# Patient Record
Sex: MALE | Race: BLACK OR AFRICAN AMERICAN | NOT HISPANIC OR LATINO | Employment: OTHER | URBAN - METROPOLITAN AREA
[De-identification: names, ages, dates, MRNs, and addresses within clinical notes are randomized per-mention and may not be internally consistent; named-entity substitution may affect disease eponyms.]

---

## 2021-04-12 ENCOUNTER — OFFICE VISIT (OUTPATIENT)
Dept: URGENT CARE | Facility: CLINIC | Age: 86
End: 2021-04-12
Payer: MEDICARE

## 2021-04-12 VITALS
SYSTOLIC BLOOD PRESSURE: 170 MMHG | DIASTOLIC BLOOD PRESSURE: 84 MMHG | HEART RATE: 71 BPM | OXYGEN SATURATION: 95 % | TEMPERATURE: 98 F | RESPIRATION RATE: 18 BRPM

## 2021-04-12 DIAGNOSIS — G56.03 BILATERAL CARPAL TUNNEL SYNDROME: Primary | ICD-10-CM

## 2021-04-12 DIAGNOSIS — R20.0 BILATERAL HAND NUMBNESS: ICD-10-CM

## 2021-04-12 DIAGNOSIS — R03.0 ELEVATED BLOOD PRESSURE READING: ICD-10-CM

## 2021-04-12 DIAGNOSIS — Z76.89 ENCOUNTER TO ESTABLISH CARE: ICD-10-CM

## 2021-04-12 PROCEDURE — 99203 OFFICE O/P NEW LOW 30 MIN: CPT | Mod: S$GLB,,, | Performed by: NURSE PRACTITIONER

## 2021-04-12 PROCEDURE — 99203 PR OFFICE/OUTPT VISIT, NEW, LEVL III, 30-44 MIN: ICD-10-PCS | Mod: S$GLB,,, | Performed by: NURSE PRACTITIONER

## 2021-04-12 RX ORDER — DICLOFENAC SODIUM 10 MG/G
2 GEL TOPICAL DAILY
Qty: 20 G | Refills: 0 | Status: SHIPPED | OUTPATIENT
Start: 2021-04-12 | End: 2022-11-07 | Stop reason: SDUPTHER

## 2021-04-13 ENCOUNTER — TELEPHONE (OUTPATIENT)
Dept: URGENT CARE | Facility: CLINIC | Age: 86
End: 2021-04-13

## 2021-09-28 ENCOUNTER — OFFICE VISIT (OUTPATIENT)
Dept: URGENT CARE | Facility: CLINIC | Age: 86
End: 2021-09-28
Payer: COMMERCIAL

## 2021-09-28 VITALS
TEMPERATURE: 98 F | HEART RATE: 57 BPM | SYSTOLIC BLOOD PRESSURE: 165 MMHG | OXYGEN SATURATION: 95 % | DIASTOLIC BLOOD PRESSURE: 87 MMHG | RESPIRATION RATE: 18 BRPM

## 2021-09-28 DIAGNOSIS — R20.2 PARESTHESIAS: ICD-10-CM

## 2021-09-28 DIAGNOSIS — S22.060A COMPRESSION FRACTURE OF T7 VERTEBRA, INITIAL ENCOUNTER: Primary | ICD-10-CM

## 2021-09-28 DIAGNOSIS — J30.9 ALLERGIC RHINITIS, UNSPECIFIED SEASONALITY, UNSPECIFIED TRIGGER: ICD-10-CM

## 2021-09-28 DIAGNOSIS — S39.92XA INJURY OF BACK, INITIAL ENCOUNTER: ICD-10-CM

## 2021-09-28 PROCEDURE — 1159F PR MEDICATION LIST DOCUMENTED IN MEDICAL RECORD: ICD-10-PCS | Mod: CPTII,S$GLB,, | Performed by: PHYSICIAN ASSISTANT

## 2021-09-28 PROCEDURE — 1125F PR PAIN SEVERITY QUANTIFIED, PAIN PRESENT: ICD-10-PCS | Mod: CPTII,S$GLB,, | Performed by: PHYSICIAN ASSISTANT

## 2021-09-28 PROCEDURE — 1159F MED LIST DOCD IN RCRD: CPT | Mod: CPTII,S$GLB,, | Performed by: PHYSICIAN ASSISTANT

## 2021-09-28 PROCEDURE — 72070 X-RAY EXAM THORAC SPINE 2VWS: CPT | Mod: S$GLB,,, | Performed by: RADIOLOGY

## 2021-09-28 PROCEDURE — 1160F PR REVIEW ALL MEDS BY PRESCRIBER/CLIN PHARMACIST DOCUMENTED: ICD-10-PCS | Mod: CPTII,S$GLB,, | Performed by: PHYSICIAN ASSISTANT

## 2021-09-28 PROCEDURE — 99214 OFFICE O/P EST MOD 30 MIN: CPT | Mod: S$GLB,,, | Performed by: PHYSICIAN ASSISTANT

## 2021-09-28 PROCEDURE — 1160F RVW MEDS BY RX/DR IN RCRD: CPT | Mod: CPTII,S$GLB,, | Performed by: PHYSICIAN ASSISTANT

## 2021-09-28 PROCEDURE — 99214 PR OFFICE/OUTPT VISIT, EST, LEVL IV, 30-39 MIN: ICD-10-PCS | Mod: S$GLB,,, | Performed by: PHYSICIAN ASSISTANT

## 2021-09-28 PROCEDURE — 72070 XR THORACIC SPINE AP LATERAL: ICD-10-PCS | Mod: S$GLB,,, | Performed by: RADIOLOGY

## 2021-09-28 PROCEDURE — 1125F AMNT PAIN NOTED PAIN PRSNT: CPT | Mod: CPTII,S$GLB,, | Performed by: PHYSICIAN ASSISTANT

## 2021-09-28 RX ORDER — METHOCARBAMOL 500 MG/1
1000 TABLET, FILM COATED ORAL 3 TIMES DAILY
Qty: 30 TABLET | Refills: 0 | Status: SHIPPED | OUTPATIENT
Start: 2021-09-28 | End: 2021-10-03

## 2022-04-26 ENCOUNTER — OFFICE VISIT (OUTPATIENT)
Dept: INTERNAL MEDICINE | Facility: CLINIC | Age: 87
End: 2022-04-26
Payer: COMMERCIAL

## 2022-04-26 ENCOUNTER — HOSPITAL ENCOUNTER (OUTPATIENT)
Dept: RADIOLOGY | Facility: HOSPITAL | Age: 87
Discharge: HOME | End: 2022-04-26
Attending: STUDENT IN AN ORGANIZED HEALTH CARE EDUCATION/TRAINING PROGRAM
Payer: MEDICARE

## 2022-04-26 VITALS
HEIGHT: 67 IN | WEIGHT: 130.31 LBS | BODY MASS INDEX: 20.45 KG/M2 | DIASTOLIC BLOOD PRESSURE: 68 MMHG | OXYGEN SATURATION: 98 % | HEART RATE: 89 BPM | SYSTOLIC BLOOD PRESSURE: 130 MMHG

## 2022-04-26 DIAGNOSIS — G56.00 CARPAL TUNNEL SYNDROME, UNSPECIFIED LATERALITY: ICD-10-CM

## 2022-04-26 DIAGNOSIS — H53.9 VISION CHANGES: ICD-10-CM

## 2022-04-26 DIAGNOSIS — G56.00 CARPAL TUNNEL SYNDROME, UNSPECIFIED LATERALITY: Primary | ICD-10-CM

## 2022-04-26 PROCEDURE — 99999 PR PBB SHADOW E&M-EST. PATIENT-LVL III: ICD-10-PCS | Mod: PBBFAC,GC,, | Performed by: STUDENT IN AN ORGANIZED HEALTH CARE EDUCATION/TRAINING PROGRAM

## 2022-04-26 PROCEDURE — 99213 PR OFFICE/OUTPT VISIT, EST, LEVL III, 20-29 MIN: ICD-10-PCS | Mod: HCNC,S$GLB,, | Performed by: STUDENT IN AN ORGANIZED HEALTH CARE EDUCATION/TRAINING PROGRAM

## 2022-04-26 PROCEDURE — 72040 X-RAY EXAM NECK SPINE 2-3 VW: CPT | Mod: 26,,, | Performed by: RADIOLOGY

## 2022-04-26 PROCEDURE — 99999 PR PBB SHADOW E&M-EST. PATIENT-LVL III: CPT | Mod: PBBFAC,GC,, | Performed by: STUDENT IN AN ORGANIZED HEALTH CARE EDUCATION/TRAINING PROGRAM

## 2022-04-26 PROCEDURE — 72040 XR CERVICAL SPINE AP LATERAL: ICD-10-PCS | Mod: 26,,, | Performed by: RADIOLOGY

## 2022-04-26 PROCEDURE — 72040 X-RAY EXAM NECK SPINE 2-3 VW: CPT | Mod: TC

## 2022-04-26 PROCEDURE — 99213 OFFICE O/P EST LOW 20 MIN: CPT | Mod: HCNC,S$GLB,, | Performed by: STUDENT IN AN ORGANIZED HEALTH CARE EDUCATION/TRAINING PROGRAM

## 2022-04-26 NOTE — PATIENT INSTRUCTIONS
Plan:  - EMG ordered  - xray of neck ordered  - optometry referral placed  - start wearing wrist splints at night  - follow up in 6 weeks

## 2022-04-26 NOTE — PROGRESS NOTES
"RESIDENT CLINIC PROGRESS NOTE    Name: Moses Peng  : 1935  Date of Service: 2022   PCP: Primary Doctor No    Reason for visit:   Chief Complaint   Patient presents with    Numbness     Pt states that he has been having numbness in both hands for a few years. Pt states that he has had 4 falls and hit his head and hurt his ribs about 2 weeks ago and pt states that he is still in pain       HPI: Moses Peng is a 86 y.o. male who presents to clinic for bilateral hand pain. Patient describes several year of numbness and painful intermittent tingling of both his hands. He states that his pain is worse at night. Patient works as a  and notes that pain can be bad when he is working. He notes that he has reduced  strength. Additionally, patient states that he has had multiple falls over the last several weeks. Most notably, 1 week ago he fell after "losing balance" and hit the back of his head. He states that he did not lose consciousness at this time and was not down for a long period. He lives alone. He denies any light headedness.     Medications:   Current Outpatient Medications:     diclofenac sodium (VOLTAREN) 1 % Gel, Apply 2 g topically once daily. As needed for hand pain or numbness., Disp: 20 g, Rfl: 0     Review of Systems:   Review of Systems   Constitutional: Positive for weight loss. Negative for chills, diaphoresis and fever.   HENT: Negative for congestion, hearing loss, sinus pain, sore throat and tinnitus.    Eyes: Negative for blurred vision, pain, discharge and redness.   Respiratory: Negative for cough, hemoptysis, sputum production, shortness of breath and wheezing.    Cardiovascular: Negative for chest pain, palpitations, orthopnea, claudication and leg swelling.   Gastrointestinal: Negative for abdominal pain, blood in stool, constipation, diarrhea, heartburn, melena, nausea and vomiting.   Genitourinary: Negative for dysuria, frequency and urgency.   Musculoskeletal: " "Positive for falls and neck pain. Negative for back pain, joint pain and myalgias.   Skin: Negative for itching and rash.   Neurological: Positive for tingling. Negative for dizziness, tremors, speech change, focal weakness, seizures, weakness and headaches.   Endo/Heme/Allergies: Negative for environmental allergies and polydipsia. Does not bruise/bleed easily.       Vitals:   Vitals:    04/26/22 1431   BP: 130/68   BP Location: Right arm   Patient Position: Sitting   BP Method: Medium (Manual)   Pulse: 89   SpO2: 98%   Weight: 59.1 kg (130 lb 4.7 oz)   Height: 5' 7" (1.702 m)     Body mass index is 20.41 kg/m².    Physical Exam:   Physical Exam  Constitutional:       General: He is not in acute distress.     Appearance: Normal appearance. He is normal weight. He is not ill-appearing, toxic-appearing or diaphoretic.   Eyes:      General: No scleral icterus.        Right eye: No discharge.         Left eye: No discharge.   Cardiovascular:      Rate and Rhythm: Normal rate and regular rhythm.      Pulses: Normal pulses.      Heart sounds: Normal heart sounds. No murmur heard.    No friction rub. No gallop.   Pulmonary:      Effort: Pulmonary effort is normal. No respiratory distress.      Breath sounds: Normal breath sounds. No stridor. No wheezing, rhonchi or rales.   Chest:      Chest wall: No tenderness.   Abdominal:      General: Abdomen is flat. Bowel sounds are normal. There is no distension.      Palpations: Abdomen is soft. There is no mass.      Tenderness: There is no abdominal tenderness. There is no guarding.   Musculoskeletal:      Comments: Reduced ROM of neck to left, limited by pain    Positive bilateral phalen, could not elicit tinel. Reduced  strength bilaterally   Skin:     General: Skin is warm and dry.      Coloration: Skin is not jaundiced.   Neurological:      General: No focal deficit present.      Mental Status: He is alert and oriented to person, place, and time.      Coordination: " Coordination normal.      Gait: Gait normal.      Deep Tendon Reflexes: Reflexes normal.         Labs: Previous labs reviewed.    Imaging: Previous imaging reviewed.     Assessment/Plan:   Carpal tunnel syndrome, unspecified laterality  Symptoms consistent with carpal tunnel syndrome. Patient states he has not yet trialed wrist splints. Will plan to start using them at night and avoid repetitive activities if possible. EMG ordered. Will also ordered neck film to rule out cervical pathology, particularly given recent fall.  -     EMG W/ ULTRASOUND AND NERVE CONDUCTION TEST 2 Extremities; Future  -     X-Ray Cervical Spine AP And Lateral; Future; Expected date: 04/26/2022    Vision changes  Glasses recently broke  -     Ambulatory referral/consult to Optometry; Future; Expected date: 05/03/2022    Follow up in 6 weeks.    Discussed with Dr. Oliveira    I have discussed A/P with DR Florentino and agree with plan of action.  Toribio Centeno.

## 2022-04-28 ENCOUNTER — TELEPHONE (OUTPATIENT)
Dept: INTERNAL MEDICINE | Facility: CLINIC | Age: 87
End: 2022-04-28
Payer: MEDICARE

## 2022-04-28 NOTE — TELEPHONE ENCOUNTER
----- Message from Mary Graham sent at 4/28/2022  9:52 AM CDT -----  Contact: Shaan (daughter) [phone]  GENERAL CALL BACK    Patient's daughter Shaan called indicating she was under the impression that patient was supposed to have some medications called in for his hand numbness and wrist pain? Their pharmacy did not have any prescription information. She'd like them called into:     [id number] - [place] - [address], [phone]    Shaan would like a call back once done at [phone]

## 2022-06-17 ENCOUNTER — TELEPHONE (OUTPATIENT)
Dept: INTERNAL MEDICINE | Facility: CLINIC | Age: 87
End: 2022-06-17
Payer: MEDICARE

## 2022-06-17 NOTE — TELEPHONE ENCOUNTER
----- Message from Adeoladilcia Fatima sent at 2022  2:59 PM CDT -----  Contact: or [phone]  Cc: Dariel Florentino MD  What orders is pt asking for?: Carpal tunnel syndrome, unspecified laterality    Is there a future appointment with the provider?:no     When?:    Comments?: patient order  and would like a new order to be place. Thank you

## 2022-11-07 ENCOUNTER — HOSPITAL ENCOUNTER (EMERGENCY)
Facility: HOSPITAL | Age: 87
Discharge: HOME | End: 2022-11-07
Attending: EMERGENCY MEDICINE
Payer: MEDICARE

## 2022-11-07 VITALS
WEIGHT: 135 LBS | TEMPERATURE: 98 F | OXYGEN SATURATION: 100 % | DIASTOLIC BLOOD PRESSURE: 91 MMHG | HEART RATE: 63 BPM | RESPIRATION RATE: 18 BRPM | SYSTOLIC BLOOD PRESSURE: 204 MMHG | BODY MASS INDEX: 21.14 KG/M2

## 2022-11-07 DIAGNOSIS — M71.22 SYNOVIAL CYST OF LEFT POPLITEAL SPACE: Primary | ICD-10-CM

## 2022-11-07 DIAGNOSIS — R60.9 EDEMA: ICD-10-CM

## 2022-11-07 DIAGNOSIS — M71.21 BAKER CYST, RIGHT: ICD-10-CM

## 2022-11-07 DIAGNOSIS — R20.0 BILATERAL HAND NUMBNESS: ICD-10-CM

## 2022-11-07 DIAGNOSIS — R06.02 SHORTNESS OF BREATH: ICD-10-CM

## 2022-11-07 DIAGNOSIS — R60.0 PERIPHERAL EDEMA: ICD-10-CM

## 2022-11-07 DIAGNOSIS — G56.03 CARPAL TUNNEL SYNDROME ON BOTH SIDES: ICD-10-CM

## 2022-11-07 LAB
ALBUMIN SERPL-MCNC: 3 G/DL (ref 3.3–5.5)
ALP SERPL-CCNC: 81 U/L (ref 42–141)
BILIRUB SERPL-MCNC: 0.5 MG/DL (ref 0.2–1.6)
BUN SERPL-MCNC: 17 MG/DL (ref 7–22)
CALCIUM SERPL-MCNC: 9 MG/DL (ref 8–10.3)
CHLORIDE SERPL-SCNC: 106 MMOL/L (ref 98–108)
CREAT SERPL-MCNC: 1.2 MG/DL (ref 0.6–1.2)
GLUCOSE SERPL-MCNC: 90 MG/DL (ref 73–118)
POC ALT (SGPT): 22 U/L (ref 10–47)
POC AST (SGOT): 36 U/L (ref 11–38)
POC B-TYPE NATRIURETIC PEPTIDE: 262 PG/ML (ref 0–100)
POC CARDIAC TROPONIN I: 0.01 NG/ML
POC TCO2: 28 MMOL/L (ref 18–33)
POTASSIUM BLD-SCNC: 4.4 MMOL/L (ref 3.6–5.1)
PROTEIN, POC: 6.8 G/DL (ref 6.4–8.1)
SAMPLE: NORMAL
SODIUM BLD-SCNC: 141 MMOL/L (ref 128–145)

## 2022-11-07 PROCEDURE — 83880 ASSAY OF NATRIURETIC PEPTIDE: CPT | Mod: ER

## 2022-11-07 PROCEDURE — 84484 ASSAY OF TROPONIN QUANT: CPT | Mod: ER

## 2022-11-07 PROCEDURE — 93010 ELECTROCARDIOGRAM REPORT: CPT | Mod: ,,, | Performed by: INTERNAL MEDICINE

## 2022-11-07 PROCEDURE — 36000 PLACE NEEDLE IN VEIN: CPT | Mod: ER

## 2022-11-07 PROCEDURE — 93005 ELECTROCARDIOGRAM TRACING: CPT | Mod: ER

## 2022-11-07 PROCEDURE — 99285 EMERGENCY DEPT VISIT HI MDM: CPT | Mod: 25,ER

## 2022-11-07 PROCEDURE — 80053 COMPREHEN METABOLIC PANEL: CPT | Mod: ER

## 2022-11-07 PROCEDURE — 93010 EKG 12-LEAD: ICD-10-PCS | Mod: ,,, | Performed by: INTERNAL MEDICINE

## 2022-11-07 PROCEDURE — 85025 COMPLETE CBC W/AUTO DIFF WBC: CPT | Mod: ER

## 2022-11-07 RX ORDER — DICLOFENAC SODIUM 10 MG/G
2 GEL TOPICAL DAILY
Qty: 20 G | Refills: 0 | Status: SHIPPED | OUTPATIENT
Start: 2022-11-07

## 2022-11-07 RX ORDER — HYDROCHLOROTHIAZIDE 12.5 MG/1
12.5 TABLET ORAL DAILY
Qty: 30 TABLET | Refills: 11 | OUTPATIENT
Start: 2022-11-07 | End: 2023-12-28

## 2022-11-07 NOTE — ED PROVIDER NOTES
"Encounter Date: 11/7/2022    SCRIBE #1 NOTE: I, Natty Barba, am scribing for, and in the presence of,  Mary Strange MD. I have scribed the following portions of the note - Other sections scribed: HPI, ROS, PE.     History     Chief Complaint   Patient presents with    Leg Pain    hand numbness     Pt states," My right leg has been swollen for a month and my hands are numb foir the last year."     87 y.o. male with Hx of Asthma and Carpal tunnel syndrome who presents to the ED with multiple complaints including bilateral leg swelling for 2-4 weeks, frequent urination (every 30 minutes) for 2-4 weeks, and numbness to the bilateral hands for 1 year. Patient works outside cutting grass. Per chart review, patient has previously been seen for hand numbness and evaluated for carpal tunnel syndrome. He says the medications prescribed are not helping his symptoms. Denies history of DM or neuropathy. Denies shortness of breath. Patient smokes cigars occasionally.     The history is provided by the patient. No  was used.   Review of patient's allergies indicates:  No Known Allergies  No past medical history on file.  No past surgical history on file.  No family history on file.  Social History     Tobacco Use    Smoking status: Every Day    Smokeless tobacco: Never     Review of Systems   Constitutional:  Negative for fatigue and fever.   HENT:  Negative for facial swelling.    Eyes:  Negative for pain.   Respiratory:  Negative for shortness of breath.    Cardiovascular:  Positive for leg swelling. Negative for chest pain.   Gastrointestinal:  Negative for abdominal pain, nausea and vomiting.   Genitourinary:  Positive for frequency. Negative for dysuria.   Musculoskeletal:  Negative for back pain.   Skin:  Negative for color change.   Neurological:  Positive for numbness. Negative for headaches.   Hematological:  Does not bruise/bleed easily.   Psychiatric/Behavioral:  Negative for behavioral problems.  "   All other systems reviewed and are negative.    Physical Exam     Initial Vitals [22 1042]   BP Pulse Resp Temp SpO2   (!) 177/94 81 16 98 °F (36.7 °C) 99 %      MAP       --         Physical Exam    Nursing note and vitals reviewed.  Constitutional: He appears well-developed and well-nourished.   HENT:   Head: Normocephalic and atraumatic.   Eyes: EOM are normal. Pupils are equal, round, and reactive to light.   Neck: Neck supple.   Normal range of motion.  Cardiovascular:  Normal rate and regular rhythm.           Pulmonary/Chest: He has wheezes.   Scattered wheezes.   Abdominal: Abdomen is soft. There is no rebound and no guarding.   Musculoskeletal:         General: Normal range of motion.      Cervical back: Normal range of motion and neck supple.      Right lower le+ Edema present.      Left lower leg: Edema (trace) present.     Neurological: He is alert and oriented to person, place, and time. No cranial nerve deficit.   Skin: Skin is warm and dry.   Psychiatric: He has a normal mood and affect.       ED Course   Procedures  Labs Reviewed   POCT B-TYPE NATRIURETIC PEPTIDE (BNP) - Abnormal; Notable for the following components:       Result Value    POC B-Type Natriuretic Peptide 262 (*)     All other components within normal limits   TROPONIN ISTAT   POCT CBC   POCT CMP   POCT B-TYPE NATRIURETIC PEPTIDE (BNP)   POCT TROPONIN   POCT CMP       EKG Readings: (Independently Interpreted)   Initial Reading: No STEMI. Rhythm: Normal Sinus Rhythm. Heart Rate: 64. Ectopy: No Ectopy.     Imaging Results              X-Ray Chest PA And Lateral (Final result)  Result time 22 13:42:46      Final result by Jose Alejandro Tan MD (22 13:42:46)                   Impression:      No radiographic evidence of pneumonia or other source of shortness of breath, noting that early/mild viral pneumonia or nonspecific bronchitis may be radiographically occult.    Slight interval progression of a chronic appearing  moderate to severe anterior wedge compression deformity of a midthoracic vertebral body.      Electronically signed by: Jose Alejandro Tan MD  Date:    11/07/2022  Time:    13:42               Narrative:    EXAMINATION:  XR CHEST PA AND LATERAL    CLINICAL HISTORY:  Shortness of breath    TECHNIQUE:  PA and lateral views of the chest were performed.    COMPARISON:  Thoracic spine series 09/28/2021    FINDINGS:  Monitoring leads overlie the chest.  Relatively symmetric nipple shadows project over both lung bases on the frontal view.    Cardiomediastinal silhouette is midline and within normal limits for age noting calcification of the arch.  Pulmonary vasculature and hilar contours are within normal limits.    Few scattered linear opacities throughout the lungs consistent with minimal platelike scarring versus atelectasis.  The lungs are otherwise symmetrically well expanded without consolidation, pleural effusion or pneumothorax.    Moderate to severe anterior wedge compression deformity of a midthoracic vertebral body, slightly progressed from prior, but no definite radiolucency and likely chronic.  Remaining osseous structures appear stable without acute process seen.                                       US Lower Extremity Veins Bilateral (Final result)  Result time 11/07/22 13:46:05      Final result by Jose Alejandro Tan MD (11/07/22 13:46:05)                   Impression:      No evidence of deep venous thrombosis in either lower extremity.    Suspected bilateral Baker's cysts, right larger than left.      Electronically signed by: Jose Alejandro Tan MD  Date:    11/07/2022  Time:    13:46               Narrative:    EXAMINATION:  US LOWER EXTREMITY VEINS BILATERAL    CLINICAL HISTORY:  Edema, unspecified    TECHNIQUE:  Duplex and color flow Doppler and dynamic compression was performed of the bilateral lower extremity veins was performed.    COMPARISON:  None    FINDINGS:  Right thigh veins: The common femoral, femoral,  popliteal, upper greater saphenous, and deep femoral veins are patent and free of thrombus. The veins are normally compressible and have normal phasic flow and augmentation response.    Right calf veins: The visualized calf veins are patent.    Left thigh veins: The common femoral, femoral, popliteal, upper greater saphenous, and deep femoral veins are patent and free of thrombus. The veins are normally compressible and have normal phasic flow and augmentation response.    Left calf veins: The visualized calf veins are patent.    Miscellaneous: At the right popliteal fossa there is a 4 x 1.8 x 3.7 cm nonvascular hypoechoic collection.  At the left popliteal fossa there is a 2 x 0.6 x 1 cm nonvascular hypoechoic collection.  Diffuse mild nonspecific subcutaneous edema bilaterally.                                       Medications - No data to display  Medical Decision Making:   History:   Old Medical Records: I decided to obtain old medical records.  Initial Assessment:   This is an emergent evaluation of a 87 yr with leg swelling and hand paresthesias.   Differential Diagnosis:   DVT, chf, peripheral edema, carpal tunnel, amongst others.   Independently Interpreted Test(s):   I have ordered and independently interpreted EKG Reading(s) - see prior notes  Clinical Tests:   Lab Tests: Ordered and Reviewed  ED Management:  On exam, no acute distress, scattered wheezes on lung exam. Heart sounds wnl. +mild edema to bilateral lower extremities. Will obtain ultrasound and labs.   strength normal bilaterally.  Review of patient's chart reveals he has a history of carpal tunnel syndrome for which he has been evaluated by his primary care physician as well as a reported outpatient orthopedist who discussed potential surgery with him.  With regards to his hand complaint, I will refill his Voltaren gel and refer him back to his primary care physician.  Dispo is pending.     Mary Strange MD  1:34 PM  11/7/2022    Labs and  imaging returned.  He has bilateral Baker cysts and a mild elevation in his BNP at 262 but otherwise no acute findings.  Chest x-ray without significant pulmonary vascular congestion per Radiology interpretation.  Will discharge this patient home with instructions to follow with Dr. Gauman. Return precautions given.     Mary Strange MD  2:59 PM  11/7/2022              Scribe Attestation:   Scribe #1: I performed the above scribed service and the documentation accurately describes the services I performed. I attest to the accuracy of the note.      ED Course as of 11/07/22 0913   Mon Nov 07, 2022   1346 EKG: Normal sinus rhythm, 64 beats per minute, , no STEMI. [EM]      ED Course User Index  [EM] Mary Strange MD               I, Mary Strange MD, personally performed the services described in the documentation.  All medical records entries made by the scribe were made at my direction and in my presence.  I have reviewed the chart and agree that the record reflects my personal performance and is accurate and complete.   Clinical Impression:   Final diagnoses:  [R60.9] Edema  [R06.02] Shortness of breath  [M71.22] Synovial cyst of left popliteal space (Primary)  [M71.21] Baker cyst, right  [R60.9] Peripheral edema  [G56.03] Carpal tunnel syndrome on both sides      ED Disposition Condition    Discharge Stable          ED Prescriptions       Medication Sig Dispense Start Date End Date Auth. Provider    diclofenac sodium (VOLTAREN) 1 % Gel Apply 2 g topically once daily. As needed for hand pain or numbness. 20 g 11/7/2022 -- Mary Strange MD          Follow-up Information       Follow up With Specialties Details Why Contact Info    Milan Guaman MD Internal Medicine, Oncology, Hematology and Oncology   [address]  [address]  [place] [address]  [phone]               Mary Strange MD  11/07/22 6596

## 2022-11-17 ENCOUNTER — PES CALL (OUTPATIENT)
Dept: ADMINISTRATIVE | Facility: CLINIC | Age: 87
End: 2022-11-17
Payer: MEDICARE

## 2023-12-28 ENCOUNTER — HOSPITAL ENCOUNTER (EMERGENCY)
Facility: HOSPITAL | Age: 88
Discharge: HOME | End: 2023-12-28
Attending: STUDENT IN AN ORGANIZED HEALTH CARE EDUCATION/TRAINING PROGRAM
Payer: MEDICARE

## 2023-12-28 VITALS
HEART RATE: 80 BPM | TEMPERATURE: 98 F | WEIGHT: 135 LBS | RESPIRATION RATE: 16 BRPM | OXYGEN SATURATION: 98 % | BODY MASS INDEX: 21.14 KG/M2 | SYSTOLIC BLOOD PRESSURE: 217 MMHG | DIASTOLIC BLOOD PRESSURE: 104 MMHG

## 2023-12-28 DIAGNOSIS — I10 HYPERTENSION: ICD-10-CM

## 2023-12-28 DIAGNOSIS — R07.89 CHEST DISCOMFORT: ICD-10-CM

## 2023-12-28 DIAGNOSIS — R07.81 RIB PAIN: ICD-10-CM

## 2023-12-28 LAB
ALBUMIN SERPL BCP-MCNC: 3.3 G/DL (ref 3.5–5.2)
ALP SERPL-CCNC: 99 U/L (ref 55–135)
ALT SERPL W/O P-5'-P-CCNC: 26 U/L (ref 10–44)
ANION GAP SERPL CALC-SCNC: 8 MMOL/L (ref 8–16)
AST SERPL-CCNC: 52 U/L (ref 10–40)
BASOPHILS # BLD AUTO: 0.02 K/UL (ref 0–0.2)
BASOPHILS NFR BLD: 0.4 % (ref 0–1.9)
BILIRUB SERPL-MCNC: 0.5 MG/DL (ref 0.1–1)
BUN SERPL-MCNC: 18 MG/DL (ref 8–23)
CALCIUM SERPL-MCNC: 8.3 MG/DL (ref 8.7–10.5)
CHLORIDE SERPL-SCNC: 110 MMOL/L (ref 95–110)
CO2 SERPL-SCNC: 23 MMOL/L (ref 23–29)
CREAT SERPL-MCNC: 1.2 MG/DL (ref 0.5–1.4)
DIFFERENTIAL METHOD BLD: ABNORMAL
EOSINOPHIL # BLD AUTO: 0 K/UL (ref 0–0.5)
EOSINOPHIL NFR BLD: 0.7 % (ref 0–8)
ERYTHROCYTE [DISTWIDTH] IN BLOOD BY AUTOMATED COUNT: 14 % (ref 11.5–14.5)
EST. GFR  (NO RACE VARIABLE): 58.2 ML/MIN/1.73 M^2
GLUCOSE SERPL-MCNC: 81 MG/DL (ref 70–110)
HCT VFR BLD AUTO: 34.5 % (ref 40–54)
HGB BLD-MCNC: 12 G/DL (ref 14–18)
IMM GRANULOCYTES # BLD AUTO: 0.01 K/UL (ref 0–0.04)
IMM GRANULOCYTES NFR BLD AUTO: 0.2 % (ref 0–0.5)
LYMPHOCYTES # BLD AUTO: 1.2 K/UL (ref 1–4.8)
LYMPHOCYTES NFR BLD: 21.8 % (ref 18–48)
MCH RBC QN AUTO: 30.5 PG (ref 27–31)
MCHC RBC AUTO-ENTMCNC: 34.8 G/DL (ref 32–36)
MCV RBC AUTO: 88 FL (ref 82–98)
MONOCYTES # BLD AUTO: 0.3 K/UL (ref 0.3–1)
MONOCYTES NFR BLD: 5 % (ref 4–15)
NEUTROPHILS # BLD AUTO: 3.9 K/UL (ref 1.8–7.7)
NEUTROPHILS NFR BLD: 71.9 % (ref 38–73)
NRBC BLD-RTO: 0 /100 WBC
PLATELET # BLD AUTO: 227 K/UL (ref 150–450)
PMV BLD AUTO: 9.6 FL (ref 9.2–12.9)
POTASSIUM SERPL-SCNC: 3.9 MMOL/L (ref 3.5–5.1)
PROT SERPL-MCNC: 6.9 G/DL (ref 6–8.4)
RBC # BLD AUTO: 3.94 M/UL (ref 4.6–6.2)
SODIUM SERPL-SCNC: 141 MMOL/L (ref 136–145)
WBC # BLD AUTO: 5.45 K/UL (ref 3.9–12.7)

## 2023-12-28 PROCEDURE — 93005 ELECTROCARDIOGRAM TRACING: CPT

## 2023-12-28 PROCEDURE — 99285 EMERGENCY DEPT VISIT HI MDM: CPT | Mod: 25

## 2023-12-28 PROCEDURE — 80053 COMPREHEN METABOLIC PANEL: CPT | Performed by: PHYSICIAN ASSISTANT

## 2023-12-28 PROCEDURE — 85025 COMPLETE CBC W/AUTO DIFF WBC: CPT | Performed by: PHYSICIAN ASSISTANT

## 2023-12-28 PROCEDURE — 93010 ELECTROCARDIOGRAM REPORT: CPT | Mod: ,,, | Performed by: INTERNAL MEDICINE

## 2023-12-28 PROCEDURE — 25000003 PHARM REV CODE 250

## 2023-12-28 PROCEDURE — 25000003 PHARM REV CODE 250: Performed by: STUDENT IN AN ORGANIZED HEALTH CARE EDUCATION/TRAINING PROGRAM

## 2023-12-28 RX ORDER — LIDOCAINE 50 MG/G
1 PATCH TOPICAL
Status: DISCONTINUED | OUTPATIENT
Start: 2023-12-28 | End: 2023-12-28 | Stop reason: HOSPADM

## 2023-12-28 RX ORDER — ACETAMINOPHEN 500 MG
1000 TABLET ORAL
Status: COMPLETED | OUTPATIENT
Start: 2023-12-28 | End: 2023-12-28

## 2023-12-28 RX ORDER — MORPHINE SULFATE 4 MG/ML
4 INJECTION, SOLUTION INTRAMUSCULAR; INTRAVENOUS
Status: DISCONTINUED | OUTPATIENT
Start: 2023-12-28 | End: 2023-12-28 | Stop reason: HOSPADM

## 2023-12-28 RX ORDER — HYDROCHLOROTHIAZIDE 12.5 MG/1
12.5 TABLET ORAL ONCE
Status: COMPLETED | OUTPATIENT
Start: 2023-12-28 | End: 2023-12-28

## 2023-12-28 RX ORDER — HYDROCHLOROTHIAZIDE 12.5 MG/1
12.5 TABLET ORAL DAILY
Qty: 30 TABLET | Refills: 11 | Status: SHIPPED | OUTPATIENT
Start: 2023-12-28 | End: 2024-12-27

## 2023-12-28 RX ADMIN — ACETAMINOPHEN 1000 MG: 500 TABLET ORAL at 01:12

## 2023-12-28 RX ADMIN — LIDOCAINE 1 PATCH: 700 PATCH TOPICAL at 01:12

## 2023-12-28 RX ADMIN — HYDROCHLOROTHIAZIDE 12.5 MG: 12.5 TABLET ORAL at 01:12

## 2023-12-28 NOTE — ED TRIAGE NOTES
89 y/o M presents to ER with c/c R chest wall pain secondary fall on Sunday. Pain rated 9/10, endorses mechanical fall but denies SOB, N/V/D, fevers and chills.

## 2023-12-28 NOTE — ED NOTES
Patient identifiers for Moses Peng 88 y.o. male checked and correct.  Chief Complaint   Patient presents with    Fall     Fell Sunday night on right side; pain to right side of chest wall; worse with movement; denies head injury or blood thinners     No past medical history on file.  Allergies reported: Review of patient's allergies indicates:  No Known Allergies      LOC: Patient is awake, alert, and aware of environment with an appropriate affect. Patient is oriented x 4 and speaking appropriately.  APPEARANCE: Patient resting comfortably and in no acute distress. Patient is clean and well groomed, patient's clothing is properly fastened.  HEENT: - JVD, + midline trach  SKIN: The skin is warm and dry. Patient has normal skin turgor and moist mucus membranes.   MUSKULOSKELETAL: Patient is moving all extremities well, no obvious deformities noted. Pulses intact. PMS x 4.  RESPIRATORY: Airway is open and patent. Respirations are spontaneous and non-labored with normal effort and rate. = CBBS  CARDIAC: Patient has a normal rate and rhythm. 75 on cardiac monitor. No peripheral edema noted. Endorses R midclavicular c/p rated 9/10.  ABDOMEN: No distention noted. Soft and non-tender upon palpation.  NEUROLOGICAL: pupils 4 mm, PERRL. Facial expression is symmetrical. Hand grasps are equal bilaterally. Normal sensation in all extremities when touched with finger.        
Pt states he would like to go home, Dr. Pena notified.  
special scrub at home x3 days starting 10/3, 10/4 and 10/5 per MD order

## 2023-12-28 NOTE — FIRST PROVIDER EVALUATION
Emergency Department TeleTriage Encounter Note      CHIEF COMPLAINT    Chief Complaint   Patient presents with    Fall     Fell Sunday night on right side; pain to right side of chest wall; worse with movement; denies head injury or blood thinners       VITAL SIGNS   Initial Vitals [12/28/23 1127]   BP Pulse Resp Temp SpO2   (!) 226/102 97 16 98.3 °F (36.8 °C) 100 %      MAP       --            ALLERGIES    Review of patient's allergies indicates:  No Known Allergies    PROVIDER TRIAGE NOTE  Patient presents with complaint of right rib pain after a fall.  Also reports hitting the back of his head on the right side.  Denies loss of consciousness.  Denies nausea or vomiting.  Denies visual changes.  Denies history of hypertension.  States he does not check his blood pressure at home.      Phy:   Constitutional: well nourished, well developed, appearing stated age, NAD        Initial orders will be placed and care will be transferred to an alternate provider when patient is roomed for a full evaluation. Any additional orders and the final disposition will be determined by that provider.        ORDERS  Labs Reviewed - No data to display    ED Orders (720h ago, onward)      Start Ordered     Status Ordering Provider    12/28/23 1134 12/28/23 1133  EKG 12-lead  Once         Completed by DOMENICO HUNT on 12/28/2023 at 11:39 AM FELIBERTO TIMMONS              Virtual Visit Note: The provider triage portion of this emergency department evaluation and documentation was performed via [employer], a HIPAA-compliant telemedicine application, in concert with a tele-presenter in the room. A face to face patient evaluation with one of my colleagues will occur once the patient is placed in an emergency department room.      DISCLAIMER: This note was prepared with [employer] voice recognition transcription software. Garbled syntax, mangled pronouns, and other bizarre constructions may be attributed to that software system.

## 2023-12-28 NOTE — ED PROVIDER NOTES
Encounter Date: 12/28/2023       History     Chief Complaint   Patient presents with    Fall     Fell Sunday night on right side; pain to right side of chest wall; worse with movement; denies head injury or blood thinners     Patient is an 88 y.o. male with a PMH of HTN presenting to [facility] for evaluation of right sided anterior rib pain s/p ground level fall on Sunday night. He reports he was in his kitchen and lost his balance, falling onto the right side of his ribs and hitting the right side of his head. Denies LOC, vision changes, N/V, weakness, sensory changes, ataxia, dyspnea, palpitations, neck pain, back pain, headache, abdominal pain, or chest pain.     The history is provided by the patient and medical records. No  was used.     Review of patient's allergies indicates:  No Known Allergies  History reviewed. No pertinent past medical history.  History reviewed. No pertinent surgical history.  History reviewed. No pertinent family history.  Social History     Tobacco Use    Smoking status: Every Day    Smokeless tobacco: Never     Review of Systems    Physical Exam     Initial Vitals [12/28/23 1127]   BP Pulse Resp Temp SpO2   (!) 226/102 97 16 98.3 °F (36.8 °C) 100 %      MAP       --         Physical Exam    Nursing note and vitals reviewed.  Constitutional: He appears well-developed and well-nourished. No distress.   HENT:   Head: Normocephalic and atraumatic.   Right Ear: External ear normal.   Left Ear: External ear normal.   Eyes: Conjunctivae are normal. Pupils are equal, round, and reactive to light. No scleral icterus.   Neck: Neck supple.   Cardiovascular:  Normal rate and intact distal pulses.           Pulmonary/Chest: Breath sounds normal. No stridor. No respiratory distress. He has no wheezes. He has no rhonchi. He has no rales. He exhibits tenderness (mildly tender to palpation over lower anterior ribs in the mid clavicular line, no subcostal tenderness).    Abdominal: Abdomen is soft. He exhibits no distension. There is no abdominal tenderness. There is no rebound and no guarding.   Musculoskeletal:         General: No edema.      Cervical back: Neck supple.      Comments: No C/T/L spine tenderness. No tenderness over clavicles, upper extremities, or lower extremities.      Neurological: He is alert and oriented to person, place, and time. GCS score is 15. GCS eye subscore is 4. GCS verbal subscore is 5. GCS motor subscore is 6.   Skin: Skin is warm and dry. Capillary refill takes less than 2 seconds. No rash noted.   Psychiatric: He has a normal mood and affect. His behavior is normal.         ED Course   Procedures  Labs Reviewed   CBC W/ AUTO DIFFERENTIAL - Abnormal; Notable for the following components:       Result Value    RBC 3.94 (*)     Hemoglobin 12.0 (*)     Hematocrit 34.5 (*)     All other components within normal limits   COMPREHENSIVE METABOLIC PANEL - Abnormal; Notable for the following components:    Calcium 8.3 (*)     Albumin 3.3 (*)     AST 52 (*)     eGFR 58.2 (*)     All other components within normal limits        ECG Results              EKG 12-lead (Final result)  Result time 12/28/23 15:48:30      Final result by Interface, Lab In [facility] (12/28/23 15:48:30)                   Narrative:    Test Reason : R07.89,    Vent. Rate : 085 BPM     Atrial Rate : 085 BPM     P-R Int : 128 ms          QRS Dur : 088 ms      QT Int : 370 ms       P-R-T Axes : 077 054 070 degrees     QTc Int : 440 ms    Sinus rhythm with Premature atrial complexes  Voltage criteria for left ventricular hypertrophy  Nonspecific ST and/or T wave abnormalities  Abnormal ECG  When compared with ECG of 07-NOV-2022 12:51,  Premature atrial complexes are now Present  Confirmed by Agus Hicks MD ([id number]) on 12/28/2023 3:48:18 PM    Referred By: [email or web address]   SELF           Confirmed By:Agus Hicks MD                                  Imaging Results              CT Head Without  Contrast (Final result)  Result time 12/28/23 14:21:31      Final result by Anthony Concepcion MD (12/28/23 14:21:31)                   Impression:      No acute intracranial hemorrhage/injury.      Electronically signed by: Anthony Concepcion  Date:    12/28/2023  Time:    14:21               Narrative:    EXAMINATION:  CT HEAD WITHOUT CONTRAST    CLINICAL HISTORY:  Head trauma, minor (Age >= 65y);    TECHNIQUE:  Low dose axial images were obtained through the head.  Coronal and sagittal reformations were also performed. Contrast was not administered.    COMPARISON:  None.    FINDINGS:  No evidence of hydrocephalus, mass effect, intracranial hemorrhage or acute territorial infarct.    Decreased attenuation within the periventricular white matter is nonspecific but may reflect mild chronic small vessel ischemic change.    The calvarium is intact. The visualized sinuses and mastoid air cells are clear.  Bilateral orbital scleral bands.                                       CT Cervical Spine Without Contrast (Final result)  Result time 12/28/23 14:39:17      Final result by Pritesh Rothman MD (12/28/23 14:39:17)                   Impression:      Anterior wedging of T1, likely chronic.  Correlate with trauma history and for focal pain in this region.  No other fractures.    Multilevel degenerative changes, including a disc extrusion at C5-6 resulting in moderate canal stenosis.    Multinodular goiter.  Nonemergent thyroid ultrasound is recommended.    Other findings as described.      Electronically signed by: Pritesh Rothman  Date:    12/28/2023  Time:    14:39               Narrative:    EXAMINATION:  CT CERVICAL SPINE WITHOUT CONTRAST    CLINICAL HISTORY:  Neck trauma (Age >= 65y);    TECHNIQUE:  Low dose axial images, sagittal and coronal reformations were performed though the cervical spine.  Contrast was not administered.    COMPARISON:  Radiographs 04/26/2022 and 09/28/2021    FINDINGS:  ALIGNMENT: Straightening of  the cervical spine.  Grade 1 retrolisthesis at C4-5 and C5-6.    BONE: Diffuse osteopenia.  Anterior wedging of T1.  It is difficult to tell if this was present on previous radiographs.  No other fractures are identified.  No focal osseous lesions.    JOINT: Multilevel disc, uncovertebral, and facet joint degeneration.  Partial ankylosis of the C2-3 and C3-4 disc spaces and posterior elements, likely secondary to chronic inflammation.  Degenerative changes also involve the atlantodental and sternoclavicular joints.    SPINAL CANAL: See below.    POSTERIOR FOSSA: Refer to same day CT head.    PARASPINAL SOFT TISSUES: Enlarged thyroid gland containing multiple partially calcified nodules measuring up to 2.7 cm.  Mild atherosclerosis.  There is an air-fluid level in the upper esophagus, which could be secondary to dysmotility or reflux.  Paraseptal and centrilobular emphysema.    SIGNIFICANT FINDINGS BY LEVEL: Large central disc extrusion at C5-6 resulting in moderate canal stenosis with flattening of the cervical cord.  There is moderate-severe foraminal stenosis at multiple levels bilaterally.                                       X-Ray Ribs 2 View Right (Final result)  Result time 12/28/23 13:40:54      Final result by Stefano Blair MD (12/28/23 13:40:54)                   Impression:      No evidence of a displaced right-sided rib fracture.      Electronically signed by: Stefano Blair MD  Date:    12/28/2023  Time:    13:40               Narrative:    EXAMINATION:  XR RIBS 2 VIEW RIGHT    CLINICAL HISTORY:  Pleurodynia    TECHNIQUE:  Two views of the right ribs were performed.    COMPARISON:  09/28/2021.    FINDINGS:  There is demineralization of the osseous structures.  There is no cortical step-off.  There is no evidence of periostitis.    Monitoring EKG leads are present.  There is a partially visualized compression fracture of a midthoracic vertebral body.    There is no evidence of a displaced right-sided rib  fracture.                                       X-Ray Chest AP Portable (Final result)  Result time 12/28/23 13:42:28      Final result by Stefano Blair MD (12/28/23 13:42:28)                   Impression:      No acute process.      Electronically signed by: Stefano Blair MD  Date:    12/28/2023  Time:    13:42               Narrative:    EXAMINATION:  XR CHEST AP PORTABLE    CLINICAL HISTORY:  Pleurodynia    TECHNIQUE:  Single frontal view of the chest was performed.    COMPARISON:  11/07/2022.    FINDINGS:  Monitoring EKG leads are present.  The trachea is unremarkable.  There are calcifications of the aortic knob.  The cardiomediastinal silhouette is within normal limits.  There is no evidence of free air beneath the hemidiaphragms.  There are no pleural effusions.  There is no evidence of a pneumothorax.  There is no evidence of pneumomediastinum.  No airspace opacity is present.  The osseous structures demonstrate degenerative changes.                                       Medications   hydroCHLOROthiazide tablet 12.5 mg (12.5 mg Oral Given 12/28/23 1309)   acetaminophen tablet 1,000 mg (1,000 mg Oral Given 12/28/23 1309)     Medical Decision Making  Obtained imaging of head, c spine, and ribs to evaluate for injuries after fall 4 days ago.     CT c-spine demonstrated anterior wedging of T1, that was felt to likely be chronic. Re-evaluated patient who confirmed he has had no back pain and has no tenderness on exam. Full ROM without pain. Additionally discussed importance of PCP follow up for ultrasound of multinodular goiter. Referral placed to internal medicine clinic for patient to establish care.     Patient did not want to stay for blood pressure control and states he understands the risks. Discussed importance of adherence to antihypertensive medication to help prevent stroke or heart attack, and importance of follow up with PCP for blood pressure monitoring and potentially adding medications to better  manage his pressures. Discussed plan for discharge home, referral to IM clinic to establish care with a PCP, prescription for HCTZ sent to patient's pharmacy, and strict return precautions and patient expressed understanding and agreement.     Amount and/or Complexity of Data Reviewed  Radiology: ordered. Decision-making details documented in ED Course.    Risk  OTC drugs.  Prescription drug management.               ED Course as of 12/29/23 1712   Thu Dec 28, 2023   1252 EKG with sinus rhythm, rate 85, no STEMI. [NN]   1423 XR negative for rib fracture [NN]   1437 CT Head Without Contrast  Impression:     No acute intracranial hemorrhage/injury.   [OW]   1506 Impression:     Anterior wedging of T1, likely chronic.  Correlate with trauma history and for focal pain in this region.  No other fractures.     Multilevel degenerative changes, including a disc extrusion at C5-6 resulting in moderate canal stenosis.     Multinodular goiter.  Nonemergent thyroid ultrasound is recommended.     Other findings as described.   [NN]   1506 Patient has no pain at T1 or in the midline of his C/T/L spine [NN]   1508 HCT negative [NN]   1532 Patient was requesting to leave.  On re-evaluation prior to discharge, he was still hyper extensive.  He has no chest pain, shortness of breath or headache.  He was advised to stay for treatment of his blood pressure however he declined.  We discussed risks benefits and alternatives.  Patient has decision-making capacity.  He declined to stay and states that he wants to leave now.  He understands that he could have a stroke or other cardiovascular complications.  Patient has not been taking HCTZ but we will represcribed this for him to start taking again at discharge. [NN]      ED Course User Index  [NN] Savita Winchester MD  [OW] Debby Pena MD                           Clinical Impression:  Final diagnoses:  [R07.89] Chest discomfort  [I10] Hypertension  [R07.81] Rib pain          ED  Disposition Condition    Discharge Stable          ED Prescriptions       Medication Sig Dispense Start Date End Date Auth. Provider    hydroCHLOROthiazide (HYDRODIURIL) 12.5 MG Tab Take 1 tablet (12.5 mg total) by mouth once daily. 30 tablet 12/28/2023 12/27/2024 Debby Pena MD          Follow-up Information       Follow up With Specialties Details Why Contact Info Additional Information    [facility]   [address]  [facility] [address] [phone] [facility] Please park in surface lot and check in at central registration desk    [facility] Emergency Medicine  If symptoms worsen [address]  [facility] [address] [phone]              Debby Pena MD  Resident  12/29/23 [id number]

## 2023-12-28 NOTE — DISCHARGE INSTRUCTIONS
Tests today showed:   Labs Reviewed   CBC W/ AUTO DIFFERENTIAL - Abnormal; Notable for the following components:       Result Value    RBC 3.94 (*)     Hemoglobin 12.0 (*)     Hematocrit 34.5 (*)     All other components within normal limits   COMPREHENSIVE METABOLIC PANEL - Abnormal; Notable for the following components:    Calcium 8.3 (*)     Albumin 3.3 (*)     AST 52 (*)     eGFR 58.2 (*)     All other components within normal limits   HIV 1 / 2 ANTIBODY   HEPATITIS C ANTIBODY     CT Head Without Contrast   Final Result      No acute intracranial hemorrhage/injury.         Electronically signed by: Anthony Concepcion   Date:    12/28/2023   Time:    14:21      CT Cervical Spine Without Contrast   Final Result      Anterior wedging of T1, likely chronic.  Correlate with trauma history and for focal pain in this region.  No other fractures.      Multilevel degenerative changes, including a disc extrusion at C5-6 resulting in moderate canal stenosis.      Multinodular goiter.  Nonemergent thyroid ultrasound is recommended.      Other findings as described.         Electronically signed by: Pritesh Rothman   Date:    12/28/2023   Time:    14:39      X-Ray Ribs 2 View Right   Final Result      No evidence of a displaced right-sided rib fracture.         Electronically signed by: Stefano Blair MD   Date:    12/28/2023   Time:    13:40      X-Ray Chest AP Portable   Final Result      No acute process.         Electronically signed by: Stefano Blair MD   Date:    12/28/2023   Time:    13:42          Treatments you had today:   Medications   LIDOcaine 5 % patch 1 patch (1 patch Transdermal Patch Applied 12/28/23 1309)   morphine injection 4 mg (has no administration in time range)   hydroCHLOROthiazide tablet 12.5 mg (12.5 mg Oral Given 12/28/23 1309)   acetaminophen tablet 1,000 mg (1,000 mg Oral Given 12/28/23 1309)       Follow-Up Plan:  - Follow-up with primary care doctor within 3 - 5 days  - Additional testing and/or  evaluation as directed by your primary doctor    Return to the Emergency Department for symptoms including but not limited to: worsening symptoms, shortness of breath or chest pain, vomiting with inability to hold down fluids, fevers greater than 100.4°F, passing out/fainting/unconsciousness, or other concerning symptoms.

## 2024-05-17 ENCOUNTER — ANESTHESIA (OUTPATIENT)
Dept: SURGERY | Facility: HOSPITAL | Age: 89
DRG: 020 | End: 2024-05-17
Payer: MEDICARE

## 2024-05-17 ENCOUNTER — ANESTHESIA EVENT (OUTPATIENT)
Dept: SURGERY | Facility: HOSPITAL | Age: 89
DRG: 020 | End: 2024-05-17
Payer: MEDICARE

## 2024-05-17 ENCOUNTER — HOSPITAL ENCOUNTER (INPATIENT)
Facility: HOSPITAL | Age: 89
LOS: 9 days | Discharge: HOME HEALTH | DRG: 020 | End: 2024-05-26
Attending: STUDENT IN AN ORGANIZED HEALTH CARE EDUCATION/TRAINING PROGRAM | Admitting: PSYCHIATRY & NEUROLOGY
Payer: MEDICARE

## 2024-05-17 DIAGNOSIS — I10 ESSENTIAL HYPERTENSION: ICD-10-CM

## 2024-05-17 DIAGNOSIS — E87.1 HYPONATREMIA: ICD-10-CM

## 2024-05-17 DIAGNOSIS — D64.9 ANEMIA, UNSPECIFIED TYPE: ICD-10-CM

## 2024-05-17 DIAGNOSIS — G93.5 BRAIN COMPRESSION: ICD-10-CM

## 2024-05-17 DIAGNOSIS — G93.40 ENCEPHALOPATHY: ICD-10-CM

## 2024-05-17 DIAGNOSIS — S06.5XAA SUBDURAL HEMATOMA: Primary | ICD-10-CM

## 2024-05-17 DIAGNOSIS — F10.930 ALCOHOL WITHDRAWAL SYNDROME WITHOUT COMPLICATION: ICD-10-CM

## 2024-05-17 DIAGNOSIS — Z91.89 AT RISK FOR LONG QT SYNDROME: ICD-10-CM

## 2024-05-17 DIAGNOSIS — G93.89 MIDLINE SHIFT OF BRAIN DUE TO HEMATOMA: ICD-10-CM

## 2024-05-17 DIAGNOSIS — G93.40 ACUTE ENCEPHALOPATHY: ICD-10-CM

## 2024-05-17 DIAGNOSIS — S06.5XAA SDH (SUBDURAL HEMATOMA): ICD-10-CM

## 2024-05-17 DIAGNOSIS — Z98.890 HISTORY OF BURR HOLE SURGERY: ICD-10-CM

## 2024-05-17 DIAGNOSIS — S06.2X0S MIDLINE SHIFT OF BRAIN DUE TO HEMATOMA: ICD-10-CM

## 2024-05-17 DIAGNOSIS — R45.851 SUICIDAL IDEATION: ICD-10-CM

## 2024-05-17 LAB
ABO + RH BLD: NORMAL
ALBUMIN SERPL BCP-MCNC: 3.2 G/DL (ref 3.5–5.2)
ALP SERPL-CCNC: 91 U/L (ref 55–135)
ALT SERPL W/O P-5'-P-CCNC: 19 U/L (ref 10–44)
ANION GAP SERPL CALC-SCNC: 10 MMOL/L (ref 8–16)
APAP SERPL-MCNC: <3 UG/ML (ref 10–20)
APTT PPP: 27.4 SEC (ref 21–32)
AST SERPL-CCNC: 33 U/L (ref 10–40)
BASOPHILS # BLD AUTO: 0.04 K/UL (ref 0–0.2)
BASOPHILS NFR BLD: 1 % (ref 0–1.9)
BILIRUB SERPL-MCNC: 0.2 MG/DL (ref 0.1–1)
BLD GP AB SCN CELLS X3 SERPL QL: NORMAL
BUN SERPL-MCNC: 18 MG/DL (ref 8–23)
CALCIUM SERPL-MCNC: 9 MG/DL (ref 8.7–10.5)
CHLORIDE SERPL-SCNC: 105 MMOL/L (ref 95–110)
CO2 SERPL-SCNC: 21 MMOL/L (ref 23–29)
CREAT SERPL-MCNC: 1.2 MG/DL (ref 0.5–1.4)
DIFFERENTIAL METHOD BLD: ABNORMAL
EOSINOPHIL # BLD AUTO: 0.2 K/UL (ref 0–0.5)
EOSINOPHIL NFR BLD: 3.9 % (ref 0–8)
ERYTHROCYTE [DISTWIDTH] IN BLOOD BY AUTOMATED COUNT: 13.5 % (ref 11.5–14.5)
EST. GFR  (NO RACE VARIABLE): 58.2 ML/MIN/1.73 M^2
ESTIMATED AVG GLUCOSE: 100 MG/DL (ref 68–131)
ETHANOL SERPL-MCNC: 31 MG/DL
GLUCOSE SERPL-MCNC: 70 MG/DL (ref 70–110)
HBA1C MFR BLD: 5.1 % (ref 4–5.6)
HCT VFR BLD AUTO: 35.1 % (ref 40–54)
HCV AB SERPL QL IA: NORMAL
HGB BLD-MCNC: 11.3 G/DL (ref 14–18)
HIV 1+2 AB+HIV1 P24 AG SERPL QL IA: NORMAL
IMM GRANULOCYTES # BLD AUTO: 0.01 K/UL (ref 0–0.04)
IMM GRANULOCYTES NFR BLD AUTO: 0.2 % (ref 0–0.5)
INR PPP: 1 (ref 0.8–1.2)
LYMPHOCYTES # BLD AUTO: 1.4 K/UL (ref 1–4.8)
LYMPHOCYTES NFR BLD: 35.3 % (ref 18–48)
MCH RBC QN AUTO: 32 PG (ref 27–31)
MCHC RBC AUTO-ENTMCNC: 32.2 G/DL (ref 32–36)
MCV RBC AUTO: 99 FL (ref 82–98)
MONOCYTES # BLD AUTO: 0.2 K/UL (ref 0.3–1)
MONOCYTES NFR BLD: 5.6 % (ref 4–15)
NEUTROPHILS # BLD AUTO: 2.2 K/UL (ref 1.8–7.7)
NEUTROPHILS NFR BLD: 54 % (ref 38–73)
NRBC BLD-RTO: 0 /100 WBC
PHOSPHATE SERPL-MCNC: 3.3 MG/DL (ref 2.7–4.5)
PLATELET # BLD AUTO: 221 K/UL (ref 150–450)
PMV BLD AUTO: 9.7 FL (ref 9.2–12.9)
POTASSIUM SERPL-SCNC: 4.1 MMOL/L (ref 3.5–5.1)
PROT SERPL-MCNC: 6.9 G/DL (ref 6–8.4)
PROTHROMBIN TIME: 11.2 SEC (ref 9–12.5)
RBC # BLD AUTO: 3.53 M/UL (ref 4.6–6.2)
SALICYLATES SERPL-MCNC: <5 MG/DL (ref 15–30)
SODIUM SERPL-SCNC: 136 MMOL/L (ref 136–145)
SPECIMEN OUTDATE: NORMAL
T4 FREE SERPL-MCNC: 0.9 NG/DL (ref 0.71–1.51)
TSH SERPL DL<=0.005 MIU/L-ACNC: 0.29 UIU/ML (ref 0.4–4)
WBC # BLD AUTO: 4.08 K/UL (ref 3.9–12.7)

## 2024-05-17 PROCEDURE — 99291 CRITICAL CARE FIRST HOUR: CPT | Mod: FS,,, | Performed by: PSYCHIATRY & NEUROLOGY

## 2024-05-17 PROCEDURE — 25000003 PHARM REV CODE 250: Performed by: STUDENT IN AN ORGANIZED HEALTH CARE EDUCATION/TRAINING PROGRAM

## 2024-05-17 PROCEDURE — 80053 COMPREHEN METABOLIC PANEL: CPT | Performed by: STUDENT IN AN ORGANIZED HEALTH CARE EDUCATION/TRAINING PROGRAM

## 2024-05-17 PROCEDURE — 82077 ASSAY SPEC XCP UR&BREATH IA: CPT | Performed by: STUDENT IN AN ORGANIZED HEALTH CARE EDUCATION/TRAINING PROGRAM

## 2024-05-17 PROCEDURE — 37000008 HC ANESTHESIA 1ST 15 MINUTES: Performed by: NEUROLOGICAL SURGERY

## 2024-05-17 PROCEDURE — 94761 N-INVAS EAR/PLS OXIMETRY MLT: CPT

## 2024-05-17 PROCEDURE — 96374 THER/PROPH/DIAG INJ IV PUSH: CPT | Mod: 59

## 2024-05-17 PROCEDURE — 00C43ZZ EXTIRPATION OF MATTER FROM INTRACRANIAL SUBDURAL SPACE, PERCUTANEOUS APPROACH: ICD-10-PCS | Performed by: NEUROLOGICAL SURGERY

## 2024-05-17 PROCEDURE — 63600175 PHARM REV CODE 636 W HCPCS: Performed by: STUDENT IN AN ORGANIZED HEALTH CARE EDUCATION/TRAINING PROGRAM

## 2024-05-17 PROCEDURE — 61154 BURR HOLE W/EVAC&/DRG HMTMA: CPT | Mod: RT,,, | Performed by: NEUROLOGICAL SURGERY

## 2024-05-17 PROCEDURE — 63600175 PHARM REV CODE 636 W HCPCS: Performed by: NURSE ANESTHETIST, CERTIFIED REGISTERED

## 2024-05-17 PROCEDURE — 20000000 HC ICU ROOM

## 2024-05-17 PROCEDURE — 85610 PROTHROMBIN TIME: CPT | Performed by: PHYSICIAN ASSISTANT

## 2024-05-17 PROCEDURE — 99291 CRITICAL CARE FIRST HOUR: CPT

## 2024-05-17 PROCEDURE — 84100 ASSAY OF PHOSPHORUS: CPT | Performed by: NURSE PRACTITIONER

## 2024-05-17 PROCEDURE — 25000003 PHARM REV CODE 250: Performed by: NURSE ANESTHETIST, CERTIFIED REGISTERED

## 2024-05-17 PROCEDURE — 85730 THROMBOPLASTIN TIME PARTIAL: CPT | Performed by: PHYSICIAN ASSISTANT

## 2024-05-17 PROCEDURE — 36620 INSERTION CATHETER ARTERY: CPT | Mod: 59,,, | Performed by: ANESTHESIOLOGY

## 2024-05-17 PROCEDURE — 80179 DRUG ASSAY SALICYLATE: CPT | Performed by: STUDENT IN AN ORGANIZED HEALTH CARE EDUCATION/TRAINING PROGRAM

## 2024-05-17 PROCEDURE — D9220A PRA ANESTHESIA: Mod: ANES,,, | Performed by: ANESTHESIOLOGY

## 2024-05-17 PROCEDURE — 84443 ASSAY THYROID STIM HORMONE: CPT | Performed by: STUDENT IN AN ORGANIZED HEALTH CARE EDUCATION/TRAINING PROGRAM

## 2024-05-17 PROCEDURE — 96365 THER/PROPH/DIAG IV INF INIT: CPT

## 2024-05-17 PROCEDURE — 84439 ASSAY OF FREE THYROXINE: CPT | Performed by: STUDENT IN AN ORGANIZED HEALTH CARE EDUCATION/TRAINING PROGRAM

## 2024-05-17 PROCEDURE — 99499 UNLISTED E&M SERVICE: CPT | Mod: ,,, | Performed by: NURSE PRACTITIONER

## 2024-05-17 PROCEDURE — 37000009 HC ANESTHESIA EA ADD 15 MINS: Performed by: NEUROLOGICAL SURGERY

## 2024-05-17 PROCEDURE — 85025 COMPLETE CBC W/AUTO DIFF WBC: CPT | Performed by: STUDENT IN AN ORGANIZED HEALTH CARE EDUCATION/TRAINING PROGRAM

## 2024-05-17 PROCEDURE — C1729 CATH, DRAINAGE: HCPCS | Performed by: NEUROLOGICAL SURGERY

## 2024-05-17 PROCEDURE — 86803 HEPATITIS C AB TEST: CPT | Performed by: PHYSICIAN ASSISTANT

## 2024-05-17 PROCEDURE — D9220A PRA ANESTHESIA: Mod: CRNA,,, | Performed by: NURSE ANESTHETIST, CERTIFIED REGISTERED

## 2024-05-17 PROCEDURE — 27201423 OPTIME MED/SURG SUP & DEVICES STERILE SUPPLY: Performed by: NEUROLOGICAL SURGERY

## 2024-05-17 PROCEDURE — 83036 HEMOGLOBIN GLYCOSYLATED A1C: CPT | Performed by: NURSE PRACTITIONER

## 2024-05-17 PROCEDURE — 36000710: Performed by: NEUROLOGICAL SURGERY

## 2024-05-17 PROCEDURE — 36000711: Performed by: NEUROLOGICAL SURGERY

## 2024-05-17 PROCEDURE — 87389 HIV-1 AG W/HIV-1&-2 AB AG IA: CPT | Performed by: PHYSICIAN ASSISTANT

## 2024-05-17 PROCEDURE — 80143 DRUG ASSAY ACETAMINOPHEN: CPT | Performed by: STUDENT IN AN ORGANIZED HEALTH CARE EDUCATION/TRAINING PROGRAM

## 2024-05-17 PROCEDURE — 99284 EMERGENCY DEPT VISIT MOD MDM: CPT | Mod: 57,ICN,, | Performed by: PHYSICIAN ASSISTANT

## 2024-05-17 PROCEDURE — 86901 BLOOD TYPING SEROLOGIC RH(D): CPT | Performed by: PHYSICIAN ASSISTANT

## 2024-05-17 PROCEDURE — C9254 INJECTION, LACOSAMIDE: HCPCS | Performed by: STUDENT IN AN ORGANIZED HEALTH CARE EDUCATION/TRAINING PROGRAM

## 2024-05-17 PROCEDURE — C1751 CATH, INF, PER/CENT/MIDLINE: HCPCS | Performed by: ANESTHESIOLOGY

## 2024-05-17 PROCEDURE — 25000003 PHARM REV CODE 250: Performed by: NEUROLOGICAL SURGERY

## 2024-05-17 RX ORDER — FAMOTIDINE 20 MG/1
20 TABLET, FILM COATED ORAL 2 TIMES DAILY
Status: DISCONTINUED | OUTPATIENT
Start: 2024-05-17 | End: 2024-05-18

## 2024-05-17 RX ORDER — ONDANSETRON HYDROCHLORIDE 2 MG/ML
INJECTION, SOLUTION INTRAVENOUS
Status: DISCONTINUED | OUTPATIENT
Start: 2024-05-17 | End: 2024-05-17

## 2024-05-17 RX ORDER — HYDROCHLOROTHIAZIDE 12.5 MG/1
12.5 TABLET ORAL DAILY
Status: DISCONTINUED | OUTPATIENT
Start: 2024-05-18 | End: 2024-05-20

## 2024-05-17 RX ORDER — OXYCODONE HYDROCHLORIDE 5 MG/1
5 TABLET ORAL EVERY 4 HOURS PRN
Status: DISCONTINUED | OUTPATIENT
Start: 2024-05-17 | End: 2024-05-26

## 2024-05-17 RX ORDER — HEPARIN SODIUM 5000 [USP'U]/ML
5000 INJECTION, SOLUTION INTRAVENOUS; SUBCUTANEOUS EVERY 8 HOURS
Status: DISCONTINUED | OUTPATIENT
Start: 2024-05-18 | End: 2024-05-25

## 2024-05-17 RX ORDER — ROCURONIUM BROMIDE 10 MG/ML
INJECTION, SOLUTION INTRAVENOUS
Status: DISCONTINUED | OUTPATIENT
Start: 2024-05-17 | End: 2024-05-17

## 2024-05-17 RX ORDER — DEXMEDETOMIDINE HYDROCHLORIDE 100 UG/ML
INJECTION, SOLUTION INTRAVENOUS
Status: DISCONTINUED | OUTPATIENT
Start: 2024-05-17 | End: 2024-05-17

## 2024-05-17 RX ORDER — NICARDIPINE HYDROCHLORIDE 0.2 MG/ML
0-15 INJECTION INTRAVENOUS CONTINUOUS
Status: DISCONTINUED | OUTPATIENT
Start: 2024-05-17 | End: 2024-05-17

## 2024-05-17 RX ORDER — ONDANSETRON HYDROCHLORIDE 2 MG/ML
8 INJECTION, SOLUTION INTRAVENOUS EVERY 6 HOURS PRN
Status: DISCONTINUED | OUTPATIENT
Start: 2024-05-17 | End: 2024-05-26 | Stop reason: HOSPADM

## 2024-05-17 RX ORDER — CEFAZOLIN SODIUM 1 G/3ML
INJECTION, POWDER, FOR SOLUTION INTRAMUSCULAR; INTRAVENOUS
Status: DISCONTINUED | OUTPATIENT
Start: 2024-05-17 | End: 2024-05-17

## 2024-05-17 RX ORDER — ACETAMINOPHEN 500 MG
1000 TABLET ORAL EVERY 6 HOURS
Status: DISCONTINUED | OUTPATIENT
Start: 2024-05-18 | End: 2024-05-21

## 2024-05-17 RX ORDER — MUPIROCIN 20 MG/G
OINTMENT TOPICAL 2 TIMES DAILY
Status: COMPLETED | OUTPATIENT
Start: 2024-05-17 | End: 2024-05-19

## 2024-05-17 RX ORDER — FENTANYL CITRATE 50 UG/ML
INJECTION, SOLUTION INTRAMUSCULAR; INTRAVENOUS
Status: DISCONTINUED | OUTPATIENT
Start: 2024-05-17 | End: 2024-05-17

## 2024-05-17 RX ORDER — PROPOFOL 10 MG/ML
VIAL (ML) INTRAVENOUS
Status: DISCONTINUED | OUTPATIENT
Start: 2024-05-17 | End: 2024-05-17

## 2024-05-17 RX ORDER — LIDOCAINE HYDROCHLORIDE AND EPINEPHRINE 10; 10 MG/ML; UG/ML
INJECTION, SOLUTION INFILTRATION; PERINEURAL
Status: DISCONTINUED | OUTPATIENT
Start: 2024-05-17 | End: 2024-05-17 | Stop reason: HOSPADM

## 2024-05-17 RX ORDER — DEXAMETHASONE SODIUM PHOSPHATE 4 MG/ML
INJECTION, SOLUTION INTRA-ARTICULAR; INTRALESIONAL; INTRAMUSCULAR; INTRAVENOUS; SOFT TISSUE
Status: DISCONTINUED | OUTPATIENT
Start: 2024-05-17 | End: 2024-05-17

## 2024-05-17 RX ORDER — AMOXICILLIN 250 MG
1 CAPSULE ORAL DAILY
Status: DISCONTINUED | OUTPATIENT
Start: 2024-05-18 | End: 2024-05-26 | Stop reason: HOSPADM

## 2024-05-17 RX ORDER — PHENYLEPHRINE HYDROCHLORIDE 10 MG/ML
INJECTION INTRAVENOUS
Status: DISCONTINUED | OUTPATIENT
Start: 2024-05-17 | End: 2024-05-17

## 2024-05-17 RX ORDER — NICARDIPINE HYDROCHLORIDE 0.2 MG/ML
0-15 INJECTION INTRAVENOUS CONTINUOUS
Status: DISCONTINUED | OUTPATIENT
Start: 2024-05-17 | End: 2024-05-18

## 2024-05-17 RX ORDER — LEVETIRACETAM 500 MG/5ML
INJECTION, SOLUTION, CONCENTRATE INTRAVENOUS
Status: DISCONTINUED | OUTPATIENT
Start: 2024-05-17 | End: 2024-05-17

## 2024-05-17 RX ORDER — LEVETIRACETAM 500 MG/1
500 TABLET ORAL EVERY 12 HOURS
Status: DISCONTINUED | OUTPATIENT
Start: 2024-05-17 | End: 2024-05-19

## 2024-05-17 RX ORDER — LIDOCAINE HYDROCHLORIDE 20 MG/ML
INJECTION INTRAVENOUS
Status: DISCONTINUED | OUTPATIENT
Start: 2024-05-17 | End: 2024-05-17

## 2024-05-17 RX ORDER — BACITRACIN ZINC 500 UNIT/G
OINTMENT (GRAM) TOPICAL
Status: DISCONTINUED | OUTPATIENT
Start: 2024-05-17 | End: 2024-05-17 | Stop reason: HOSPADM

## 2024-05-17 RX ORDER — SODIUM CHLORIDE 9 MG/ML
INJECTION, SOLUTION INTRAVENOUS CONTINUOUS
Status: DISCONTINUED | OUTPATIENT
Start: 2024-05-17 | End: 2024-05-20

## 2024-05-17 RX ADMIN — CEFAZOLIN 2 G: 2 INJECTION, POWDER, FOR SOLUTION INTRAMUSCULAR; INTRAVENOUS at 07:05

## 2024-05-17 RX ADMIN — NICARDIPINE HYDROCHLORIDE 5 MG/HR: 0.2 INJECTION, SOLUTION INTRAVENOUS at 03:05

## 2024-05-17 RX ADMIN — FENTANYL CITRATE 50 MCG: 50 INJECTION, SOLUTION INTRAMUSCULAR; INTRAVENOUS at 06:05

## 2024-05-17 RX ADMIN — CEFAZOLIN 2 G: 330 INJECTION, POWDER, FOR SOLUTION INTRAMUSCULAR; INTRAVENOUS at 05:05

## 2024-05-17 RX ADMIN — FENTANYL CITRATE 50 MCG: 50 INJECTION, SOLUTION INTRAMUSCULAR; INTRAVENOUS at 05:05

## 2024-05-17 RX ADMIN — PHENYLEPHRINE HYDROCHLORIDE 100 MCG: 10 INJECTION INTRAVENOUS at 05:05

## 2024-05-17 RX ADMIN — FAMOTIDINE 20 MG: 20 TABLET ORAL at 09:05

## 2024-05-17 RX ADMIN — DEXAMETHASONE SODIUM PHOSPHATE 4 MG: 4 INJECTION, SOLUTION INTRAMUSCULAR; INTRAVENOUS at 06:05

## 2024-05-17 RX ADMIN — SODIUM CHLORIDE: 9 INJECTION, SOLUTION INTRAVENOUS at 07:05

## 2024-05-17 RX ADMIN — SUGAMMADEX 200 MG: 100 INJECTION, SOLUTION INTRAVENOUS at 06:05

## 2024-05-17 RX ADMIN — MUPIROCIN: 20 OINTMENT TOPICAL at 09:05

## 2024-05-17 RX ADMIN — LIDOCAINE HYDROCHLORIDE 80 MG: 20 INJECTION INTRAVENOUS at 05:05

## 2024-05-17 RX ADMIN — ONDANSETRON 4 MG: 2 INJECTION INTRAMUSCULAR; INTRAVENOUS at 06:05

## 2024-05-17 RX ADMIN — LEVETIRACETAM 1000 MG: 100 INJECTION, SOLUTION INTRAVENOUS at 05:05

## 2024-05-17 RX ADMIN — DEXMEDETOMIDINE 8 MCG: 100 INJECTION, SOLUTION, CONCENTRATE INTRAVENOUS at 06:05

## 2024-05-17 RX ADMIN — ROCURONIUM BROMIDE 70 MG: 10 INJECTION, SOLUTION INTRAVENOUS at 05:05

## 2024-05-17 RX ADMIN — LEVETIRACETAM 500 MG: 500 TABLET, FILM COATED ORAL at 09:05

## 2024-05-17 RX ADMIN — LACOSAMIDE 100 MG: 10 INJECTION INTRAVENOUS at 04:05

## 2024-05-17 RX ADMIN — PROPOFOL 130 MG: 10 INJECTION, EMULSION INTRAVENOUS at 05:05

## 2024-05-17 RX ADMIN — SODIUM CHLORIDE, SODIUM ACETATE ANHYDROUS, SODIUM GLUCONATE, POTASSIUM CHLORIDE, AND MAGNESIUM CHLORIDE: 526; 222; 502; 37; 30 INJECTION, SOLUTION INTRAVENOUS at 05:05

## 2024-05-17 NOTE — SUBJECTIVE & OBJECTIVE
History reviewed. No pertinent past medical history.  History reviewed. No pertinent surgical history.   No current facility-administered medications on file prior to encounter.     Current Outpatient Medications on File Prior to Encounter   Medication Sig Dispense Refill    diclofenac sodium (VOLTAREN) 1 % Gel Apply 2 g topically once daily. As needed for hand pain or numbness. 20 g 0    hydroCHLOROthiazide (HYDRODIURIL) 12.5 MG Tab Take 1 tablet (12.5 mg total) by mouth once daily. 30 tablet 11      Allergies: Patient has no known allergies.  No family history on file.  Social History     Tobacco Use    Smoking status: Every Day    Smokeless tobacco: Never     Review of Systems  Constitutional: Denies fevers, weight loss, chills, or weakness.  Eyes: Denies changes in vision.  ENT: Denies dysphagia, nasal discharge, ear pain or discharge.  Cardiovascular: Denies chest pain, palpitations, orthopnea, or claudication.  Respiratory: Denies shortness of breath, cough, hemoptysis, or wheezing.  GI: Denies nausea/vomitting, hematochezia, melena, abd pain, or changes in appetite.  : Denies dysuria, incontinence, or hematuria.  Musculoskeletal: Denies joint pain or myalgias.  Skin/breast: Denies rashes, lumps, lesions, or discharge.  Neurologic: Denies headache, dizziness, vertigo. Complains of numbness of R hand and R leg that started over a year ago.   Psychiatric: Denies changes in mood or hallucinations.  Endocrine: Denies polyuria, polydipsia, heat/cold intolerance.  Hematologic/Lymph: Denies lymphadenopathy, easy bruising or easy bleeding.  Allergic/Immunologic: Denies rash, rhinitis.   Objective:     Vitals:    Temp: 98 °F (36.7 °C)  Pulse: (!) 56  BP: (!) 154/72  Resp: 18  SpO2: 100 %    Temp  Min: 97.7 °F (36.5 °C)  Max: 98.2 °F (36.8 °C)  Pulse  Min: 56  Max: 82  BP  Min: 154/72  Max: 190/87  Resp  Min: 16  Max: 18  SpO2  Min: 99 %  Max: 100 %    No intake/output data recorded.            Physical Exam  GA:  Alert, comfortable, no acute distress.   HEENT: No scleral icterus or JVD.   Pulmonary: Clear to auscultation A/L.   Cardiac: RRR S1 & S2 w/o rubs/murmurs/gallops.   Abdominal: Bowel sounds present x 4. No appreciable hepatosplenomegaly.  Skin: No jaundice, rashes, or visible lesions.  Neuro:  --GCS: E4 V5 M6  --Mental Status:  awake, oriented X4, follows commands.   --CN II-XII grossly intact.   --Pupils 3mm, PERRL.   --Corneal reflex, gag, cough intact.  --LUE strength: 5/5  --RUE strength: 5/5  --LLE strength: 5/5  --RLE strength: 5/5       Today I personally reviewed pertinent medications, lines/drains/airways, imaging, cardiology results, laboratory results, microbiology results,

## 2024-05-17 NOTE — Clinical Note
Diagnosis: Subdural hematoma [[id number]]   Future Attending Provider: LINO MACEDO [[id number]]   Reason for IP Medical Treatment  (Clinical interventions that can only be accomplished in the IP setting? ) :: Subdural hematoma   I certify that Inpatient services for greater than or equal to 2 midnights are medically necessary:: Yes   Plans for Post-Acute care--if anticipated (pick the single best option):: A. No post acute care anticipated at this time

## 2024-05-17 NOTE — ASSESSMENT & PLAN NOTE
89 yo male w/Bilateral SDH R>L large right SDH with 7 mm MLS     -- Denies falls, not on AC/APT  -- NSGY following  -- MRI brain revealed-large subdural hematoma over the right cerebral hemisphere. Collection -- measures up to 2.1 cm in thickness   -- To OR w/ NSGY today  -- SBP <140  -- neuro checks q 1 hr  -- PT/OT/SLP

## 2024-05-17 NOTE — ANESTHESIA PREPROCEDURE EVALUATION
[facility]  Anesthesia Pre-Operative Evaluation   05/17/2024        Moses Peng, 1935  [id number]  Procedure(s) (LRB):  CRANIOTOMY, FOR SUBDURAL HEMATOMA EVACUATION (Right)    Subjective    Moses Peng is a 88 y.o. male w/ no known significant PMHx admitted with R hand and foot swelling and numbness with AMS. Stat MRI Brain with bilateral subdural hematomas with significant intracranial mass effect with leftward midline shift.     Patient now presents for above procedure(s).     Level of Care: ED  Hemodynamics: No vasopressor or inotropic support.  Respiratory Status: Room air  NPO: Unknown    ECHO:  No results found for this or any previous visit.      Prev Airway: None documented.    LDA: None documented.       Peripheral IV - Single Lumen 05/17/24 1418 18 G Anterior;Right Forearm (Active)   Number of days: 0            Peripheral IV - Single Lumen 05/17/24 1549 20 G Anterior;Left Forearm (Active)   Number of days: 0       Drips:    nicardipine  0-15 mg/hr Intravenous Continuous 25 mL/hr at 05/17/24 1546 5 mg/hr at 05/17/24 1546       There is no problem list on file for this patient.      Review of patient's allergies indicates:  No Known Allergies    Current Inpatient Medications:    lacosamide (VIMPAT) IVPB  100 mg Intravenous Once       No current facility-administered medications on file prior to encounter.     Current Outpatient Medications on File Prior to Encounter   Medication Sig Dispense Refill    diclofenac sodium (VOLTAREN) 1 % Gel Apply 2 g topically once daily. As needed for hand pain or numbness. 20 g 0    hydroCHLOROthiazide (HYDRODIURIL) 12.5 MG Tab Take 1 tablet (12.5 mg total) by mouth once daily. 30 tablet 11       History reviewed. No pertinent surgical history.    Social History:  Tobacco Use: High Risk (5/17/2024)    Patient History     Smoking Tobacco Use: Every Day     Smokeless Tobacco Use: Never     Passive Exposure: Not on file       Alcohol Use: Not on file        Objective    Vital Signs Range:  BMI Readings from Last 1 Encounters:   05/17/24 21.14 kg/m²       Temp:  [36.5 °C (97.7 °F)-36.7 °C (98.1 °F)]   Pulse:  [56-73]   Resp:  [18]   BP: (154-190)/(72-87)   SpO2:  [99 %-100 %]        Significant Labs:        Component Value Date/Time    WBC 4.08 05/17/2024 1202    HGB 11.3 (L) 05/17/2024 1202    HCT 35.1 (L) 05/17/2024 1202    HCT 36 05/16/2024 2134     05/17/2024 1202     05/17/2024 1202    K 4.1 05/17/2024 1202     05/17/2024 1202    CO2 21 (L) 05/17/2024 1202    GLU 70 05/17/2024 1202    BUN 18 05/17/2024 1202    CREATININE 1.2 05/17/2024 1202    CALCIUM 9.0 05/17/2024 1202    ALBUMIN 3.2 (L) 05/17/2024 1202    PROT 6.9 05/17/2024 1202    ALKPHOS 91 05/17/2024 1202    BILITOT 0.2 05/17/2024 1202    AST 33 05/17/2024 1202    ALT 19 05/17/2024 1202    INR 1.0 05/17/2024 1417        Please see Results Review for additional labs.     Diagnostic Studies: All relevant studies, reviewed.      EKG:   Results for orders placed or performed during the hospital encounter of 12/28/23   EKG 12-lead    Collection Time: 12/28/23 11:31 AM    Narrative    Test Reason : R07.89,    Vent. Rate : 085 BPM     Atrial Rate : 085 BPM     P-R Int : 128 ms          QRS Dur : 088 ms      QT Int : 370 ms       P-R-T Axes : 077 054 070 degrees     QTc Int : 440 ms    Sinus rhythm with Premature atrial complexes  Voltage criteria for left ventricular hypertrophy  Nonspecific ST and/or T wave abnormalities  Abnormal ECG  When compared with ECG of 07-NOV-2022 12:51,  Premature atrial complexes are now Present  Confirmed by Agus Hicks MD ([id number]) on 12/28/2023 3:48:18 PM    Referred By: ALEM   SELF           Confirmed By:Agus Hicks MD                                                                                                                 05/17/2024  Moses Peng is a 88 y.o., male.      Pre-op Assessment    I have reviewed the Patient Summary Reports.     I have  reviewed the Nursing Notes. I have reviewed the NPO Status.   I have reviewed the Medications.     Review of Systems  Anesthesia Hx:  No problems with previous Anesthesia   Neg history of prior surgery.          Denies Family Hx of Anesthesia complications.    Denies Personal Hx of Anesthesia complications.                    Cardiovascular:  Exercise tolerance: good    Denies Hypertension.    Denies CAD.    Denies CABG/stent.     Denies CHF.    no hyperlipidemia                             Pulmonary:    Denies COPD.  Denies Asthma.     Denies Sleep Apnea.                Renal/:   Denies Chronic Renal Disease.                Hepatic/GI:      Denies GERD.             Neurological:    Denies CVA.    Denies Headaches. Denies Seizures.                                Endocrine:  Denies Diabetes.         Denies Obesity / BMI > 30  Psych:  Denies Psychiatric History.                  Physical Exam  General: Confusion    Airway:  Mallampati: unable to assess   Mouth Opening: Normal  TM Distance: Normal  Tongue: Normal  Neck ROM: Normal ROM    Dental:  Intact        Anesthesia Plan  Type of Anesthesia, risks & benefits discussed:    Anesthesia Type: Gen ETT  Intra-op Monitoring Plan: Standard ASA Monitors and Art Line  Post Op Pain Control Plan: multimodal analgesia and IV/PO Opioids PRN  Induction:  IV  Airway Plan: Direct and Video, Post-Induction  Informed Consent: Informed consent signed with the Patient and all parties understand the risks and agree with anesthesia plan.  All questions answered. Patient consented to blood products? Yes  ASA Score: 4 Emergent  Day of Surgery Review of History & Physical: H&P Update referred to the surgeon/provider.    Ready For Surgery From Anesthesia Perspective.     .

## 2024-05-17 NOTE — HPI
88 M unknown PMH presents to the ED by EMS stating he wants to kill himself. Came yesterday with c/o R hand paresthesias and was discharged. Currently under PEC per psych evaluation. He states he may have hit the left side of his head on the door earlier this week. Denies falls. Denies headache, seizures, weakness, b/b dysfunction. MRI with large right SDH with midline shift. Denies use of AC/APT. NSGY consulted for evaluation.
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
## 2024-05-17 NOTE — ASSESSMENT & PLAN NOTE
"Per chart review- patient reported "if I had a gun I would kill myself"-   psychiatry following   PEC'ed      "

## 2024-05-17 NOTE — TRANSFER OF CARE
"Anesthesia Transfer of Care Note    Patient: Moses Peng    Procedure(s) Performed: Procedure(s) (LRB):  CRANIOTOMY, FOR SUBDURAL HEMATOMA EVACUATION (Right)    Patient location: ICU    Anesthesia Type: general    Transport from OR: Transported from OR on 6-10 L/min O2 by face mask with adequate spontaneous ventilation. Continuous ECG monitoring in transport. Continuous SpO2 monitoring in transport. Continuos invasive BP monitoring in transport    Post pain: adequate analgesia    Post assessment: no apparent anesthetic complications    Post vital signs: stable    Level of consciousness: awake and confused (oriented to self)    Nausea/Vomiting: no nausea/vomiting    Complications: none    Transfer of care protocol was followed      Last vitals: Visit Vitals  BP (!) 113/49   Pulse (!) 68   Temp 36.7 °C (98 °F) (Oral)   Resp 23   Ht 5' 7" (1.702 m)   Wt 61.2 kg (135 lb)   SpO2 100%   BMI 21.14 kg/m²     "

## 2024-05-17 NOTE — H&P
[facility] - Emergency Dept  Neurocritical Care  History & Physical    Admit Date: 5/17/2024  Service Date: 05/17/2024  Length of Stay: 0    Subjective:     Chief Complaint: Subdural hematoma    History of Present Illness: Mr Peng is an 89 y/o M unknown PMHx admitted to [facility] w/ bilateral SDH R>L and MLS. Per chart review, he presented to the ED by EMS stating he wants to kill himself. Came yesterday with c/o R hand paresthesias and was discharged. Currently [facility]ed per psych evaluation.  Denies falls. MRI with large right SDH with midline shift. Denies use of AC/APT. NSGY consulted for evaluation. NPO currently for OR  for SDH evac w/NSGY. Patient admitted to [facility] for close monitoring and higher level of care.         History reviewed. No pertinent past medical history.  History reviewed. No pertinent surgical history.   No current facility-administered medications on file prior to encounter.     Current Outpatient Medications on File Prior to Encounter   Medication Sig Dispense Refill    diclofenac sodium (VOLTAREN) 1 % Gel Apply 2 g topically once daily. As needed for hand pain or numbness. 20 g 0    hydroCHLOROthiazide (HYDRODIURIL) 12.5 MG Tab Take 1 tablet (12.5 mg total) by mouth once daily. 30 tablet 11      Allergies: Patient has no known allergies.  No family history on file.  Social History     Tobacco Use    Smoking status: Every Day    Smokeless tobacco: Never     Review of Systems  Constitutional: Denies fevers, weight loss, chills, or weakness.  Eyes: Denies changes in vision.  ENT: Denies dysphagia, nasal discharge, ear pain or discharge.  Cardiovascular: Denies chest pain, palpitations, orthopnea, or claudication.  Respiratory: Denies shortness of breath, cough, hemoptysis, or wheezing.  GI: Denies nausea/vomitting, hematochezia, melena, abd pain, or changes in appetite.  : Denies dysuria, incontinence, or hematuria.  Musculoskeletal: Denies joint pain or myalgias.  Skin/breast: Denies rashes, lumps,  "lesions, or discharge.  Neurologic: Denies headache, dizziness, vertigo. Complains of numbness of R hand and R leg that started over a year ago.   Psychiatric: Denies changes in mood or hallucinations.  Endocrine: Denies polyuria, polydipsia, heat/cold intolerance.  Hematologic/Lymph: Denies lymphadenopathy, easy bruising or easy bleeding.  Allergic/Immunologic: Denies rash, rhinitis.   Objective:     Vitals:    Temp: 98 °F (36.7 °C)  Pulse: (!) 56  BP: (!) 154/72  Resp: 18  SpO2: 100 %    Temp  Min: 97.7 °F (36.5 °C)  Max: 98.2 °F (36.8 °C)  Pulse  Min: 56  Max: 82  BP  Min: 154/72  Max: 190/87  Resp  Min: 16  Max: 18  SpO2  Min: 99 %  Max: 100 %    No intake/output data recorded.            Physical Exam  GA: Alert, comfortable, no acute distress.   HEENT: No scleral icterus or JVD.   Pulmonary: Clear to auscultation A/L.   Cardiac: RRR S1 & S2 w/o rubs/murmurs/gallops.   Abdominal: Bowel sounds present x 4. No appreciable hepatosplenomegaly.  Skin: No jaundice, rashes, or visible lesions.  Neuro:  --GCS: E4 V5 M6  --Mental Status:  awake, oriented X4, follows commands.   --CN II-XII grossly intact.   --Pupils 3mm, PERRL.   --Corneal reflex, gag, cough intact.  --LUE strength: 5/5  --RUE strength: 5/5  --LLE strength: 5/5  --RLE strength: 5/5       Today I personally reviewed pertinent medications, lines/drains/airways, imaging, cardiology results, laboratory results, microbiology results,     Assessment/Plan:     Neuro  * Subdural hematoma  89 yo male w/Bilateral SDH R>L large right SDH with 7 mm MLS     -- Denies falls, not on AC/APT  -- NSGY following  -- MRI brain revealed-large subdural hematoma over the right cerebral hemisphere. Collection -- measures up to 2.1 cm in thickness   -- To OR w/ NSGY today  -- SBP <140  -- neuro checks q 1 hr  -- PT/OT/SLP      Brain compression  Seen on brain imaging  Neuro exam q 1 hr    Psychiatric  Suicidal ideation  Per chart review- patient reported "if I had a gun I would " "kill myself"-   psychiatry following   PEC'ed        Cardiac/Vascular  Essential hypertension  SBP <140  Wean off cardene   PRN hydralazine and labetalol  Continuous cardiac monitoring   TTE and EKG          The patient is being Prophylaxed for:  Venous Thromboembolism with: Mechanical  Stress Ulcer with: None  Ventilator Pneumonia with: not applicable    Activity Orders            Diet NPO: NPO starting at 05/17 1601          No Order    Deanna Khanna NP  Neurocritical Care    Critical condition in that Patient has a condition that poses threat to life and bodily function: bilateral SDH R>L     35 minutes of Critical care time was spent personally by me on the following activities: development of treatment plan with patient or surrogate and bedside caregivers, discussions with consultants, evaluation of patient's response to treatment, examination of patient, ordering and performing treatments and interventions, ordering and review of laboratory studies, ordering and review of radiographic studies, pulse oximetry, antibiotic titration if applicable, vasopressor titration if applicable, re-evaluation of patient's condition. This critical care time did not overlap with that of any other provider or involve time for any procedures. There is high probability for acute neurological change leading to clinical and possibly life-threatening deterioration requiring highest level of physician preparedness for urgent intervention.    "

## 2024-05-17 NOTE — ED TRIAGE NOTES
"87 y/o M presents to ER with c/c RUE and RLE tingling x 1 year. Pt came to hospital with similar complaint yesterday. Pt said to police he was so tired of this that "I would just kill myself". Pt owns a gun but has not plans for how to harm himself. Denies SI at this time.  "

## 2024-05-17 NOTE — SUBJECTIVE & OBJECTIVE
(Not in a hospital admission)      Review of patient's allergies indicates:  No Known Allergies    History reviewed. No pertinent past medical history.  History reviewed. No pertinent surgical history.  Family History    None       Tobacco Use    Smoking status: Every Day    Smokeless tobacco: Never   Substance and Sexual Activity    Alcohol use: Not on file    Drug use: Not on file    Sexual activity: Not on file     Review of Systems  Objective:     Weight: 61.2 kg (135 lb)  Body mass index is 21.14 kg/m².  Vital Signs (Most Recent):  Temp: 98 °F (36.7 °C) (05/17/24 1602)  Pulse: (!) 56 (05/17/24 1344)  Resp: 18 (05/17/24 1344)  BP: (!) 154/72 (05/17/24 1554)  SpO2: 100 % (05/17/24 1344) Vital Signs (24h Range):  Temp:  [97.7 °F (36.5 °C)-98.2 °F (36.8 °C)] 98 °F (36.7 °C)  Pulse:  [56-82] 56  Resp:  [16-18] 18  SpO2:  [99 %-100 %] 100 %  BP: (154-190)/(72-95) 154/72                                 Physical Exam     Neurosurgery Physical Exam        General: well developed, well nourished, no distress.   Head: normocephalic, atraumatic  Neurologic: Alert and oriented. Thought content appropriate.  GCS: Motor: 6/Verbal: 5/Eyes: 4 GCS Total: 15  Mental Status: Awake, Alert, Oriented x 4  Language: No aphasia  Speech: No dysarthria  Cranial nerves: face symmetric, tongue midline, CN II-XII grossly intact.   Eyes: pupils equal, round, reactive to light with accomodation, EOMI.   Pulmonary: normal respirations, no signs of respiratory distress  Sensory: intact to light touch throughout  Motor Strength: Moves all extremities spontaneously with good tone.  Full strength upper and lower extremities. No abnormal movements seen.     Strength  Deltoids Triceps Biceps Wrist Extension Wrist Flexion Hand    Upper: R 5/5 5/5 5/5 5/5 5/5 5/5    L 5/5 5/5 5/5 5/5 5/5 5/5     Iliopsoas Quadriceps Knee  Flexion Tibialis  anterior Gastro- cnemius EHL   Lower: R 5/5 5/5 5/5 5/5 5/5 5/5    L 5/5 5/5 5/5 5/5 5/5 5/5     Pronator  Drift: no drift noted  Finger-to-nose: Intact bilaterally  Skin: Skin is warm, dry and intact.  No midline TTP, full ROM neck      Significant Labs:  Recent Labs   Lab 05/17/24  1202   GLU 70      K 4.1      CO2 21*   BUN 18   CREATININE 1.2   CALCIUM 9.0     Recent Labs   Lab 05/16/24 2134 05/17/24  1202   WBC  --  4.08   HGB  --  11.3*   HCT 36 35.1*   PLT  --  221     Recent Labs   Lab 05/17/24  1417   INR 1.0   APTT 27.4     Microbiology Results (last 7 days)       ** No results found for the last 168 hours. **          Recent Lab Results         05/17/24  1417   05/17/24  1202   05/16/24 2134        Acetaminophen Level   <3.0  Comment: Toxic Levels:  Adults (4 hr post-ingestion).........>150 ug/mL  Adults (12 hr post-ingestion)........>40 ug/mL  Peds (2 hr post-ingestion, liquid)...>225 ug/mL           Albumin   3.2         Alcohol, Serum   31         ALP   91         ALT   19         Anion Gap   10         PTT 27.4  Comment: Refer to local heparin nomogram for intensity/dose specific   therapeutic   range.             AST   33         Baso #   0.04         Basophil %   1.0         BILIRUBIN TOTAL   0.2  Comment: For infants and newborns, interpretation of results should be based  on gestational age, weight and in agreement with clinical  observations.    Premature Infant recommended reference ranges:  Up to 24 hours.............<8.0 mg/dL  Up to 48 hours............<12.0 mg/dL  3-5 days..................<15.0 mg/dL  6-29 days.................<15.0 mg/dL           BUN   18         Calcium   9.0         Chloride   105         CO2   21         Creatinine   1.2         Differential Method   Automated         eGFR   58.2         Eos #   0.2         Eos %   3.9         Free T4   0.90         Glucose   70         Gran # (ANC)   2.2         Gran %   54.0         Group & Rh O POS           Hematocrit   35.1         Hemoglobin   11.3         Hepatitis C Ab   Non-reactive         HIV 1/2 Ag/Ab    Non-reactive         Immature Grans (Abs)   0.01  Comment: Mild elevation in immature granulocytes is non specific and   can be seen in a variety of conditions including stress response,   acute inflammation, trauma and pregnancy. Correlation with other   laboratory and clinical findings is essential.           Immature Granulocytes   0.2         INDIRECT FELIPE NEG           INR 1.0  Comment: Coumadin Therapy:  2.0 - 3.0 for INR for all indicators except mechanical heart valves  and antiphospholipid syndromes which should use 2.5 - 3.5.             Lymph #   1.4         Lymph %   35.3         MCH   32.0         MCHC   32.2         MCV   99         Mono #   0.2         Mono %   5.6         MPV   9.7         nRBC   0         Platelet Count   221         POC BUN     18       POC Chloride     106       POC Creatinine     1.5       POC Glucose     83       POC Hematocrit     36       POC Ionized Calcium     1.15       Potassium, Blood Gas     4.5       Sodium, Blood Gas     140       POC TCO2 (MEASURED)     25       Potassium   4.1         PROTEIN TOTAL   6.9         PT 11.2           RBC   3.53         RDW   13.5         Salicylate Level   <5.0  Comment: Toxic:  30.0 - 70.0 mg/dl  Lethal: >70.0 mg/dl           Sample     DEBO       Sodium   136         Specimen Outdate 05/20/2024 23:59           TSH   0.289         WBC   4.08               All pertinent labs from the last 24 hours have been reviewed.    Significant Diagnostics:  I have reviewed all pertinent imaging results/findings within the past 24 hours.

## 2024-05-17 NOTE — ASSESSMENT & PLAN NOTE
89 yo male with large right SDH with 7 mm MLS, GCS 15.    - Recommended craniotomy for SDH evacuation. Patient does not have decision making and currently PED'd. Patient now agreeable to surgery. Emergency consents signed by 2 MD.  - Admit to ICU post-op  - Discussed with Dr. Price

## 2024-05-17 NOTE — BRIEF OP NOTE
[facility] - [facility] Dept  Brief Operative Note    SUMMARY     Surgery Date: 5/17/2024     Surgeons and Role:     * Herb Price MD - Primary    Assisting Surgeon: Wenceslao Barnett MD    Pre-op Diagnosis:  Subdural hematoma [S06.5XAA]    Post-op Diagnosis:  Post-Op Diagnosis Codes:     * Subdural hematoma [S06.5XAA]    Procedure(s) (LRB):  CRANIOTOMY, FOR SUBDURAL HEMATOMA EVACUATION (Right)    Anesthesia: General    Implants:  * No implants in log *    Operative Findings: R sided burrholes for SD hygroma    Estimated Blood Loss: * No values recorded between 5/17/2024  5:44 PM and 5/17/2024  6:27 PM *    Estimated Blood Loss has been documented.         Specimens:   Specimen (24h ago, onward)      None            [id number]

## 2024-05-17 NOTE — CARE UPDATE
Patient stated he did not want family contacted to notify of admission, however he is under PEC hold. Discussed recommendations for surgical intervention with patient's daughter Shaan Rees who is the only listed contact in the system. She passed the phone to the patient's ex-wife Rosaline Peng who states she is the patient's medical POA. They have both stated they would like life-saving intervention such as craniotomy for SDH evacuation performed. Surgery discussed with patient again and he agrees to the recommended procedure. Emergency consent obtained given the patient is under PEC hold. Psychiatry to re-evaluate PEC requirements daily, will re-assess tomorrow.     Lurdes Pollard PA-C

## 2024-05-17 NOTE — ED PROVIDER NOTES
"Encounter Date: 5/17/2024       History     Chief Complaint   Patient presents with    Suicidal     States "if I had a gun I would kill myself"      HPI      89 y/o M PMH HTN who presents to the ED for evaluation of suicidal statements.  Patient arrives by police and EMS. Reportedly he was making statements such as "if I had a gun I would kill myself" and "I am disgusted with myself." Patient admits to making these statements. He states it was out of frustration with his hand and foot numbness that has been present for over a year.  He was seen in our ED yesterday for the numbness.  Currently patient denies any pain.  No N/V, CP, dyspnea, dysuria, headache, or medication ingestion.    Review of patient's allergies indicates:  No Known Allergies  History reviewed. No pertinent past medical history.  History reviewed. No pertinent surgical history.  No family history on file.  Social History     Tobacco Use    Smoking status: Every Day    Smokeless tobacco: Never     Review of Systems   Respiratory:  Negative for cough.    Cardiovascular:  Negative for chest pain.   Gastrointestinal:  Negative for nausea.   Neurological:  Positive for numbness.   Psychiatric/Behavioral:  Positive for suicidal ideas.        Physical Exam     Initial Vitals [05/17/24 1136]   BP Pulse Resp Temp SpO2   (!) 160/80 73 18 98.1 °F (36.7 °C) 99 %      MAP       --         Physical Exam    Nursing note and vitals reviewed.  Constitutional: He appears well-nourished.   HENT:   No signs of severe head or neck trauma   Eyes: EOM are normal.   Neck: Neck supple.   Cardiovascular:  Normal rate and regular rhythm.           Pulmonary/Chest: Breath sounds normal. No respiratory distress.   Abdominal: Abdomen is soft. There is no abdominal tenderness.   Musculoskeletal:         General: No edema. Normal range of motion.      Cervical back: Neck supple.     Neurological: He is alert and oriented to person, place, and time.   Subjective numbness of right " hand and foot; 5/5 strength all UE and LE, symmetric smile, tongue midline, no aphasia, no dysarthria   Skin: Skin is warm and dry.   Psychiatric: He has a normal mood and affect. Thought content normal.         ED Course   Procedures  Labs Reviewed   CBC W/ AUTO DIFFERENTIAL - Abnormal; Notable for the following components:       Result Value    RBC 3.53 (*)     Hemoglobin 11.3 (*)     Hematocrit 35.1 (*)     MCV 99 (*)     MCH 32.0 (*)     Mono # 0.2 (*)     All other components within normal limits    Narrative:     Release to patient->Immediate   COMPREHENSIVE METABOLIC PANEL - Abnormal; Notable for the following components:    CO2 21 (*)     Albumin 3.2 (*)     eGFR 58.2 (*)     All other components within normal limits    Narrative:     Release to patient->Immediate   TSH - Abnormal; Notable for the following components:    TSH 0.289 (*)     All other components within normal limits    Narrative:     Release to patient->Immediate   ALCOHOL,MEDICAL (ETHANOL) - Abnormal; Notable for the following components:    Alcohol, Serum 31 (*)     All other components within normal limits    Narrative:     Release to patient->Immediate   ACETAMINOPHEN LEVEL - Abnormal; Notable for the following components:    Acetaminophen (Tylenol), Serum <3.0 (*)     All other components within normal limits    Narrative:     Release to patient->Immediate   SALICYLATE LEVEL - Abnormal; Notable for the following components:    Salicylate Lvl <5.0 (*)     All other components within normal limits    Narrative:     Release to patient->Immediate   HIV 1 / 2 ANTIBODY    Narrative:     Release to patient->Immediate   HEPATITIS C ANTIBODY    Narrative:     Release to patient->Immediate   T4, FREE    Narrative:     Release to patient->Immediate   APTT   PROTIME-INR   URINALYSIS, REFLEX TO URINE CULTURE   DRUG SCREEN PANEL, URINE EMERGENCY   TYPE & SCREEN   PREPARE RBC SOFT          Imaging Results               MRI Brain Without Contrast (Final  result)  Result time 05/17/24 13:45:52      Final result by Cheng Fajardo MD (05/17/24 13:45:52)                   Impression:      Bilateral subdural hematomas, larger on the right, as further discussed above.  There is significant intracranial mass effect with leftward midline shift.    Underlying chronic small vessel ischemic change and cerebral volume loss.    Neuro surgical consultation is advised.    This report was flagged in Epic as abnormal.      Electronically signed by: Cheng Fajardo MD  Date:    05/17/2024  Time:    13:45               Narrative:    EXAMINATION:  MRI BRAIN WITHOUT CONTRAST    CLINICAL HISTORY:  Transient ischemic attack (TIA);    TECHNIQUE:  Multiplanar multisequence MR imaging of the brain was performed without intravenous contrast.    Examination mildly degraded by patient motion artifact.    COMPARISON:  CT head 12/28/2023    FINDINGS:  Intracranial Compartment:    There is a large subdural hematoma over the right cerebral hemisphere.  Collection measures up to 2.1 cm in thickness.  Collection is predominantly hyperintense on T1 and T2-weighted images, with some interspersed areas of more heterogeneous signal anteriorly and posteriorly.  Minimal septation evident.  Additional subdural hemorrhage over the left cerebral convexity with similar imaging characteristics.  This collection measuring just 0.6 cm in maximal thickness.  No extra-axial blood or fluid collections elsewhere.  There is significant intracranial mass effect.  Diffuse right-sided sulcal and ventricular crowding, with approximately 0.7 cm of leftward midline shift measured at the septum pellucidum.    Mild chronic small vessel ischemic change throughout the supratentorial white matter.  No evidence of recent or remote major vascular distribution infarct.  No evidence of recent or remote parenchymal hemorrhage.    Moderate cerebral volume loss.  No ventricular entrapment or obstructive hydrocephalus.    Normal  vascular flow voids are preserved.    Skull/Extracranial Contents (limited evaluation):    Bone marrow signal intensity is unremarkable.    Postsurgical change both globes.    Findings were relayed to the ordering provider (Drake) via the epic secure chat system at approximately 13:40.                                       Medications   niCARdipine 40 mg/200 mL (0.2 mg/mL) infusion (5 mg/hr Intravenous New Bag 5/17/24 1546)   lacosamide (Vimpat) 100 mg in sodium chloride 0.9% 100 mL IVPB (ready to mix) (100 mg Intravenous Given 5/17/24 1626)     Medical Decision Making  Amount and/or Complexity of Data Reviewed  Labs: ordered. Decision-making details documented in ED Course.  Radiology: ordered and independent interpretation performed. Decision-making details documented in ED Course.    Risk  Prescription drug management.  Decision regarding hospitalization.                                  88-year-old male here with suicidal ideations.  VSS in ED, moving all extremities, normal speech; subjective numbness of RUE and RLE. He admits to the statements that he made.  PEC was placed.  Normal WBC, ASA/APAP negative, ETOH +31.  Consulted psychiatry because of his statements.  In the meantime, MRI was completed, showing acute large subdural hematomas with shift. Patient is a rather poor historian, states he may have bumped his head at some point, but is not sure.   Immediately consulted NSGY, who saw the patient. Plan is for OR.  Psychiatry also saw the patient, they recommend continuing PEC at this time.  Discussed with Neuro ICU, will start cardene for BP goal <160, and vimpat IV.  Patient not on any known blood thinners.    Admitted to Neuro ICU, plan for OR today per NSGY.    Critical care time of 45 minutes.              Clinical Impression:  Final diagnoses:  [S06.5XAA] Subdural hematoma (Primary)  [R45.851] Suicidal ideation          ED Disposition Condition    Admit                 Luis Juan MD  05/17/24  [id number]

## 2024-05-17 NOTE — ANESTHESIA PROCEDURE NOTES
Intubation    Date/Time: 5/17/2024 5:22 PM    Performed by: Lombardi, Nicole A., CRNA  Authorized by: Nikki Yeh MD    Intubation:     Induction:  Rapid sequence induction    Intubated:  Postinduction    Mask Ventilation:  Not attempted    Attempts:  1    Attempted By:  CRNA    Method of Intubation:  Video laryngoscopy    Blade:  Alvarez 3    Laryngeal View Grade: Grade I - full view of cords      Difficult Airway Encountered?: No      Complications:  None    Airway Device:  Oral endotracheal tube    Airway Device Size:  7.0    Style/Cuff Inflation:  Cuffed (inflated to minimal occlusive pressure)    Inflation Amount (mL):  6    Tube secured:  22    Secured at:  The lips    Placement Verified By:  Capnometry    Complicating Factors:  None    Findings Post-Intubation:  BS equal bilateral and atraumatic/condition of teeth unchanged

## 2024-05-17 NOTE — ED NOTES
"Patient identifiers for Moses Peng 88 y.o. male checked and correct.  Chief Complaint   Patient presents with    Suicidal     States "if I had a gun I would kill myself"      No past medical history on file.  Allergies reported: Review of patient's allergies indicates:  No Known Allergies      LOC: Patient is awake, alert, and aware of environment with an appropriate affect. Patient is oriented x 4 and speaking appropriately.  APPEARANCE: Patient resting comfortably and in no acute distress. Patient is clean and well groomed, patient's clothing is properly fastened.  HEENT: - JVD, + midline trach  SKIN: The skin is warm and dry. Patient has normal skin turgor and moist mucus membranes.   MUSKULOSKELETAL: Patient is moving all extremities well, no obvious deformities noted. Pulses intact.   RESPIRATORY: Airway is open and patent. Respirations are spontaneous and non-labored with normal effort and rate.  CARDIAC: No peripheral edema noted.   ABDOMEN: No distention noted. Soft and non-tender upon palpation.  NEUROLOGICAL: pupils 4 mm, PERRL. Facial expression is symmetrical. Hand grasps are equal bilaterally. Normal sensation in all extremities when touched with finger. Endorses RUE and RLE tingling with intermittent pain.        "

## 2024-05-17 NOTE — CONSULTS
[facility]  Neurosurgery  Consult Note    Inpatient consult to Neurosurgery  Consult performed by: Lurdes Pollard PA-C  Consult ordered by: Luis Juan MD        Subjective:     Chief Complaint/Reason for Admission: SDH    History of Present Illness: 88 M unknown PMH presents to the ED by EMS stating he wants to kill himself. Came yesterday with c/o R hand paresthesias and was discharged. Currently under PEC per psych evaluation. He states he may have hit the left side of his head on the door earlier this week. Denies falls. Denies headache, seizures, weakness, b/b dysfunction. MRI with large right SDH with midline shift. Denies use of AC/APT. NSGY consulted for evaluation.     (Not in a hospital admission)      Review of patient's allergies indicates:  No Known Allergies    History reviewed. No pertinent past medical history.  History reviewed. No pertinent surgical history.  Family History    None       Tobacco Use    Smoking status: Every Day    Smokeless tobacco: Never   Substance and Sexual Activity    Alcohol use: Not on file    Drug use: Not on file    Sexual activity: Not on file     Review of Systems  Objective:     Weight: 61.2 kg (135 lb)  Body mass index is 21.14 kg/m².  Vital Signs (Most Recent):  Temp: 98 °F (36.7 °C) (05/17/24 1602)  Pulse: (!) 56 (05/17/24 1344)  Resp: 18 (05/17/24 1344)  BP: (!) 154/72 (05/17/24 1554)  SpO2: 100 % (05/17/24 1344) Vital Signs (24h Range):  Temp:  [97.7 °F (36.5 °C)-98.2 °F (36.8 °C)] 98 °F (36.7 °C)  Pulse:  [56-82] 56  Resp:  [16-18] 18  SpO2:  [99 %-100 %] 100 %  BP: (154-190)/(72-95) 154/72                                 Physical Exam     Neurosurgery Physical Exam        General: well developed, well nourished, no distress.   Head: normocephalic, atraumatic  Neurologic: Alert and oriented. Thought content appropriate.  GCS: Motor: 6/Verbal: 5/Eyes: 4 GCS Total: 15  Mental Status: Awake, Alert, Oriented x 4  Language: No aphasia  Speech: No  dysarthria  Cranial nerves: face symmetric, tongue midline, CN II-XII grossly intact.   Eyes: pupils equal, round, reactive to light with accomodation, EOMI.   Pulmonary: normal respirations, no signs of respiratory distress  Sensory: intact to light touch throughout  Motor Strength: Moves all extremities spontaneously with good tone.  Full strength upper and lower extremities. No abnormal movements seen.     Strength  Deltoids Triceps Biceps Wrist Extension Wrist Flexion Hand    Upper: R 5/5 5/5 5/5 5/5 5/5 5/5    L 5/5 5/5 5/5 5/5 5/5 5/5     Iliopsoas Quadriceps Knee  Flexion Tibialis  anterior Gastro- cnemius EHL   Lower: R 5/5 5/5 5/5 5/5 5/5 5/5    L 5/5 5/5 5/5 5/5 5/5 5/5     Pronator Drift: no drift noted  Finger-to-nose: Intact bilaterally  Skin: Skin is warm, dry and intact.  No midline TTP, full ROM neck      Significant Labs:  Recent Labs   Lab 05/17/24  1202   GLU 70      K 4.1      CO2 21*   BUN 18   CREATININE 1.2   CALCIUM 9.0     Recent Labs   Lab 05/16/24  2134 05/17/24  1202   WBC  --  4.08   HGB  --  11.3*   HCT 36 35.1*   PLT  --  221     Recent Labs   Lab 05/17/24  1417   INR 1.0   APTT 27.4     Microbiology Results (last 7 days)       ** No results found for the last 168 hours. **          Recent Lab Results         05/17/24  1417   05/17/24  1202   05/16/24  2134        Acetaminophen Level   <3.0  Comment: Toxic Levels:  Adults (4 hr post-ingestion).........>150 ug/mL  Adults (12 hr post-ingestion)........>40 ug/mL  Peds (2 hr post-ingestion, liquid)...>225 ug/mL           Albumin   3.2         Alcohol, Serum   31         ALP   91         ALT   19         Anion Gap   10         PTT 27.4  Comment: Refer to local heparin nomogram for intensity/dose specific   therapeutic   range.             AST   33         Baso #   0.04         Basophil %   1.0         BILIRUBIN TOTAL   0.2  Comment: For infants and newborns, interpretation of results should be based  on gestational age,  weight and in agreement with clinical  observations.    Premature Infant recommended reference ranges:  Up to 24 hours.............<8.0 mg/dL  Up to 48 hours............<12.0 mg/dL  3-5 days..................<15.0 mg/dL  6-29 days.................<15.0 mg/dL           BUN   18         Calcium   9.0         Chloride   105         CO2   21         Creatinine   1.2         Differential Method   Automated         eGFR   58.2         Eos #   0.2         Eos %   3.9         Free T4   0.90         Glucose   70         Gran # (ANC)   2.2         Gran %   54.0         Group & Rh O POS           Hematocrit   35.1         Hemoglobin   11.3         Hepatitis C Ab   Non-reactive         HIV 1/2 Ag/Ab   Non-reactive         Immature Grans (Abs)   0.01  Comment: Mild elevation in immature granulocytes is non specific and   can be seen in a variety of conditions including stress response,   acute inflammation, trauma and pregnancy. Correlation with other   laboratory and clinical findings is essential.           Immature Granulocytes   0.2         INDIRECT FELIPE NEG           INR 1.0  Comment: Coumadin Therapy:  2.0 - 3.0 for INR for all indicators except mechanical heart valves  and antiphospholipid syndromes which should use 2.5 - 3.5.             Lymph #   1.4         Lymph %   35.3         MCH   32.0         MCHC   32.2         MCV   99         Mono #   0.2         Mono %   5.6         MPV   9.7         nRBC   0         Platelet Count   221         POC BUN     18       POC Chloride     106       POC Creatinine     1.5       POC Glucose     83       POC Hematocrit     36       POC Ionized Calcium     1.15       Potassium, Blood Gas     4.5       Sodium, Blood Gas     140       POC TCO2 (MEASURED)     25       Potassium   4.1         PROTEIN TOTAL   6.9         PT 11.2           RBC   3.53         RDW   13.5         Salicylate Level   <5.0  Comment: Toxic:  30.0 - 70.0 mg/dl  Lethal: >70.0 mg/dl           Sample     DEBO        Sodium   136         Specimen Outdate 05/20/2024 23:59           TSH   0.289         WBC   4.08               All pertinent labs from the last 24 hours have been reviewed.    Significant Diagnostics:  I have reviewed all pertinent imaging results/findings within the past 24 hours.  Assessment/Plan:     * Subdural hematoma  87 yo male with large right SDH with 7 mm MLS, GCS 15.    - Recommended craniotomy for SDH evacuation. Patient does not have decision making and currently PED'd. Patient now agreeable to surgery. Emergency consents signed by 2 MD.  - Admit to ICU post-op  - Discussed with Dr. Price         Thank you for your consult. I will follow-up with patient. Please contact us if you have any additional questions.    Lurdes Pollard PA-C  Neurosurgery  [facility]

## 2024-05-17 NOTE — ASSESSMENT & PLAN NOTE
SBP <140  Wean off cardene   PRN hydralazine and labetalol  Continuous cardiac monitoring   TTE and EKG

## 2024-05-17 NOTE — HPI
Mr Peng is an 89 y/o M unknown PMHx admitted to [facility] w/ bilateral SDH R>L and MLS. Per chart review, he presented to the ED by EMS stating he wants to kill himself. Came yesterday with c/o R hand paresthesias and was discharged. Currently PEC'ed per psych evaluation.  Denies falls. MRI with large right SDH with midline shift. Denies use of AC/APT. NSGY consulted for evaluation. NPO currently for OR  for SDH evac w/NSGY. Patient admitted to [facility] for close monitoring and higher level of care.

## 2024-05-18 PROBLEM — S06.A0XA MIDLINE SHIFT OF BRAIN DUE TO HEMATOMA: Status: ACTIVE | Noted: 2024-05-18

## 2024-05-18 PROBLEM — G93.40 ENCEPHALOPATHY: Status: ACTIVE | Noted: 2024-05-18

## 2024-05-18 PROBLEM — G93.89 MIDLINE SHIFT OF BRAIN DUE TO HEMATOMA: Status: ACTIVE | Noted: 2024-05-18

## 2024-05-18 PROBLEM — S06.2XAA MIDLINE SHIFT OF BRAIN DUE TO HEMATOMA: Status: ACTIVE | Noted: 2024-05-18

## 2024-05-18 PROBLEM — Z98.890 HISTORY OF BURR HOLE SURGERY: Status: ACTIVE | Noted: 2024-05-18

## 2024-05-18 PROBLEM — G93.40 ACUTE ENCEPHALOPATHY: Status: ACTIVE | Noted: 2024-05-18

## 2024-05-18 PROBLEM — S06.2X0S MIDLINE SHIFT OF BRAIN DUE TO HEMATOMA: Status: ACTIVE | Noted: 2024-05-18

## 2024-05-18 LAB
ALBUMIN SERPL BCP-MCNC: 2.9 G/DL (ref 3.5–5.2)
ALP SERPL-CCNC: 90 U/L (ref 55–135)
ALT SERPL W/O P-5'-P-CCNC: 15 U/L (ref 10–44)
AMPHET+METHAMPHET UR QL: NEGATIVE
ANION GAP SERPL CALC-SCNC: 8 MMOL/L (ref 8–16)
ANION GAP SERPL CALC-SCNC: 8 MMOL/L (ref 8–16)
ASCENDING AORTA: 3.25 CM
AST SERPL-CCNC: 25 U/L (ref 10–40)
AV INDEX (PROSTH): 0.43
AV MEAN GRADIENT: 17 MMHG
AV PEAK GRADIENT: 32 MMHG
AV VALVE AREA BY VELOCITY RATIO: 1.23 CM²
AV VALVE AREA: 1.34 CM²
AV VELOCITY RATIO: 0.39
BARBITURATES UR QL SCN>200 NG/ML: NEGATIVE
BASOPHILS # BLD AUTO: 0.01 K/UL (ref 0–0.2)
BASOPHILS # BLD AUTO: 0.01 K/UL (ref 0–0.2)
BASOPHILS NFR BLD: 0.3 % (ref 0–1.9)
BASOPHILS NFR BLD: 0.3 % (ref 0–1.9)
BENZODIAZ UR QL SCN>200 NG/ML: NEGATIVE
BILIRUB SERPL-MCNC: 0.4 MG/DL (ref 0.1–1)
BILIRUB UR QL STRIP: NEGATIVE
BSA FOR ECHO PROCEDURE: 1.7 M2
BUN SERPL-MCNC: 14 MG/DL (ref 8–23)
BUN SERPL-MCNC: 14 MG/DL (ref 8–23)
BZE UR QL SCN: NEGATIVE
CALCIUM SERPL-MCNC: 8.4 MG/DL (ref 8.7–10.5)
CALCIUM SERPL-MCNC: 8.4 MG/DL (ref 8.7–10.5)
CANNABINOIDS UR QL SCN: NEGATIVE
CHLORIDE SERPL-SCNC: 107 MMOL/L (ref 95–110)
CHLORIDE SERPL-SCNC: 107 MMOL/L (ref 95–110)
CLARITY UR REFRACT.AUTO: CLEAR
CO2 SERPL-SCNC: 21 MMOL/L (ref 23–29)
CO2 SERPL-SCNC: 21 MMOL/L (ref 23–29)
COLOR UR AUTO: YELLOW
CREAT SERPL-MCNC: 1.2 MG/DL (ref 0.5–1.4)
CREAT SERPL-MCNC: 1.2 MG/DL (ref 0.5–1.4)
CREAT UR-MCNC: 81 MG/DL (ref 23–375)
CV ECHO LV RWT: 0.71 CM
DIFFERENTIAL METHOD BLD: ABNORMAL
DIFFERENTIAL METHOD BLD: ABNORMAL
DOP CALC AO PEAK VEL: 2.81 M/S
DOP CALC AO VTI: 52.28 CM
DOP CALC LVOT AREA: 3.1 CM2
DOP CALC LVOT DIAMETER: 2 CM
DOP CALC LVOT PEAK VEL: 1.1 M/S
DOP CALC LVOT STROKE VOLUME: 69.96 CM3
DOP CALCLVOT PEAK VEL VTI: 22.28 CM
E WAVE DECELERATION TIME: 216.96 MSEC
E/A RATIO: 0.7
E/E' RATIO: 10.13 M/S
ECHO LV POSTERIOR WALL: 1.14 CM (ref 0.6–1.1)
EJECTION FRACTION: 68 %
EOSINOPHIL # BLD AUTO: 0 K/UL (ref 0–0.5)
EOSINOPHIL # BLD AUTO: 0 K/UL (ref 0–0.5)
EOSINOPHIL NFR BLD: 0 % (ref 0–8)
EOSINOPHIL NFR BLD: 0 % (ref 0–8)
ERYTHROCYTE [DISTWIDTH] IN BLOOD BY AUTOMATED COUNT: 13.2 % (ref 11.5–14.5)
ERYTHROCYTE [DISTWIDTH] IN BLOOD BY AUTOMATED COUNT: 13.2 % (ref 11.5–14.5)
EST. GFR  (NO RACE VARIABLE): 58.2 ML/MIN/1.73 M^2
EST. GFR  (NO RACE VARIABLE): 58.2 ML/MIN/1.73 M^2
FRACTIONAL SHORTENING: 27 % (ref 28–44)
GLUCOSE SERPL-MCNC: 135 MG/DL (ref 70–110)
GLUCOSE SERPL-MCNC: 135 MG/DL (ref 70–110)
GLUCOSE UR QL STRIP: NEGATIVE
HCT VFR BLD AUTO: 34.2 % (ref 40–54)
HCT VFR BLD AUTO: 34.2 % (ref 40–54)
HGB BLD-MCNC: 11.5 G/DL (ref 14–18)
HGB BLD-MCNC: 11.5 G/DL (ref 14–18)
HGB UR QL STRIP: NEGATIVE
IMM GRANULOCYTES # BLD AUTO: 0.01 K/UL (ref 0–0.04)
IMM GRANULOCYTES # BLD AUTO: 0.01 K/UL (ref 0–0.04)
IMM GRANULOCYTES NFR BLD AUTO: 0.3 % (ref 0–0.5)
IMM GRANULOCYTES NFR BLD AUTO: 0.3 % (ref 0–0.5)
INTERVENTRICULAR SEPTUM: 1.16 CM (ref 0.6–1.1)
IVRT: 102.76 MSEC
KETONES UR QL STRIP: NEGATIVE
LA MAJOR: 5.54 CM
LA MINOR: 5.52 CM
LA WIDTH: 3.77 CM
LEFT ATRIUM SIZE: 2.99 CM
LEFT ATRIUM VOLUME INDEX MOD: 31.5 ML/M2
LEFT ATRIUM VOLUME INDEX: 31 ML/M2
LEFT ATRIUM VOLUME MOD: 53.82 CM3
LEFT ATRIUM VOLUME: 52.99 CM3
LEFT INTERNAL DIMENSION IN SYSTOLE: 2.34 CM (ref 2.1–4)
LEFT VENTRICLE DIASTOLIC VOLUME INDEX: 23.75 ML/M2
LEFT VENTRICLE DIASTOLIC VOLUME: 40.61 ML
LEFT VENTRICLE MASS INDEX: 65 G/M2
LEFT VENTRICLE SYSTOLIC VOLUME INDEX: 11 ML/M2
LEFT VENTRICLE SYSTOLIC VOLUME: 18.82 ML
LEFT VENTRICULAR INTERNAL DIMENSION IN DIASTOLE: 3.19 CM (ref 3.5–6)
LEFT VENTRICULAR MASS: 111.26 G
LEUKOCYTE ESTERASE UR QL STRIP: NEGATIVE
LV LATERAL E/E' RATIO: 9 M/S
LV SEPTAL E/E' RATIO: 11.57 M/S
LYMPHOCYTES # BLD AUTO: 0.5 K/UL (ref 1–4.8)
LYMPHOCYTES # BLD AUTO: 0.5 K/UL (ref 1–4.8)
LYMPHOCYTES NFR BLD: 12.7 % (ref 18–48)
LYMPHOCYTES NFR BLD: 12.7 % (ref 18–48)
MAGNESIUM SERPL-MCNC: 1.8 MG/DL (ref 1.6–2.6)
MCH RBC QN AUTO: 32.9 PG (ref 27–31)
MCH RBC QN AUTO: 32.9 PG (ref 27–31)
MCHC RBC AUTO-ENTMCNC: 33.6 G/DL (ref 32–36)
MCHC RBC AUTO-ENTMCNC: 33.6 G/DL (ref 32–36)
MCV RBC AUTO: 98 FL (ref 82–98)
MCV RBC AUTO: 98 FL (ref 82–98)
METHADONE UR QL SCN>300 NG/ML: NEGATIVE
MONOCYTES # BLD AUTO: 0 K/UL (ref 0.3–1)
MONOCYTES # BLD AUTO: 0 K/UL (ref 0.3–1)
MONOCYTES NFR BLD: 1.1 % (ref 4–15)
MONOCYTES NFR BLD: 1.1 % (ref 4–15)
MV PEAK A VEL: 1.16 M/S
MV PEAK E VEL: 0.81 M/S
MV STENOSIS PRESSURE HALF TIME: 62.92 MS
MV VALVE AREA P 1/2 METHOD: 3.5 CM2
NEUTROPHILS # BLD AUTO: 3.2 K/UL (ref 1.8–7.7)
NEUTROPHILS # BLD AUTO: 3.2 K/UL (ref 1.8–7.7)
NEUTROPHILS NFR BLD: 85.6 % (ref 38–73)
NEUTROPHILS NFR BLD: 85.6 % (ref 38–73)
NITRITE UR QL STRIP: NEGATIVE
NRBC BLD-RTO: 0 /100 WBC
NRBC BLD-RTO: 0 /100 WBC
OHS CV RV/LV RATIO: 1.01 CM
OPIATES UR QL SCN: NEGATIVE
PCP UR QL SCN>25 NG/ML: NEGATIVE
PH UR STRIP: 6 [PH] (ref 5–8)
PISA TR MAX VEL: 2.89 M/S
PLATELET # BLD AUTO: 213 K/UL (ref 150–450)
PLATELET # BLD AUTO: 213 K/UL (ref 150–450)
PMV BLD AUTO: 9.5 FL (ref 9.2–12.9)
PMV BLD AUTO: 9.5 FL (ref 9.2–12.9)
POTASSIUM SERPL-SCNC: 4.3 MMOL/L (ref 3.5–5.1)
POTASSIUM SERPL-SCNC: 4.3 MMOL/L (ref 3.5–5.1)
PROT SERPL-MCNC: 6.4 G/DL (ref 6–8.4)
PROT UR QL STRIP: NEGATIVE
RA MAJOR: 4.73 CM
RA PRESSURE ESTIMATED: 3 MMHG
RA WIDTH: 4.27 CM
RBC # BLD AUTO: 3.5 M/UL (ref 4.6–6.2)
RBC # BLD AUTO: 3.5 M/UL (ref 4.6–6.2)
RIGHT VENTRICULAR END-DIASTOLIC DIMENSION: 3.22 CM
RV TB RVSP: 6 MMHG
SINUS: 3.12 CM
SODIUM SERPL-SCNC: 136 MMOL/L (ref 136–145)
SODIUM SERPL-SCNC: 136 MMOL/L (ref 136–145)
SP GR UR STRIP: 1.02 (ref 1–1.03)
STJ: 2.4 CM
TDI LATERAL: 0.09 M/S
TDI SEPTAL: 0.07 M/S
TDI: 0.08 M/S
TOXICOLOGY INFORMATION: NORMAL
TR MAX PG: 33 MMHG
TRICUSPID ANNULAR PLANE SYSTOLIC EXCURSION: 2.09 CM
TV REST PULMONARY ARTERY PRESSURE: 36 MMHG
URN SPEC COLLECT METH UR: NORMAL
WBC # BLD AUTO: 3.7 K/UL (ref 3.9–12.7)
WBC # BLD AUTO: 3.7 K/UL (ref 3.9–12.7)
Z-SCORE OF LEFT VENTRICULAR DIMENSION IN END DIASTOLE: -3.98
Z-SCORE OF LEFT VENTRICULAR DIMENSION IN END SYSTOLE: -1.81

## 2024-05-18 PROCEDURE — 99232 SBSQ HOSP IP/OBS MODERATE 35: CPT | Mod: GC,,, | Performed by: INTERNAL MEDICINE

## 2024-05-18 PROCEDURE — 63600175 PHARM REV CODE 636 W HCPCS: Performed by: PHYSICIAN ASSISTANT

## 2024-05-18 PROCEDURE — 63600175 PHARM REV CODE 636 W HCPCS: Performed by: NURSE PRACTITIONER

## 2024-05-18 PROCEDURE — 25000003 PHARM REV CODE 250: Performed by: NURSE PRACTITIONER

## 2024-05-18 PROCEDURE — 80307 DRUG TEST PRSMV CHEM ANLYZR: CPT | Performed by: STUDENT IN AN ORGANIZED HEALTH CARE EDUCATION/TRAINING PROGRAM

## 2024-05-18 PROCEDURE — 94761 N-INVAS EAR/PLS OXIMETRY MLT: CPT

## 2024-05-18 PROCEDURE — 81003 URINALYSIS AUTO W/O SCOPE: CPT | Performed by: STUDENT IN AN ORGANIZED HEALTH CARE EDUCATION/TRAINING PROGRAM

## 2024-05-18 PROCEDURE — 25000003 PHARM REV CODE 250: Performed by: STUDENT IN AN ORGANIZED HEALTH CARE EDUCATION/TRAINING PROGRAM

## 2024-05-18 PROCEDURE — 80053 COMPREHEN METABOLIC PANEL: CPT | Performed by: NURSE PRACTITIONER

## 2024-05-18 PROCEDURE — 99291 CRITICAL CARE FIRST HOUR: CPT | Mod: FS,,, | Performed by: PSYCHIATRY & NEUROLOGY

## 2024-05-18 PROCEDURE — 83735 ASSAY OF MAGNESIUM: CPT | Performed by: NURSE PRACTITIONER

## 2024-05-18 PROCEDURE — 99499 UNLISTED E&M SERVICE: CPT | Mod: ,,, | Performed by: NURSE PRACTITIONER

## 2024-05-18 PROCEDURE — 63600175 PHARM REV CODE 636 W HCPCS: Performed by: STUDENT IN AN ORGANIZED HEALTH CARE EDUCATION/TRAINING PROGRAM

## 2024-05-18 PROCEDURE — 20000000 HC ICU ROOM

## 2024-05-18 PROCEDURE — 85025 COMPLETE CBC W/AUTO DIFF WBC: CPT | Performed by: NURSE PRACTITIONER

## 2024-05-18 RX ORDER — LANOLIN ALCOHOL/MO/W.PET/CERES
800 CREAM (GRAM) TOPICAL
Status: DISCONTINUED | OUTPATIENT
Start: 2024-05-18 | End: 2024-05-22

## 2024-05-18 RX ORDER — SODIUM,POTASSIUM PHOSPHATES 280-250MG
2 POWDER IN PACKET (EA) ORAL
Status: DISCONTINUED | OUTPATIENT
Start: 2024-05-18 | End: 2024-05-22

## 2024-05-18 RX ORDER — FAMOTIDINE 20 MG/1
20 TABLET, FILM COATED ORAL DAILY
Status: DISCONTINUED | OUTPATIENT
Start: 2024-05-19 | End: 2024-05-19

## 2024-05-18 RX ORDER — FENTANYL CITRATE 50 UG/ML
12.5 INJECTION, SOLUTION INTRAMUSCULAR; INTRAVENOUS ONCE
Status: COMPLETED | OUTPATIENT
Start: 2024-05-18 | End: 2024-05-18

## 2024-05-18 RX ORDER — HYDRALAZINE HYDROCHLORIDE 20 MG/ML
10 INJECTION INTRAMUSCULAR; INTRAVENOUS EVERY 4 HOURS PRN
Status: DISCONTINUED | OUTPATIENT
Start: 2024-05-18 | End: 2024-05-26 | Stop reason: HOSPADM

## 2024-05-18 RX ORDER — POLYETHYLENE GLYCOL 3350 17 G/17G
17 POWDER, FOR SOLUTION ORAL DAILY
Status: DISCONTINUED | OUTPATIENT
Start: 2024-05-18 | End: 2024-05-26 | Stop reason: HOSPADM

## 2024-05-18 RX ADMIN — ACETAMINOPHEN 1000 MG: 500 TABLET ORAL at 05:05

## 2024-05-18 RX ADMIN — SENNOSIDES AND DOCUSATE SODIUM 1 TABLET: 50; 8.6 TABLET ORAL at 08:05

## 2024-05-18 RX ADMIN — CEFAZOLIN 2 G: 2 INJECTION, POWDER, FOR SOLUTION INTRAMUSCULAR; INTRAVENOUS at 03:05

## 2024-05-18 RX ADMIN — HEPARIN SODIUM 5000 UNITS: 5000 INJECTION INTRAVENOUS; SUBCUTANEOUS at 02:05

## 2024-05-18 RX ADMIN — OXYCODONE 5 MG: 5 TABLET ORAL at 11:05

## 2024-05-18 RX ADMIN — OXYCODONE 5 MG: 5 TABLET ORAL at 04:05

## 2024-05-18 RX ADMIN — HYDRALAZINE HYDROCHLORIDE 10 MG: 20 INJECTION, SOLUTION INTRAMUSCULAR; INTRAVENOUS at 10:05

## 2024-05-18 RX ADMIN — OXYCODONE 5 MG: 5 TABLET ORAL at 08:05

## 2024-05-18 RX ADMIN — MUPIROCIN: 20 OINTMENT TOPICAL at 08:05

## 2024-05-18 RX ADMIN — OXYCODONE 5 MG: 5 TABLET ORAL at 03:05

## 2024-05-18 RX ADMIN — ACETAMINOPHEN 1000 MG: 500 TABLET ORAL at 10:05

## 2024-05-18 RX ADMIN — LEVETIRACETAM 500 MG: 500 TABLET, FILM COATED ORAL at 08:05

## 2024-05-18 RX ADMIN — SODIUM CHLORIDE: 9 INJECTION, SOLUTION INTRAVENOUS at 05:05

## 2024-05-18 RX ADMIN — HEPARIN SODIUM 5000 UNITS: 5000 INJECTION INTRAVENOUS; SUBCUTANEOUS at 05:05

## 2024-05-18 RX ADMIN — HYDRALAZINE HYDROCHLORIDE 10 MG: 20 INJECTION, SOLUTION INTRAMUSCULAR; INTRAVENOUS at 06:05

## 2024-05-18 RX ADMIN — FENTANYL CITRATE 12.5 MCG: 50 INJECTION INTRAMUSCULAR; INTRAVENOUS at 10:05

## 2024-05-18 RX ADMIN — SODIUM CHLORIDE: 9 INJECTION, SOLUTION INTRAVENOUS at 04:05

## 2024-05-18 RX ADMIN — HYDROCHLOROTHIAZIDE 12.5 MG: 12.5 TABLET ORAL at 08:05

## 2024-05-18 RX ADMIN — POLYETHYLENE GLYCOL 3350 17 G: 17 POWDER, FOR SOLUTION ORAL at 02:05

## 2024-05-18 RX ADMIN — ACETAMINOPHEN 1000 MG: 500 TABLET ORAL at 12:05

## 2024-05-18 RX ADMIN — CEFAZOLIN 2 G: 2 INJECTION, POWDER, FOR SOLUTION INTRAMUSCULAR; INTRAVENOUS at 11:05

## 2024-05-18 RX ADMIN — ACETAMINOPHEN 1000 MG: 500 TABLET ORAL at 11:05

## 2024-05-18 RX ADMIN — FAMOTIDINE 20 MG: 20 TABLET ORAL at 08:05

## 2024-05-18 RX ADMIN — CEFAZOLIN 2 G: 2 INJECTION, POWDER, FOR SOLUTION INTRAMUSCULAR; INTRAVENOUS at 08:05

## 2024-05-18 RX ADMIN — LEVETIRACETAM 500 MG: 500 TABLET, FILM COATED ORAL at 09:05

## 2024-05-18 RX ADMIN — MUPIROCIN: 20 OINTMENT TOPICAL at 09:05

## 2024-05-18 RX ADMIN — HEPARIN SODIUM 5000 UNITS: 5000 INJECTION INTRAVENOUS; SUBCUTANEOUS at 10:05

## 2024-05-18 NOTE — SUBJECTIVE & OBJECTIVE
Interval History: NAEON. Remains agitated with sitter bedside. Impulsive and will sit up in bed, disregards drain. Subdural drain with 65cc, bloody output. CTH post operatively with expected pneumocephalus, post op changes.     Medications:  Continuous Infusions:   sodium chloride 0.9%   Intravenous Continuous 100 mL/hr at 05/18/24 0548 New Bag at 05/18/24 0548    nicardipine  0-15 mg/hr Intravenous Continuous 12.5 mL/hr at 05/18/24 0424 2.5 mg/hr at 05/18/24 0424     Scheduled Meds:   acetaminophen  1,000 mg Oral Q6H    ceFAZolin (Ancef) IV (PEDS and ADULTS)  2 g Intravenous Q8H    famotidine  20 mg Oral BID    heparin (porcine)  5,000 Units Subcutaneous Q8H    hydroCHLOROthiazide  12.5 mg Oral Daily    levETIRAcetam  500 mg Oral Q12H    mupirocin   Nasal BID    senna-docusate 8.6-50 mg  1 tablet Oral Daily     PRN Meds:  Current Facility-Administered Medications:     ondansetron, 8 mg, Intravenous, Q6H PRN    oxyCODONE, 5 mg, Oral, Q4H PRN     Review of Systems  Objective:     Weight: 61.2 kg (135 lb)  Body mass index is 21.14 kg/m².  Vital Signs (Most Recent):  Temp: 97.9 °F (36.6 °C) (05/18/24 0400)  Pulse: (!) 59 (05/18/24 0400)  Resp: 20 (05/18/24 0400)  BP: (!) 143/66 (05/18/24 0400)  SpO2: 99 % (05/18/24 0400) Vital Signs (24h Range):  Temp:  [97.7 °F (36.5 °C)-98.2 °F (36.8 °C)] 97.9 °F (36.6 °C)  Pulse:  [56-73] 59  Resp:  [12-42] 20  SpO2:  [96 %-100 %] 99 %  BP: (109-190)/(56-87) 143/66  Arterial Line BP: (104-162)/(43-65) 145/60                              Closed/Suction Drain 05/17/24 1805 Left Scalp Bulb 7 Fr. (Active)   Drainage Serosanguineous 05/18/24 0400   Status Other (Comment) 05/18/24 0400   Output (mL) 15 mL 05/18/24 0400          Physical Exam         Neurosurgery Physical Exam  General: well developed, well nourished, mild distress.   Head: normocephalic, atraumatic  Neurologic: Alert and oriented. Thought content appropriate.  GCS: Motor: 6/Verbal: 5/Eyes: 4 GCS Total: 15  Mental Status:  Awake, Alert, Oriented x 4  Language: No aphasia  Speech: No dysarthria  Cranial nerves: face symmetric, tongue midline, CN II-XII grossly intact.   Eyes: pupils equal, round, reactive to light with accomodation, EOMI.   Pulmonary: normal respirations, no signs of respiratory distress  Sensory: intact to light touch throughout  Motor Strength: Moves all extremities spontaneously with good tone.  Full strength upper and lower extremities. No abnormal movements seen.      Pronator Drift: no drift noted  Skin: Incision c/d/i  JOANNE with bloody output    Significant Labs:  Recent Labs   Lab 05/17/24  1202 05/18/24  0125   GLU 70 135*  135*    136  136   K 4.1 4.3  4.3    107  107   CO2 21* 21*  21*   BUN 18 14  14   CREATININE 1.2 1.2  1.2   CALCIUM 9.0 8.4*  8.4*   MG  --  1.8     Recent Labs   Lab 05/16/24  2134 05/17/24  1202 05/18/24  0125   WBC  --  4.08 3.70*  3.70*   HGB  --  11.3* 11.5*  11.5*   HCT 36 35.1* 34.2*  34.2*   PLT  --  221 213  213     Recent Labs   Lab 05/17/24  1417   INR 1.0   APTT 27.4     Microbiology Results (last 7 days)       ** No results found for the last 168 hours. **          All pertinent labs from the last 24 hours have been reviewed.    Significant Diagnostics:  I have reviewed all pertinent imaging results/findings within the past 24 hours.    X-Ray Chest 1 View    Result Date: 5/17/2024  No acute intrathoracic process.  No evidence of a pneumonia. Electronically signed by: Stefano Blair MD Date:    05/17/2024 Time:    22:02    CT Head Without Contrast    Result Date: 5/17/2024  Interval postsurgical changes of right craniotomy for subdural hematoma evacuation with drainage catheter placement.  Decreased volume of the extra-axial collection with improved mass effect and midline shift.  Expected pneumocephalus overlies the right frontal cerebral convexity. Stable subdural hematoma overlying the left cerebral convexity predominantly hyperattenuating.  No  significant mass effect or midline shift.  Continued attention on follow-up recommended. Chronic microvascular ischemic changes and generalized cerebral volume loss. Additional findings as above. Electronically signed by resident: Evon Talbert Date:    05/17/2024 Time:    19:53 Electronically signed by: Stefano Blair MD Date:    05/17/2024 Time:    20:30    MRI Brain Without Contrast    Result Date: 5/17/2024  Bilateral subdural hematomas, larger on the right, as further discussed above.  There is significant intracranial mass effect with leftward midline shift. Underlying chronic small vessel ischemic change and cerebral volume loss. Neuro surgical consultation is advised. This report was flagged in Epic as abnormal. Electronically signed by: Cheng Fajardo MD Date:    05/17/2024 Time:    13:45

## 2024-05-18 NOTE — PROGRESS NOTES
[facility]  Neurocritical Care  Progress Note    Admit Date: 5/17/2024  Service Date: 05/18/2024  Length of Stay: 1    Subjective:     Chief Complaint: Subdural hematoma    History of Present Illness: Mr Peng is an 87 y/o M unknown PMHx admitted to [facility] w/ bilateral SDH R>L and MLS. Per chart review, he presented to the ED by EMS stating he wants to kill himself. Came yesterday with c/o R hand paresthesias and was discharged. Currently [facility]ed per psych evaluation.  Denies falls. MRI with large right SDH with midline shift. Denies use of AC/APT. NSGY consulted for evaluation. NPO currently for OR  for SDH evac w/NSGY. Patient admitted to [facility] for close monitoring and higher level of care.       Hospital Course: 5/18/2024 Started miralax, electrolytes, and hydralazine prn, dced cardene, Drain pulled, CT in am      History reviewed. No pertinent past medical history.  History reviewed. No pertinent surgical history.   No current facility-administered medications on file prior to encounter.     Current Outpatient Medications on File Prior to Encounter   Medication Sig Dispense Refill    hydroCHLOROthiazide (HYDRODIURIL) 12.5 MG Tab Take 1 tablet (12.5 mg total) by mouth once daily. 30 tablet 11    [DISCONTINUED] diclofenac sodium (VOLTAREN) 1 % Gel Apply 2 g topically once daily. As needed for hand pain or numbness. 20 g 0      Allergies: Patient has no known allergies.  No family history on file.  Social History     Tobacco Use    Smoking status: Every Day    Smokeless tobacco: Never     Review of Systems  Constitutional: Denies fevers, weight loss, chills, or weakness.  Eyes: Denies changes in vision.  ENT: Denies dysphagia, nasal discharge, ear pain or discharge.  Cardiovascular: Denies chest pain, palpitations, orthopnea, or claudication.  Respiratory: Denies shortness of breath, cough, hemoptysis, or wheezing.  GI: Denies nausea/vomitting, hematochezia, melena, abd pain, or changes in  appetite.  : Denies dysuria, incontinence, or hematuria.  Musculoskeletal: Denies joint pain or myalgias.  Skin/breast: Denies rashes, lumps, lesions, or discharge.  Neurologic: Denies headache, dizziness, vertigo. Complains of numbness of R hand and R leg that started over a year ago.   Psychiatric: Denies changes in mood or hallucinations.  Endocrine: Denies polyuria, polydipsia, heat/cold intolerance.  Hematologic/Lymph: Denies lymphadenopathy, easy bruising or easy bleeding.  Allergic/Immunologic: Denies rash, rhinitis.   Objective:     Vitals:    Temp: 98.2 °F (36.8 °C)  Pulse: 75  Rhythm: normal sinus rhythm  BP: (!) 145/67  MAP (mmHg): 97  Resp: (!) 25  SpO2: (!) 94 %    Temp  Min: 97.7 °F (36.5 °C)  Max: 98.2 °F (36.8 °C)  Pulse  Min: 58  Max: 75  BP  Min: 105/60  Max: 162/64  MAP (mmHg)  Min: 74  Max: 106  Resp  Min: 11  Max: 42  SpO2  Min: 93 %  Max: 100 %    05/17 0701 - 05/18 0700  In: 2254.4 [I.V.:2155.7]  Out: 815 [Urine:750; Drains:65]   Unmeasured Output  Urine Occurrence: 1 (Unmeasured d/t going in bedpan w/pad)        Physical Exam  GA: Alert, comfortable, no acute distress.   HEENT: No scleral icterus or JVD.   Pulmonary: Clear to auscultation A/L.   Cardiac: RRR S1 & S2 w/o rubs/murmurs/gallops.   Abdominal: Bowel sounds present x 4. No appreciable hepatosplenomegaly.  Skin: No jaundice, rashes, or visible lesions.  Neuro:  --GCS: E4 V5 M6  --Mental Status:  awake, oriented X4, follows commands.   --CN II-XII grossly intact.   --Pupils 3mm, PERRL.   --Corneal reflex, gag, cough intact.  --LUE strength: 5/5  --RUE strength: 5/5  --LLE strength: 5/5  --RLE strength: 5/5       Today I personally reviewed pertinent medications, lines/drains/airways, imaging, cardiology results, laboratory results, microbiology results,     Assessment/Plan:     Neuro  * Subdural hematoma  89 yo male w/Bilateral SDH R>L large right SDH with 7 mm MLS   -- Denies falls, not on AC/APT  -- NSGY following  -- MRI brain  "revealed-large subdural hematoma over the right cerebral hemisphere. Collection -- measures up to 2.1 cm in thickness   -- To OR w/ NSGY today  -- SBP <140  -- neuro checks q 1 hr  -- PT/OT/SLP  5/18/2024 Started miralax, electrolytes, and hydralazine prn, dced cardene, Drain pulled, CT in am      Brain compression  Seen on brain imaging  Neuro exam q 1 hr    Psychiatric  Suicidal ideation  Per chart review- patient reported "if I had a gun I would kill myself"-   psychiatry following   PEC'ed      Cardiac/Vascular  Essential hypertension  SBP <160  Weaned off cardene   PRN hydralazine  Continuous cardiac monitoring   TTE and EKG            Activity Orders            Diet Adult Regular (IDDSI Level 7): Regular starting at 05/17 1825    Bed rest starting at 05/17 1824          Full Code    Jonas Apodaca NP  Neurocritical Care  [facility]      "

## 2024-05-18 NOTE — ANESTHESIA PROCEDURE NOTES
Arterial    Diagnosis: SDH    Patient location during procedure: done in OR  Timeout: 5/17/2024 5:20 PM  Procedure end time: 5/17/2024 5:23 PM    Staffing  Authorizing Provider: Nikki Yeh MD  Performing Provider: Phil Pang MD    Staffing  Performed by: Phil Pang MD  Authorized by: Nikki Yeh MD    Anesthesiologist was present at the time of the procedure.    Preanesthetic Checklist  Completed: patient identified, IV checked, site marked, risks and benefits discussed, surgical consent, monitors and equipment checked, pre-op evaluation, timeout performed and anesthesia consent givenArterial  Skin Prep: chlorhexidine gluconate and isopropyl alcohol  Local Infiltration: none  Orientation: left  Location: radial    Catheter Size: 20 G  Catheter placement by Ultrasound guidance. Heme positive aspiration all ports.   Vessel Caliber: small, patent, compressibility normal  Vascular Doppler:  not done  Needle advanced into vessel with real time Ultrasound guidance.Insertion Attempts: 1  Assessment  Dressing: secured with tape and tegaderm  Patient: Tolerated well

## 2024-05-18 NOTE — PT/OT/SLP PROGRESS
Physical Therapy      Patient Name:  Moses Peng   MRN:  [id number]    Patient not seen today secondary to Other (Comment) (HOB flat orders due to subdural drain). Will follow-up as appropriate pending removal of drain and liberation of HOB flat orders.

## 2024-05-18 NOTE — PLAN OF CARE
[facility]  Initial Discharge Assessment       Primary Care Provider: No, Primary Doctor    Admission Diagnosis: Suicidal ideation [R45.851]  Subdural hematoma [S06.5XAA]  SDH (subdural hematoma) [S06.5XAA]    Admission Date: 5/17/2024  Expected Discharge Date:     Transition of Care Barriers: No family/friends to help    Payor: HU[employer] MANAGED MEDICARE / Plan: HU[employer]NA MEDICARE [facility] / Product Type: Medicare Advantage /     Extended Emergency Contact Information  Primary Emergency Contact: Shaan Rees  Mobile Phone: [phone]  Relation: Other  Preferred language: English   needed? No    Discharge Plan A: Home  Discharge Plan B: Home with family      [id number] - [place] - [address]  [address]  [place] [id number]  Phone: [phone] Fax: [phone]      Initial Assessment (most recent)       Adult Discharge Assessment - 05/18/24 1522          Discharge Assessment    Assessment Type Discharge Planning Assessment     Confirmed/corrected address, phone number and insurance Yes     Confirmed Demographics Correct on Facesheet     Source of Information patient     Does patient/caregiver understand observation status Yes     Communicated MIKE with patient/caregiver Yes     Reason For Admission Subdural hematoma     People in Home alone     Do you expect to return to your current living situation? Yes     Do you have help at home or someone to help you manage your care at home? No     Prior to hospitilization cognitive status: Unable to Assess     Current cognitive status: Alert/Oriented     Equipment Currently Used at Home walker, rolling     Readmission within 30 days? No     Patient currently being followed by outpatient case management? No     Do you currently have service(s) that help you manage your care at home? No     Do you take prescription medications? Yes     Do you have prescription coverage? Yes     Coverage Payor: HUMANA MANAGED MEDICARE - [employer] MEDICARE  [facility] [place]     Is the patient taking medications as prescribed? yes     How do you get to doctors appointments? family or friend will provide;car, drives self     Are you on dialysis? No     Do you take coumadin? No     Discharge Plan A Home     Discharge Plan B Home with family     DME Needed Upon Discharge  none     Discharge Plan discussed with: Patient     Transition of Care Barriers No family/friends to help        Physical Activity    On average, how many days per week do you engage in moderate to strenuous exercise (like a brisk walk)? 0 days     On average, how many minutes do you engage in exercise at this level? 0 min        Financial Resource Strain    How hard is it for you to pay for the very basics like food, housing, medical care, and heating? Not very hard        Housing Stability    In the last 12 months, was there a time when you were not able to pay the mortgage or rent on time? No     At any time in the past 12 months, were you homeless or living in a shelter (including now)? No        Transportation Needs    Has the lack of transportation kept you from medical appointments, meetings, work or from getting things needed for daily living? No        Food Insecurity    Within the past 12 months, you worried that your food would run out before you got the money to buy more. Never true     Within the past 12 months, the food you bought just didn't last and you didn't have money to get more. Never true        Stress    Do you feel stress - tense, restless, nervous, or anxious, or unable to sleep at night because your mind is troubled all the time - these days? Not at all        Social Isolation    How often do you feel lonely or isolated from those around you?  Never        Alcohol Use    Q1: How often do you have a drink containing alcohol? 4 or more times a week     Q2: How many drinks containing alcohol do you have on a typical day when you are drinking? 3 or 4     Q3: How often do you have six or more  drinks on one occasion? Weekly                   Spoke to pt. Pt lives at home alone. Post hospital  stay friend will be pt support person and pt. has transportation at d/c with friend but may need a ride home. There have been no hospitalizations within the last 30 days per pt.  Verified pt PCP and preferred pharmacy. Pt stated not on Coumadin and is not receiving dialysis. All questions answered regarding case management/ discharge planning , pt verbalized understanding. Discharge booklet with SW contact information given to pt.     Discharge Plan A and Plan B have been determined by review of patient's clinical status, future medical and therapeutic needs, and coverage/benefits for post-acute care in coordination with multidisciplinary team members.        SILAS Garvin  [facility] [place]  [phone]

## 2024-05-18 NOTE — PROGRESS NOTES
[facility]  Neurosurgery  Progress Note    Subjective:     History of Present Illness: 88 M unknown PMH presents to the ED by EMS stating he wants to kill himself. Came yesterday with c/o R hand paresthesias and was discharged. Currently under PEC per psych evaluation. He states he may have hit the left side of his head on the door earlier this week. Denies falls. Denies headache, seizures, weakness, b/b dysfunction. MRI with large right SDH with midline shift. Denies use of AC/APT. NSGY consulted for evaluation.     Post-Op Info:  Procedure(s) (LRB):  CRANIOTOMY, FOR SUBDURAL HEMATOMA EVACUATION (Right)   1 Day Post-Op   Interval History: NAEON. Remains agitated with sitter bedside. Impulsive and will sit up in bed, disregards drain. Subdural drain with 65cc, bloody output. CTH post operatively with expected pneumocephalus, post op changes.     Medications:  Continuous Infusions:   sodium chloride 0.9%   Intravenous Continuous 100 mL/hr at 05/18/24 0548 New Bag at 05/18/24 0548    nicardipine  0-15 mg/hr Intravenous Continuous 12.5 mL/hr at 05/18/24 0424 2.5 mg/hr at 05/18/24 0424     Scheduled Meds:   acetaminophen  1,000 mg Oral Q6H    ceFAZolin (Ancef) IV (PEDS and ADULTS)  2 g Intravenous Q8H    famotidine  20 mg Oral BID    heparin (porcine)  5,000 Units Subcutaneous Q8H    hydroCHLOROthiazide  12.5 mg Oral Daily    levETIRAcetam  500 mg Oral Q12H    mupirocin   Nasal BID    senna-docusate 8.6-50 mg  1 tablet Oral Daily     PRN Meds:  Current Facility-Administered Medications:     ondansetron, 8 mg, Intravenous, Q6H PRN    oxyCODONE, 5 mg, Oral, Q4H PRN     Review of Systems  Objective:     Weight: 61.2 kg (135 lb)  Body mass index is 21.14 kg/m².  Vital Signs (Most Recent):  Temp: 97.9 °F (36.6 °C) (05/18/24 0400)  Pulse: (!) 59 (05/18/24 0400)  Resp: 20 (05/18/24 0400)  BP: (!) 143/66 (05/18/24 0400)  SpO2: 99 % (05/18/24 0400) Vital Signs (24h Range):  Temp:  [97.7 °F (36.5 °C)-98.2 °F  (36.8 °C)] 97.9 °F (36.6 °C)  Pulse:  [56-73] 59  Resp:  [12-42] 20  SpO2:  [96 %-100 %] 99 %  BP: (109-190)/(56-87) 143/66  Arterial Line BP: (104-162)/(43-65) 145/60                              Closed/Suction Drain 05/17/24 1805 Left Scalp Bulb 7 Fr. (Active)   Drainage Serosanguineous 05/18/24 0400   Status Other (Comment) 05/18/24 0400   Output (mL) 15 mL 05/18/24 0400          Physical Exam         Neurosurgery Physical Exam  General: well developed, well nourished, mild distress.   Head: normocephalic, atraumatic  Neurologic: Alert and oriented. Thought content appropriate.  GCS: Motor: 6/Verbal: 5/Eyes: 4 GCS Total: 15  Mental Status: Awake, Alert, Oriented x 4  Language: No aphasia  Speech: No dysarthria  Cranial nerves: face symmetric, tongue midline, CN II-XII grossly intact.   Eyes: pupils equal, round, reactive to light with accomodation, EOMI.   Pulmonary: normal respirations, no signs of respiratory distress  Sensory: intact to light touch throughout  Motor Strength: Moves all extremities spontaneously with good tone.  Full strength upper and lower extremities. No abnormal movements seen.      Pronator Drift: no drift noted  Skin: Incision c/d/i  JOANNE with bloody output    Significant Labs:  Recent Labs   Lab 05/17/24  1202 05/18/24  0125   GLU 70 135*  135*    136  136   K 4.1 4.3  4.3    107  107   CO2 21* 21*  21*   BUN 18 14  14   CREATININE 1.2 1.2  1.2   CALCIUM 9.0 8.4*  8.4*   MG  --  1.8     Recent Labs   Lab 05/16/24  2134 05/17/24  1202 05/18/24  0125   WBC  --  4.08 3.70*  3.70*   HGB  --  11.3* 11.5*  11.5*   HCT 36 35.1* 34.2*  34.2*   PLT  --  221 213  213     Recent Labs   Lab 05/17/24  1417   INR 1.0   APTT 27.4     Microbiology Results (last 7 days)       ** No results found for the last 168 hours. **          All pertinent labs from the last 24 hours have been reviewed.    Significant Diagnostics:  I have reviewed all pertinent imaging results/findings within  the past 24 hours.    X-Ray Chest 1 View    Result Date: 5/17/2024  No acute intrathoracic process.  No evidence of a pneumonia. Electronically signed by: Stefano Blair MD Date:    05/17/2024 Time:    22:02    CT Head Without Contrast    Result Date: 5/17/2024  Interval postsurgical changes of right craniotomy for subdural hematoma evacuation with drainage catheter placement.  Decreased volume of the extra-axial collection with improved mass effect and midline shift.  Expected pneumocephalus overlies the right frontal cerebral convexity. Stable subdural hematoma overlying the left cerebral convexity predominantly hyperattenuating.  No significant mass effect or midline shift.  Continued attention on follow-up recommended. Chronic microvascular ischemic changes and generalized cerebral volume loss. Additional findings as above. Electronically signed by resident: Evon Talbert Date:    05/17/2024 Time:    19:53 Electronically signed by: Stefano Blair MD Date:    05/17/2024 Time:    20:30    MRI Brain Without Contrast    Result Date: 5/17/2024  Bilateral subdural hematomas, larger on the right, as further discussed above.  There is significant intracranial mass effect with leftward midline shift. Underlying chronic small vessel ischemic change and cerebral volume loss. Neuro surgical consultation is advised. This report was flagged in Epic as abnormal. Electronically signed by: Cheng Fajardo MD Date:    05/17/2024 Time:    13:45     Assessment/Plan:     * Subdural hematoma  87 yo male with large right SDH with 7 mm MLS, GCS 15 now s/p burrhole craniotomy for SDH evacuation.     --Patient admitted to [facility] on telemetry; sitter bedside; currently PEC'd      -q1h neurochecks in ICU,  --Post op CTH satisfactory  --SBP <140  --Na >135  --Keppra 500 BID x5d  --HOB <15  --Subdural drain to thumbprint suction, abx while in place  --PT/OT once subdural drain removed; bedrest while drain in place  --PRN pain control with aggressive  bowel regimen while on narcotics  --Continue to monitor clinically, notify NSGY immediately with any changes in neuro status    Dispo: ongoing    Closed with Monocryl    D/w Dr. Price    Please contact the on call neurosurgery provider with questions or concerns. Can be reached via on-call schedule and/or .              Mary Jara MD  Neurosurgery  [facility]

## 2024-05-18 NOTE — ASSESSMENT & PLAN NOTE
89 yo male w/Bilateral SDH R>L large right SDH with 7 mm MLS   -- Denies falls, not on AC/APT  -- NSGY following  -- MRI brain revealed-large subdural hematoma over the right cerebral hemisphere. Collection -- measures up to 2.1 cm in thickness   -- To OR w/ NSGY today  -- SBP <140  -- neuro checks q 1 hr  -- PT/OT/SLP  5/18/2024 Started miralax, electrolytes, and hydralazine prn, dced cardene, Drain pulled, CT in am

## 2024-05-18 NOTE — PROGRESS NOTES
"CONSULTATION LIAISON PSYCHIATRY PROGRESS NOTE    Patient Name: Moses Peng  MRN: [id number]  Patient Class: IP- Inpatient  Admission Date: 5/17/2024  Attending Physician: Jj Castillo DO      SUBJECTIVE:   Moses Peng is a 88 y.o. male with a no past psychiatric history, currently presenting with chronic RUE and RLE numbness and tingling. Emergency Psychiatry was originally consulted to address the patient's symptoms of suicidal ideation. Patient is admitted to the hospital for bilateral Subdural hematomas.    Psychiatry consulted for suicidal ideation    Patient is s/p craniotomy overnight with subdural drain now in place. Per NSGY documentation patient "Remains agitated with sitter bedside. Impulsive and will sit up in bed, disregards drain." Patient receiving scheduled Tylenol and PRN oxycodone 5 mg for pain management. Patient received PRN Oxycodone 5 mg @ 0357. Patient is receiving Keppra 500 mg q12hrs. Patient did not receive any psychotropic medications overnight.    Today, patient seen at bedside asleep with subdural drain in place under bandage. Patient wakes easily upon introduction. He reports he is doing "good" with the exception of pain in the head and cites surgery. He reports surgery was on Thursday and reports it is currently the 19th or 20th of May 2024. Patient reports he does not recall why he was admitted to the hospital. Patient re-oriented and provided education regarding admission. Patient reports his mood as "good" and that he feels "100% alive" and is happy to be alive. He denies passive or active SI and reports he did not mean to say that he wanted to end his life yesterday. He reports he likes being alive rather than dead and has five grandchildren to live for. He reports there are things he is looking forward to but that he cannot tell the interviewer what those things are because they are secret. Patient initially denies having access to any guns or firearms in his house then reports he " "does have one gun that he keeps locked in a box and has never used. Towards the endo of the interview he is making jokes and smiling. He denies any additional complaints at this time. Denies HI, AVH, or paranoia.       OBJECTIVE:    Mental Status Exam:  General Appearance: appears stated age, well developed and nourished, adequately groomed and appropriately dressed, in no acute distress, lying in bed, bandage over R side of head intact and drain in place with bloody output.  Behavior: normal; cooperative; reasonably friendly, pleasant, and polite; appropriate eye-contact; under good behavioral control  Involuntary Movements and Motor Activity: no abnormal involuntary movements noted; no tics, no tremors, no akathisia, no dystonia, no evidence of tardive dyskinesia; no psychomotor agitation or retardation  Gait and Station: unable to assess - patient lying down or seated  Speech and Language: intact; normal rate, rhythm, volume, tone, and pitch; conversational, spontaneous, and coherent; speaks and understands English proficiently and fluently; repeats words and phrases, no word finding difficulties are noted  Mood: "good"  Affect: normal, euthymic, reactive, full-range, mood-congruent, appropriate to situation and context  Thought Process and Associations: concrete (proverbs & similarities), evidence of disorganized thinking on CAM-ICU assessment  reported that 1 pound weighs more than 2  Thought Content and Perceptions:: no suicidal or homicidal ideation, no auditory or visual hallucinations, no paranoid ideation, no ideas of reference, no evidence of delusions or psychosis  Sensorium and Orientation: oriented to person and place, oriented partially to time, disoriented to situation  Recent and Remote Memory: mild impairments noted  Attention and Concentration: unable to spell WORLD forwards, unable to complete serial 7's, poor effort given during testing1 error on SAVE A HAART  Fund of Knowledge: correctly " identifies the , unable to identify the previous president  Insight: impaired, poor awareness of illness  Judgment: impaired, noncompliant with health provider's recommendations and instructions    CAM ICU positive? No      ASSESSMENT & RECOMMENDATIONS   Encephalopathy 2/2 Subdural Hematoma, improving    PSYCH MEDICATIONS  None needed    Delirium Behavior Management  Minimize use of physical restraints if able   Keep window shades open and room lit during day and room dim at night in order to promote normal sleep-wake cycles  Encourage family at bedside. [place] patient often to situation, location, date.  Continue to Limit or Discontinue use of Narcotics, Benzos and Anticholinergic medications as they may worsen delirium.  Continue medical workup for causative etiology of Delirium.       RISK ASSESSMENT  NO NEED FOR PEC    FOLLOW UP  Will sign off. Patient can follow up with his outpatient mental health provider. Resources provided in patient's discharge instructions.    DISPOSITION - once medically cleared:   Defer to medical team    Please contact ON CALL psychiatry service (24/7) for any acute issues that may arise.    Dr. Priscilla Cabezas   Psychiatry  [facility]  5/18/2024 9:08 AM        --------------------------------------------------------------------------------------------------------------------------------------------------------------------------------------------------------------------------------------    CONTINUED OBJECTIVE clinical data & findings reviewed and noted for above decision making    Current Medications:   Scheduled Meds:    acetaminophen  1,000 mg Oral Q6H    ceFAZolin (Ancef) IV (PEDS and ADULTS)  2 g Intravenous Q8H    famotidine  20 mg Oral BID    heparin (porcine)  5,000 Units Subcutaneous Q8H    hydroCHLOROthiazide  12.5 mg Oral Daily    levETIRAcetam  500 mg Oral Q12H    mupirocin   Nasal BID    senna-docusate 8.6-50 mg  1 tablet  Oral Daily     PRN Meds:   Current Facility-Administered Medications:     ondansetron, 8 mg, Intravenous, Q6H PRN    oxyCODONE, 5 mg, Oral, Q4H PRN    Allergies:   Review of patient's allergies indicates:  No Known Allergies    Vitals  Vitals:    05/18/24 0849   BP: (!) 128/53   Pulse:    Resp:    Temp:        Labs/Imaging/Studies:  Recent Results (from the past 24 hour(s))   HIV 1/2 Ag/Ab (4th Gen)    Collection Time: 05/17/24 12:02 PM   Result Value Ref Range    HIV 1/2 Ag/Ab Non-reactive Non-reactive   Hepatitis C Antibody    Collection Time: 05/17/24 12:02 PM   Result Value Ref Range    Hepatitis C Ab Non-reactive Non-reactive   CBC auto differential    Collection Time: 05/17/24 12:02 PM   Result Value Ref Range    WBC 4.08 3.90 - 12.70 K/uL    RBC 3.53 (L) 4.60 - 6.20 M/uL    Hemoglobin 11.3 (L) 14.0 - 18.0 g/dL    Hematocrit 35.1 (L) 40.0 - 54.0 %    MCV 99 (H) 82 - 98 fL    MCH 32.0 (H) 27.0 - 31.0 pg    MCHC 32.2 32.0 - 36.0 g/dL    RDW 13.5 11.5 - 14.5 %    Platelets 221 150 - 450 K/uL    MPV 9.7 9.2 - 12.9 fL    Immature Granulocytes 0.2 0.0 - 0.5 %    Gran # (ANC) 2.2 1.8 - 7.7 K/uL    Immature Grans (Abs) 0.01 0.00 - 0.04 K/uL    Lymph # 1.4 1.0 - 4.8 K/uL    Mono # 0.2 (L) 0.3 - 1.0 K/uL    Eos # 0.2 0.0 - 0.5 K/uL    Baso # 0.04 0.00 - 0.20 K/uL    nRBC 0 0 /100 WBC    Gran % 54.0 38.0 - 73.0 %    Lymph % 35.3 18.0 - 48.0 %    Mono % 5.6 4.0 - 15.0 %    Eosinophil % 3.9 0.0 - 8.0 %    Basophil % 1.0 0.0 - 1.9 %    Differential Method Automated    Comprehensive metabolic panel    Collection Time: 05/17/24 12:02 PM   Result Value Ref Range    Sodium 136 136 - 145 mmol/L    Potassium 4.1 3.5 - 5.1 mmol/L    Chloride 105 95 - 110 mmol/L    CO2 21 (L) 23 - 29 mmol/L    Glucose 70 70 - 110 mg/dL    BUN 18 8 - 23 mg/dL    Creatinine 1.2 0.5 - 1.4 mg/dL    Calcium 9.0 8.7 - 10.5 mg/dL    Total Protein 6.9 6.0 - 8.4 g/dL    Albumin 3.2 (L) 3.5 - 5.2 g/dL    Total Bilirubin 0.2 0.1 - 1.0 mg/dL    Alkaline  Phosphatase 91 55 - 135 U/L    AST 33 10 - 40 U/L    ALT 19 10 - 44 U/L    eGFR 58.2 (A) >60 mL/min/1.73 m^2    Anion Gap 10 8 - 16 mmol/L   TSH    Collection Time: 05/17/24 12:02 PM   Result Value Ref Range    TSH 0.289 (L) 0.400 - 4.000 uIU/mL   Ethanol    Collection Time: 05/17/24 12:02 PM   Result Value Ref Range    Alcohol, Serum 31 (H) <10 mg/dL   Acetaminophen level    Collection Time: 05/17/24 12:02 PM   Result Value Ref Range    Acetaminophen (Tylenol), Serum <3.0 (L) 10.0 - 20.0 ug/mL   Salicylate level    Collection Time: 05/17/24 12:02 PM   Result Value Ref Range    Salicylate Lvl <5.0 (L) 15.0 - 30.0 mg/dL   T4, Free    Collection Time: 05/17/24 12:02 PM   Result Value Ref Range    Free T4 0.90 0.71 - 1.51 ng/dL   APTT    Collection Time: 05/17/24  2:17 PM   Result Value Ref Range    aPTT 27.4 21.0 - 32.0 sec   Protime-INR    Collection Time: 05/17/24  2:17 PM   Result Value Ref Range    Prothrombin Time 11.2 9.0 - 12.5 sec    INR 1.0 0.8 - 1.2   Type & Screen    Collection Time: 05/17/24  2:17 PM   Result Value Ref Range    Group & Rh O POS     Indirect [place] NEG     Specimen Outdate 05/20/2024 23:59    Hemoglobin A1c    Collection Time: 05/17/24  7:33 PM   Result Value Ref Range    Hemoglobin A1C 5.1 4.0 - 5.6 %    Estimated Avg Glucose 100 68 - 131 mg/dL   Phosphorus    Collection Time: 05/17/24  7:33 PM   Result Value Ref Range    Phosphorus 3.3 2.7 - 4.5 mg/dL   CBC Auto Differential    Collection Time: 05/18/24  1:25 AM   Result Value Ref Range    WBC 3.70 (L) 3.90 - 12.70 K/uL    RBC 3.50 (L) 4.60 - 6.20 M/uL    Hemoglobin 11.5 (L) 14.0 - 18.0 g/dL    Hematocrit 34.2 (L) 40.0 - 54.0 %    MCV 98 82 - 98 fL    MCH 32.9 (H) 27.0 - 31.0 pg    MCHC 33.6 32.0 - 36.0 g/dL    RDW 13.2 11.5 - 14.5 %    Platelets 213 150 - 450 K/uL    MPV 9.5 9.2 - 12.9 fL    Immature Granulocytes 0.3 0.0 - 0.5 %    Gran # (ANC) 3.2 1.8 - 7.7 K/uL    Immature Grans (Abs) 0.01 0.00 - 0.04 K/uL    Lymph # 0.5 (L) 1.0 - 4.8  K/uL    Mono # 0.0 (L) 0.3 - 1.0 K/uL    Eos # 0.0 0.0 - 0.5 K/uL    Baso # 0.01 0.00 - 0.20 K/uL    nRBC 0 0 /100 WBC    Gran % 85.6 (H) 38.0 - 73.0 %    Lymph % 12.7 (L) 18.0 - 48.0 %    Mono % 1.1 (L) 4.0 - 15.0 %    Eosinophil % 0.0 0.0 - 8.0 %    Basophil % 0.3 0.0 - 1.9 %    Differential Method Automated    Comprehensive Metabolic Panel    Collection Time: 05/18/24  1:25 AM   Result Value Ref Range    Sodium 136 136 - 145 mmol/L    Potassium 4.3 3.5 - 5.1 mmol/L    Chloride 107 95 - 110 mmol/L    CO2 21 (L) 23 - 29 mmol/L    Glucose 135 (H) 70 - 110 mg/dL    BUN 14 8 - 23 mg/dL    Creatinine 1.2 0.5 - 1.4 mg/dL    Calcium 8.4 (L) 8.7 - 10.5 mg/dL    Total Protein 6.4 6.0 - 8.4 g/dL    Albumin 2.9 (L) 3.5 - 5.2 g/dL    Total Bilirubin 0.4 0.1 - 1.0 mg/dL    Alkaline Phosphatase 90 55 - 135 U/L    AST 25 10 - 40 U/L    ALT 15 10 - 44 U/L    eGFR 58.2 (A) >60 mL/min/1.73 m^2    Anion Gap 8 8 - 16 mmol/L   Magnesium    Collection Time: 05/18/24  1:25 AM   Result Value Ref Range    Magnesium 1.8 1.6 - 2.6 mg/dL   CBC auto differential    Collection Time: 05/18/24  1:25 AM   Result Value Ref Range    WBC 3.70 (L) 3.90 - 12.70 K/uL    RBC 3.50 (L) 4.60 - 6.20 M/uL    Hemoglobin 11.5 (L) 14.0 - 18.0 g/dL    Hematocrit 34.2 (L) 40.0 - 54.0 %    MCV 98 82 - 98 fL    MCH 32.9 (H) 27.0 - 31.0 pg    MCHC 33.6 32.0 - 36.0 g/dL    RDW 13.2 11.5 - 14.5 %    Platelets 213 150 - 450 K/uL    MPV 9.5 9.2 - 12.9 fL    Immature Granulocytes 0.3 0.0 - 0.5 %    Gran # (ANC) 3.2 1.8 - 7.7 K/uL    Immature Grans (Abs) 0.01 0.00 - 0.04 K/uL    Lymph # 0.5 (L) 1.0 - 4.8 K/uL    Mono # 0.0 (L) 0.3 - 1.0 K/uL    Eos # 0.0 0.0 - 0.5 K/uL    Baso # 0.01 0.00 - 0.20 K/uL    nRBC 0 0 /100 WBC    Gran % 85.6 (H) 38.0 - 73.0 %    Lymph % 12.7 (L) 18.0 - 48.0 %    Mono % 1.1 (L) 4.0 - 15.0 %    Eosinophil % 0.0 0.0 - 8.0 %    Basophil % 0.3 0.0 - 1.9 %    Differential Method Automated    Basic metabolic panel    Collection Time: 05/18/24  1:25 AM    Result Value Ref Range    Sodium 136 136 - 145 mmol/L    Potassium 4.3 3.5 - 5.1 mmol/L    Chloride 107 95 - 110 mmol/L    CO2 21 (L) 23 - 29 mmol/L    Glucose 135 (H) 70 - 110 mg/dL    BUN 14 8 - 23 mg/dL    Creatinine 1.2 0.5 - 1.4 mg/dL    Calcium 8.4 (L) 8.7 - 10.5 mg/dL    Anion Gap 8 8 - 16 mmol/L    eGFR 58.2 (A) >60 mL/min/1.73 m^2   Urinalysis, Reflex to Urine Culture Urine, Clean Catch    Collection Time: 05/18/24  4:18 AM    Specimen: Urine   Result Value Ref Range    Specimen UA Urine, Clean Catch     Color, UA Yellow Yellow, Straw, Zita    Appearance, UA Clear Clear    pH, UA 6.0 5.0 - 8.0    Specific Gravity, UA 1.020 1.005 - 1.030    Protein, UA Negative Negative    Glucose, UA Negative Negative    Ketones, UA Negative Negative    Bilirubin (UA) Negative Negative    Occult Blood UA Negative Negative    Nitrite, UA Negative Negative    Leukocytes, UA Negative Negative   Drug screen panel, emergency    Collection Time: 05/18/24  4:18 AM   Result Value Ref Range    Benzodiazepines Negative Negative    Methadone metabolites Negative Negative    Cocaine (Metab.) Negative Negative    Opiate Scrn, Ur Negative Negative    Barbiturate Screen, Ur Negative Negative    Amphetamine Screen, Ur Negative Negative    THC Negative Negative    Phencyclidine Negative Negative    Creatinine, Urine 81.0 23.0 - 375.0 mg/dL    Toxicology Information SEE COMMENT      Imaging Results              X-Ray Chest 1 View (Final result)  Result time 05/17/24 22:02:05      Final result by Stefano Blair MD (05/17/24 22:02:05)                   Impression:      No acute intrathoracic process.  No evidence of a pneumonia.      Electronically signed by: Stefano Blair MD  Date:    05/17/2024  Time:    22:02               Narrative:    EXAMINATION:  XR CHEST 1 VIEW    CLINICAL HISTORY:  eval PNA;    TECHNIQUE:  Single frontal view of the chest was performed.    COMPARISON:  12/28/2023    FINDINGS:  Monitoring EKG leads are present.  The  trachea is unremarkable.  The cardiomediastinal silhouette is stable.  There is no evidence of free air beneath the hemidiaphragms.  There are no pleural effusions.  There is no evidence of a pneumothorax.  There is no evidence of pneumomediastinum.  No airspace opacity is present.  There are degenerative changes in the osseous structures.                                       CT Head Without Contrast (Final result)  Result time 05/17/24 20:30:06      Final result by Stefano Blair MD (05/17/24 20:30:06)                   Impression:      Interval postsurgical changes of right craniotomy for subdural hematoma evacuation with drainage catheter placement.  Decreased volume of the extra-axial collection with improved mass effect and midline shift.  Expected pneumocephalus overlies the right frontal cerebral convexity.    Stable subdural hematoma overlying the left cerebral convexity predominantly hyperattenuating.  No significant mass effect or midline shift.  Continued attention on follow-up recommended.    Chronic microvascular ischemic changes and generalized cerebral volume loss.    Additional findings as above.    Electronically signed by resident: Evon Talbert  Date:    05/17/2024  Time:    19:53    Electronically signed by: Stefano Blair MD  Date:    05/17/2024  Time:    20:30               Narrative:    EXAMINATION:  CT HEAD WITHOUT CONTRAST    CLINICAL HISTORY:  post-op SDH jacob holes;    TECHNIQUE:  Low dose axial CT images obtained throughout the head without the use of intravenous contrast.  Axial, sagittal and coronal reconstructions were performed.    COMPARISON:  MRI 05/17/2024; CT 12/28/2023    FINDINGS:  Postsurgical changes of right craniotomy for subdural hematoma evacuation with decreased volume of predominantly low-density extra-axial collection.  Draining catheter terminates within the collection.  Postsurgical pneumocephalus overlies the right frontal convexity.  The collection measures up to 1.5  cm in maximum thickness, previously 2.1 cm.  Stable mass effect upon the underlying parenchyma and right lateral ventricle with improved leftward midline shift, measuring 4 mm at the septum pellucidum, pre measurement of 7 mm.    Additional hyperattenuating extra-axial collection overlies the left frontal and parietal cerebral convexity measuring 5 mm in maximal thickness, similar when compared to same day MRI.  No significant mass effect upon the underlying parenchyma.    Generalized cerebral volume loss.  Minimal re-expansion of the right lateral ventricle.  No evidence of hydrocephalus.    Mild patchy hypoattenuation in the supratentorial white matter, nonspecific but most likely reflecting chronic small vessel ischemic changes.  No abnormal parenchymal abnormality to suggest acute intraparenchymal hemorrhage or major vascular distribution infarct.    No displaced calvarial fracture.  Mild soft tissue stranding and air overlies the operative site.  Postsurgical changes of bilateral globes.    Left maxillary mucous retention cyst.  Peripheral mucosal thickening of the right maxillary sinus.  Remaining paranasal sinuses and mastoid air cells are clear.                                        MRI Brain Without Contrast (Final result)  Result time 05/17/24 13:45:52      Final result by Cheng Fajardo MD (05/17/24 13:45:52)                   Impression:      Bilateral subdural hematomas, larger on the right, as further discussed above.  There is significant intracranial mass effect with leftward midline shift.    Underlying chronic small vessel ischemic change and cerebral volume loss.    Neuro surgical consultation is advised.    This report was flagged in Epic as abnormal.      Electronically signed by: Cheng Fajardo MD  Date:    05/17/2024  Time:    13:45               Narrative:    EXAMINATION:  MRI BRAIN WITHOUT CONTRAST    CLINICAL HISTORY:  Transient ischemic attack (TIA);    TECHNIQUE:  Multiplanar  multisequence MR imaging of the brain was performed without intravenous contrast.    Examination mildly degraded by patient motion artifact.    COMPARISON:  CT head 12/28/2023    FINDINGS:  Intracranial Compartment:    There is a large subdural hematoma over the right cerebral hemisphere.  Collection measures up to 2.1 cm in thickness.  Collection is predominantly hyperintense on T1 and T2-weighted images, with some interspersed areas of more heterogeneous signal anteriorly and posteriorly.  Minimal septation evident.  Additional subdural hemorrhage over the left cerebral convexity with similar imaging characteristics.  This collection measuring just 0.6 cm in maximal thickness.  No extra-axial blood or fluid collections elsewhere.  There is significant intracranial mass effect.  Diffuse right-sided sulcal and ventricular crowding, with approximately 0.7 cm of leftward midline shift measured at the septum pellucidum.    Mild chronic small vessel ischemic change throughout the supratentorial white matter.  No evidence of recent or remote major vascular distribution infarct.  No evidence of recent or remote parenchymal hemorrhage.    Moderate cerebral volume loss.  No ventricular entrapment or obstructive hydrocephalus.    Normal vascular flow voids are preserved.    Skull/Extracranial Contents (limited evaluation):    Bone marrow signal intensity is unremarkable.    Postsurgical change both globes.    Findings were relayed to the ordering provider (Drake) via the epic secure chat system at approximately 13:40.

## 2024-05-18 NOTE — HOSPITAL COURSE
05/18/2024 Started miralax, electrolytes, and hydralazine prn, dced cardene, CT in am  05/19/2024 NAEON. CTH overnight with decreased right SDH and MLS with stable mixed amount of left SDH. Patient acutely agitated this AM, threatening to leave and sitting upright in bed. Security called to bedside. Agitation settled after receiving 10mg haldol IM, 2mg versed, and 25mg benadryl. Patient started on precedex gtt for sedation. Keppra changed to briviact in the hopes of reducing agitation. Seroquel 25mg added qhs. Drain in place, NSGY to pull drain tomorrow.  05/20/2024 Persistent agitation overnight, on precedex gtt -> less agitated this AM, appears tired, likely from multiple medications over last 24 hrs. Weaning precedex gtt, plan for zyprexa 5 mg q8hrs PRN per psych recs. Subdural drain d/c'd by NSGY, HOB liberalized.   05/21/2024 Post-subdural drain removal CT w/ slight increase in pneumocephalus, no re-accumulation of SDH, midline shift/mass effect unchanged from prior. Mental status markedly improved this AM, does remain mildly altered (perseverating on poorly defined pain and numbness in hands), but no longer requiring 4 pt restraints, precedex d/c'd.   05/22/2024 MMA embolization postponed due to anesthesia scheduling. Continues to complain of b/l hand numbness/pain, no focal findings on exam; gabapentin increased. Started on losartan for elevated BP overnight. Stable for transfer to floor

## 2024-05-18 NOTE — SUBJECTIVE & OBJECTIVE
History reviewed. No pertinent past medical history.  History reviewed. No pertinent surgical history.   No current facility-administered medications on file prior to encounter.     Current Outpatient Medications on File Prior to Encounter   Medication Sig Dispense Refill    hydroCHLOROthiazide (HYDRODIURIL) 12.5 MG Tab Take 1 tablet (12.5 mg total) by mouth once daily. 30 tablet 11    [DISCONTINUED] diclofenac sodium (VOLTAREN) 1 % Gel Apply 2 g topically once daily. As needed for hand pain or numbness. 20 g 0      Allergies: Patient has no known allergies.  No family history on file.  Social History     Tobacco Use    Smoking status: Every Day    Smokeless tobacco: Never     Review of Systems  Constitutional: Denies fevers, weight loss, chills, or weakness.  Eyes: Denies changes in vision.  ENT: Denies dysphagia, nasal discharge, ear pain or discharge.  Cardiovascular: Denies chest pain, palpitations, orthopnea, or claudication.  Respiratory: Denies shortness of breath, cough, hemoptysis, or wheezing.  GI: Denies nausea/vomitting, hematochezia, melena, abd pain, or changes in appetite.  : Denies dysuria, incontinence, or hematuria.  Musculoskeletal: Denies joint pain or myalgias.  Skin/breast: Denies rashes, lumps, lesions, or discharge.  Neurologic: Denies headache, dizziness, vertigo. Complains of numbness of R hand and R leg that started over a year ago.   Psychiatric: Denies changes in mood or hallucinations.  Endocrine: Denies polyuria, polydipsia, heat/cold intolerance.  Hematologic/Lymph: Denies lymphadenopathy, easy bruising or easy bleeding.  Allergic/Immunologic: Denies rash, rhinitis.   Objective:     Vitals:    Temp: 98.2 °F (36.8 °C)  Pulse: 75  Rhythm: normal sinus rhythm  BP: (!) 145/67  MAP (mmHg): 97  Resp: (!) 25  SpO2: (!) 94 %    Temp  Min: 97.7 °F (36.5 °C)  Max: 98.2 °F (36.8 °C)  Pulse  Min: 58  Max: 75  BP  Min: 105/60  Max: 162/64  MAP (mmHg)  Min: 74  Max: 106  Resp  Min: 11  Max:  42  SpO2  Min: 93 %  Max: 100 %    05/17 0701 - 05/18 0700  In: 2254.4 [I.V.:2155.7]  Out: 815 [Urine:750; Drains:65]   Unmeasured Output  Urine Occurrence: 1 (Unmeasured d/t going in bedpan w/pad)        Physical Exam  GA: Alert, comfortable, no acute distress.   HEENT: No scleral icterus or JVD.   Pulmonary: Clear to auscultation A/L.   Cardiac: RRR S1 & S2 w/o rubs/murmurs/gallops.   Abdominal: Bowel sounds present x 4. No appreciable hepatosplenomegaly.  Skin: No jaundice, rashes, or visible lesions.  Neuro:  --GCS: E4 V5 M6  --Mental Status:  awake, oriented X4, follows commands.   --CN II-XII grossly intact.   --Pupils 3mm, PERRL.   --Corneal reflex, gag, cough intact.  --LUE strength: 5/5  --RUE strength: 5/5  --LLE strength: 5/5  --RLE strength: 5/5       Today I personally reviewed pertinent medications, lines/drains/airways, imaging, cardiology results, laboratory results, microbiology results,

## 2024-05-18 NOTE — PT/OT/SLP PROGRESS
Occupational Therapy      Patient Name:  Moses Peng   MRN:  [id number]    Patient not seen today secondary to Pt with subdural drain and HOB flat orders. Will follow-up pending drain removal/restrictions lifted.    5/18/2024

## 2024-05-18 NOTE — ASSESSMENT & PLAN NOTE
87 yo male with large right SDH with 7 mm MLS, GCS 15 now s/p burrhole craniotomy for SDH evacuation.     --Patient admitted to [facility] on telemetry; sitter bedside; currently PEC'd      -q1h neurochecks in ICU,  --Post op CTH satisfactory  --SBP <140  --Na >135  --Keppra 500 BID x5d  --HOB <15  --Subdural drain to thumbprint suction, abx while in place  --PT/OT once subdural drain removed; bedrest while drain in place  --PRN pain control with aggressive bowel regimen while on narcotics  --Continue to monitor clinically, notify NSGY immediately with any changes in neuro status    Dispo: ongoing    Closed with Anjumryl    D/w Dr. Price    Please contact the on call neurosurgery provider with questions or concerns. Can be reached via on-call schedule and/or .

## 2024-05-18 NOTE — HOSPITAL COURSE
5/18: NAEON. Remains agitated with sitter bedside. Impulsive and will sit up in bed, disregards drain. Subdural drain with 65cc, bloody output. CTH post operatively with expected pneumocephalus, post op changes.   5/19: NAEON. Sitter bedside. Subdural drain with ~ 80cc for past 24h, 30cc overnight.   5/20: NAEON. JOANNE 20cc for 24h, more serous this AM. Continues on Precedex s/p phenobarb taper. Remains in BUE restraints with sitter bedside.  5/21: NAEON. Subdural drain removed. Pending post pull CT this AM.   5/22: CTH post drain pull stable. Exam stable, off pcdx

## 2024-05-19 LAB
ALBUMIN SERPL BCP-MCNC: 2.7 G/DL (ref 3.5–5.2)
ALP SERPL-CCNC: 79 U/L (ref 55–135)
ALT SERPL W/O P-5'-P-CCNC: 8 U/L (ref 10–44)
ANION GAP SERPL CALC-SCNC: 6 MMOL/L (ref 8–16)
AST SERPL-CCNC: 19 U/L (ref 10–40)
BASOPHILS # BLD AUTO: 0.02 K/UL (ref 0–0.2)
BASOPHILS NFR BLD: 0.3 % (ref 0–1.9)
BILIRUB SERPL-MCNC: 0.3 MG/DL (ref 0.1–1)
BILIRUB UR QL STRIP: NEGATIVE
BUN SERPL-MCNC: 13 MG/DL (ref 8–23)
CALCIUM SERPL-MCNC: 7.9 MG/DL (ref 8.7–10.5)
CHLORIDE SERPL-SCNC: 106 MMOL/L (ref 95–110)
CLARITY UR REFRACT.AUTO: CLEAR
CO2 SERPL-SCNC: 20 MMOL/L (ref 23–29)
COLOR UR AUTO: COLORLESS
CREAT SERPL-MCNC: 1.1 MG/DL (ref 0.5–1.4)
DIFFERENTIAL METHOD BLD: ABNORMAL
EOSINOPHIL # BLD AUTO: 0.1 K/UL (ref 0–0.5)
EOSINOPHIL NFR BLD: 0.7 % (ref 0–8)
ERYTHROCYTE [DISTWIDTH] IN BLOOD BY AUTOMATED COUNT: 13.2 % (ref 11.5–14.5)
EST. GFR  (NO RACE VARIABLE): >60 ML/MIN/1.73 M^2
GLUCOSE SERPL-MCNC: 93 MG/DL (ref 70–110)
GLUCOSE UR QL STRIP: NEGATIVE
HCT VFR BLD AUTO: 33.2 % (ref 40–54)
HGB BLD-MCNC: 11.1 G/DL (ref 14–18)
HGB UR QL STRIP: NEGATIVE
IMM GRANULOCYTES # BLD AUTO: 0.02 K/UL (ref 0–0.04)
IMM GRANULOCYTES NFR BLD AUTO: 0.3 % (ref 0–0.5)
KETONES UR QL STRIP: NEGATIVE
LEUKOCYTE ESTERASE UR QL STRIP: NEGATIVE
LYMPHOCYTES # BLD AUTO: 1.7 K/UL (ref 1–4.8)
LYMPHOCYTES NFR BLD: 25.1 % (ref 18–48)
MAGNESIUM SERPL-MCNC: 1.8 MG/DL (ref 1.6–2.6)
MCH RBC QN AUTO: 32.4 PG (ref 27–31)
MCHC RBC AUTO-ENTMCNC: 33.4 G/DL (ref 32–36)
MCV RBC AUTO: 97 FL (ref 82–98)
MONOCYTES # BLD AUTO: 0.4 K/UL (ref 0.3–1)
MONOCYTES NFR BLD: 6.3 % (ref 4–15)
NEUTROPHILS # BLD AUTO: 4.6 K/UL (ref 1.8–7.7)
NEUTROPHILS NFR BLD: 67.3 % (ref 38–73)
NITRITE UR QL STRIP: NEGATIVE
NRBC BLD-RTO: 0 /100 WBC
OSMOLALITY SERPL: 287 MOSM/KG (ref 280–300)
OSMOLALITY UR: 283 MOSM/KG (ref 50–1200)
PH UR STRIP: 6 [PH] (ref 5–8)
PHOSPHATE SERPL-MCNC: 2.4 MG/DL (ref 2.7–4.5)
PLATELET # BLD AUTO: 193 K/UL (ref 150–450)
PMV BLD AUTO: 10.6 FL (ref 9.2–12.9)
POTASSIUM SERPL-SCNC: 3.7 MMOL/L (ref 3.5–5.1)
PROT SERPL-MCNC: 5.8 G/DL (ref 6–8.4)
PROT UR QL STRIP: NEGATIVE
RBC # BLD AUTO: 3.43 M/UL (ref 4.6–6.2)
SODIUM SERPL-SCNC: 132 MMOL/L (ref 136–145)
SODIUM UR-SCNC: 56 MMOL/L (ref 20–250)
SP GR UR STRIP: 1.01 (ref 1–1.03)
URN SPEC COLLECT METH UR: ABNORMAL
WBC # BLD AUTO: 6.82 K/UL (ref 3.9–12.7)

## 2024-05-19 PROCEDURE — 81003 URINALYSIS AUTO W/O SCOPE: CPT

## 2024-05-19 PROCEDURE — 63600175 PHARM REV CODE 636 W HCPCS: Performed by: STUDENT IN AN ORGANIZED HEALTH CARE EDUCATION/TRAINING PROGRAM

## 2024-05-19 PROCEDURE — 83735 ASSAY OF MAGNESIUM: CPT | Performed by: PSYCHIATRY & NEUROLOGY

## 2024-05-19 PROCEDURE — 83930 ASSAY OF BLOOD OSMOLALITY: CPT

## 2024-05-19 PROCEDURE — 63600175 PHARM REV CODE 636 W HCPCS

## 2024-05-19 PROCEDURE — 25000003 PHARM REV CODE 250

## 2024-05-19 PROCEDURE — 63600175 PHARM REV CODE 636 W HCPCS: Performed by: NURSE PRACTITIONER

## 2024-05-19 PROCEDURE — C9399 UNCLASSIFIED DRUGS OR BIOLOG: HCPCS

## 2024-05-19 PROCEDURE — 99233 SBSQ HOSP IP/OBS HIGH 50: CPT | Mod: ,,,

## 2024-05-19 PROCEDURE — 25000003 PHARM REV CODE 250: Performed by: STUDENT IN AN ORGANIZED HEALTH CARE EDUCATION/TRAINING PROGRAM

## 2024-05-19 PROCEDURE — 85025 COMPLETE CBC W/AUTO DIFF WBC: CPT | Performed by: PSYCHIATRY & NEUROLOGY

## 2024-05-19 PROCEDURE — 80053 COMPREHEN METABOLIC PANEL: CPT | Performed by: PSYCHIATRY & NEUROLOGY

## 2024-05-19 PROCEDURE — 84300 ASSAY OF URINE SODIUM: CPT

## 2024-05-19 PROCEDURE — 94761 N-INVAS EAR/PLS OXIMETRY MLT: CPT

## 2024-05-19 PROCEDURE — 83935 ASSAY OF URINE OSMOLALITY: CPT

## 2024-05-19 PROCEDURE — 25000003 PHARM REV CODE 250: Performed by: NURSE PRACTITIONER

## 2024-05-19 PROCEDURE — 20000000 HC ICU ROOM

## 2024-05-19 PROCEDURE — 84100 ASSAY OF PHOSPHORUS: CPT | Performed by: PSYCHIATRY & NEUROLOGY

## 2024-05-19 PROCEDURE — 93005 ELECTROCARDIOGRAM TRACING: CPT

## 2024-05-19 PROCEDURE — 93010 ELECTROCARDIOGRAM REPORT: CPT | Mod: ,,, | Performed by: INTERNAL MEDICINE

## 2024-05-19 RX ORDER — LEVETIRACETAM 500 MG/1
500 TABLET ORAL EVERY 12 HOURS
Status: DISCONTINUED | OUTPATIENT
Start: 2024-05-19 | End: 2024-05-19

## 2024-05-19 RX ORDER — ZIPRASIDONE MESYLATE 20 MG/ML
10 INJECTION, POWDER, LYOPHILIZED, FOR SOLUTION INTRAMUSCULAR ONCE
Status: COMPLETED | OUTPATIENT
Start: 2024-05-19 | End: 2024-05-19

## 2024-05-19 RX ORDER — HALOPERIDOL 5 MG/ML
INJECTION INTRAMUSCULAR
Status: COMPLETED
Start: 2024-05-19 | End: 2024-05-19

## 2024-05-19 RX ORDER — QUETIAPINE FUMARATE 25 MG/1
25 TABLET, FILM COATED ORAL NIGHTLY
Status: DISCONTINUED | OUTPATIENT
Start: 2024-05-19 | End: 2024-05-26 | Stop reason: HOSPADM

## 2024-05-19 RX ORDER — DIPHENHYDRAMINE HYDROCHLORIDE 50 MG/ML
INJECTION INTRAMUSCULAR; INTRAVENOUS
Status: COMPLETED
Start: 2024-05-19 | End: 2024-05-19

## 2024-05-19 RX ORDER — HALOPERIDOL 5 MG/ML
10 INJECTION INTRAMUSCULAR ONCE
Status: COMPLETED | OUTPATIENT
Start: 2024-05-19 | End: 2024-05-19

## 2024-05-19 RX ORDER — DIPHENHYDRAMINE HYDROCHLORIDE 50 MG/ML
25 INJECTION INTRAMUSCULAR; INTRAVENOUS ONCE
Status: COMPLETED | OUTPATIENT
Start: 2024-05-19 | End: 2024-05-19

## 2024-05-19 RX ORDER — DEXMEDETOMIDINE HYDROCHLORIDE 4 UG/ML
INJECTION, SOLUTION INTRAVENOUS
Status: COMPLETED
Start: 2024-05-19 | End: 2024-05-19

## 2024-05-19 RX ORDER — BACITRACIN ZINC 500 [USP'U]/G
OINTMENT TOPICAL 2 TIMES DAILY
Status: DISCONTINUED | OUTPATIENT
Start: 2024-05-19 | End: 2024-05-26 | Stop reason: HOSPADM

## 2024-05-19 RX ORDER — PHENOBARBITAL SODIUM 65 MG/ML
130 INJECTION, SOLUTION INTRAMUSCULAR; INTRAVENOUS ONCE
Status: COMPLETED | OUTPATIENT
Start: 2024-05-20 | End: 2024-05-20

## 2024-05-19 RX ORDER — PHENOBARBITAL SODIUM 65 MG/ML
160 INJECTION, SOLUTION INTRAMUSCULAR; INTRAVENOUS ONCE
Status: COMPLETED | OUTPATIENT
Start: 2024-05-19 | End: 2024-05-19

## 2024-05-19 RX ORDER — LEVETIRACETAM 500 MG/5ML
500 INJECTION, SOLUTION, CONCENTRATE INTRAVENOUS ONCE
Status: COMPLETED | OUTPATIENT
Start: 2024-05-19 | End: 2024-05-19

## 2024-05-19 RX ORDER — DEXMEDETOMIDINE HYDROCHLORIDE 4 UG/ML
0-.6 INJECTION, SOLUTION INTRAVENOUS CONTINUOUS
Status: DISCONTINUED | OUTPATIENT
Start: 2024-05-19 | End: 2024-05-21

## 2024-05-19 RX ORDER — MIDAZOLAM HYDROCHLORIDE 1 MG/ML
2 INJECTION, SOLUTION INTRAMUSCULAR; INTRAVENOUS ONCE AS NEEDED
Status: COMPLETED | OUTPATIENT
Start: 2024-05-19 | End: 2024-05-19

## 2024-05-19 RX ORDER — MIDAZOLAM HYDROCHLORIDE 1 MG/ML
INJECTION, SOLUTION INTRAMUSCULAR; INTRAVENOUS
Status: COMPLETED
Start: 2024-05-19 | End: 2024-05-19

## 2024-05-19 RX ADMIN — BRIVARACETAM 50 MG: 10 SOLUTION ORAL at 08:05

## 2024-05-19 RX ADMIN — POTASSIUM & SODIUM PHOSPHATES POWDER PACK 280-160-250 MG 2 PACKET: 280-160-250 PACK at 06:05

## 2024-05-19 RX ADMIN — HALOPERIDOL LACTATE 10 MG: 5 INJECTION, SOLUTION INTRAMUSCULAR at 07:05

## 2024-05-19 RX ADMIN — HEPARIN SODIUM 5000 UNITS: 5000 INJECTION INTRAVENOUS; SUBCUTANEOUS at 09:05

## 2024-05-19 RX ADMIN — BACITRACIN 1 EACH: 500 OINTMENT TOPICAL at 02:05

## 2024-05-19 RX ADMIN — MUPIROCIN: 20 OINTMENT TOPICAL at 09:05

## 2024-05-19 RX ADMIN — POTASSIUM BICARBONATE 50 MEQ: 978 TABLET, EFFERVESCENT ORAL at 06:05

## 2024-05-19 RX ADMIN — BACITRACIN 1 EACH: 500 OINTMENT TOPICAL at 09:05

## 2024-05-19 RX ADMIN — HEPARIN SODIUM 5000 UNITS: 5000 INJECTION INTRAVENOUS; SUBCUTANEOUS at 02:05

## 2024-05-19 RX ADMIN — DEXMEDETOMIDINE HYDROCHLORIDE 0.6 MCG/KG/HR: 4 INJECTION INTRAVENOUS at 02:05

## 2024-05-19 RX ADMIN — DIPHENHYDRAMINE HYDROCHLORIDE 25 MG: 50 INJECTION, SOLUTION INTRAMUSCULAR; INTRAVENOUS at 07:05

## 2024-05-19 RX ADMIN — ACETAMINOPHEN 1000 MG: 500 TABLET ORAL at 06:05

## 2024-05-19 RX ADMIN — HYDRALAZINE HYDROCHLORIDE 10 MG: 20 INJECTION, SOLUTION INTRAMUSCULAR; INTRAVENOUS at 03:05

## 2024-05-19 RX ADMIN — PHENOBARBITAL SODIUM 159.9 MG: 65 INJECTION INTRAMUSCULAR at 10:05

## 2024-05-19 RX ADMIN — OXYCODONE 5 MG: 5 TABLET ORAL at 08:05

## 2024-05-19 RX ADMIN — MIDAZOLAM 2 MG: 1 INJECTION INTRAMUSCULAR; INTRAVENOUS at 07:05

## 2024-05-19 RX ADMIN — LEVETIRACETAM 500 MG: 100 INJECTION INTRAVENOUS at 09:05

## 2024-05-19 RX ADMIN — DEXMEDETOMIDINE HYDROCHLORIDE 0.2 MCG/KG/HR: 4 INJECTION INTRAVENOUS at 07:05

## 2024-05-19 RX ADMIN — QUETIAPINE FUMARATE 25 MG: 25 TABLET ORAL at 08:05

## 2024-05-19 RX ADMIN — HALOPERIDOL 10 MG: 5 INJECTION INTRAMUSCULAR at 07:05

## 2024-05-19 RX ADMIN — DIPHENHYDRAMINE HYDROCHLORIDE 25 MG: 50 INJECTION INTRAMUSCULAR; INTRAVENOUS at 07:05

## 2024-05-19 RX ADMIN — ZIPRASIDONE MESYLATE 10 MG: 20 INJECTION, POWDER, LYOPHILIZED, FOR SOLUTION INTRAMUSCULAR at 06:05

## 2024-05-19 RX ADMIN — Medication 800 MG: at 06:05

## 2024-05-19 RX ADMIN — CEFAZOLIN 2 G: 2 INJECTION, POWDER, FOR SOLUTION INTRAMUSCULAR; INTRAVENOUS at 05:05

## 2024-05-19 RX ADMIN — HYDRALAZINE HYDROCHLORIDE 10 MG: 20 INJECTION, SOLUTION INTRAMUSCULAR; INTRAVENOUS at 04:05

## 2024-05-19 RX ADMIN — HEPARIN SODIUM 5000 UNITS: 5000 INJECTION INTRAVENOUS; SUBCUTANEOUS at 06:05

## 2024-05-19 NOTE — SUBJECTIVE & OBJECTIVE
Interval History: NAEON. Remains agitated with sitter bedside. Impulsive and will sit up in bed, disregards drain. Subdural drain with 65cc, bloody output. CTH post operatively with expected pneumocephalus, post op changes.     Medications:  Continuous Infusions:   sodium chloride 0.9%   Intravenous Continuous 100 mL/hr at 05/19/24 0501 Rate Verify at 05/19/24 0501     Scheduled Meds:   acetaminophen  1,000 mg Oral Q6H    famotidine  20 mg Oral Daily    heparin (porcine)  5,000 Units Subcutaneous Q8H    hydroCHLOROthiazide  12.5 mg Oral Daily    levETIRAcetam  500 mg Oral Q12H    mupirocin   Nasal BID    polyethylene glycol  17 g Oral Daily    senna-docusate 8.6-50 mg  1 tablet Oral Daily     PRN Meds:  Current Facility-Administered Medications:     hydrALAZINE, 10 mg, Intravenous, Q4H PRN    magnesium oxide, 800 mg, Oral, PRN    magnesium oxide, 800 mg, Oral, PRN    ondansetron, 8 mg, Intravenous, Q6H PRN    oxyCODONE, 5 mg, Oral, Q4H PRN    potassium bicarbonate, 35 mEq, Oral, PRN    potassium bicarbonate, 50 mEq, Oral, PRN    potassium bicarbonate, 60 mEq, Oral, PRN    potassium, sodium phosphates, 2 packet, Oral, PRN    potassium, sodium phosphates, 2 packet, Oral, PRN    potassium, sodium phosphates, 2 packet, Oral, PRN     Review of Systems  Objective:     Weight: 61.2 kg (135 lb)  Body mass index is 21.14 kg/m².  Vital Signs (Most Recent):  Temp: 98.4 °F (36.9 °C) (05/19/24 0301)  Pulse: 66 (05/19/24 0521)  Resp: (!) 45 (05/19/24 0501)  BP: 119/66 (05/19/24 0501)  SpO2: 97 % (05/19/24 0501) Vital Signs (24h Range):  Temp:  [97.7 °F (36.5 °C)-98.4 °F (36.9 °C)] 98.4 °F (36.9 °C)  Pulse:  [58-87] 66  Resp:  [11-54] 45  SpO2:  [92 %-100 %] 97 %  BP: (105-170)/(51-73) 119/66  Arterial Line BP: (112-162)/(49-76) 139/58                              Closed/Suction Drain 05/17/24 1805 Left Scalp Bulb 7 Fr. (Active)   Dressing Type Other (Comment) 05/19/24 0301   Drainage Sanguineous 05/18/24 1505   Status To bulb  suction 05/19/24 0301   Output (mL) 30 mL 05/19/24 0501          Physical Exam         Neurosurgery Physical Exam  General: well developed, well nourished, mild distress.   Head: normocephalic, atraumatic  Neurologic: Alert and oriented. Thought content appropriate.  GCS: Motor: 6/Verbal: 5/Eyes: 4 GCS Total: 15  Mental Status: Awake, Alert, Oriented x 4  Language: No aphasia  Speech: No dysarthria  Cranial nerves: face symmetric, tongue midline, CN II-XII grossly intact.   Eyes: pupils equal, round, reactive to light with accomodation, EOMI.   Pulmonary: normal respirations, no signs of respiratory distress  Sensory: intact to light touch throughout  Motor Strength: Moves all extremities spontaneously with good tone.  Full strength upper and lower extremities. No abnormal movements seen.      Pronator Drift: no drift noted  Skin: Incision c/d/i  JOANNE with serosang output    Significant Labs:  Recent Labs   Lab 05/17/24  1202 05/18/24  0125 05/19/24  0220   GLU 70 135*  135* 93    136  136 132*   K 4.1 4.3  4.3 3.7    107  107 106   CO2 21* 21*  21* 20*   BUN 18 14  14 13   CREATININE 1.2 1.2  1.2 1.1   CALCIUM 9.0 8.4*  8.4* 7.9*   MG  --  1.8 1.8     Recent Labs   Lab 05/17/24  1202 05/18/24  0125 05/19/24  0220   WBC 4.08 3.70*  3.70* 6.82   HGB 11.3* 11.5*  11.5* 11.1*   HCT 35.1* 34.2*  34.2* 33.2*    213  213 193     Recent Labs   Lab 05/17/24  1417   INR 1.0   APTT 27.4     Microbiology Results (last 7 days)       ** No results found for the last 168 hours. **          All pertinent labs from the last 24 hours have been reviewed.    Significant Diagnostics:  I have reviewed all pertinent imaging results/findings within the past 24 hours.

## 2024-05-19 NOTE — PT/OT/SLP PROGRESS
Occupational Therapy      Patient Name:  Moses Peng   MRN:  [id number]    Patient not seen today secondary to pt with subdural drain, requires HOB flat. OT to hold today. Will follow-up as appropriate.  Sonia Epperson, OT  5/19/2024

## 2024-05-19 NOTE — PROGRESS NOTES
Pt. Transported to CT with cardiac monitor and Ambu Bag via bed. No acute events during transfer. O2 device used room air. Pt had nurse assist and security present. Pt returned to [facility] rm [id number]  and room monitor reapplied. VSS. RN WCTM

## 2024-05-19 NOTE — PLAN OF CARE
"[facility] Care Plan    POC reviewed with Moses Peng at 0300. Pt verbalized understanding. Questions and concerns addressed. No acute events overnight. Pt progressing toward goals. Will continue to monitor. See below and flowsheets for full assessment and VS info.     - CTH obtained.   - Pending JOANNE drain removal.   - JOANNE drain to thumbprint bulb suction.  - PRN hydralazine given x 2 for SBP greater than 160.   - PRN oxycodone x 1 and one time dose of fentanyl 12.5 mcg given for headache.   - Linens changed.  - Mag, potasssium, and phos replaced.       Is this a stroke patient? no    Neuro:  [place] Coma Scale  Best Eye Response: 4-->(E4) spontaneous  Best Motor Response: 6-->(M6) obeys commands  Best Verbal Response: 4-->(V4) confused  [place] Coma Scale Score: 14  Assessment Qualifiers: patient not sedated/intubated  Pupil PERRLA: yes     24hr Temp:  [97.7 °F (36.5 °C)-98.4 °F (36.9 °C)]     CV:   Rhythm: normal sinus rhythm  BP goals:   SBP < 160  MAP > 65    Resp:           Plan: N/A    GI/:     Diet/Nutrition Received: regular  Last Bowel Movement: 05/16/24  Voiding Characteristics: voids spontaneously without difficulty    Intake/Output Summary (Last 24 hours) at 5/19/2024 0533  Last data filed at 5/19/2024 0501  Gross per 24 hour   Intake 2967.83 ml   Output 783 ml   Net 2184.83 ml     Unmeasured Output  Urine Occurrence: 1    Labs/Accuchecks:  Recent Labs   Lab 05/19/24  0220   WBC 6.82   RBC 3.43*   HGB 11.1*   HCT 33.2*         Recent Labs   Lab 05/19/24  0220   *   K 3.7   CO2 20*      BUN 13   CREATININE 1.1   ALKPHOS 79   ALT 8*   AST 19   BILITOT 0.3      Recent Labs   Lab 05/17/24  1417   INR 1.0   APTT 27.4    No results for input(s): "CPK", "CPKMB", "TROPONINI", "MB" in the last 168 hours.    Electrolytes: Electrolytes replaced  Accuchecks: none    Gtts:   sodium chloride 0.9%   Intravenous Continuous 100 mL/hr at 05/19/24 0501 Rate Verify at 05/19/24 0501       LDA/Wounds:    Nurses " Note -- 4 Eyes    Is there altered skin present? no       Prevention Measures Documented    Second RN/Staff Member:  Florecita    Restraints:        WCTM   Problem: Adult Inpatient Plan of Care  Goal: Absence of Hospital-Acquired Illness or Injury  Intervention: Prevent and Manage VTE (Venous Thromboembolism) Risk  Flowsheets (Taken 5/19/2024 0523)  VTE Prevention/Management: remove, assess skin, and reapply sequential compression device     Problem: Infection  Goal: Absence of Infection Signs and Symptoms  Intervention: Prevent or Manage Infection  Flowsheets (Taken 5/19/2024 0523)  Infection Management: aseptic technique maintained  Isolation Precautions:   precautions maintained   protective     Problem: Wound  Goal: Optimal Pain Control and Function  Intervention: Prevent or Manage Pain  Flowsheets (Taken 5/19/2024 0523)  Sleep/Rest Enhancement:   awakenings minimized   therapeutic touch utilized  Goal: Optimal Wound Healing  Intervention: Promote Wound Healing  Flowsheets (Taken 5/19/2024 0523)  Sleep/Rest Enhancement:   awakenings minimized   therapeutic touch utilized     Problem: Fall Injury Risk  Goal: Absence of Fall and Fall-Related Injury  Intervention: Promote Injury-Free Environment  Flowsheets (Taken 5/19/2024 0523)  Safety Promotion/Fall Prevention:   assistive device/personal item within reach   bed alarm set   commode/urinal/bedpan at bedside   side rails raised x 3   pulse ox     Problem: Skin Injury Risk Increased  Goal: Skin Health and Integrity  Intervention: Optimize Skin Protection  Flowsheets (Taken 5/19/2024 0523)  Pressure Reduction Techniques:   frequent weight shift encouraged   positioned off wounds   pressure points protected  Pressure Reduction Devices: positioning supports utilized  Activity Management:   Arm raise - L1   Leg kicks - L2  Head of Bed (HOB) Positioning: HOB at 15 degrees

## 2024-05-19 NOTE — PLAN OF CARE
"[facility] Care Plan    POC reviewed with Moses Peng and family at 1500. Pts verbalized understanding. Questions and concerns addressed with patients family at bedside. Pt progressing toward goals. See below and flowsheets for full assessment and VS info.     - See previous note for events during shift  - Linen changed  - Precedex gtt continued and titrated to maintain a RASS goal of -1  - PRN hydrazine given x1 for SBP >160  - NS @ 75  - SD drian remains in place, to bulb suction. Output 10ML  - HOB restrictions still in place        Is this a stroke patient? no    Neuro:  [place] Coma Scale  Best Eye Response: 4-->(E4) spontaneous  Best Motor Response: 6-->(M6) obeys commands  Best Verbal Response: 5-->(V5) oriented  [place] Coma Scale Score: 15  Assessment Qualifiers: patient not sedated/intubated  Pupil PERRLA: yes     24 hr Temp:  [96.7 °F (35.9 °C)-98.4 °F (36.9 °C)]     CV:   Rhythm: normal sinus rhythm  BP goals:   SBP < 160  MAP > 65    Resp:           Plan: N/A    GI/:     Diet/Nutrition Received: regular  Last Bowel Movement: 05/16/24  Voiding Characteristics: voids spontaneously without difficulty    Intake/Output Summary (Last 24 hours) at 5/19/2024 1719  Last data filed at 5/19/2024 1602  Gross per 24 hour   Intake 1514.68 ml   Output 580 ml   Net 934.68 ml     Unmeasured Output  Urine Occurrence: 1    Labs/Accuchecks:  Recent Labs   Lab 05/19/24  0220   WBC 6.82   RBC 3.43*   HGB 11.1*   HCT 33.2*         Recent Labs   Lab 05/19/24  0220   *   K 3.7   CO2 20*      BUN 13   CREATININE 1.1   ALKPHOS 79   ALT 8*   AST 19   BILITOT 0.3      Recent Labs   Lab 05/17/24  1417   INR 1.0   APTT 27.4    No results for input(s): "CPK", "CPKMB", "TROPONINI", "MB" in the last 168 hours.    Electrolytes: Contraindicated  Accuchecks: none    Gtts:   sodium chloride 0.9%   Intravenous Continuous 100 mL/hr at 05/19/24 1602 Rate Verify at 05/19/24 1602    dexmedeTOMIDine (Precedex) infusion (titrating)  " 0-1.4 mcg/kg/hr Intravenous Continuous 6.12 mL/hr at 24 1602 0.4 mcg/kg/hr at 24 1602       LDA/Wounds:      Nurses Note -- 4 Eyes      Is there altered skin present? no       Prevention Measures Documented    Second RN/Staff Member: DIANNA Chance       Restraints: L and R soft wrist restraints. L and R ankle soft restraints, Roll belt   Restraint Order  Length of Order: Order good for next 24 hours or when removed.  Date that the current order will : 24  Time that the current order will : 802  Order Upon Application: Yes

## 2024-05-19 NOTE — ASSESSMENT & PLAN NOTE
87 yo male with large right SDH with 7 mm MLS, GCS 15 now s/p burrhole craniotomy for SDH evacuation.     --Patient admitted to [facility] on telemetry; sitter bedside; currently PEC'd      -q1h neurochecks in ICU,  --Post op CTH satisfactory; repeat CT this AM with improving pneumocephalus  --SBP <140  --Na >135  --Keppra 500 BID x5d  --HOB <15  --Subdural drain to thumbprint suction, abx while in place  --PT/OT once subdural drain removed; bedrest while drain in place  --PRN pain control with aggressive bowel regimen while on narcotics  --Continue to monitor clinically, notify NSGY immediately with any changes in neuro status    Dispo: ongoing, sitter bedside    Closed with Monocryl    D/w Dr. Price    Please contact the on call neurosurgery provider with questions or concerns. Can be reached via on-call schedule and/or .

## 2024-05-19 NOTE — PROGRESS NOTES
[facility] - Neuro Critical Care  Neurosurgery  Progress Note    Subjective:     History of Present Illness: 88 M unknown PMH presents to the ED by EMS stating he wants to kill himself. Came yesterday with c/o R hand paresthesias and was discharged. Currently under PEC per psych evaluation. He states he may have hit the left side of his head on the door earlier this week. Denies falls. Denies headache, seizures, weakness, b/b dysfunction. MRI with large right SDH with midline shift. Denies use of AC/APT. NSGY consulted for evaluation.     Post-Op Info:  Procedure(s) (LRB):  CRANIOTOMY, FOR SUBDURAL HEMATOMA EVACUATION (Right)   2 Days Post-Op   Interval History: NAEON. Remains agitated with sitter bedside. Impulsive and will sit up in bed, disregards drain. Subdural drain with 65cc, bloody output. CTH post operatively with expected pneumocephalus, post op changes.     Medications:  Continuous Infusions:   sodium chloride 0.9%   Intravenous Continuous 100 mL/hr at 05/19/24 0501 Rate Verify at 05/19/24 0501     Scheduled Meds:   acetaminophen  1,000 mg Oral Q6H    famotidine  20 mg Oral Daily    heparin (porcine)  5,000 Units Subcutaneous Q8H    hydroCHLOROthiazide  12.5 mg Oral Daily    levETIRAcetam  500 mg Oral Q12H    mupirocin   Nasal BID    polyethylene glycol  17 g Oral Daily    senna-docusate 8.6-50 mg  1 tablet Oral Daily     PRN Meds:  Current Facility-Administered Medications:     hydrALAZINE, 10 mg, Intravenous, Q4H PRN    magnesium oxide, 800 mg, Oral, PRN    magnesium oxide, 800 mg, Oral, PRN    ondansetron, 8 mg, Intravenous, Q6H PRN    oxyCODONE, 5 mg, Oral, Q4H PRN    potassium bicarbonate, 35 mEq, Oral, PRN    potassium bicarbonate, 50 mEq, Oral, PRN    potassium bicarbonate, 60 mEq, Oral, PRN    potassium, sodium phosphates, 2 packet, Oral, PRN    potassium, sodium phosphates, 2 packet, Oral, PRN    potassium, sodium phosphates, 2 packet, Oral, PRN     Review of Systems  Objective:     Weight: 61.2 kg  (135 lb)  Body mass index is 21.14 kg/m².  Vital Signs (Most Recent):  Temp: 98.4 °F (36.9 °C) (05/19/24 0301)  Pulse: 66 (05/19/24 0521)  Resp: (!) 45 (05/19/24 0501)  BP: 119/66 (05/19/24 0501)  SpO2: 97 % (05/19/24 0501) Vital Signs (24h Range):  Temp:  [97.7 °F (36.5 °C)-98.4 °F (36.9 °C)] 98.4 °F (36.9 °C)  Pulse:  [58-87] 66  Resp:  [11-54] 45  SpO2:  [92 %-100 %] 97 %  BP: (105-170)/(51-73) 119/66  Arterial Line BP: (112-162)/(49-76) 139/58                              Closed/Suction Drain 05/17/24 1805 Left Scalp Bulb 7 Fr. (Active)   Dressing Type Other (Comment) 05/19/24 0301   Drainage Sanguineous 05/18/24 1505   Status To bulb suction 05/19/24 0301   Output (mL) 30 mL 05/19/24 0501          Physical Exam         Neurosurgery Physical Exam  General: well developed, well nourished, mild distress.   Head: normocephalic, atraumatic  Neurologic: Alert and oriented. Thought content appropriate.  GCS: Motor: 6/Verbal: 5/Eyes: 4 GCS Total: 15  Mental Status: Awake, Alert, Oriented x 4  Language: No aphasia  Speech: No dysarthria  Cranial nerves: face symmetric, tongue midline, CN II-XII grossly intact.   Eyes: pupils equal, round, reactive to light with accomodation, EOMI.   Pulmonary: normal respirations, no signs of respiratory distress  Sensory: intact to light touch throughout  Motor Strength: Moves all extremities spontaneously with good tone.  Full strength upper and lower extremities. No abnormal movements seen.      Pronator Drift: no drift noted  Skin: Incision c/d/i  JOANNE with serosang output    Significant Labs:  Recent Labs   Lab 05/17/24  1202 05/18/24  0125 05/19/24  0220   GLU 70 135*  135* 93    136  136 132*   K 4.1 4.3  4.3 3.7    107  107 106   CO2 21* 21*  21* 20*   BUN 18 14  14 13   CREATININE 1.2 1.2  1.2 1.1   CALCIUM 9.0 8.4*  8.4* 7.9*   MG  --  1.8 1.8     Recent Labs   Lab 05/17/24  1202 05/18/24  0125 05/19/24  0220   WBC 4.08 3.70*  3.70* 6.82   HGB 11.3* 11.5*   11.5* 11.1*   HCT 35.1* 34.2*  34.2* 33.2*    213  213 193     Recent Labs   Lab 05/17/24  1417   INR 1.0   APTT 27.4     Microbiology Results (last 7 days)       ** No results found for the last 168 hours. **          All pertinent labs from the last 24 hours have been reviewed.    Significant Diagnostics:  I have reviewed all pertinent imaging results/findings within the past 24 hours.  Assessment/Plan:     * Subdural hematoma  89 yo male with large right SDH with 7 mm MLS, GCS 15 now s/p burrhole craniotomy for SDH evacuation.     --Patient admitted to [facility] on telemetry; sitter bedside; currently PEC'd      -q1h neurochecks in ICU,  --Post op CTH satisfactory; repeat CT this AM with improving pneumocephalus  --SBP <140  --Na >135  --Keppra 500 BID x5d  --HOB <15  --Subdural drain to thumbprint suction, abx while in place  --PT/OT once subdural drain removed; bedrest while drain in place  --PRN pain control with aggressive bowel regimen while on narcotics  --Continue to monitor clinically, notify NSGY immediately with any changes in neuro status    Dispo: ongoing, sitter bedside    Closed with Monocryl    D/w Dr. Price    Please contact the on call neurosurgery provider with questions or concerns. Can be reached via on-call schedule and/or .              Mary Jara MD  Neurosurgery  [facility] - Neuro Critical Care

## 2024-05-19 NOTE — PT/OT/SLP PROGRESS
Physical Therapy  Not Seen  Patient Name:  Moses Peng   MRN:  [id number]  Admitting Diagnosis:  Subdural hematoma   Recent Surgery: Procedure(s) (LRB):  CRANIOTOMY, FOR SUBDURAL HEMATOMA EVACUATION (Right) 2 Days Post-Op  Admit Date: 5/17/2024  Length of Stay: 2 days    Patient not seen on this date for PT evaluation - patient still has subdural drain and requires HOB flat. PT will check back tomorrow to complete evaluation as patient is able to participate.    Monie Vega, PT, DPT  5/19/2024

## 2024-05-20 PROBLEM — E87.1 HYPONATREMIA: Status: ACTIVE | Noted: 2024-05-20

## 2024-05-20 PROBLEM — G93.40 ENCEPHALOPATHY: Status: RESOLVED | Noted: 2024-05-18 | Resolved: 2024-05-20

## 2024-05-20 LAB
ALBUMIN SERPL BCP-MCNC: 2.3 G/DL (ref 3.5–5.2)
ALP SERPL-CCNC: 77 U/L (ref 55–135)
ALT SERPL W/O P-5'-P-CCNC: 6 U/L (ref 10–44)
ANION GAP SERPL CALC-SCNC: 10 MMOL/L (ref 8–16)
AST SERPL-CCNC: 18 U/L (ref 10–40)
BASOPHILS # BLD AUTO: 0.02 K/UL (ref 0–0.2)
BASOPHILS NFR BLD: 0.5 % (ref 0–1.9)
BILIRUB SERPL-MCNC: 0.3 MG/DL (ref 0.1–1)
BUN SERPL-MCNC: 14 MG/DL (ref 8–23)
CALCIUM SERPL-MCNC: 8.4 MG/DL (ref 8.7–10.5)
CHLORIDE SERPL-SCNC: 108 MMOL/L (ref 95–110)
CO2 SERPL-SCNC: 16 MMOL/L (ref 23–29)
CREAT SERPL-MCNC: 1 MG/DL (ref 0.5–1.4)
DIFFERENTIAL METHOD BLD: ABNORMAL
EOSINOPHIL # BLD AUTO: 0 K/UL (ref 0–0.5)
EOSINOPHIL NFR BLD: 0.7 % (ref 0–8)
ERYTHROCYTE [DISTWIDTH] IN BLOOD BY AUTOMATED COUNT: 13 % (ref 11.5–14.5)
EST. GFR  (NO RACE VARIABLE): >60 ML/MIN/1.73 M^2
GLUCOSE SERPL-MCNC: 105 MG/DL (ref 70–110)
HCT VFR BLD AUTO: 34.9 % (ref 40–54)
HGB BLD-MCNC: 11.7 G/DL (ref 14–18)
IMM GRANULOCYTES # BLD AUTO: 0 K/UL (ref 0–0.04)
IMM GRANULOCYTES NFR BLD AUTO: 0 % (ref 0–0.5)
LYMPHOCYTES # BLD AUTO: 0.9 K/UL (ref 1–4.8)
LYMPHOCYTES NFR BLD: 22.4 % (ref 18–48)
MAGNESIUM SERPL-MCNC: 1.9 MG/DL (ref 1.6–2.6)
MCH RBC QN AUTO: 32.1 PG (ref 27–31)
MCHC RBC AUTO-ENTMCNC: 33.5 G/DL (ref 32–36)
MCV RBC AUTO: 96 FL (ref 82–98)
MONOCYTES # BLD AUTO: 0.2 K/UL (ref 0.3–1)
MONOCYTES NFR BLD: 5.5 % (ref 4–15)
NEUTROPHILS # BLD AUTO: 3 K/UL (ref 1.8–7.7)
NEUTROPHILS NFR BLD: 70.9 % (ref 38–73)
NRBC BLD-RTO: 0 /100 WBC
OHS QRS DURATION: 90 MS
OHS QRS DURATION: 94 MS
OHS QTC CALCULATION: 449 MS
OHS QTC CALCULATION: 457 MS
PHOSPHATE SERPL-MCNC: 3 MG/DL (ref 2.7–4.5)
PLATELET # BLD AUTO: 181 K/UL (ref 150–450)
PMV BLD AUTO: 10.1 FL (ref 9.2–12.9)
POTASSIUM SERPL-SCNC: 3.9 MMOL/L (ref 3.5–5.1)
PROT SERPL-MCNC: 5.5 G/DL (ref 6–8.4)
RBC # BLD AUTO: 3.64 M/UL (ref 4.6–6.2)
SODIUM SERPL-SCNC: 134 MMOL/L (ref 136–145)
WBC # BLD AUTO: 4.19 K/UL (ref 3.9–12.7)

## 2024-05-20 PROCEDURE — 94761 N-INVAS EAR/PLS OXIMETRY MLT: CPT

## 2024-05-20 PROCEDURE — 99291 CRITICAL CARE FIRST HOUR: CPT | Mod: GC,,, | Performed by: PSYCHIATRY & NEUROLOGY

## 2024-05-20 PROCEDURE — 63600175 PHARM REV CODE 636 W HCPCS: Performed by: STUDENT IN AN ORGANIZED HEALTH CARE EDUCATION/TRAINING PROGRAM

## 2024-05-20 PROCEDURE — 25000003 PHARM REV CODE 250

## 2024-05-20 PROCEDURE — 99221 1ST HOSP IP/OBS SF/LOW 40: CPT | Mod: GC,,, | Performed by: PSYCHIATRY & NEUROLOGY

## 2024-05-20 PROCEDURE — 25000003 PHARM REV CODE 250: Performed by: STUDENT IN AN ORGANIZED HEALTH CARE EDUCATION/TRAINING PROGRAM

## 2024-05-20 PROCEDURE — 63600175 PHARM REV CODE 636 W HCPCS

## 2024-05-20 PROCEDURE — 84100 ASSAY OF PHOSPHORUS: CPT | Performed by: STUDENT IN AN ORGANIZED HEALTH CARE EDUCATION/TRAINING PROGRAM

## 2024-05-20 PROCEDURE — 85025 COMPLETE CBC W/AUTO DIFF WBC: CPT | Performed by: STUDENT IN AN ORGANIZED HEALTH CARE EDUCATION/TRAINING PROGRAM

## 2024-05-20 PROCEDURE — 83735 ASSAY OF MAGNESIUM: CPT | Performed by: STUDENT IN AN ORGANIZED HEALTH CARE EDUCATION/TRAINING PROGRAM

## 2024-05-20 PROCEDURE — 20000000 HC ICU ROOM

## 2024-05-20 PROCEDURE — C9399 UNCLASSIFIED DRUGS OR BIOLOG: HCPCS

## 2024-05-20 PROCEDURE — 80053 COMPREHEN METABOLIC PANEL: CPT | Performed by: STUDENT IN AN ORGANIZED HEALTH CARE EDUCATION/TRAINING PROGRAM

## 2024-05-20 RX ORDER — DIPHENHYDRAMINE HYDROCHLORIDE 50 MG/ML
25 INJECTION INTRAMUSCULAR; INTRAVENOUS EVERY 6 HOURS PRN
Status: DISCONTINUED | OUTPATIENT
Start: 2024-05-20 | End: 2024-05-21

## 2024-05-20 RX ORDER — OLANZAPINE 10 MG/2ML
5 INJECTION, POWDER, FOR SOLUTION INTRAMUSCULAR EVERY 8 HOURS PRN
Status: DISCONTINUED | OUTPATIENT
Start: 2024-05-20 | End: 2024-05-26 | Stop reason: HOSPADM

## 2024-05-20 RX ADMIN — BACITRACIN 1 EACH: 500 OINTMENT TOPICAL at 09:05

## 2024-05-20 RX ADMIN — PHENOBARBITAL SODIUM 130 MG: 65 INJECTION INTRAMUSCULAR at 04:05

## 2024-05-20 RX ADMIN — HEPARIN SODIUM 5000 UNITS: 5000 INJECTION INTRAVENOUS; SUBCUTANEOUS at 09:05

## 2024-05-20 RX ADMIN — QUETIAPINE FUMARATE 25 MG: 25 TABLET ORAL at 09:05

## 2024-05-20 RX ADMIN — HEPARIN SODIUM 5000 UNITS: 5000 INJECTION INTRAVENOUS; SUBCUTANEOUS at 02:05

## 2024-05-20 RX ADMIN — HEPARIN SODIUM 5000 UNITS: 5000 INJECTION INTRAVENOUS; SUBCUTANEOUS at 05:05

## 2024-05-20 RX ADMIN — BRIVARACETAM 50 MG: 10 SOLUTION ORAL at 09:05

## 2024-05-20 RX ADMIN — SODIUM CHLORIDE: 9 INJECTION, SOLUTION INTRAVENOUS at 05:05

## 2024-05-20 RX ADMIN — PHENOBARBITAL SODIUM 130 MG: 65 INJECTION INTRAMUSCULAR at 01:05

## 2024-05-20 RX ADMIN — DEXMEDETOMIDINE HYDROCHLORIDE 0.6 MCG/KG/HR: 4 INJECTION INTRAVENOUS at 02:05

## 2024-05-20 RX ADMIN — HYDROCHLOROTHIAZIDE 12.5 MG: 12.5 TABLET ORAL at 08:05

## 2024-05-20 NOTE — PROGRESS NOTES
[facility] - Neuro Critical Care  Neurocritical Care  Progress Note    Admit Date: 5/17/2024  Service Date: 05/19/2024  Length of Stay: 2    Subjective:     Chief Complaint: Subdural hematoma    History of Present Illness: Mr Peng is an 87 y/o M unknown PMHx admitted to [facility] w/ bilateral SDH R>L and MLS. Per chart review, he presented to the ED by EMS stating he wants to kill himself. Came yesterday with c/o R hand paresthesias and was discharged. Currently [facility]ed per psych evaluation.  Denies falls. MRI with large right SDH with midline shift. Denies use of AC/APT. NSGY consulted for evaluation. NPO currently for OR  for SDH evac w/NSGY. Patient admitted to [facility] for close monitoring and higher level of care.       Hospital Course: 05/18/2024 Started miralax, electrolytes, and hydralazine prn, dced cardene, CT in am  05/19/2024 NAEON. CTH overnight with decreased right SDH and MLS with stable mixed amount of left SDH. Patient acutely agitated this AM, threatening to leave and sitting upright in bed. Security called to bedside. Agitation settled after receiving 10mg haldol IM, 2mg versed, and 25mg benadryl. Patient started on precedex gtt for sedation. Keppra changed to briviact in the hopes of reducing agitation. Seroquel 25mg added qhs. Drain in place, NSGY to pull drain tomorrow.      Objective:     Vitals:  Temp: 97.1 °F (36.2 °C)  Pulse: 71  Rhythm: normal sinus rhythm  BP: (!) 178/81  MAP (mmHg): 117  Resp: (!) 31  SpO2: 100 %    Temp  Min: 96.7 °F (35.9 °C)  Max: 98.4 °F (36.9 °C)  Pulse  Min: 45  Max: 106  BP  Min: 97/53  Max: 178/81  MAP (mmHg)  Min: 72  Max: 117  Resp  Min: 10  Max: 45  SpO2  Min: 91 %  Max: 100 %    05/18 0701 - 05/19 0700  In: 2955 [P.O.:600; I.V.:2191.5]  Out: 433 [Urine:350; Drains:83]   Unmeasured Output  Urine Occurrence: 1        Physical Exam  Vitals and nursing note reviewed.   Constitutional:       General: He is not in acute distress.     Appearance: Normal appearance.       Comments: Elderly male. Well developed. Well nourished. Resting comfortably in bed. On precedex gtt for sedation.    HENT:      Head: Normocephalic.      Comments: SDD in place with serosanguinous output. Surgical site C/D/I.      Right Ear: External ear normal.      Left Ear: External ear normal.      Nose: Nose normal.      Mouth/Throat:      Mouth: Mucous membranes are moist.      Pharynx: Oropharynx is clear.   Eyes:      General: No scleral icterus.     Extraocular Movements: Extraocular movements intact.      Pupils: Pupils are equal, round, and reactive to light.   Cardiovascular:      Rate and Rhythm: Normal rate and regular rhythm.   Pulmonary:      Effort: Pulmonary effort is normal. No respiratory distress.   Abdominal:      General: Abdomen is flat. There is no distension.      Palpations: Abdomen is soft.      Tenderness: There is no abdominal tenderness.   Musculoskeletal:      Right lower leg: No edema.      Left lower leg: No edema.   Skin:     General: Skin is warm and dry.   Neurological:      Mental Status: He is alert.      Comments: [id number]  Sedated on precedex. AA&Ox3. Disoriented to situation. Speech fluent. Answers questions appropriately. Follows commands briskly. Intermittently agitated & combative.  PERRL. EOMI. CN II-XII grossly intact.  VANCE & AG. SILT in all 4 extremities.           Medications:  Continuoussodium chloride 0.9%, Last Rate: Stopped (05/19/24 2033)  dexmedeTOMIDine (Precedex) infusion (titrating), Last Rate: 0.6 mcg/kg/hr (05/19/24 2100)    Scheduledacetaminophen, 1,000 mg, Q6H  bacitracin zinc, , BID  brivaracetam, 50 mg, BID  heparin (porcine), 5,000 Units, Q8H  hydroCHLOROthiazide, 12.5 mg, Daily  PHENObarbital, 159.9 mg, Once   Followed by  [START ON 5/20/2024] PHENObarbital, 130 mg, Once   Followed by  [START ON 5/20/2024] PHENObarbital, 130 mg, Once  polyethylene glycol, 17 g, Daily  QUEtiapine, 25 mg, QHS  senna-docusate 8.6-50 mg, 1 tablet, Daily    PRNhydrALAZINE,  "10 mg, Q4H PRN  magnesium oxide, 800 mg, PRN  magnesium oxide, 800 mg, PRN  ondansetron, 8 mg, Q6H PRN  oxyCODONE, 5 mg, Q4H PRN  potassium bicarbonate, 35 mEq, PRN  potassium bicarbonate, 50 mEq, PRN  potassium bicarbonate, 60 mEq, PRN  potassium, sodium phosphates, 2 packet, PRN  potassium, sodium phosphates, 2 packet, PRN  potassium, sodium phosphates, 2 packet, PRN      Today I personally reviewed pertinent medications, lines/drains/airways, imaging, cardiology results, laboratory results, microbiology results,     Diet  Diet Adult Regular (IDDSI Level 7)  Diet Adult Regular (IDDSI Level 7)        Assessment/Plan:     Neuro  * Subdural hematoma  87 yo male w/Bilateral SDH R>L large right SDH with 7 mm MLS. POD 2 s/p evac.   [facility]  - NSGY consulted, following  - SDD in place, management per NSGY   - q1h neuro checks, vitals, I/Os  - Echo, EKG, CXR  - MRI brain revealed-large subdural hematoma over the right cerebral hemisphere. Collection -- measures up to 2.1 cm in thickness   - CTH (5/19) with improvement in right SDH collection and MLS as well as stable left SDH collection    - No AC/AP, denies trauma  - Daily CBC, CMP, mag, phos  - SBP < 160   - PRN labetalol, hydralazine  - Keppra dc'd given agitation, start briviact for a total of 7 days for seizure prophylaxis   - s/p 10mg haldol IM, 2mg versed IV, and 25mg benadryl for agitation and combativeness this AM  - Precedex gtt for agitation  - Start seroquel 25mg qhs  - Seizure precautions  - HOB 30°   - Diet Regular  - PT/OT/SLP as appropriate  - VTE Prophylaxis: mechanical, hold chemical in setting of acute bleed     Acute encephalopathy  See Subdural hematoma    Midline shift of brain due to hematoma  See Subdural hematoma    History of jacob hole surgery  See Subdural hematoma    Encephalopathy  See primary problem    Brain compression  See Subdural hematoma    Psychiatric  Suicidal ideation  Per chart review- patient reported "if I had a gun I " "would kill myself"-   psychiatry following   [facility]'ed    Cardiac/Vascular  Essential hypertension  History of,  - EKG, Echo  - SBP < 160  - PRN labetalol, hydralazine  - Continue home meds          The patient is being Prophylaxed for:  Venous Thromboembolism with: Mechanical  Stress Ulcer with: None  Ventilator Pneumonia with: not applicable    Activity Orders            Diet Adult Regular (IDDSI Level 7): Regular starting at 05/17 1825          Full Code    Critical condition in that Patient has a condition that poses threat to life and bodily function: see associated documentation     35 minutes of Critical care time was spent personally by me on the following activities: development of treatment plan with patient or surrogate and bedside caregivers, discussions with consultants, evaluation of patient's response to treatment, examination of patient, ordering and performing treatments and interventions, ordering and review of laboratory studies, ordering and review of radiographic studies, pulse oximetry, antibiotic titration if applicable, vasopressor titration if applicable, re-evaluation of patient's condition. This critical care time did not overlap with that of any other provider or involve time for any procedures. There is high probability for acute neurological change leading to clinical and possibly life-threatening deterioration requiring highest level of physician preparedness for urgent intervention.      Jj Castillo, DO  Neurocritical Care  [facility] - Neuro Critical Care      "

## 2024-05-20 NOTE — MEDICAL/APP STUDENT
"CONSULTATION LIAISON PSYCHIATRY PROGRESS NOTE    Patient Name: Moses Peng  MRN: [id number]  Patient Class: IP- Inpatient  Admission Date: 5/17/2024  Attending Physician: Nilda Thompson MD      SUBJECTIVE:   Moses Peng is a 88 y.o. male with no known past psychiatric history & no known past medical history presents to the ED/admitted to the hospital for Subdural hematoma    Psychiatry consulted for "suicidal ideation."    Today, he is sedated on Precedex. He is arousable to voice but does not produce intelligible sounds or exhibit purposeful movements. Drain still in place. No significant event reported by nursing staff.    During rounds, he is off Precedex and is easily arousable. He is oriented to person, place, time (able to state May, 2024), not to date or situation. He mumbles heavily. He states that he does not have SI, HI, AH, or VH. He does not remember the episode of SI during this admission in ED, and he does not have any SI at the moment. No previous suicidal attempts.    He states he feels safe in the hospital; however, he does not have family/friends to support him outside the hospital.       OBJECTIVE:    Mental Status Exam:  General Appearance: dressed in hospital garb, lying in bed, in no acute distress, in restraints, disheveled  Behavior: cooperative, appropriate eye-contact  Involuntary Movements and Motor Activity: no abnormal involuntary movements noted; no tics, no tremors, no akathisia, no dystonia, no evidence of tardive dyskinesia; no psychomotor agitation or retardation  Gait and Station: unable to assess - patient lying down or seated  Speech and Language: soft, monotone, mumbling  Mood: "KAILYN"  Affect: constricted  Thought Process and Associations: linear, logical  Thought Content and Perceptions:: no suicidal or homicidal ideation, no auditory or visual hallucinations, no paranoid ideation, no ideas of reference, no evidence of delusions or psychosis  Sensorium and Orientation: oriented " to person and place, oriented partially to time  Recent and Remote Memory: Unable to  Formally Assess  Attention and Concentration: Unable to Formally Assess  Fund of Knowledge: Unable to Formally Assess  Insight: impaired  Judgment: impaired    CAM ICU positive? KAILYN      ASSESSMENT & RECOMMENDATIONS     Please contact ON CALL psychiatry service (24/7) for any acute issues that may arise.    Nicolas Conte, MS-3  [employer]     Psychiatry  [facility]  5/20/2024 11:44 AM        --------------------------------------------------------------------------------------------------------------------------------------------------------------------------------------------------------------------------------------    CONTINUED OBJECTIVE clinical data & findings reviewed and noted for above decision making    Current Medications:   Scheduled Meds:    acetaminophen  1,000 mg Oral Q6H    bacitracin zinc   Topical (Top) BID    brivaracetam  50 mg Oral BID    heparin (porcine)  5,000 Units Subcutaneous Q8H    hydroCHLOROthiazide  12.5 mg Oral Daily    polyethylene glycol  17 g Oral Daily    QUEtiapine  25 mg Oral QHS    senna-docusate 8.6-50 mg  1 tablet Oral Daily     PRN Meds:   Current Facility-Administered Medications:     diphenhydrAMINE, 25 mg, Intravenous, Q6H PRN    hydrALAZINE, 10 mg, Intravenous, Q4H PRN    magnesium oxide, 800 mg, Oral, PRN    magnesium oxide, 800 mg, Oral, PRN    ondansetron, 8 mg, Intravenous, Q6H PRN    oxyCODONE, 5 mg, Oral, Q4H PRN    potassium bicarbonate, 35 mEq, Oral, PRN    potassium bicarbonate, 50 mEq, Oral, PRN    potassium bicarbonate, 60 mEq, Oral, PRN    potassium, sodium phosphates, 2 packet, Oral, PRN    potassium, sodium phosphates, 2 packet, Oral, PRN    potassium, sodium phosphates, 2 packet, Oral, PRN    Allergies:   Review of patient's allergies indicates:  No Known Allergies    Vitals  Vitals:    05/20/24 0900   BP: 133/65   Pulse: 61   Resp: 12    Temp: 98.6 °F (37 °C)       Labs/Imaging/Studies:  Recent Results (from the past 24 hour(s))   EKG 12-lead    Collection Time: 05/19/24 12:27 PM   Result Value Ref Range    QRS Duration 94 ms    [facility] QTC Calculation 457 ms   EKG 12-lead    Collection Time: 05/19/24 12:29 PM   Result Value Ref Range    QRS Duration 90 ms    [facility] QTC Calculation 449 ms   Sodium, urine, random    Collection Time: 05/19/24  3:52 PM   Result Value Ref Range    Sodium, Urine 56 20 - 250 mmol/L   Urinalysis    Collection Time: 05/19/24  3:52 PM   Result Value Ref Range    Specimen UA Urine, Clean Catch     Color, UA Colorless (A) Yellow, Straw, Zita    Appearance, UA Clear Clear    pH, UA 6.0 5.0 - 8.0    Specific Gravity, UA 1.010 1.005 - 1.030    Protein, UA Negative Negative    Glucose, UA Negative Negative    Ketones, UA Negative Negative    Bilirubin (UA) Negative Negative    Occult Blood UA Negative Negative    Nitrite, UA Negative Negative    Leukocytes, UA Negative Negative   Osmolality, urine    Collection Time: 05/19/24  3:52 PM   Result Value Ref Range    Osmolality, Urine 283 50 - 1200 mOsm/kg   Osmolality, Serum    Collection Time: 05/19/24  4:12 PM   Result Value Ref Range    Osmolality 287 280 - 300 mOsm/kg   CBC auto differential    Collection Time: 05/20/24  1:52 AM   Result Value Ref Range    WBC 4.19 3.90 - 12.70 K/uL    RBC 3.64 (L) 4.60 - 6.20 M/uL    Hemoglobin 11.7 (L) 14.0 - 18.0 g/dL    Hematocrit 34.9 (L) 40.0 - 54.0 %    MCV 96 82 - 98 fL    MCH 32.1 (H) 27.0 - 31.0 pg    MCHC 33.5 32.0 - 36.0 g/dL    RDW 13.0 11.5 - 14.5 %    Platelets 181 150 - 450 K/uL    MPV 10.1 9.2 - 12.9 fL    Immature Granulocytes 0.0 0.0 - 0.5 %    Gran # (ANC) 3.0 1.8 - 7.7 K/uL    Immature Grans (Abs) 0.00 0.00 - 0.04 K/uL    Lymph # 0.9 (L) 1.0 - 4.8 K/uL    Mono # 0.2 (L) 0.3 - 1.0 K/uL    Eos # 0.0 0.0 - 0.5 K/uL    Baso # 0.02 0.00 - 0.20 K/uL    nRBC 0 0 /100 WBC    Gran % 70.9 38.0 - 73.0 %    Lymph % 22.4 18.0 - 48.0 %    Mono  % 5.5 4.0 - 15.0 %    Eosinophil % 0.7 0.0 - 8.0 %    Basophil % 0.5 0.0 - 1.9 %    Differential Method Automated    Comprehensive metabolic panel    Collection Time: 05/20/24  1:52 AM   Result Value Ref Range    Sodium 134 (L) 136 - 145 mmol/L    Potassium 3.9 3.5 - 5.1 mmol/L    Chloride 108 95 - 110 mmol/L    CO2 16 (L) 23 - 29 mmol/L    Glucose 105 70 - 110 mg/dL    BUN 14 8 - 23 mg/dL    Creatinine 1.0 0.5 - 1.4 mg/dL    Calcium 8.4 (L) 8.7 - 10.5 mg/dL    Total Protein 5.5 (L) 6.0 - 8.4 g/dL    Albumin 2.3 (L) 3.5 - 5.2 g/dL    Total Bilirubin 0.3 0.1 - 1.0 mg/dL    Alkaline Phosphatase 77 55 - 135 U/L    AST 18 10 - 40 U/L    ALT 6 (L) 10 - 44 U/L    eGFR >60.0 >60 mL/min/1.73 m^2    Anion Gap 10 8 - 16 mmol/L   Phosphorus    Collection Time: 05/20/24  1:52 AM   Result Value Ref Range    Phosphorus 3.0 2.7 - 4.5 mg/dL   Magnesium    Collection Time: 05/20/24  1:52 AM   Result Value Ref Range    Magnesium 1.9 1.6 - 2.6 mg/dL     Imaging Results              X-Ray Chest 1 View (Final result)  Result time 05/17/24 22:02:05      Final result by Stefano Blair MD (05/17/24 22:02:05)                   Impression:      No acute intrathoracic process.  No evidence of a pneumonia.      Electronically signed by: Stefano Blair MD  Date:    05/17/2024  Time:    22:02               Narrative:    EXAMINATION:  XR CHEST 1 VIEW    CLINICAL HISTORY:  eval PNA;    TECHNIQUE:  Single frontal view of the chest was performed.    COMPARISON:  12/28/2023    FINDINGS:  Monitoring EKG leads are present.  The trachea is unremarkable.  The cardiomediastinal silhouette is stable.  There is no evidence of free air beneath the hemidiaphragms.  There are no pleural effusions.  There is no evidence of a pneumothorax.  There is no evidence of pneumomediastinum.  No airspace opacity is present.  There are degenerative changes in the osseous structures.                                       CT Head Without Contrast (Final result)  Result time  05/17/24 20:30:06      Final result by Stefano Blair MD (05/17/24 20:30:06)                   Impression:      Interval postsurgical changes of right craniotomy for subdural hematoma evacuation with drainage catheter placement.  Decreased volume of the extra-axial collection with improved mass effect and midline shift.  Expected pneumocephalus overlies the right frontal cerebral convexity.    Stable subdural hematoma overlying the left cerebral convexity predominantly hyperattenuating.  No significant mass effect or midline shift.  Continued attention on follow-up recommended.    Chronic microvascular ischemic changes and generalized cerebral volume loss.    Additional findings as above.    Electronically signed by resident: Evon Talbert  Date:    05/17/2024  Time:    19:53    Electronically signed by: Stefano Blair MD  Date:    05/17/2024  Time:    20:30               Narrative:    EXAMINATION:  CT HEAD WITHOUT CONTRAST    CLINICAL HISTORY:  post-op SDH jacob holes;    TECHNIQUE:  Low dose axial CT images obtained throughout the head without the use of intravenous contrast.  Axial, sagittal and coronal reconstructions were performed.    COMPARISON:  MRI 05/17/2024; CT 12/28/2023    FINDINGS:  Postsurgical changes of right craniotomy for subdural hematoma evacuation with decreased volume of predominantly low-density extra-axial collection.  Draining catheter terminates within the collection.  Postsurgical pneumocephalus overlies the right frontal convexity.  The collection measures up to 1.5 cm in maximum thickness, previously 2.1 cm.  Stable mass effect upon the underlying parenchyma and right lateral ventricle with improved leftward midline shift, measuring 4 mm at the septum pellucidum, pre measurement of 7 mm.    Additional hyperattenuating extra-axial collection overlies the left frontal and parietal cerebral convexity measuring 5 mm in maximal thickness, similar when compared to same day MRI.  No significant  mass effect upon the underlying parenchyma.    Generalized cerebral volume loss.  Minimal re-expansion of the right lateral ventricle.  No evidence of hydrocephalus.    Mild patchy hypoattenuation in the supratentorial white matter, nonspecific but most likely reflecting chronic small vessel ischemic changes.  No abnormal parenchymal abnormality to suggest acute intraparenchymal hemorrhage or major vascular distribution infarct.    No displaced calvarial fracture.  Mild soft tissue stranding and air overlies the operative site.  Postsurgical changes of bilateral globes.    Left maxillary mucous retention cyst.  Peripheral mucosal thickening of the right maxillary sinus.  Remaining paranasal sinuses and mastoid air cells are clear.                                        MRI Brain Without Contrast (Final result)  Result time 05/17/24 13:45:52      Final result by Cheng Fajardo MD (05/17/24 13:45:52)                   Impression:      Bilateral subdural hematomas, larger on the right, as further discussed above.  There is significant intracranial mass effect with leftward midline shift.    Underlying chronic small vessel ischemic change and cerebral volume loss.    Neuro surgical consultation is advised.    This report was flagged in Epic as abnormal.      Electronically signed by: Cheng Fajardo MD  Date:    05/17/2024  Time:    13:45               Narrative:    EXAMINATION:  MRI BRAIN WITHOUT CONTRAST    CLINICAL HISTORY:  Transient ischemic attack (TIA);    TECHNIQUE:  Multiplanar multisequence MR imaging of the brain was performed without intravenous contrast.    Examination mildly degraded by patient motion artifact.    COMPARISON:  CT head 12/28/2023    FINDINGS:  Intracranial Compartment:    There is a large subdural hematoma over the right cerebral hemisphere.  Collection measures up to 2.1 cm in thickness.  Collection is predominantly hyperintense on T1 and T2-weighted images, with some interspersed areas  of more heterogeneous signal anteriorly and posteriorly.  Minimal septation evident.  Additional subdural hemorrhage over the left cerebral convexity with similar imaging characteristics.  This collection measuring just 0.6 cm in maximal thickness.  No extra-axial blood or fluid collections elsewhere.  There is significant intracranial mass effect.  Diffuse right-sided sulcal and ventricular crowding, with approximately 0.7 cm of leftward midline shift measured at the septum pellucidum.    Mild chronic small vessel ischemic change throughout the supratentorial white matter.  No evidence of recent or remote major vascular distribution infarct.  No evidence of recent or remote parenchymal hemorrhage.    Moderate cerebral volume loss.  No ventricular entrapment or obstructive hydrocephalus.    Normal vascular flow voids are preserved.    Skull/Extracranial Contents (limited evaluation):    Bone marrow signal intensity is unremarkable.    Postsurgical change both globes.    Findings were relayed to the ordering provider (Drake) via the epic secure chat system at approximately 13:40.

## 2024-05-20 NOTE — PT/OT/SLP PROGRESS
Physical Therapy      Patient Name:  Moses Peng   MRN:  [id number]    Patient not seen today secondary to Other (Comment) (at time of attempt, SD drain in place with HOB flat orders). Will follow-up as appropriate.

## 2024-05-20 NOTE — PROGRESS NOTES
[facility] - Neuro Critical Care  Neurosurgery  Progress Note    Subjective:     History of Present Illness: 88 M unknown PMH presents to the ED by EMS stating he wants to kill himself. Came yesterday with c/o R hand paresthesias and was discharged. Currently under PEC per psych evaluation. He states he may have hit the left side of his head on the door earlier this week. Denies falls. Denies headache, seizures, weakness, b/b dysfunction. MRI with large right SDH with midline shift. Denies use of AC/APT. NSGY consulted for evaluation.     Post-Op Info:  Procedure(s) (LRB):  CRANIOTOMY, FOR SUBDURAL HEMATOMA EVACUATION (Right)   3 Days Post-Op   Interval History: NAEON. JOANNE 20cc for 24h, more serous this AM. Continues on Precedex s/p phenobarb taper. Remains in BUE restraints with sitter bedside.    Medications:  Continuous Infusions:   sodium chloride 0.9%   Intravenous Continuous 100 mL/hr at 05/20/24 0600 Rate Verify at 05/20/24 0600    dexmedeTOMIDine (Precedex) infusion (titrating)  0-1.4 mcg/kg/hr Intravenous Continuous 7.65 mL/hr at 05/20/24 0600 0.5 mcg/kg/hr at 05/20/24 0600     Scheduled Meds:   acetaminophen  1,000 mg Oral Q6H    bacitracin zinc   Topical (Top) BID    brivaracetam  50 mg Oral BID    heparin (porcine)  5,000 Units Subcutaneous Q8H    hydroCHLOROthiazide  12.5 mg Oral Daily    polyethylene glycol  17 g Oral Daily    QUEtiapine  25 mg Oral QHS    senna-docusate 8.6-50 mg  1 tablet Oral Daily     PRN Meds:  Current Facility-Administered Medications:     hydrALAZINE, 10 mg, Intravenous, Q4H PRN    magnesium oxide, 800 mg, Oral, PRN    magnesium oxide, 800 mg, Oral, PRN    ondansetron, 8 mg, Intravenous, Q6H PRN    oxyCODONE, 5 mg, Oral, Q4H PRN    potassium bicarbonate, 35 mEq, Oral, PRN    potassium bicarbonate, 50 mEq, Oral, PRN    potassium bicarbonate, 60 mEq, Oral, PRN    potassium, sodium phosphates, 2 packet, Oral, PRN    potassium, sodium phosphates, 2 packet, Oral, PRN    potassium,  sodium phosphates, 2 packet, Oral, PRN     Review of Systems  Objective:     Weight: 61.2 kg (135 lb)  Body mass index is 21.14 kg/m².  Vital Signs (Most Recent):  Temp: 98.4 °F (36.9 °C) (05/20/24 0600)  Pulse: 63 (05/20/24 0600)  Resp: 15 (05/20/24 0600)  BP: (!) 105/58 (05/20/24 0500)  SpO2: 100 % (05/20/24 0600) Vital Signs (24h Range):  Temp:  [93.2 °F (34 °C)-98.4 °F (36.9 °C)] 98.4 °F (36.9 °C)  Pulse:  [] 63  Resp:  [10-39] 15  SpO2:  [91 %-100 %] 100 %  BP: ()/(53-81) 105/58  Arterial Line BP: (139)/(69) 139/69                              Closed/Suction Drain 05/17/24 1805 Left Scalp Bulb 7 Fr. (Active)   Dressing Type Other (Comment) 05/19/24 0301   Drainage Serosanguineous 05/19/24 1702   Status To bulb suction 05/19/24 1702   Output (mL) 7.5 mL 05/20/24 0517       Male External Urinary Catheter 05/19/24 1502 (Active)   Output (mL) 200 mL 05/19/24 1502          Physical Exam         Neurosurgery Physical Exam  General: well developed, well nourished, sedated  Head: normocephalic, atraumatic  Neurologic: Alert and oriented. Precedex paused  GCS: Motor: 6/Verbal: 5/Eyes: 4 GCS Total: 15  Mental Status: Awake, Alert, Oriented x 4  Language: No aphasia  Speech: No dysarthria  Cranial nerves: face symmetric, tongue midline, CN II-XII grossly intact.   Eyes: pupils equal, round, reactive to light with accomodation, EOMI.   Pulmonary: normal respirations, no signs of respiratory distress  Sensory: intact to light touch throughout  Motor Strength: Moves all extremities spontaneously with good tone.  Full strength upper and lower extremities. No abnormal movements seen.      Skin: Incision c/d/i  JOANNE with serosang output    Significant Labs:  Recent Labs   Lab 05/19/24  0220 05/20/24 0152   GLU 93 105   * 134*   K 3.7 3.9    108   CO2 20* 16*   BUN 13 14   CREATININE 1.1 1.0   CALCIUM 7.9* 8.4*   MG 1.8 1.9     Recent Labs   Lab 05/19/24 0220 05/20/24 0152   WBC 6.82 4.19   HGB 11.1*  "11.7*   HCT 33.2* 34.9*    181     No results for input(s): "LABPT", "INR", "APTT" in the last 48 hours.  Microbiology Results (last 7 days)       ** No results found for the last 168 hours. **          All pertinent labs from the last 24 hours have been reviewed.    Significant Diagnostics:  I have reviewed all pertinent imaging results/findings within the past 24 hours.      Assessment/Plan:     * Subdural hematoma  89 yo male with large right SDH with 7 mm MLS, GCS 15 now s/p burrhole craniotomy for SDH evacuation.     --Patient admitted to [facility] on telemetry; sitter bedside; currently PEC'd      -q1h neurochecks in ICU,  --Post op CTH satisfactory; repeat CT 5/19 with improving pneumocephalus  --SBP <140  --Na >135  --Keppra 500 BID x5d  --HOB <15 while subdural drain in place  --Subdural drain to thumbprint suction, abx while in place  --Rec Neuro IR consultation for MMA embolization during this admission  --PT/OT once subdural drain removed; bedrest while drain in place  --PRN pain control with aggressive bowel regimen while on narcotics  --Continue to monitor clinically, notify NSGY immediately with any changes in neuro status    Dispo: ongoing, sitter bedside; possible removal of subdural drain today    Closed with Monocryl    D/w Dr. Price    Please contact the on call neurosurgery provider with questions or concerns. Can be reached via on-call schedule and/or .              Mary Jara MD  Neurosurgery  [facility] - Neuro Critical Care  "

## 2024-05-20 NOTE — SUBJECTIVE & OBJECTIVE
Interval History:  See hospital course above    Review of Systems   Unable to perform ROS: Other (patient agitated, uncooperative with exam)      Objective:     Vitals:  Temp: 96.7 °F (35.9 °C)  Pulse: (!) 55  Rhythm: normal sinus rhythm  BP: (!) 157/78  MAP (mmHg): 87  Resp: 18  SpO2: 100 %    Temp  Min: 96.7 °F (35.9 °C)  Max: 98.4 °F (36.9 °C)  Pulse  Min: 45  Max: 106  BP  Min: 97/53  Max: 171/80  MAP (mmHg)  Min: 72  Max: 115  Resp  Min: 10  Max: 45  SpO2  Min: 91 %  Max: 100 %    05/18 0701 - 05/19 0700  In: 2955 [P.O.:600; I.V.:2191.5]  Out: 433 [Urine:350; Drains:83]   Unmeasured Output  Urine Occurrence: 1        Physical Exam  Vitals and nursing note reviewed.   Constitutional:       General: He is not in acute distress.     Appearance: Normal appearance.      Comments: Elderly male. Well developed. Well nourished. Resting comfortably in bed. On precedex gtt for sedation.    HENT:      Head: Normocephalic.      Comments: SDD in place with serosanguinous output. Surgical site C/D/I.      Right Ear: External ear normal.      Left Ear: External ear normal.      Nose: Nose normal.      Mouth/Throat:      Mouth: Mucous membranes are moist.      Pharynx: Oropharynx is clear.   Eyes:      General: No scleral icterus.     Extraocular Movements: Extraocular movements intact.      Pupils: Pupils are equal, round, and reactive to light.   Cardiovascular:      Rate and Rhythm: Normal rate and regular rhythm.   Pulmonary:      Effort: Pulmonary effort is normal. No respiratory distress.   Abdominal:      General: Abdomen is flat. There is no distension.      Palpations: Abdomen is soft.      Tenderness: There is no abdominal tenderness.   Musculoskeletal:      Right lower leg: No edema.      Left lower leg: No edema.   Skin:     General: Skin is warm and dry.   Neurological:      Mental Status: He is alert.      Comments: [id number]  Sedated on precedex. AA&Ox3. Disoriented to situation. Speech fluent. Answers questions  appropriately. Follows commands briskly. Intermittently agitated & combative.  PERRL. EOMI. CN II-XII grossly intact.  VANCE & AG. SILT in all 4 extremities.       Gait & coordination exams deferred.        Medications:  Continuoussodium chloride 0.9%, Last Rate: 100 mL/hr at 05/19/24 1802  dexmedeTOMIDine (Precedex) infusion (titrating), Last Rate: 0.4 mcg/kg/hr (05/19/24 1802)    Scheduledacetaminophen, 1,000 mg, Q6H  bacitracin zinc, , BID  brivaracetam, 50 mg, BID  heparin (porcine), 5,000 Units, Q8H  hydroCHLOROthiazide, 12.5 mg, Daily  mupirocin, , BID  polyethylene glycol, 17 g, Daily  QUEtiapine, 25 mg, QHS  senna-docusate 8.6-50 mg, 1 tablet, Daily    PRNhydrALAZINE, 10 mg, Q4H PRN  magnesium oxide, 800 mg, PRN  magnesium oxide, 800 mg, PRN  ondansetron, 8 mg, Q6H PRN  oxyCODONE, 5 mg, Q4H PRN  potassium bicarbonate, 35 mEq, PRN  potassium bicarbonate, 50 mEq, PRN  potassium bicarbonate, 60 mEq, PRN  potassium, sodium phosphates, 2 packet, PRN  potassium, sodium phosphates, 2 packet, PRN  potassium, sodium phosphates, 2 packet, PRN      Today I personally reviewed pertinent medications, lines/drains/airways, imaging, cardiology results, laboratory results, notably:      Laboratory:  CBC:  Recent Labs   Lab 05/19/24  0220   WBC 6.82   RBC 3.43*   HGB 11.1*   HCT 33.2*      MCV 97   MCH 32.4*   MCHC 33.4       CMP:  Recent Labs   Lab 05/19/24  0220   CALCIUM 7.9*   ALBUMIN 2.7*   PROT 5.8*   *   K 3.7   CO2 20*      BUN 13   CREATININE 1.1   ALKPHOS 79   ALT 8*   AST 19   BILITOT 0.3     Recent Labs   Lab 05/19/24  0220   MG 1.8   PHOS 2.4*       Coagulation:  Recent Labs   Lab 05/17/24  1417   LABPROT 11.2   INR 1.0   APTT 27.4     Endocrine:  Recent Labs     05/17/24  1202 05/17/24  1933   HGBA1C  --  5.1   TSH 0.289*  --        Urinalysis:  Recent Labs   Lab 05/19/24  1552   COLORU Colorless*   SPECGRAV 1.010   PHUR 6.0   OCCULTUA Negative   GLUCUA Negative   KETONESU Negative   PROTEINUA  Negative   BILIRUBINUA Negative              Imaging:  CT Head Without Contrast:  Impression:  Evolving postsurgical changes.  Decrease in volume of low-density collection along the right hemisphere.  Decreased midline shift to the left.  Stable small mixed attenuation subdural hematoma on the left.  No evidence of new hemorrhage.  Electronically signed by:Anthony Concepcion  Date:                                            05/19/2024  Time:                                           08:09    Diet  Diet Adult Regular (IDDSI Level 7)  Diet Adult Regular (IDDSI Level 7)

## 2024-05-20 NOTE — ASSESSMENT & PLAN NOTE
Pt stated he having the pain mostly at nigh, frequent urinating(every 30 mins - 1 hr), no fever, no chills, & no nausea/vomiting. See Subdural hematoma

## 2024-05-20 NOTE — ASSESSMENT & PLAN NOTE
87 yo male w/Bilateral SDH R>L large right SDH with 7 mm MLS. POD 2 s/p evac.   - [facility]  - NSGY consulted, following  - SDD in place, management per NSGY   - q1h neuro checks, vitals, I/Os  - Echo, EKG, CXR  - MRI brain revealed-large subdural hematoma over the right cerebral hemisphere. Collection -- measures up to 2.1 cm in thickness   - CTH (5/19) with improvement in right SDH collection and MLS as well as stable left SDH collection    - No AC/AP, denies trauma  - Daily CBC, CMP, mag, phos  - SBP < 160   - PRN labetalol, hydralazine  - Keppra dc'd given agitation, start briviact for a total of 7 days for seizure prophylaxis   - s/p 10mg haldol IM, 2mg versed IV, and 25mg benadryl for agitation and combativeness this AM  - Precedex gtt for agitation  - Start seroquel 25mg qhs  - Seizure precautions  - HOB 30°   - Diet Regular  - PT/OT/SLP as appropriate  - VTE Prophylaxis: mechanical, hold chemical in setting of acute bleed

## 2024-05-20 NOTE — PT/OT/SLP PROGRESS
Occupational Therapy      Patient Name:  Moses Peng   MRN:  [id number]    Patient not seen today secondary to pt with subdural drain, requires HOB flat. OT to hold today. Will follow-up as appropriate.  Sonia Epperson, OT    5/20/2024

## 2024-05-20 NOTE — OP NOTE
DATE OF PROCEDURE: 5/17/2024     PREOPERATIVE DIAGNOSES:   Subdural hematoma [S06.5XAA]    POSTOPERATIVE DIAGNOSES:   Subdural hematoma [S06.5XAA]    PROCEDURES PERFORMED:   Jacob hole craniotomies for evacuation of right subdural hematoma and placement of subdural drain    Surgeons and Role:     * Herb Price MD - Primary    ANESTHESIA: General    ESTIMATED BLOOD LOSS: 200mL    INDICATION FOR PROCEDURE: Moses Peng is a 88 y.o. year old male with encephalopathy and large chronic right SDH. Since there was subfalcine herniation I recommended the above procedure.      OPERATIVE PROCEDURE:  After obtaining informed consent, the patient was brought   into the Operating Room. he was intubated and anesthetized by Anesthesia.     The patient was placed supine on the operating room table with his head in a horseshoe and turned to   the left to expose the right side of the head. A shoulder roll was placed under his right shoulder. The head was then prepped and draped the head in the standard sterile fashion. The skin was injected with 1% lidocaine with epinephrine. We made two small incisions over the right frontoparietal skull (one anterior and one posterior) using a #15 blade. This was carried all the way down to the periosteum using Bovie electrocautery.  Weitlaner retractors were placed in both incisions. Using the  drill bit a jacob hole was made at both incisions. The dura was cauterized at both jacob holes and opened with the 11 blade. Copious chronic subdural blood emerged from both jacob holes. We evacuated the remaining SDH with suction aspiration and irrigation. We increased the end tidal CO2 so that the brain surface rebounded to the inner table of the skull. We placed a JOANNE subdural drain in the anterior jacob hole and directed posteriorly.     We placed gelfoam sheets over the dura at both jaocb holes. We closed the galea with 2-0 Vicryls and closed the skin with staples. The patient was extubated by  Anesthesia and brought to the Recovery Room in stable condition. All counts were correct at the end of the case. I was present for all critical portions

## 2024-05-20 NOTE — CONSULTS
Interventional Neuroradiology Pre-procedure Note    Procedure: Diagnostic cerebral angiogram and bilateral MMA embolization    History of Present Illness:  Moses Peng is a 88 y.o. male with large right SDH with 7 mm MLS and smaller left sided SDH,  now s/p burrhole craniotomy for SDH evacuation in admitted in the [facility]. LORETTA is consulted for b/l MMA embolization.       ROS:   Hematological: no known coagulopathies  Respiratory: no shortness of breath  Cardiovascular: no chest pain  Gastrointestinal: no abdominal pain  Genito-Urinary: no dysuria  Musculoskeletal: negative  Neurological: confused    Scheduled Meds:    acetaminophen  1,000 mg Oral Q6H    bacitracin zinc   Topical (Top) BID    brivaracetam  50 mg Oral BID    heparin (porcine)  5,000 Units Subcutaneous Q8H    hydroCHLOROthiazide  12.5 mg Oral Daily    polyethylene glycol  17 g Oral Daily    QUEtiapine  25 mg Oral QHS    senna-docusate 8.6-50 mg  1 tablet Oral Daily     Current Meds:   Current Facility-Administered Medications:     0.9%  NaCl infusion, , Intravenous, Continuous, Wenceslao Barnett MD, Last Rate: 100 mL/hr at 05/20/24 0600, Rate Verify at 05/20/24 0600    acetaminophen tablet 1,000 mg, 1,000 mg, Oral, Q6H, Wenceslao Barnett MD, 1,000 mg at 05/19/24 0603    bacitracin zinc ointment, , Topical (Top), BID, Wenceslao Barnett MD, 1 each at 05/20/24 0900    brivaracetam oral solution 50 mg, 50 mg, Oral, BID, Soledad Mascorro PA-C, 50 mg at 05/20/24 0927    dexmedetomidine (PRECEDEX) 400mcg/100mL 0.9% NaCL infusion, 0-1.4 mcg/kg/hr, Intravenous, Continuous, Soledad Mascorro PA-C, Last Rate: 7.65 mL/hr at 05/20/24 0600, 0.5 mcg/kg/hr at 05/20/24 0600    diphenhydrAMINE injection 25 mg, 25 mg, Intravenous, Q6H PRN, Soledad Mascorro PA-C    heparin (porcine) injection 5,000 Units, 5,000 Units, Subcutaneous, Q8H, Wenceslao Barnett MD, 5,000 Units at 05/20/24 1445    hydrALAZINE injection 10 mg, 10 mg, Intravenous, Q4H PRN, Jonas Apodaca, NP, 10  mg at 05/19/24 1651    hydroCHLOROthiazide tablet 12.5 mg, 12.5 mg, Oral, Daily, Wenceslao Barnett MD, 12.5 mg at 05/20/24 0823    magnesium oxide split tablet 800 mg, 800 mg, Oral, PRN, Andras, Jonas P., NP, 800 mg at 05/19/24 0603    magnesium oxide split tablet 800 mg, 800 mg, Oral, PRN, Andras, Jonas P., NP    OLANZapine injection 5 mg, 5 mg, Intramuscular, Q8H PRN, Soledad Mascorro PA-C    ondansetron injection 8 mg, 8 mg, Intravenous, Q6H PRN, Wenceslao Barnett MD    oxyCODONE immediate release tablet 5 mg, 5 mg, Oral, Q4H PRN, Wenceslao Barnett MD, 5 mg at 05/19/24 2045    polyethylene glycol packet 17 g, 17 g, Oral, Daily, Andras, Jonas P., NP, 17 g at 05/18/24 1430    potassium bicarbonate disintegrating tablet 35 mEq, 35 mEq, Oral, PRN, Andras, Jonas P., NP    potassium bicarbonate disintegrating tablet 50 mEq, 50 mEq, Oral, PRN, Andras, Jonas P., NP, 50 mEq at 05/19/24 0604    potassium bicarbonate disintegrating tablet 60 mEq, 60 mEq, Oral, PRN, Andras, Jonas P., NP    potassium, sodium phosphates 280-160-250 mg packet 2 packet, 2 packet, Oral, PRN, Andras, Jonas P., NP, 2 packet at 05/19/24 0604    potassium, sodium phosphates 280-160-250 mg packet 2 packet, 2 packet, Oral, PRN, Andras, Jonas P., NP    potassium, sodium phosphates 280-160-250 mg packet 2 packet, 2 packet, Oral, PRN, Andras, Jonas P., NP    QUEtiapine tablet 25 mg, 25 mg, Oral, QHS, Soledad Mascorro [place], 25 mg at 05/19/24 2045    senna-docusate 8.6-50 mg per tablet 1 tablet, 1 tablet, Oral, Daily, Deanna Khanna NP, 1 tablet at 05/18/24 0849   Continuous Infusions:    sodium chloride 0.9%   Intravenous Continuous 100 mL/hr at 05/20/24 0600 Rate Verify at 05/20/24 0600    dexmedeTOMIDine (Precedex) infusion (titrating)  0-1.4 mcg/kg/hr Intravenous Continuous 7.65 mL/hr at 05/20/24 0600 0.5 mcg/kg/hr at 05/20/24 0600     PRN Meds:  Current Facility-Administered Medications:     diphenhydrAMINE, 25 mg, Intravenous, Q6H PRN     "hydrALAZINE, 10 mg, Intravenous, Q4H PRN    magnesium oxide, 800 mg, Oral, PRN    magnesium oxide, 800 mg, Oral, PRN    OLANZapine, 5 mg, Intramuscular, Q8H PRN    ondansetron, 8 mg, Intravenous, Q6H PRN    oxyCODONE, 5 mg, Oral, Q4H PRN    potassium bicarbonate, 35 mEq, Oral, PRN    potassium bicarbonate, 50 mEq, Oral, PRN    potassium bicarbonate, 60 mEq, Oral, PRN    potassium, sodium phosphates, 2 packet, Oral, PRN    potassium, sodium phosphates, 2 packet, Oral, PRN    potassium, sodium phosphates, 2 packet, Oral, PRN    Allergies: Review of patient's allergies indicates:  No Known Allergies  Sedation Hx: No adverse events.    Labs:     WBC/Hgb/Hct/Plts:  4.19/11.7/34.9/181 (05/20 0152)  BUN/Cr/glu/ALT/AST/amyl/lip:  14/1.0/--/6/18/--/-- (05/20 0152)     Objective:  Vitals: Blood pressure (!) 110/58, pulse 62, temperature 98.2 °F (36.8 °C), resp. rate 14, height 5' 7" (1.702 m), weight 61.2 kg (135 lb), SpO2 97%.     Physical Exam:  General: well developed, well nourished, no distress.   Head: normocephalic, atraumatic  Neck: No tracheal deviation. No palpable masses. Full ROM.   Neurologic: Alert and oriented with intermittent agitation requiring sitter  GCS: E4 V5 M6; Total: 15  Language: No aphasia  Speech: No dysarthria  Cranial nerves: face symmetric, tongue midline, CN II-XII grossly intact.   Eyes: pupils equal, round, reactive to light with accomodation, EOMI.   Pulmonary: normal respirations, no signs of respiratory distress  Abdomen: soft, non-distended, not tender to palpation  Vascular: Pulses 2+ and symmetric radial and dorsalis pedis. No LE edema.   Skin: Skin is warm, dry and intact.  Sensory: intact to light touch throughout  Motor Strength: Moves all extremities spontaneously with good tone.  Full strength upper and lower extremities. No abnormal movements seen.     ASA: 2  MAL: 2    Plan:  -Plan for cerebral angiogram with possible intervention on Wednesday (05/22/24)  -Sedation Plan: General " anesthesia  -All diagnostics and imaging reviewed  -Keep NPO after Tuesday midnight  -Risks & benefits of procedure explained in detail; patient's daughter consented and all questions answered  -Further reccs to follow procedure          Lanre Woo MD, MHA  Fellow, NeuroEndovascular Surgery, [facility]  Neurologist, [facility] [place], [place]

## 2024-05-20 NOTE — SUBJECTIVE & OBJECTIVE
Interval History: ANTIONE. JOANNE 20cc for 24h, more serous this AM. Continues on Precedex s/p phenobarb taper. Remains in BUE restraints with sitter bedside.    Medications:  Continuous Infusions:   sodium chloride 0.9%   Intravenous Continuous 100 mL/hr at 05/20/24 0600 Rate Verify at 05/20/24 0600    dexmedeTOMIDine (Precedex) infusion (titrating)  0-1.4 mcg/kg/hr Intravenous Continuous 7.65 mL/hr at 05/20/24 0600 0.5 mcg/kg/hr at 05/20/24 0600     Scheduled Meds:   acetaminophen  1,000 mg Oral Q6H    bacitracin zinc   Topical (Top) BID    brivaracetam  50 mg Oral BID    heparin (porcine)  5,000 Units Subcutaneous Q8H    hydroCHLOROthiazide  12.5 mg Oral Daily    polyethylene glycol  17 g Oral Daily    QUEtiapine  25 mg Oral QHS    senna-docusate 8.6-50 mg  1 tablet Oral Daily     PRN Meds:  Current Facility-Administered Medications:     hydrALAZINE, 10 mg, Intravenous, Q4H PRN    magnesium oxide, 800 mg, Oral, PRN    magnesium oxide, 800 mg, Oral, PRN    ondansetron, 8 mg, Intravenous, Q6H PRN    oxyCODONE, 5 mg, Oral, Q4H PRN    potassium bicarbonate, 35 mEq, Oral, PRN    potassium bicarbonate, 50 mEq, Oral, PRN    potassium bicarbonate, 60 mEq, Oral, PRN    potassium, sodium phosphates, 2 packet, Oral, PRN    potassium, sodium phosphates, 2 packet, Oral, PRN    potassium, sodium phosphates, 2 packet, Oral, PRN     Review of Systems  Objective:     Weight: 61.2 kg (135 lb)  Body mass index is 21.14 kg/m².  Vital Signs (Most Recent):  Temp: 98.4 °F (36.9 °C) (05/20/24 0600)  Pulse: 63 (05/20/24 0600)  Resp: 15 (05/20/24 0600)  BP: (!) 105/58 (05/20/24 0500)  SpO2: 100 % (05/20/24 0600) Vital Signs (24h Range):  Temp:  [93.2 °F (34 °C)-98.4 °F (36.9 °C)] 98.4 °F (36.9 °C)  Pulse:  [] 63  Resp:  [10-39] 15  SpO2:  [91 %-100 %] 100 %  BP: ()/(53-81) 105/58  Arterial Line BP: (139)/(69) 139/69                              Closed/Suction Drain 05/17/24 1805 Left Scalp Bulb 7 Fr. (Active)   Dressing Type Other  "(Comment) 05/19/24 0301   Drainage Serosanguineous 05/19/24 1702   Status To bulb suction 05/19/24 1702   Output (mL) 7.5 mL 05/20/24 0517       Male External Urinary Catheter 05/19/24 1502 (Active)   Output (mL) 200 mL 05/19/24 1502          Physical Exam         Neurosurgery Physical Exam  General: well developed, well nourished, sedated  Head: normocephalic, atraumatic  Neurologic: Alert and oriented. Precedex paused  GCS: Motor: 6/Verbal: 5/Eyes: 4 GCS Total: 15  Mental Status: Awake, Alert, Oriented x 4  Language: No aphasia  Speech: No dysarthria  Cranial nerves: face symmetric, tongue midline, CN II-XII grossly intact.   Eyes: pupils equal, round, reactive to light with accomodation, EOMI.   Pulmonary: normal respirations, no signs of respiratory distress  Sensory: intact to light touch throughout  Motor Strength: Moves all extremities spontaneously with good tone.  Full strength upper and lower extremities. No abnormal movements seen.      Skin: Incision c/d/i  JOANNE with serosang output    Significant Labs:  Recent Labs   Lab 05/19/24  0220 05/20/24  0152   GLU 93 105   * 134*   K 3.7 3.9    108   CO2 20* 16*   BUN 13 14   CREATININE 1.1 1.0   CALCIUM 7.9* 8.4*   MG 1.8 1.9     Recent Labs   Lab 05/19/24  0220 05/20/24  0152   WBC 6.82 4.19   HGB 11.1* 11.7*   HCT 33.2* 34.9*    181     No results for input(s): "LABPT", "INR", "APTT" in the last 48 hours.  Microbiology Results (last 7 days)       ** No results found for the last 168 hours. **          All pertinent labs from the last 24 hours have been reviewed.    Significant Diagnostics:  I have reviewed all pertinent imaging results/findings within the past 24 hours.      "

## 2024-05-20 NOTE — ASSESSMENT & PLAN NOTE
89 yo male w/Bilateral SDH R>L large right SDH with 7 mm MLS. POD 2 s/p evac.   - [facility]  - NSGY consulted, following  - SDD in place, management per NSGY   - q1h neuro checks, vitals, I/Os  - Echo, EKG, CXR  - MRI brain revealed-large subdural hematoma over the right cerebral hemisphere. Collection -- measures up to 2.1 cm in thickness   - CTH (5/19) with improvement in right SDH collection and MLS as well as stable left SDH collection    - No AC/AP, denies trauma  - Daily CBC, CMP, mag, phos  - SBP < 160   - PRN labetalol, hydralazine  - Keppra dc'd given agitation, start briviact for a total of 7 days for seizure prophylaxis   - s/p 10mg haldol IM, 2mg versed IV, and 25mg benadryl for agitation and combativeness this AM  - Precedex gtt for agitation  - Start seroquel 25mg qhs  - Seizure precautions  - HOB 30°   - Diet Regular  - PT/OT/SLP as appropriate  - VTE Prophylaxis: mechanical, hold chemical in setting of acute bleed

## 2024-05-20 NOTE — ASSESSMENT & PLAN NOTE
89 yo male with large right SDH with 7 mm MLS, GCS 15 now s/p burrhole craniotomy for SDH evacuation.     --Patient admitted to [facility] on telemetry; sitter bedside; currently PEC'd      -q1h neurochecks in ICU,  --Post op CTH satisfactory; repeat CT 5/19 with improving pneumocephalus  --SBP <140  --Na >135  --Keppra 500 BID x5d  --HOB <15 while subdural drain in place  --Subdural drain to thumbprint suction, abx while in place  --Rec Neuro IR consultation for MMA embolization during this admission  --PT/OT once subdural drain removed; bedrest while drain in place  --PRN pain control with aggressive bowel regimen while on narcotics  --Continue to monitor clinically, notify NSGY immediately with any changes in neuro status    Dispo: ongoing, sitter bedside; possible removal of subdural drain today    Closed with Monocryl    D/w Dr. Price    Please contact the on call neurosurgery provider with questions or concerns. Can be reached via on-call schedule and/or .

## 2024-05-20 NOTE — H&P
[facility] - Neuro Critical Care  Neurocritical Care  History & Physical    Admit Date: 5/17/2024  Service Date: 05/19/2024  Length of Stay: 2    Subjective:     Chief Complaint: Subdural hematoma    History of Present Illness: Mr Peng is an 87 y/o M unknown PMHx admitted to [facility] w/ bilateral SDH R>L and MLS. Per chart review, he presented to the ED by EMS stating he wants to kill himself. Came yesterday with c/o R hand paresthesias and was discharged. Currently [facility]'ed per psych evaluation.  Denies falls. MRI with large right SDH with midline shift. Denies use of AC/APT. NSGY consulted for evaluation. NPO currently for OR  for SDH evac w/NSGY. Patient admitted to [facility] for close monitoring and higher level of care.       Interval History:  See hospital course above    Review of Systems   Unable to perform ROS: Other (patient agitated, uncooperative with exam)      Objective:     Vitals:  Temp: 96.7 °F (35.9 °C)  Pulse: (!) 55  Rhythm: normal sinus rhythm  BP: (!) 157/78  MAP (mmHg): 87  Resp: 18  SpO2: 100 %    Temp  Min: 96.7 °F (35.9 °C)  Max: 98.4 °F (36.9 °C)  Pulse  Min: 45  Max: 106  BP  Min: 97/53  Max: 171/80  MAP (mmHg)  Min: 72  Max: 115  Resp  Min: 10  Max: 45  SpO2  Min: 91 %  Max: 100 %    05/18 0701 - 05/19 0700  In: 2955 [P.O.:600; I.V.:2191.5]  Out: 433 [Urine:350; Drains:83]   Unmeasured Output  Urine Occurrence: 1        Physical Exam  Vitals and nursing note reviewed.   Constitutional:       General: He is not in acute distress.     Appearance: Normal appearance.      Comments: Elderly male. Well developed. Well nourished. Resting comfortably in bed. On precedex gtt for sedation.    HENT:      Head: Normocephalic.      Comments: SDD in place with serosanguinous output. Surgical site C/D/I.      Right Ear: External ear normal.      Left Ear: External ear normal.      Nose: Nose normal.      Mouth/Throat:      Mouth: Mucous membranes are moist.      Pharynx: Oropharynx is clear.   Eyes:      General: No  scleral icterus.     Extraocular Movements: Extraocular movements intact.      Pupils: Pupils are equal, round, and reactive to light.   Cardiovascular:      Rate and Rhythm: Normal rate and regular rhythm.   Pulmonary:      Effort: Pulmonary effort is normal. No respiratory distress.   Abdominal:      General: Abdomen is flat. There is no distension.      Palpations: Abdomen is soft.      Tenderness: There is no abdominal tenderness.   Musculoskeletal:      Right lower leg: No edema.      Left lower leg: No edema.   Skin:     General: Skin is warm and dry.   Neurological:      Mental Status: He is alert.      Comments: [id number]  Sedated on precedex. AA&Ox3. Disoriented to situation. Speech fluent. Answers questions appropriately. Follows commands briskly. Intermittently agitated & combative.  PERRL. EOMI. CN II-XII grossly intact.  VANCE & AG. SILT in all 4 extremities.       Gait & coordination exams deferred.        Medications:  Continuoussodium chloride 0.9%, Last Rate: 100 mL/hr at 05/19/24 1802  dexmedeTOMIDine (Precedex) infusion (titrating), Last Rate: 0.4 mcg/kg/hr (05/19/24 1802)    Scheduledacetaminophen, 1,000 mg, Q6H  bacitracin zinc, , BID  brivaracetam, 50 mg, BID  heparin (porcine), 5,000 Units, Q8H  hydroCHLOROthiazide, 12.5 mg, Daily  mupirocin, , BID  polyethylene glycol, 17 g, Daily  QUEtiapine, 25 mg, QHS  senna-docusate 8.6-50 mg, 1 tablet, Daily    PRNhydrALAZINE, 10 mg, Q4H PRN  magnesium oxide, 800 mg, PRN  magnesium oxide, 800 mg, PRN  ondansetron, 8 mg, Q6H PRN  oxyCODONE, 5 mg, Q4H PRN  potassium bicarbonate, 35 mEq, PRN  potassium bicarbonate, 50 mEq, PRN  potassium bicarbonate, 60 mEq, PRN  potassium, sodium phosphates, 2 packet, PRN  potassium, sodium phosphates, 2 packet, PRN  potassium, sodium phosphates, 2 packet, PRN      Today I personally reviewed pertinent medications, lines/drains/airways, imaging, cardiology results, laboratory results, notably:      Laboratory:  CBC:  Recent  Labs   Lab 05/19/24  0220   WBC 6.82   RBC 3.43*   HGB 11.1*   HCT 33.2*      MCV 97   MCH 32.4*   MCHC 33.4       CMP:  Recent Labs   Lab 05/19/24  0220   CALCIUM 7.9*   ALBUMIN 2.7*   PROT 5.8*   *   K 3.7   CO2 20*      BUN 13   CREATININE 1.1   ALKPHOS 79   ALT 8*   AST 19   BILITOT 0.3     Recent Labs   Lab 05/19/24  0220   MG 1.8   PHOS 2.4*       Coagulation:  Recent Labs   Lab 05/17/24  1417   LABPROT 11.2   INR 1.0   APTT 27.4     Endocrine:  Recent Labs     05/17/24  1202 05/17/24  1933   HGBA1C  --  5.1   TSH 0.289*  --        Urinalysis:  Recent Labs   Lab 05/19/24  1552   COLORU Colorless*   SPECGRAV 1.010   PHUR 6.0   OCCULTUA Negative   GLUCUA Negative   KETONESU Negative   PROTEINUA Negative   BILIRUBINUA Negative              Imaging:  CT Head Without Contrast:  Impression:  Evolving postsurgical changes.  Decrease in volume of low-density collection along the right hemisphere.  Decreased midline shift to the left.  Stable small mixed attenuation subdural hematoma on the left.  No evidence of new hemorrhage.  Electronically signed by:Anthony Concepcion  Date:                                            05/19/2024  Time:                                           08:09    Diet  Diet Adult Regular (IDDSI Level 7)  Diet Adult Regular (IDDSI Level 7)      Assessment/Plan:     Neuro  * Subdural hematoma  89 yo male w/Bilateral SDH R>L large right SDH with 7 mm MLS. POD 2 s/p evac.   - Admit to [facility]  - NSGY consulted, following  - SDD in place, management per NSGY   - q1h neuro checks, vitals, I/Os  - Echo, EKG, CXR  - MRI brain revealed-large subdural hematoma over the right cerebral hemisphere. Collection -- measures up to 2.1 cm in thickness   - CT (5/19) with improvement in right SDH collection and MLS as well as stable left SDH collection    - No AC/AP, denies trauma  - Daily CBC, CMP, mag, phos  - SBP < 160   - PRN labetalol, hydralazine  - Keppra dc'd given agitation, start briviact  "for a total of 7 days for seizure prophylaxis   - s/p 10mg haldol IM, 2mg versed IV, and 25mg benadryl for agitation and combativeness this AM  - Precedex gtt for agitation  - Start seroquel 25mg qhs  - Seizure precautions  - HOB 30°   - Diet Regular  - PT/OT/SLP as appropriate  - VTE Prophylaxis: mechanical, hold chemical in setting of acute bleed     Acute encephalopathy  See Subdural hematoma    Midline shift of brain due to hematoma  See Subdural hematoma    History of jacob hole surgery  See Subdural hematoma    Brain compression  See Subdural hematoma    Psychiatric  Suicidal ideation  Per chart review- patient reported "if I had a gun I would kill myself"-   psychiatry following   PEC'ed    Cardiac/Vascular  Essential hypertension  History of,  - EKG, Echo  - SBP < 160  - PRN labetalol, hydralazine  - Continue home meds          The patient is being Prophylaxed for:  Venous Thromboembolism with: Mechanical or Chemical  Stress Ulcer with: Not Applicable   Ventilator Pneumonia with: not applicable    Activity Orders            Diet Adult Regular (IDDSI Level 7): Regular starting at 05/17 5685          Full Code    Soledad Mascorro, PA-C  Neurocritical Care  [facility] - Neuro Critical Care  "

## 2024-05-20 NOTE — PROGRESS NOTES
CONSULTATION LIAISON PSYCHIATRY PROGRESS NOTE    Patient Name: Moses Peng  MRN: [id number]  Patient Class: IP- Inpatient  Admission Date: 5/17/2024  Attending Physician: Nilda Thompson MD      SUBJECTIVE:   Moses Peng is a 88 y.o. male with a no past psychiatric history, currently presenting with chronic RUE and RLE numbness and tingling. Emergency Psychiatry was originally consulted to address the patient's symptoms of suicidal ideation. Patient is admitted to the hospital for bilateral Subdural hematomas.    Psychiatry consulted for suicidal ideation    Yesterday, patient required PRNs of Haldol 10/Versed 2/Benadryl 25. Overnight, patient became agitated, was said to be kicking and swinging at staff. Reportedly threatened to leave. Started and completed phenobarbital taper, added Seroquel 25 mg then started on Precedex drip.     On initial evaluation, patient is sedated on Precedex. He is arousable to voice but does not produce intelligible sounds or exhibit purposeful movements. Drain still in place. No significant event reported by nursing staff.     During rounds, he is on low dose Precedex and arouses to voice. Subdural drain now removed. He is able to answer questions appropriately, though speech remains difficult to discern. He is oriented to person, place, time (able to state May, 2024), not to date or situation. He mumbles heavily. He states that he does not have SI, HI, AH, or VH. He does not remember the episode of SI during this admission in ED, and he does not have any SI at the moment. No previous suicidal attempts.     He states he feels safe in the hospital; however, he does not have family/friends to support him outside the hospital.       OBJECTIVE:    Mental Status Exam:  General Appearance: appears stated age, well developed and nourished, adequately groomed and appropriately dressed, in no acute distress, lying in bed, bandage over R side of head intact and drain in place with bloody  "output.  Behavior: normal; cooperative; reasonably friendly, pleasant, and polite; appropriate eye-contact; under good behavioral control  Involuntary Movements and Motor Activity: no abnormal involuntary movements noted; no tics, no tremors, no akathisia, no dystonia, no evidence of tardive dyskinesia; no psychomotor agitation or retardation  Gait and Station: unable to assess - patient lying down or seated  Speech and Language: slowed, incoherent  Mood: "good"  Affect: blunted  Thought Process and Associations: concrete (proverbs & similarities)  Thought Content and Perceptions:: no suicidal or homicidal ideation, no auditory or visual hallucinations, no paranoid ideation, no ideas of reference, no evidence of delusions or psychosis  Sensorium and Orientation: oriented to person and place, oriented partially to time, disoriented to situation  Recent and Remote Memory: mild impairments noted  Attention and Concentration: impaired, 1 error on [employer]  Fund of Knowledge: correctly identifies the , unable to identify the previous president  Insight: impaired, poor awareness of illness  Judgment: impaired, noncompliant with health provider's recommendations and instructions    CAM ICU positive? No      ASSESSMENT & RECOMMENDATIONS   Encephalopathy 2/2 Subdural Hematoma, improving    PSYCH MEDICATIONS  Scheduled: Continue Seroquel 25 mg PO nightly  PRN: Zyprexa 5 mg q8 hours PRN for non-redirectable agitation associated with delirium    Delirium Behavior Management  Minimize use of physical restraints if able   Keep window shades open and room lit during day and room dim at night in order to promote normal sleep-wake cycles  Encourage family at bedside. [place] patient often to situation, location, date.  Continue to Limit or Discontinue use of Narcotics, Benzos and Anticholinergic medications as they may worsen delirium.  Continue medical workup for causative etiology of Delirium. "       RISK ASSESSMENT  Currently under CEC; will continue out of caution and re-evaluate    FOLLOW UP  Will continue to follow    DISPOSITION - once medically cleared:   Defer to medical team    Please contact ON CALL psychiatry service (24/7) for any acute issues that may arise.    EDNA Sanders  [employer]    Parts of this note were prepared by medical student. I have individually assessed the patient and collaborated with above student on documentation.     Dr. Joe WARNER Psychiatry  [facility]  5/20/2024 9:08 AM        --------------------------------------------------------------------------------------------------------------------------------------------------------------------------------------------------------------------------------------    CONTINUED OBJECTIVE clinical data & findings reviewed and noted for above decision making    Current Medications:   Scheduled Meds:    acetaminophen  1,000 mg Oral Q6H    bacitracin zinc   Topical (Top) BID    brivaracetam  50 mg Oral BID    heparin (porcine)  5,000 Units Subcutaneous Q8H    hydroCHLOROthiazide  12.5 mg Oral Daily    polyethylene glycol  17 g Oral Daily    QUEtiapine  25 mg Oral QHS    senna-docusate 8.6-50 mg  1 tablet Oral Daily     PRN Meds:   Current Facility-Administered Medications:     diphenhydrAMINE, 25 mg, Intravenous, Q6H PRN    hydrALAZINE, 10 mg, Intravenous, Q4H PRN    magnesium oxide, 800 mg, Oral, PRN    magnesium oxide, 800 mg, Oral, PRN    ondansetron, 8 mg, Intravenous, Q6H PRN    oxyCODONE, 5 mg, Oral, Q4H PRN    potassium bicarbonate, 35 mEq, Oral, PRN    potassium bicarbonate, 50 mEq, Oral, PRN    potassium bicarbonate, 60 mEq, Oral, PRN    potassium, sodium phosphates, 2 packet, Oral, PRN    potassium, sodium phosphates, 2 packet, Oral, PRN    potassium, sodium phosphates, 2 packet, Oral, PRN    Allergies:   Review of patient's allergies indicates:  No Known  Allergies    Vitals  Vitals:    05/20/24 1345   BP:    Pulse: 62   Resp: 14   Temp: 98.2 °F (36.8 °C)       Labs/Imaging/Studies:  Recent Results (from the past 24 hour(s))   Sodium, urine, random    Collection Time: 05/19/24  3:52 PM   Result Value Ref Range    Sodium, Urine 56 20 - 250 mmol/L   Urinalysis    Collection Time: 05/19/24  3:52 PM   Result Value Ref Range    Specimen UA Urine, Clean Catch     Color, UA Colorless (A) Yellow, Straw, Zita    Appearance, UA Clear Clear    pH, UA 6.0 5.0 - 8.0    Specific Gravity, UA 1.010 1.005 - 1.030    Protein, UA Negative Negative    Glucose, UA Negative Negative    Ketones, UA Negative Negative    Bilirubin (UA) Negative Negative    Occult Blood UA Negative Negative    Nitrite, UA Negative Negative    Leukocytes, UA Negative Negative   Osmolality, urine    Collection Time: 05/19/24  3:52 PM   Result Value Ref Range    Osmolality, Urine 283 50 - 1200 mOsm/kg   Osmolality, Serum    Collection Time: 05/19/24  4:12 PM   Result Value Ref Range    Osmolality 287 280 - 300 mOsm/kg   CBC auto differential    Collection Time: 05/20/24  1:52 AM   Result Value Ref Range    WBC 4.19 3.90 - 12.70 K/uL    RBC 3.64 (L) 4.60 - 6.20 M/uL    Hemoglobin 11.7 (L) 14.0 - 18.0 g/dL    Hematocrit 34.9 (L) 40.0 - 54.0 %    MCV 96 82 - 98 fL    MCH 32.1 (H) 27.0 - 31.0 pg    MCHC 33.5 32.0 - 36.0 g/dL    RDW 13.0 11.5 - 14.5 %    Platelets 181 150 - 450 K/uL    MPV 10.1 9.2 - 12.9 fL    Immature Granulocytes 0.0 0.0 - 0.5 %    Gran # (ANC) 3.0 1.8 - 7.7 K/uL    Immature Grans (Abs) 0.00 0.00 - 0.04 K/uL    Lymph # 0.9 (L) 1.0 - 4.8 K/uL    Mono # 0.2 (L) 0.3 - 1.0 K/uL    Eos # 0.0 0.0 - 0.5 K/uL    Baso # 0.02 0.00 - 0.20 K/uL    nRBC 0 0 /100 WBC    Gran % 70.9 38.0 - 73.0 %    Lymph % 22.4 18.0 - 48.0 %    Mono % 5.5 4.0 - 15.0 %    Eosinophil % 0.7 0.0 - 8.0 %    Basophil % 0.5 0.0 - 1.9 %    Differential Method Automated    Comprehensive metabolic panel    Collection Time: 05/20/24   1:52 AM   Result Value Ref Range    Sodium 134 (L) 136 - 145 mmol/L    Potassium 3.9 3.5 - 5.1 mmol/L    Chloride 108 95 - 110 mmol/L    CO2 16 (L) 23 - 29 mmol/L    Glucose 105 70 - 110 mg/dL    BUN 14 8 - 23 mg/dL    Creatinine 1.0 0.5 - 1.4 mg/dL    Calcium 8.4 (L) 8.7 - 10.5 mg/dL    Total Protein 5.5 (L) 6.0 - 8.4 g/dL    Albumin 2.3 (L) 3.5 - 5.2 g/dL    Total Bilirubin 0.3 0.1 - 1.0 mg/dL    Alkaline Phosphatase 77 55 - 135 U/L    AST 18 10 - 40 U/L    ALT 6 (L) 10 - 44 U/L    eGFR >60.0 >60 mL/min/1.73 m^2    Anion Gap 10 8 - 16 mmol/L   Phosphorus    Collection Time: 05/20/24  1:52 AM   Result Value Ref Range    Phosphorus 3.0 2.7 - 4.5 mg/dL   Magnesium    Collection Time: 05/20/24  1:52 AM   Result Value Ref Range    Magnesium 1.9 1.6 - 2.6 mg/dL     Imaging Results              X-Ray Chest 1 View (Final result)  Result time 05/17/24 22:02:05      Final result by Stefano Blair MD (05/17/24 22:02:05)                   Impression:      No acute intrathoracic process.  No evidence of a pneumonia.      Electronically signed by: Stefano Blair MD  Date:    05/17/2024  Time:    22:02               Narrative:    EXAMINATION:  XR CHEST 1 VIEW    CLINICAL HISTORY:  eval PNA;    TECHNIQUE:  Single frontal view of the chest was performed.    COMPARISON:  12/28/2023    FINDINGS:  Monitoring EKG leads are present.  The trachea is unremarkable.  The cardiomediastinal silhouette is stable.  There is no evidence of free air beneath the hemidiaphragms.  There are no pleural effusions.  There is no evidence of a pneumothorax.  There is no evidence of pneumomediastinum.  No airspace opacity is present.  There are degenerative changes in the osseous structures.                                       CT Head Without Contrast (Final result)  Result time 05/17/24 20:30:06      Final result by Stefano Blair MD (05/17/24 20:30:06)                   Impression:      Interval postsurgical changes of right craniotomy for subdural  hematoma evacuation with drainage catheter placement.  Decreased volume of the extra-axial collection with improved mass effect and midline shift.  Expected pneumocephalus overlies the right frontal cerebral convexity.    Stable subdural hematoma overlying the left cerebral convexity predominantly hyperattenuating.  No significant mass effect or midline shift.  Continued attention on follow-up recommended.    Chronic microvascular ischemic changes and generalized cerebral volume loss.    Additional findings as above.    Electronically signed by resident: Evon Talbert  Date:    05/17/2024  Time:    19:53    Electronically signed by: Stefano Blair MD  Date:    05/17/2024  Time:    20:30               Narrative:    EXAMINATION:  CT HEAD WITHOUT CONTRAST    CLINICAL HISTORY:  post-op SDH jacob holes;    TECHNIQUE:  Low dose axial CT images obtained throughout the head without the use of intravenous contrast.  Axial, sagittal and coronal reconstructions were performed.    COMPARISON:  MRI 05/17/2024; CT 12/28/2023    FINDINGS:  Postsurgical changes of right craniotomy for subdural hematoma evacuation with decreased volume of predominantly low-density extra-axial collection.  Draining catheter terminates within the collection.  Postsurgical pneumocephalus overlies the right frontal convexity.  The collection measures up to 1.5 cm in maximum thickness, previously 2.1 cm.  Stable mass effect upon the underlying parenchyma and right lateral ventricle with improved leftward midline shift, measuring 4 mm at the septum pellucidum, pre measurement of 7 mm.    Additional hyperattenuating extra-axial collection overlies the left frontal and parietal cerebral convexity measuring 5 mm in maximal thickness, similar when compared to same day MRI.  No significant mass effect upon the underlying parenchyma.    Generalized cerebral volume loss.  Minimal re-expansion of the right lateral ventricle.  No evidence of hydrocephalus.    Mild  patchy hypoattenuation in the supratentorial white matter, nonspecific but most likely reflecting chronic small vessel ischemic changes.  No abnormal parenchymal abnormality to suggest acute intraparenchymal hemorrhage or major vascular distribution infarct.    No displaced calvarial fracture.  Mild soft tissue stranding and air overlies the operative site.  Postsurgical changes of bilateral globes.    Left maxillary mucous retention cyst.  Peripheral mucosal thickening of the right maxillary sinus.  Remaining paranasal sinuses and mastoid air cells are clear.                                        MRI Brain Without Contrast (Final result)  Result time 05/17/24 13:45:52      Final result by Cheng Fajardo MD (05/17/24 13:45:52)                   Impression:      Bilateral subdural hematomas, larger on the right, as further discussed above.  There is significant intracranial mass effect with leftward midline shift.    Underlying chronic small vessel ischemic change and cerebral volume loss.    Neuro surgical consultation is advised.    This report was flagged in Epic as abnormal.      Electronically signed by: Cheng Fajardo MD  Date:    05/17/2024  Time:    13:45               Narrative:    EXAMINATION:  MRI BRAIN WITHOUT CONTRAST    CLINICAL HISTORY:  Transient ischemic attack (TIA);    TECHNIQUE:  Multiplanar multisequence MR imaging of the brain was performed without intravenous contrast.    Examination mildly degraded by patient motion artifact.    COMPARISON:  CT head 12/28/2023    FINDINGS:  Intracranial Compartment:    There is a large subdural hematoma over the right cerebral hemisphere.  Collection measures up to 2.1 cm in thickness.  Collection is predominantly hyperintense on T1 and T2-weighted images, with some interspersed areas of more heterogeneous signal anteriorly and posteriorly.  Minimal septation evident.  Additional subdural hemorrhage over the left cerebral convexity with similar imaging  characteristics.  This collection measuring just 0.6 cm in maximal thickness.  No extra-axial blood or fluid collections elsewhere.  There is significant intracranial mass effect.  Diffuse right-sided sulcal and ventricular crowding, with approximately 0.7 cm of leftward midline shift measured at the septum pellucidum.    Mild chronic small vessel ischemic change throughout the supratentorial white matter.  No evidence of recent or remote major vascular distribution infarct.  No evidence of recent or remote parenchymal hemorrhage.    Moderate cerebral volume loss.  No ventricular entrapment or obstructive hydrocephalus.    Normal vascular flow voids are preserved.    Skull/Extracranial Contents (limited evaluation):    Bone marrow signal intensity is unremarkable.    Postsurgical change both globes.    Findings were relayed to the ordering provider (Drake) via the epic secure chat system at approximately 13:40.

## 2024-05-20 NOTE — SUBJECTIVE & OBJECTIVE
Objective:     Vitals:  Temp: 97.1 °F (36.2 °C)  Pulse: 71  Rhythm: normal sinus rhythm  BP: (!) 178/81  MAP (mmHg): 117  Resp: (!) 31  SpO2: 100 %    Temp  Min: 96.7 °F (35.9 °C)  Max: 98.4 °F (36.9 °C)  Pulse  Min: 45  Max: 106  BP  Min: 97/53  Max: 178/81  MAP (mmHg)  Min: 72  Max: 117  Resp  Min: 10  Max: 45  SpO2  Min: 91 %  Max: 100 %    05/18 0701 - 05/19 0700  In: 2955 [P.O.:600; I.V.:2191.5]  Out: 433 [Urine:350; Drains:83]   Unmeasured Output  Urine Occurrence: 1        Physical Exam  Vitals and nursing note reviewed.   Constitutional:       General: He is not in acute distress.     Appearance: Normal appearance.      Comments: Elderly male. Well developed. Well nourished. Resting comfortably in bed. On precedex gtt for sedation.    HENT:      Head: Normocephalic.      Comments: SDD in place with serosanguinous output. Surgical site C/D/I.      Right Ear: External ear normal.      Left Ear: External ear normal.      Nose: Nose normal.      Mouth/Throat:      Mouth: Mucous membranes are moist.      Pharynx: Oropharynx is clear.   Eyes:      General: No scleral icterus.     Extraocular Movements: Extraocular movements intact.      Pupils: Pupils are equal, round, and reactive to light.   Cardiovascular:      Rate and Rhythm: Normal rate and regular rhythm.   Pulmonary:      Effort: Pulmonary effort is normal. No respiratory distress.   Abdominal:      General: Abdomen is flat. There is no distension.      Palpations: Abdomen is soft.      Tenderness: There is no abdominal tenderness.   Musculoskeletal:      Right lower leg: No edema.      Left lower leg: No edema.   Skin:     General: Skin is warm and dry.   Neurological:      Mental Status: He is alert.      Comments: [id number]  Sedated on precedex. AA&Ox3. Disoriented to situation. Speech fluent. Answers questions appropriately. Follows commands briskly. Intermittently agitated & combative.  PERRL. EOMI. CN II-XII grossly intact.  VANCE & AG. SILT in all 4  extremities.           Medications:  Continuoussodium chloride 0.9%, Last Rate: Stopped (05/19/24 2033)  dexmedeTOMIDine (Precedex) infusion (titrating), Last Rate: 0.6 mcg/kg/hr (05/19/24 2100)    Scheduledacetaminophen, 1,000 mg, Q6H  bacitracin zinc, , BID  brivaracetam, 50 mg, BID  heparin (porcine), 5,000 Units, Q8H  hydroCHLOROthiazide, 12.5 mg, Daily  PHENObarbital, 159.9 mg, Once   Followed by  [START ON 5/20/2024] PHENObarbital, 130 mg, Once   Followed by  [START ON 5/20/2024] PHENObarbital, 130 mg, Once  polyethylene glycol, 17 g, Daily  QUEtiapine, 25 mg, QHS  senna-docusate 8.6-50 mg, 1 tablet, Daily    PRNhydrALAZINE, 10 mg, Q4H PRN  magnesium oxide, 800 mg, PRN  magnesium oxide, 800 mg, PRN  ondansetron, 8 mg, Q6H PRN  oxyCODONE, 5 mg, Q4H PRN  potassium bicarbonate, 35 mEq, PRN  potassium bicarbonate, 50 mEq, PRN  potassium bicarbonate, 60 mEq, PRN  potassium, sodium phosphates, 2 packet, PRN  potassium, sodium phosphates, 2 packet, PRN  potassium, sodium phosphates, 2 packet, PRN      Today I personally reviewed pertinent medications, lines/drains/airways, imaging, cardiology results, laboratory results, microbiology results,     Diet  Diet Adult Regular (IDDSI Level 7)  Diet Adult Regular (IDDSI Level 7)

## 2024-05-20 NOTE — PLAN OF CARE
Problem: Adult Inpatient Plan of Care  Goal: Plan of Care Review  Outcome: Progressing  Goal: Patient-Specific Goal (Individualized)  Outcome: Progressing  Goal: Absence of Hospital-Acquired Illness or Injury  Outcome: Progressing  Goal: Optimal Comfort and Wellbeing  Outcome: Progressing  Goal: Readiness for Transition of Care  Outcome: Progressing     Problem: Infection  Goal: Absence of Infection Signs and Symptoms  Outcome: Progressing     Problem: Wound  Goal: Optimal Coping  Outcome: Progressing  Goal: Optimal Functional Ability  Outcome: Progressing  Goal: Absence of Infection Signs and Symptoms  Outcome: Progressing  Goal: Improved Oral Intake  Outcome: Progressing  Goal: Optimal Pain Control and Function  Outcome: Progressing  Goal: Skin Health and Integrity  Outcome: Progressing  Goal: Optimal Wound Healing  Outcome: Progressing     Problem: Fall Injury Risk  Goal: Absence of Fall and Fall-Related Injury  Outcome: Progressing     Problem: Skin Injury Risk Increased  Goal: Skin Health and Integrity  Outcome: Progressing     Problem: Restraint, Nonviolent  Goal: Absence of Harm or Injury  Outcome: Progressing

## 2024-05-21 LAB
ALBUMIN SERPL BCP-MCNC: 2.3 G/DL (ref 3.5–5.2)
ALP SERPL-CCNC: 78 U/L (ref 55–135)
ALT SERPL W/O P-5'-P-CCNC: 5 U/L (ref 10–44)
ANION GAP SERPL CALC-SCNC: 7 MMOL/L (ref 8–16)
AST SERPL-CCNC: 24 U/L (ref 10–40)
BASOPHILS # BLD AUTO: 0.02 K/UL (ref 0–0.2)
BASOPHILS NFR BLD: 0.4 % (ref 0–1.9)
BILIRUB SERPL-MCNC: 0.3 MG/DL (ref 0.1–1)
BUN SERPL-MCNC: 18 MG/DL (ref 8–23)
CALCIUM SERPL-MCNC: 8.3 MG/DL (ref 8.7–10.5)
CHLORIDE SERPL-SCNC: 112 MMOL/L (ref 95–110)
CO2 SERPL-SCNC: 21 MMOL/L (ref 23–29)
CREAT SERPL-MCNC: 1 MG/DL (ref 0.5–1.4)
DIFFERENTIAL METHOD BLD: ABNORMAL
EOSINOPHIL # BLD AUTO: 0.1 K/UL (ref 0–0.5)
EOSINOPHIL NFR BLD: 1.7 % (ref 0–8)
ERYTHROCYTE [DISTWIDTH] IN BLOOD BY AUTOMATED COUNT: 13.6 % (ref 11.5–14.5)
EST. GFR  (NO RACE VARIABLE): >60 ML/MIN/1.73 M^2
GLUCOSE SERPL-MCNC: 79 MG/DL (ref 70–110)
HCT VFR BLD AUTO: 34 % (ref 40–54)
HGB BLD-MCNC: 11.3 G/DL (ref 14–18)
IMM GRANULOCYTES # BLD AUTO: 0.01 K/UL (ref 0–0.04)
IMM GRANULOCYTES NFR BLD AUTO: 0.2 % (ref 0–0.5)
LYMPHOCYTES # BLD AUTO: 1.5 K/UL (ref 1–4.8)
LYMPHOCYTES NFR BLD: 32 % (ref 18–48)
MAGNESIUM SERPL-MCNC: 1.8 MG/DL (ref 1.6–2.6)
MCH RBC QN AUTO: 32.6 PG (ref 27–31)
MCHC RBC AUTO-ENTMCNC: 33.2 G/DL (ref 32–36)
MCV RBC AUTO: 98 FL (ref 82–98)
MONOCYTES # BLD AUTO: 0.3 K/UL (ref 0.3–1)
MONOCYTES NFR BLD: 6.1 % (ref 4–15)
NEUTROPHILS # BLD AUTO: 2.7 K/UL (ref 1.8–7.7)
NEUTROPHILS NFR BLD: 59.6 % (ref 38–73)
NRBC BLD-RTO: 0 /100 WBC
PHOSPHATE SERPL-MCNC: 2.9 MG/DL (ref 2.7–4.5)
PLATELET # BLD AUTO: 179 K/UL (ref 150–450)
PMV BLD AUTO: 10.3 FL (ref 9.2–12.9)
POTASSIUM SERPL-SCNC: 4.1 MMOL/L (ref 3.5–5.1)
PROT SERPL-MCNC: 5.1 G/DL (ref 6–8.4)
RBC # BLD AUTO: 3.47 M/UL (ref 4.6–6.2)
SODIUM SERPL-SCNC: 140 MMOL/L (ref 136–145)
WBC # BLD AUTO: 4.6 K/UL (ref 3.9–12.7)

## 2024-05-21 PROCEDURE — 25000003 PHARM REV CODE 250

## 2024-05-21 PROCEDURE — 25000003 PHARM REV CODE 250: Performed by: PSYCHIATRY & NEUROLOGY

## 2024-05-21 PROCEDURE — 97112 NEUROMUSCULAR REEDUCATION: CPT

## 2024-05-21 PROCEDURE — 63600175 PHARM REV CODE 636 W HCPCS: Performed by: NURSE PRACTITIONER

## 2024-05-21 PROCEDURE — 25000003 PHARM REV CODE 250: Performed by: STUDENT IN AN ORGANIZED HEALTH CARE EDUCATION/TRAINING PROGRAM

## 2024-05-21 PROCEDURE — 83735 ASSAY OF MAGNESIUM: CPT | Performed by: STUDENT IN AN ORGANIZED HEALTH CARE EDUCATION/TRAINING PROGRAM

## 2024-05-21 PROCEDURE — 80053 COMPREHEN METABOLIC PANEL: CPT | Performed by: STUDENT IN AN ORGANIZED HEALTH CARE EDUCATION/TRAINING PROGRAM

## 2024-05-21 PROCEDURE — 97162 PT EVAL MOD COMPLEX 30 MIN: CPT

## 2024-05-21 PROCEDURE — 99233 SBSQ HOSP IP/OBS HIGH 50: CPT | Mod: GC,,, | Performed by: PSYCHIATRY & NEUROLOGY

## 2024-05-21 PROCEDURE — 97530 THERAPEUTIC ACTIVITIES: CPT

## 2024-05-21 PROCEDURE — 63600175 PHARM REV CODE 636 W HCPCS

## 2024-05-21 PROCEDURE — 25000003 PHARM REV CODE 250: Performed by: NURSE PRACTITIONER

## 2024-05-21 PROCEDURE — 20000000 HC ICU ROOM

## 2024-05-21 PROCEDURE — 85025 COMPLETE CBC W/AUTO DIFF WBC: CPT | Performed by: STUDENT IN AN ORGANIZED HEALTH CARE EDUCATION/TRAINING PROGRAM

## 2024-05-21 PROCEDURE — 25000003 PHARM REV CODE 250: Performed by: PHYSICIAN ASSISTANT

## 2024-05-21 PROCEDURE — 97166 OT EVAL MOD COMPLEX 45 MIN: CPT

## 2024-05-21 PROCEDURE — 63600175 PHARM REV CODE 636 W HCPCS: Performed by: STUDENT IN AN ORGANIZED HEALTH CARE EDUCATION/TRAINING PROGRAM

## 2024-05-21 PROCEDURE — 63600175 PHARM REV CODE 636 W HCPCS: Mod: JZ,JG | Performed by: PHYSICIAN ASSISTANT

## 2024-05-21 PROCEDURE — 94761 N-INVAS EAR/PLS OXIMETRY MLT: CPT

## 2024-05-21 PROCEDURE — 84100 ASSAY OF PHOSPHORUS: CPT | Performed by: STUDENT IN AN ORGANIZED HEALTH CARE EDUCATION/TRAINING PROGRAM

## 2024-05-21 PROCEDURE — C9399 UNCLASSIFIED DRUGS OR BIOLOG: HCPCS

## 2024-05-21 RX ORDER — PHENOBARBITAL 20 MG/5ML
10 ELIXIR ORAL 2 TIMES DAILY
Status: DISPENSED | OUTPATIENT
Start: 2024-05-25 | End: 2024-05-26

## 2024-05-21 RX ORDER — GABAPENTIN 300 MG/1
300 CAPSULE ORAL 3 TIMES DAILY
Status: DISCONTINUED | OUTPATIENT
Start: 2024-05-21 | End: 2024-05-22

## 2024-05-21 RX ORDER — ACETAMINOPHEN 500 MG
1000 TABLET ORAL EVERY 8 HOURS PRN
Status: DISCONTINUED | OUTPATIENT
Start: 2024-05-21 | End: 2024-05-26 | Stop reason: HOSPADM

## 2024-05-21 RX ORDER — LABETALOL HCL 20 MG/4 ML
10 SYRINGE (ML) INTRAVENOUS EVERY 6 HOURS PRN
Status: DISCONTINUED | OUTPATIENT
Start: 2024-05-21 | End: 2024-05-26 | Stop reason: HOSPADM

## 2024-05-21 RX ORDER — THIAMINE HCL 100 MG
100 TABLET ORAL DAILY
Status: DISCONTINUED | OUTPATIENT
Start: 2024-05-21 | End: 2024-05-26 | Stop reason: HOSPADM

## 2024-05-21 RX ORDER — FOLIC ACID 1 MG/1
1 TABLET ORAL DAILY
Status: DISCONTINUED | OUTPATIENT
Start: 2024-05-21 | End: 2024-05-26 | Stop reason: HOSPADM

## 2024-05-21 RX ORDER — PHENOBARBITAL 20 MG/5ML
30 ELIXIR ORAL 2 TIMES DAILY
Status: COMPLETED | OUTPATIENT
Start: 2024-05-21 | End: 2024-05-22

## 2024-05-21 RX ORDER — PHENOBARBITAL 20 MG/5ML
5 ELIXIR ORAL 2 TIMES DAILY
Status: DISCONTINUED | OUTPATIENT
Start: 2024-05-26 | End: 2024-05-26 | Stop reason: HOSPADM

## 2024-05-21 RX ORDER — PHENOBARBITAL 20 MG/5ML
15 ELIXIR ORAL 2 TIMES DAILY
Status: COMPLETED | OUTPATIENT
Start: 2024-05-23 | End: 2024-05-24

## 2024-05-21 RX ADMIN — PHENOBARBITAL 30 MG: 20 ELIXIR ORAL at 08:05

## 2024-05-21 RX ADMIN — BACITRACIN 1 EACH: 500 OINTMENT TOPICAL at 08:05

## 2024-05-21 RX ADMIN — GABAPENTIN 300 MG: 300 CAPSULE ORAL at 08:05

## 2024-05-21 RX ADMIN — HEPARIN SODIUM 5000 UNITS: 5000 INJECTION INTRAVENOUS; SUBCUTANEOUS at 10:05

## 2024-05-21 RX ADMIN — BRIVARACETAM 50 MG: 10 SOLUTION ORAL at 08:05

## 2024-05-21 RX ADMIN — HYDRALAZINE HYDROCHLORIDE 10 MG: 20 INJECTION, SOLUTION INTRAMUSCULAR; INTRAVENOUS at 07:05

## 2024-05-21 RX ADMIN — LABETALOL HYDROCHLORIDE 10 MG: 5 INJECTION, SOLUTION INTRAVENOUS at 04:05

## 2024-05-21 RX ADMIN — THERA TABS 1 TABLET: TAB at 03:05

## 2024-05-21 RX ADMIN — Medication 100 MG: at 03:05

## 2024-05-21 RX ADMIN — DEXMEDETOMIDINE HYDROCHLORIDE 0.2 MCG/KG/HR: 4 INJECTION INTRAVENOUS at 04:05

## 2024-05-21 RX ADMIN — OXYCODONE 5 MG: 5 TABLET ORAL at 03:05

## 2024-05-21 RX ADMIN — ACETAMINOPHEN 1000 MG: 500 TABLET ORAL at 05:05

## 2024-05-21 RX ADMIN — OXYCODONE 5 MG: 5 TABLET ORAL at 08:05

## 2024-05-21 RX ADMIN — SENNOSIDES AND DOCUSATE SODIUM 1 TABLET: 50; 8.6 TABLET ORAL at 08:05

## 2024-05-21 RX ADMIN — HYDRALAZINE HYDROCHLORIDE 10 MG: 20 INJECTION, SOLUTION INTRAMUSCULAR; INTRAVENOUS at 03:05

## 2024-05-21 RX ADMIN — Medication 800 MG: at 05:05

## 2024-05-21 RX ADMIN — QUETIAPINE FUMARATE 25 MG: 25 TABLET ORAL at 08:05

## 2024-05-21 RX ADMIN — GABAPENTIN 300 MG: 300 CAPSULE ORAL at 02:05

## 2024-05-21 RX ADMIN — HEPARIN SODIUM 5000 UNITS: 5000 INJECTION INTRAVENOUS; SUBCUTANEOUS at 05:05

## 2024-05-21 RX ADMIN — HEPARIN SODIUM 5000 UNITS: 5000 INJECTION INTRAVENOUS; SUBCUTANEOUS at 02:05

## 2024-05-21 RX ADMIN — POLYETHYLENE GLYCOL 3350 17 G: 17 POWDER, FOR SOLUTION ORAL at 08:05

## 2024-05-21 RX ADMIN — FOLIC ACID 1 MG: 1 TABLET ORAL at 03:05

## 2024-05-21 RX ADMIN — OXYCODONE 5 MG: 5 TABLET ORAL at 02:05

## 2024-05-21 NOTE — MEDICAL/APP STUDENT
"CONSULTATION LIAISON PSYCHIATRY PROGRESS NOTE    Patient Name: Moses Peng  MRN: [id number]  Patient Class: IP- Inpatient  Admission Date: 5/17/2024  Attending Physician: Nilda Thompson MD      SUBJECTIVE:   Moses Peng is a 88 y.o. male with no [place] past psychiatric history & no past pertinent medical history presents to the ED with numbness in his RUE and RLE, and admitted to the hospital for Subdural hematoma     Psychiatry consulted for "suicidal ideation."    Today, patient is lying in bed without restraints, not in acute distress. He is oriented to person, place, and to season (2024). He is not aware of situation, and does not remember the suicidal ideation expressed when he initially presented to ED.    His main concern is pain in both of his hands up to wrists, burning and numbness, for a long time. He reports that he requires gloves to be able to hold the steering wheel to drive. During interview, he makes the comment that "you want to cut them off?" Otherwise, he does not have other physical complaints.     When assessing for gun safety, he confirms that he owns a gun with bullets, not locked in a safe. He is agreeable to get rid of the gun by going to a pawn shop or giving it to a family member, his daughter. He continuously to express no SI, HI, AH, or VH. And he does not have any previous attempts.     He does report smoking cigars, [place]. He also reports drinking [place], 1 bottle per week. He does not endorse other drug uses.    I have tried to contact his daughter Shaan Rees twice without success. Will attempt at another time.       OBJECTIVE:    Mental Status Exam:  General Appearance: appears stated age, normal weight, dressed in hospital garb, lying in bed, in no acute distress, disheveled  Behavior: normal; cooperative; reasonably friendly, pleasant, and polite; appropriate eye-contact; under good behavioral control  Involuntary Movements and Motor Activity: no abnormal " "involuntary movements noted; no tics, no tremors, no akathisia, no dystonia, no evidence of tardive dyskinesia; no psychomotor agitation or retardation  Gait and Station: unable to assess - patient lying down or seated  Speech and Language: spontaneous, talkative, interruptible, slurred  Mood: "fine"  Affect: normal, euthymic, reactive, full-range, mood-congruent, appropriate to situation and context  Thought Process and Associations: linear, goal-directed, organized, logical  Thought Content and Perceptions:: no suicidal or homicidal ideation, no auditory or visual hallucinations, no paranoid ideation, no ideas of reference, no evidence of delusions or psychosis  Sensorium and Orientation: alert, oriented to person and place, oriented partially to time, oriented to year, able to state season, not oriented to situation.  Recent and Remote Memory: grossly intact  Attention and Concentration: grossly intact, attentive to the conversation and not readily distractible  Fund of Knowledge: grossly intact, used appropriate vocabulary and demonstrated an awareness of current events, consistent with educational level achieved  Insight: limited  Judgment: limited    CAM ICU positive? No, answered questions correctly; did not assess SAVEAHAART due to physical pain in hands.      ASSESSMENT & RECOMMENDATIONS   Encephalopathy 2/2 subdural hematoma,       Delirium Behavior Management  Minimize use of physical restraints if able   Keep window shades open and room lit during day and room dim at night in order to promote normal sleep-wake cycles  Encourage family at bedside. Coyote patient often to situation, location, date.  Continue to Limit or Discontinue use of Narcotics, Benzos and Anticholinergic medications as they may worsen delirium.  Continue medical workup for causative etiology of Delirium.         RISK ASSESSMENT  Currently under CEC; will continue out of caution and re-evaluate     FOLLOW UP  Will continue to follow   "   DISPOSITION - once medically cleared:   Defer to medical team        Please contact ON CALL psychiatry service (24/7) for any acute issues that may arise.    Nicolas Conte, MS-3  [employer]    CL Psychiatry  [facility]  5/21/2024 12:18 PM        --------------------------------------------------------------------------------------------------------------------------------------------------------------------------------------------------------------------------------------    CONTINUED OBJECTIVE clinical data & findings reviewed and noted for above decision making    Current Medications:   Scheduled Meds:    bacitracin zinc   Topical (Top) BID    gabapentin  300 mg Oral TID    heparin (porcine)  5,000 Units Subcutaneous Q8H    PHENobarbitaL  30 mg Oral BID    Followed by    [START ON 5/23/2024] PHENobarbitaL  15 mg Oral BID    Followed by    [START ON 5/25/2024] PHENobarbitaL  10 mg Oral BID    Followed by    [START ON 5/26/2024] PHENobarbitaL  5 mg Oral BID    polyethylene glycol  17 g Oral Daily    QUEtiapine  25 mg Oral QHS    senna-docusate 8.6-50 mg  1 tablet Oral Daily     PRN Meds:   Current Facility-Administered Medications:     acetaminophen, 1,000 mg, Oral, Q8H PRN    hydrALAZINE, 10 mg, Intravenous, Q4H PRN    magnesium oxide, 800 mg, Oral, PRN    magnesium oxide, 800 mg, Oral, PRN    OLANZapine, 5 mg, Intramuscular, Q8H PRN    ondansetron, 8 mg, Intravenous, Q6H PRN    oxyCODONE, 5 mg, Oral, Q4H PRN    potassium bicarbonate, 35 mEq, Oral, PRN    potassium bicarbonate, 50 mEq, Oral, PRN    potassium bicarbonate, 60 mEq, Oral, PRN    potassium, sodium phosphates, 2 packet, Oral, PRN    potassium, sodium phosphates, 2 packet, Oral, PRN    potassium, sodium phosphates, 2 packet, Oral, PRN    Allergies:   Review of patient's allergies indicates:  No Known Allergies    Vitals  Vitals:    05/21/24 1045   BP:    Pulse: 60   Resp: (!) 28   Temp:         Labs/Imaging/Studies:  Recent Results (from the past 24 hour(s))   CBC auto differential    Collection Time: 05/21/24  1:43 AM   Result Value Ref Range    WBC 4.60 3.90 - 12.70 K/uL    RBC 3.47 (L) 4.60 - 6.20 M/uL    Hemoglobin 11.3 (L) 14.0 - 18.0 g/dL    Hematocrit 34.0 (L) 40.0 - 54.0 %    MCV 98 82 - 98 fL    MCH 32.6 (H) 27.0 - 31.0 pg    MCHC 33.2 32.0 - 36.0 g/dL    RDW 13.6 11.5 - 14.5 %    Platelets 179 150 - 450 K/uL    MPV 10.3 9.2 - 12.9 fL    Immature Granulocytes 0.2 0.0 - 0.5 %    Gran # (ANC) 2.7 1.8 - 7.7 K/uL    Immature Grans (Abs) 0.01 0.00 - 0.04 K/uL    Lymph # 1.5 1.0 - 4.8 K/uL    Mono # 0.3 0.3 - 1.0 K/uL    Eos # 0.1 0.0 - 0.5 K/uL    Baso # 0.02 0.00 - 0.20 K/uL    nRBC 0 0 /100 WBC    Gran % 59.6 38.0 - 73.0 %    Lymph % 32.0 18.0 - 48.0 %    Mono % 6.1 4.0 - 15.0 %    Eosinophil % 1.7 0.0 - 8.0 %    Basophil % 0.4 0.0 - 1.9 %    Differential Method Automated    Comprehensive metabolic panel    Collection Time: 05/21/24  1:43 AM   Result Value Ref Range    Sodium 140 136 - 145 mmol/L    Potassium 4.1 3.5 - 5.1 mmol/L    Chloride 112 (H) 95 - 110 mmol/L    CO2 21 (L) 23 - 29 mmol/L    Glucose 79 70 - 110 mg/dL    BUN 18 8 - 23 mg/dL    Creatinine 1.0 0.5 - 1.4 mg/dL    Calcium 8.3 (L) 8.7 - 10.5 mg/dL    Total Protein 5.1 (L) 6.0 - 8.4 g/dL    Albumin 2.3 (L) 3.5 - 5.2 g/dL    Total Bilirubin 0.3 0.1 - 1.0 mg/dL    Alkaline Phosphatase 78 55 - 135 U/L    AST 24 10 - 40 U/L    ALT 5 (L) 10 - 44 U/L    eGFR >60.0 >60 mL/min/1.73 m^2    Anion Gap 7 (L) 8 - 16 mmol/L   Magnesium    Collection Time: 05/21/24  1:43 AM   Result Value Ref Range    Magnesium 1.8 1.6 - 2.6 mg/dL   Phosphorus    Collection Time: 05/21/24  1:43 AM   Result Value Ref Range    Phosphorus 2.9 2.7 - 4.5 mg/dL     Imaging Results              X-Ray Chest 1 View (Final result)  Result time 05/17/24 22:02:05      Final result by Stefano Blair MD (05/17/24 22:02:05)                   Impression:      No acute  intrathoracic process.  No evidence of a pneumonia.      Electronically signed by: Stefano Blair MD  Date:    05/17/2024  Time:    22:02               Narrative:    EXAMINATION:  XR CHEST 1 VIEW    CLINICAL HISTORY:  eval PNA;    TECHNIQUE:  Single frontal view of the chest was performed.    COMPARISON:  12/28/2023    FINDINGS:  Monitoring EKG leads are present.  The trachea is unremarkable.  The cardiomediastinal silhouette is stable.  There is no evidence of free air beneath the hemidiaphragms.  There are no pleural effusions.  There is no evidence of a pneumothorax.  There is no evidence of pneumomediastinum.  No airspace opacity is present.  There are degenerative changes in the osseous structures.                                       CT Head Without Contrast (Final result)  Result time 05/17/24 20:30:06      Final result by Stefano Blair MD (05/17/24 20:30:06)                   Impression:      Interval postsurgical changes of right craniotomy for subdural hematoma evacuation with drainage catheter placement.  Decreased volume of the extra-axial collection with improved mass effect and midline shift.  Expected pneumocephalus overlies the right frontal cerebral convexity.    Stable subdural hematoma overlying the left cerebral convexity predominantly hyperattenuating.  No significant mass effect or midline shift.  Continued attention on follow-up recommended.    Chronic microvascular ischemic changes and generalized cerebral volume loss.    Additional findings as above.    Electronically signed by resident: Evon Talbert  Date:    05/17/2024  Time:    19:53    Electronically signed by: Stefano Blair MD  Date:    05/17/2024  Time:    20:30               Narrative:    EXAMINATION:  CT HEAD WITHOUT CONTRAST    CLINICAL HISTORY:  post-op SDH jacob holes;    TECHNIQUE:  Low dose axial CT images obtained throughout the head without the use of intravenous contrast.  Axial, sagittal and coronal reconstructions were  performed.    COMPARISON:  MRI 05/17/2024; CT 12/28/2023    FINDINGS:  Postsurgical changes of right craniotomy for subdural hematoma evacuation with decreased volume of predominantly low-density extra-axial collection.  Draining catheter terminates within the collection.  Postsurgical pneumocephalus overlies the right frontal convexity.  The collection measures up to 1.5 cm in maximum thickness, previously 2.1 cm.  Stable mass effect upon the underlying parenchyma and right lateral ventricle with improved leftward midline shift, measuring 4 mm at the septum pellucidum, pre measurement of 7 mm.    Additional hyperattenuating extra-axial collection overlies the left frontal and parietal cerebral convexity measuring 5 mm in maximal thickness, similar when compared to same day MRI.  No significant mass effect upon the underlying parenchyma.    Generalized cerebral volume loss.  Minimal re-expansion of the right lateral ventricle.  No evidence of hydrocephalus.    Mild patchy hypoattenuation in the supratentorial white matter, nonspecific but most likely reflecting chronic small vessel ischemic changes.  No abnormal parenchymal abnormality to suggest acute intraparenchymal hemorrhage or major vascular distribution infarct.    No displaced calvarial fracture.  Mild soft tissue stranding and air overlies the operative site.  Postsurgical changes of bilateral globes.    Left maxillary mucous retention cyst.  Peripheral mucosal thickening of the right maxillary sinus.  Remaining paranasal sinuses and mastoid air cells are clear.                                        MRI Brain Without Contrast (Final result)  Result time 05/17/24 13:45:52      Final result by Cheng Fajardo MD (05/17/24 13:45:52)                   Impression:      Bilateral subdural hematomas, larger on the right, as further discussed above.  There is significant intracranial mass effect with leftward midline shift.    Underlying chronic small vessel  ischemic change and cerebral volume loss.    Neuro surgical consultation is advised.    This report was flagged in Epic as abnormal.      Electronically signed by: Cheng Fajardo MD  Date:    05/17/2024  Time:    13:45               Narrative:    EXAMINATION:  MRI BRAIN WITHOUT CONTRAST    CLINICAL HISTORY:  Transient ischemic attack (TIA);    TECHNIQUE:  Multiplanar multisequence MR imaging of the brain was performed without intravenous contrast.    Examination mildly degraded by patient motion artifact.    COMPARISON:  CT head 12/28/2023    FINDINGS:  Intracranial Compartment:    There is a large subdural hematoma over the right cerebral hemisphere.  Collection measures up to 2.1 cm in thickness.  Collection is predominantly hyperintense on T1 and T2-weighted images, with some interspersed areas of more heterogeneous signal anteriorly and posteriorly.  Minimal septation evident.  Additional subdural hemorrhage over the left cerebral convexity with similar imaging characteristics.  This collection measuring just 0.6 cm in maximal thickness.  No extra-axial blood or fluid collections elsewhere.  There is significant intracranial mass effect.  Diffuse right-sided sulcal and ventricular crowding, with approximately 0.7 cm of leftward midline shift measured at the septum pellucidum.    Mild chronic small vessel ischemic change throughout the supratentorial white matter.  No evidence of recent or remote major vascular distribution infarct.  No evidence of recent or remote parenchymal hemorrhage.    Moderate cerebral volume loss.  No ventricular entrapment or obstructive hydrocephalus.    Normal vascular flow voids are preserved.    Skull/Extracranial Contents (limited evaluation):    Bone marrow signal intensity is unremarkable.    Postsurgical change both globes.    Findings were relayed to the ordering provider (Drake) via the epic secure chat system at approximately 13:40.

## 2024-05-21 NOTE — ASSESSMENT & PLAN NOTE
Multifactorial. Suspect 2/2 b/l SDH +/- component of ICU delirium     - Some family report of EtOH use prior to admission, unclear quantity; no evidence of EtOH w/d to date, monitor

## 2024-05-21 NOTE — SUBJECTIVE & OBJECTIVE
Interval History: NAEON. Subdural drain removed. Pending post pull CT this AM.     Medications:  Continuous Infusions:   dexmedeTOMIDine (Precedex) infusion (titrating)  0-0.6 mcg/kg/hr Intravenous Continuous 3.06 mL/hr at 05/21/24 0701 0.2 mcg/kg/hr at 05/21/24 0701     Scheduled Meds:   acetaminophen  1,000 mg Oral Q6H    bacitracin zinc   Topical (Top) BID    brivaracetam  50 mg Oral BID    heparin (porcine)  5,000 Units Subcutaneous Q8H    PHENobarbitaL  30 mg Oral BID    Followed by    [START ON 5/23/2024] PHENobarbitaL  15 mg Oral BID    Followed by    [START ON 5/25/2024] PHENobarbitaL  10 mg Oral BID    Followed by    [START ON 5/26/2024] PHENobarbitaL  5 mg Oral BID    polyethylene glycol  17 g Oral Daily    QUEtiapine  25 mg Oral QHS    senna-docusate 8.6-50 mg  1 tablet Oral Daily     PRN Meds:  Current Facility-Administered Medications:     diphenhydrAMINE, 25 mg, Intravenous, Q6H PRN    hydrALAZINE, 10 mg, Intravenous, Q4H PRN    magnesium oxide, 800 mg, Oral, PRN    magnesium oxide, 800 mg, Oral, PRN    OLANZapine, 5 mg, Intramuscular, Q8H PRN    ondansetron, 8 mg, Intravenous, Q6H PRN    oxyCODONE, 5 mg, Oral, Q4H PRN    potassium bicarbonate, 35 mEq, Oral, PRN    potassium bicarbonate, 50 mEq, Oral, PRN    potassium bicarbonate, 60 mEq, Oral, PRN    potassium, sodium phosphates, 2 packet, Oral, PRN    potassium, sodium phosphates, 2 packet, Oral, PRN    potassium, sodium phosphates, 2 packet, Oral, PRN     Review of Systems  Objective:     Weight: 61.2 kg (135 lb)  Body mass index is 21.14 kg/m².  Vital Signs (Most Recent):  Temp: 97.9 °F (36.6 °C) (05/21/24 0715)  Pulse: (!) 57 (05/21/24 0727)  Resp: (!) 32 (05/21/24 0715)  BP: (!) 121/58 (05/21/24 0701)  SpO2: 97 % (05/21/24 0715) Vital Signs (24h Range):  Temp:  [97.2 °F (36.2 °C)-100.4 °F (38 °C)] 97.9 °F (36.6 °C)  Pulse:  [55-75] 57  Resp:  [9-35] 32  SpO2:  [96 %-100 %] 97 %  BP: ()/(52-66) 121/58     Date 05/21/24 0700 - 05/22/24 0688  "  Shift [id number] 9447-7782 9770-0659 24 Hour Total   INTAKE   I.V.(mL/kg) 3.1(0.1)   3.1(0.1)   Shift Total(mL/kg) 3.1(0.1)   3.1(0.1)   OUTPUT   Shift Total(mL/kg)       Weight (kg) 61.2 61.2 61.2 61.2                       Male External Urinary Catheter 05/19/24 1502 (Active)   Collection Container Urimeter 05/21/24 0701   Securement Method secured to top of thigh w/ adhesive device 05/21/24 0701   Skin no redness;no breakdown 05/21/24 0701   Tolerance no signs/symptoms of discomfort 05/21/24 0701   Output (mL) 225 mL 05/21/24 0301   Catheter Change Date 05/20/24 05/21/24 0301   Catheter Change Time 1215 05/21/24 0301          Physical Exam         Neurosurgery Physical Exam  General: well developed, well nourished, sedated  Head: normocephalic, atraumatic  Neurologic: Alert and oriented. Precedex paused  GCS: Motor: 6/Verbal: 5/Eyes: 4 GCS Total: 15  Mental Status: Awake, Alert, Oriented x 4  Language: No aphasia  Speech: No dysarthria  Cranial nerves: face symmetric, tongue midline, CN II-XII grossly intact.   Eyes: pupils equal, round, reactive to light with accomodation, EOMI.   Pulmonary: normal respirations, no signs of respiratory distress  Sensory: intact to light touch throughout  Motor Strength: Moves all extremities spontaneously with good tone.  Full strength upper and lower extremities. No abnormal movements seen.      Skin: Incision c/d/i    Significant Labs:  Recent Labs   Lab 05/20/24  0152 05/21/24  0143    79   * 140   K 3.9 4.1    112*   CO2 16* 21*   BUN 14 18   CREATININE 1.0 1.0   CALCIUM 8.4* 8.3*   MG 1.9 1.8     Recent Labs   Lab 05/20/24  0152 05/21/24  0143   WBC 4.19 4.60   HGB 11.7* 11.3*   HCT 34.9* 34.0*    179     No results for input(s): "LABPT", "INR", "APTT" in the last 48 hours.  Microbiology Results (last 7 days)       ** No results found for the last 168 hours. **          All pertinent labs from the last 24 hours have been reviewed.    Significant " Diagnostics:  I have reviewed and interpreted all pertinent imaging results/findings within the past 24 hours.

## 2024-05-21 NOTE — PT/OT/SLP EVAL
Physical Therapy Co-Evaluation and Co-Treatment with OT    Patient Name:  Moses Peng   MRN:  [id number]    Recent Surgery: Procedure(s) (LRB):  CRANIOTOMY, FOR SUBDURAL HEMATOMA EVACUATION (Right) 4 Days Post-Op    *Co-treatment with OT due to patient complexity and need for two skilled therapists to ensure safe mobilization.   Recommendations:     Discharge Recommendations:   High intensity therapy  Discharge Equipment Recommendations: walker, rolling   Barriers to discharge: None    Highest Level of Mobility: Gait 5' FW and BW  Assistance Required: Mod(A) x2 persons with HHA x2    Assessment:     Moses Peng is a 88 y.o. male admitted with a medical diagnosis of Subdural hematoma. He presents with the following impairments/functional limitations:  weakness, impaired endurance, impaired sensation, impaired self care skills, decreased upper extremity function, impaired functional mobility, decreased lower extremity function, pain, impaired balance, decreased safety awareness, gait instability    Pt met with HOB elevated, sitter present and agreeable to PT session. Pt reports his PLOF is (I). Currently, he requires mod(A) x2 persons when ambulating 5' FW and BW. He required cueing to widen his SENA and take larger steps when ambulating as he initially presented with a shuffled gait. Pt tolerated therapy well on this date and was motivated to participate in OOB activity. Recommend high intensity therapy following discharge once medically stable in order to reduce fall risk, improve quality of life, and return to PLOF. Patient presents with good participation, and motivation to return to prior level of function and demonstrates appropriate endurance, strength, pain control, and fine motor control to participate in up to 3 hours daily or 15hrs weekly of multidisciplinary intensive therapy.      Pt would benefit from continued skilled acute PT 4/wk to address above listed functional deficits, provide patient/caregiver  "education, reduce fall risk, and maximize (I) and safety with functional mobility.    Rehab Prognosis: Good; patient would benefit from acute skilled PT services to address these deficits and reach maximum level of function.      Plan:     During this hospitalization, patient to be seen 4 x/week to address the identified rehab impairments via gait training, therapeutic activities, therapeutic exercises, neuromuscular re-education and progress toward the following goals:    Plan of Care Expires:  06/21/24    This plan of care has been discussed with the patient/caregiver, who was included in its development and is in agreement with the identified goals and treatment plan.     Subjective     Communicated with RN prior to session.  Patient agreeable to participate.     Chief Complaint: S/p craniotomy  Patient/Family Comments/goals: Non stated    Pain/Comfort:  Pain Rating 1: 0/10  Pain Rating Post-Intervention 1: 0/10    Patients cultural, spiritual, Scientologist conflicts given the current situation: no    Patient's living environment is as follows:  Living Environment(*attained from pt who is a questionable historian): Pt lives alone in a H with 2 MONIK and no handrail. Bathroom set-up: tub/shower combo  Prior Level of Function: independent with mobility and ADLs  DME used: none  DME owned (not currently used): none  Upon discharge, patient will have assistance from: No assistance available    Objective:     Patient found HOB elevated with pulse ox (continuous), blood pressure cuff, Condom Catheter, telemetry, SCD, peripheral IV  upon PT entry to room.    General Precautions: Standard, fall   Orthopedic Precautions:N/A   Braces: N/A   BP (!) 141/64   Pulse 60   Temp 97.2 °F (36.2 °C)   Resp (!) 28   Ht 5' 7" (1.702 m)   Wt 61.2 kg (135 lb)   SpO2 100%   BMI 21.14 kg/m²   Oxygen Device:  room air      Exams:    Cognition:  Patient is oriented to Person, Place, Time, Situation  Follows one-step commands  Insight " to deficits/safety awareness: Impaired    Gross Motor Coordination: No deficits noted during functional mobility tasks     Postural examination/scapula alignment: Slouched posture    Lower Extremity Range of Motion:  Right Lower Extremity: WFL  Left Lower Extremity: WFL    Lower Extremity Strength    Right LE  Left LE    Hip Flexion: 5/5 Hip Flexion: 5/5   Knee Extension: 5/5 Knee Extension: 5/5   Knee Flexion: 5/5 Knee Flexion: 5/5   Ankle Dorsiflexion:  5/5 Ankle Dorsiflexion: 4+/5   Ankle Plantarflexion: 5/5 Ankle Plantarflexion: 4+/5        Sensation:   Light touch sensation: Intact BLEs    Functional Mobility:    Bed Mobility:  Supine to Sit: Moderate Assistance on L side of bed  With increased time.  Sit to Supine: Stand-by Assistance  Scooting anteriorly to EOB to plant feet on floor: Moderate Assistance    Transfers:   Sit to Stand Transfer: Minimal Assistance x2 persons from EOB with HHA x2             Gait:  Patient received gait training 5 feet FW and BW x2 with Moderate Assistance x2 persons and  HHA x2  Gait Assessment: decreased step length, narrow base of support, and decreased foot clearance, shuffled gait  Gait Pattern Observed: Step-to  Comments: All lines remained intact throughout ambulation trial, gait belt utilized  Pt required cueing to take bigger steps and widen SENA when ambulating. He was able to correct and improve gait pattern when instructed to do so.    Balance:  Static Sit:   Mod Assist progressed to SBA at EOB  Dynamic sit:  Mod Assist   Increased posterior lean when performing seated activities.  Able to correct with cueing from therapist.  Static Stand:   Min Assist with Hand-held assist x 2  Dynamic Stand:  Mod Assist with Hand-held assist x 2        Therapeutic Activities/Exercises     Patient assisted with functional mobility as noted above  Discussed at length benefits of PT as well as d/c recommendations. Pt agreeable  Pt performed standing marching 1 set x5  bilaterally.  Patient educated on the importance of early mobility, OOB to prevent functional decline during hospital stay  Patient was instructed to utilize staff assistance for mobility/transfers.  Patient is appropriate to transfer with Mod(A) x2 with a stand pivot and RN/PCT assist  Patient educated on PT POC and role of PT in acute care  White board updated to include patient's safest level of mobility with staff assistance, RN also updated    AM-PAC 6 CLICK MOBILITY  Turning over in bed (including adjusting bedclothes, sheets and blankets)?: 3  Sitting down on and standing up from a chair with arms (e.g., wheelchair, bedside commode, etc.): 2  Moving from lying on back to sitting on the side of the bed?: 2  Moving to and from a bed to a chair (including a wheelchair)?: 2  Need to walk in hospital room?: 2  Climbing 3-5 steps with a railing?: 1  Basic Mobility Total Score: 12      Patient left HOB elevated with all lines intact, call button in reach, bed alarm on, and rn notified.      History/Goals:     PAST MEDICAL HISTORY:  History reviewed. No pertinent past medical history.    Past Surgical History:   Procedure Laterality Date    CRANIOTOMY FOR EVACUATION OF SUBDURAL HEMATOMA Right 2024    Procedure: CRANIOTOMY, FOR SUBDURAL HEMATOMA EVACUATION;  Surgeon: Herb Price MD;  Location: [facility] OR [address] [place] [address];  Service: Neurosurgery;  Laterality: Right;  right jacob hole craniotomy for SDH evacuation       GOALS:   Multidisciplinary Problems       Physical Therapy Goals          Problem: Physical Therapy    Goal Priority Disciplines Outcome Goal Variances Interventions   Physical Therapy Goal     PT, PT/OT Progressing     Description: Goals to be met by: 24     Patient will increase functional independence with mobility by performin. Supine to sit with Stand-by Assistance  2. Sit to supine with Modified [place]  3. Sit to stand transfer with Stand-by Assistance  4. Bed to chair transfer with  Contact Guard Assistance using LRAD  5. Gait  x 50 feet with Minimal Assistance using LRAD.   6. Ascend/descend 2 stairs with no handrails Minimal Assistance using LRAD.   7. Lower extremity exercise program x10 reps per handout, with independence                         Time Tracking:     PT Received On: 05/21/24  PT Start Time: 0823     PT Stop Time: 0847  PT Total Time (min): 24 min     Billable Minutes: Evaluation 10 and Therapeutic Activity 14      Easton Guevara, [facility]  05/21/2024  Pager# [phone]

## 2024-05-21 NOTE — ASSESSMENT & PLAN NOTE
89 yo male with large right SDH with 7 mm MLS, GCS 15 now s/p burrhole craniotomy for SDH evacuation.     --Patient admitted to [facility] on telemetry; sitter bedside; currently PEC'd      -q1h neurochecks in ICU,  --Post op CTH satisfactory; repeat CT 5/19 with improving pneumocephalus. AM CTH pending s/p SD drain pull  --SBP <140  --Na >135  --Keppra 500 BID x5d  --Liberalize HOB now that subdural drain has been removed  --Rec Neuro IR consultation for MMA embolization during this admission - planning for 5/22  --PT/OT/OOBTC  --PRN pain control with aggressive bowel regimen while on narcotics  --Continue to monitor clinically, notify NSGY immediately with any changes in neuro status    Dispo: ongoing, sitter bedside    Closed with Monocryl    D/w Dr. Price by NSGY team    Please contact the on call neurosurgery provider with questions or concerns. Can be reached via on-call schedule and/or .

## 2024-05-21 NOTE — ASSESSMENT & PLAN NOTE
89 yo male w/Bilateral SDH R>L large right SDH with 7 mm MLS s/p evac     - Admit to [facility]  - NSGY consulted, following  - Subdural drain in place -> d/c today   - Repeat head CT in AM  - q1h neuro checks, vitals, I/Os  - Echo, EKG, CXR   - No AC/AP, denies trauma  - Daily CBC, CMP, mag, phos  - SBP < 160   - PRN labetalol, hydralazine  - Keppra dc'd given agitation, start briviact for a total of 7 days for seizure prophylaxis   - Precedex gtt for agitation -> wean as tolerated  - Zyprexa 5 mg q8hrs PRN  - C/w seroquel 25mg qhs  - Seizure precautions  - Diet Regular  - PT/OT/SLP as appropriate  - VTE Prophylaxis: mechanical, start HSQ

## 2024-05-21 NOTE — PROGRESS NOTES
[facility] - Neuro Critical Care  Neurocritical Care  Progress Note    Admit Date: 5/17/2024  Service Date: 05/20/2024  Length of Stay: 3    Subjective:     Chief Complaint: Subdural hematoma    History of Present Illness: Mr Peng is an 87 y/o M unknown PMHx admitted to [facility] w/ bilateral SDH R>L and MLS. Per chart review, he presented to the ED by EMS stating he wants to kill himself. Came yesterday with c/o R hand paresthesias and was discharged. Currently PEC'ed per psych evaluation.  Denies falls. MRI with large right SDH with midline shift. Denies use of AC/APT. NSGY consulted for evaluation. NPO currently for OR  for SDH evac w/NSGY. Patient admitted to [facility] for close monitoring and higher level of care.       Hospital Course: 05/18/2024 Started miralax, electrolytes, and hydralazine prn, dced cardene, CT in am  05/19/2024 NAEON. CTH overnight with decreased right SDH and MLS with stable mixed amount of left SDH. Patient acutely agitated this AM, threatening to leave and sitting upright in bed. Security called to bedside. Agitation settled after receiving 10mg haldol IM, 2mg versed, and 25mg benadryl. Patient started on precedex gtt for sedation. Keppra changed to briviact in the hopes of reducing agitation. Seroquel 25mg added qhs. Drain in place, NSGY to pull drain tomorrow.  05/20/2024 Persistent agitation overnight, on precedex gtt -> less agitated this AM, appears tired, likely from multiple medications over last 24 hrs. Weaning precedex gtt, plan for zyprexa 5 mg q8hrs PRN per psych recs. Subdural drain d/c'd by NSGY, HOB liberalized.     Interval History:  Please see hospital course above for full details    Review of Systems   Unable to perform ROS: Mental status change     Objective:     Vitals:  Temp: 98.6 °F (37 °C)  Pulse: 65  Rhythm: sinus bradycardia  BP: (!) 101/56  MAP (mmHg): 71  Resp: 14  SpO2: 98 %    Temp  Min: 93.2 °F (34 °C)  Max: 100.4 °F (38 °C)  Pulse  Min: 48  Max: 70  BP  Min: 95/52   Max: 146/74  MAP (mmHg)  Min: 70  Max: 102  Resp  Min: 9  Max: 35  SpO2  Min: 96 %  Max: 100 %    05/19 0701 - 05/20 0700  In: 919.6 [I.V.:919.6]  Out: 520.5 [Urine:500; Drains:20.5]   Unmeasured Output  Urine Occurrence: 1        Physical Exam  General Appearance: Elderly gentleman resting in bed in 4 pt restraints, not in acute hemodynamic distress  Mental Status Exam: lethargic this AM, does awaken to noxious stimuli, with continued stimulation will state name, hospital and year, not oriented to month or situation. Not following commands  Cranial Nerves: Gaze midline, pupils 4->2 mm and briskly reactive b/l. EOM grossly intact. No clear facial asymmetry, moderate dysarthria on limited exam  Motor: Moving all 4 extremities spontaneously AG, L>R  Sensory: Grimaces and yells out to noxious x4 extremities  Coordination: Unable to assess  Vascular: S1/S2 of normal intensity, no S3/S4 appreciated, no murmurs appreciated  Lungs: CTA bilaterally without wheezing; diminished at bases b/l   Abdomen: Soft, non-distended, non-tender, BS +         Medications:  Continuoussodium chloride 0.9%, Last Rate: Stopped (05/20/24 1021)  dexmedeTOMIDine (Precedex) infusion (titrating), Last Rate: 0.1 mcg/kg/hr (05/20/24 2201)    Scheduledacetaminophen, 1,000 mg, Q6H  bacitracin zinc, , BID  brivaracetam, 50 mg, BID  heparin (porcine), 5,000 Units, Q8H  hydroCHLOROthiazide, 12.5 mg, Daily  polyethylene glycol, 17 g, Daily  QUEtiapine, 25 mg, QHS  senna-docusate 8.6-50 mg, 1 tablet, Daily    PRNdiphenhydrAMINE, 25 mg, Q6H PRN  hydrALAZINE, 10 mg, Q4H PRN  magnesium oxide, 800 mg, PRN  magnesium oxide, 800 mg, PRN  OLANZapine, 5 mg, Q8H PRN  ondansetron, 8 mg, Q6H PRN  oxyCODONE, 5 mg, Q4H PRN  potassium bicarbonate, 35 mEq, PRN  potassium bicarbonate, 50 mEq, PRN  potassium bicarbonate, 60 mEq, PRN  potassium, sodium phosphates, 2 packet, PRN  potassium, sodium phosphates, 2 packet, PRN  potassium, sodium phosphates, 2 packet,  "PRN      Today I personally reviewed pertinent imaging, cardiology results, laboratory results, notably: ECG w/ Qtc 449. CBC unremarkable, no leukocytosis. CMP w/ Na 134, slight downtrend in bicarb to 16 w/o AG, BUN and creatinine wnl.     Diet  Diet Adult Regular (IDDSI Level 7)  Diet Adult Regular (IDDSI Level 7)      Assessment/Plan:     Neuro  * Subdural hematoma  89 yo male w/Bilateral SDH R>L large right SDH with 7 mm MLS s/p evac     - Admit to [facility]  - NSGY consulted, following  - Subdural drain in place -> d/c today   - Repeat head CT in AM  - q1h neuro checks, vitals, I/Os  - Echo, EKG, CXR   - No AC/AP, denies trauma  - Daily CBC, CMP, mag, phos  - SBP < 160   - PRN labetalol, hydralazine  - Keppra dc'd given agitation, start briviact for a total of 7 days for seizure prophylaxis   - Precedex gtt for agitation -> wean as tolerated  - Zyprexa 5 mg q8hrs PRN  - C/w seroquel 25mg qhs  - Seizure precautions  - Diet Regular  - PT/OT/SLP as appropriate  - VTE Prophylaxis: mechanical, start HSQ    Acute encephalopathy  Multifactorial. Suspect 2/2 b/l SDH +/- component of ICU delirium     - Some family report of EtOH use prior to admission, unclear quantity; no evidence of EtOH w/d to date, monitor    Midline shift of brain due to hematoma  See Subdural hematoma    Brain compression  See Subdural hematoma    Encephalopathy-resolved as of 5/20/2024  See primary problem    Psychiatric  Suicidal ideation  Per chart review- patient reported "if I had a gun I would kill myself"-   psychiatry following   CEC'ed    Cardiac/Vascular  Essential hypertension  History of,  - EKG, Echo  - SBP < 160  - PRN labetalol, hydralazine  - Hold HCTZ given mild hyponatremia    Endocrine  Hyponatremia  Na 132 on 5/19    - Improved to 134 this AM  - D/c home HCTZ  - Monitor daily, encourage PO intake  - Goal eunatremia          The patient is being Prophylaxed for:  Venous Thromboembolism with: Mechanical or Chemical  Stress Ulcer with: " None  Ventilator Pneumonia with: not applicable    Activity Orders            Turn patient every 2 hours starting at 05/20 1400    Diet Adult Regular (IDDSI Level 7): Regular starting at 05/17 1825          Full Code    Uninterrupted Critical Care/Counseling Time (not including procedures): 45 minutes  There is high probability for acute neurological change leading to clinical and possibly life-threatening deterioration requiring highest level of physician preparedness for urgent intervention. Critical care time was spent personally by me on the following activities: development of treatment plan with patient or surrogate and bedside caregivers, discussions with consultants, evaluation of patient's response to treatment, examination of patient, ordering and performing treatments and interventions, ordering and review of laboratory studies, ordering and review of radiographic studies, pulse oximetry, antibiotic titration if applicable, vasopressor titration if applicable, re-evaluation of patient's condition. I spent greater than 50% of the documented critical care time assessing and making medical decisions for this patient.       Nilda Thompson MD  Neurocritical Care  [facility] - Neuro Critical Care

## 2024-05-21 NOTE — PLAN OF CARE
"[facility] Care Plan    POC reviewed with Moses Peng and family at 1400. Pt verbalized understanding. Questions and concerns addressed. No acute events today. Pt progressing toward goals. See below and flowsheets for full assessment and VS info.             Is this a stroke patient? no    Neuro:  [place] Coma Scale  Best Eye Response: 4-->(E4) spontaneous  Best Motor Response: 6-->(M6) obeys commands  Best Verbal Response: 5-->(V5) oriented  [place] Coma Scale Score: 15  Assessment Qualifiers: no eye obstruction present, patient not sedated/intubated  Pupil PERRLA: yes     24 hr Temp:  [97.2 °F (36.2 °C)-98.6 °F (37 °C)]     CV:   Rhythm: normal sinus rhythm, sinus bradycardia  BP goals:   SBP < 160  MAP > 65    Resp:           Plan: N/A    GI/:     Diet/Nutrition Received: regular  Last Bowel Movement: 05/21/24  Voiding Characteristics: external catheter    Intake/Output Summary (Last 24 hours) at 5/21/2024 1418  Last data filed at 5/21/2024 1200  Gross per 24 hour   Intake 1354.65 ml   Output 560 ml   Net 794.65 ml     Unmeasured Output  Urine Occurrence: 1  Stool Occurrence: 1  Pad Count: 1    Labs/Accuchecks:  Recent Labs   Lab 05/21/24  0143   WBC 4.60   RBC 3.47*   HGB 11.3*   HCT 34.0*         Recent Labs   Lab 05/21/24  0143      K 4.1   CO2 21*   *   BUN 18   CREATININE 1.0   ALKPHOS 78   ALT 5*   AST 24   BILITOT 0.3      Recent Labs   Lab 05/17/24  1417   INR 1.0   APTT 27.4    No results for input(s): "CPK", "CPKMB", "TROPONINI", "MB" in the last 168 hours.    Electrolytes: N/A - electrolytes WDL  Accuchecks: none    Gtts:      LDA/Wounds:      Nurses Note -- 4 Eyes      Is there altered skin present? no       Prevention Measures Documented    Second RN/Staff Member:  DIANNA Rivas  "

## 2024-05-21 NOTE — ASSESSMENT & PLAN NOTE
"Per chart review- patient reported "if I had a gun I would kill myself"-   psychiatry following   CEC'ed  "

## 2024-05-21 NOTE — PLAN OF CARE
OT jennifer completed, appropriate POC established and initiated.  Problem: Occupational Therapy  Goal: Occupational Therapy Goal  Description: Goals to be met by: 6/21/24     Patient will increase functional independence with ADLs by performing:    Feeding with [place].  UE Dressing with [place].  LE Dressing with [place].  Grooming while standing at sink with [place].  Toileting from toilet with [place] for hygiene and clothing management.   Supine to sit with [place].  Step transfer with [place]  Toilet transfer to toilet with [place].    Outcome: Progressing

## 2024-05-21 NOTE — PLAN OF CARE
"SW contacted Pt's daughter, Shaan Rees, [phone] to discuss Pt's discharge planning, and some questions proposed by hr medical team. Shaan lives "about 3 minutes" from the Pt's home, and she and her , Herb, "check on him often". Shaan plans to get a starr made to the Pt's home, to check on him after discharge.     HALEIGH asked Shaan if she was aware of any firearms present in the Pt's home, and she answered: "No, I've never known him to have any guns". Shaan does not believe there are any firearms present in the home, but she doesn't currently have access to the home. SW asked her to check and make sure when she is able.     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 He told us he will die if he has to stop doing it".    Pt has good family support, but daughter states he is "very stubborn and hostile" when he doesn't get his way.     Pt is currently CEC's for suicidal ideation. Daughter reports he has never been hospitalized for any mental health issue, and she isn't aware of his depression.     HALEIGH added another emergency contact, son in law, Herb Rees [phone].    HALEIGH will follow.    ELBERT Melendrez, LMSW [facility]  [id number]    "

## 2024-05-21 NOTE — SUBJECTIVE & OBJECTIVE
Interval History:  Please see hospital course above for full details    Review of Systems   Unable to perform ROS: Mental status change     Objective:     Vitals:  Temp: 98.6 °F (37 °C)  Pulse: 65  Rhythm: sinus bradycardia  BP: (!) 101/56  MAP (mmHg): 71  Resp: 14  SpO2: 98 %    Temp  Min: 93.2 °F (34 °C)  Max: 100.4 °F (38 °C)  Pulse  Min: 48  Max: 70  BP  Min: 95/52  Max: 146/74  MAP (mmHg)  Min: 70  Max: 102  Resp  Min: 9  Max: 35  SpO2  Min: 96 %  Max: 100 %    05/19 0701 - 05/20 0700  In: 919.6 [I.V.:919.6]  Out: 520.5 [Urine:500; Drains:20.5]   Unmeasured Output  Urine Occurrence: 1        Physical Exam  General Appearance: Elderly gentleman resting in bed in 4 pt restraints, not in acute hemodynamic distress  Mental Status Exam: lethargic this AM, does awaken to noxious stimuli, with continued stimulation will state name, hospital and year, not oriented to month or situation. Not following commands  Cranial Nerves: Gaze midline, pupils 4->2 mm and briskly reactive b/l. EOM grossly intact. No clear facial asymmetry, moderate dysarthria on limited exam  Motor: Moving all 4 extremities spontaneously AG, L>R  Sensory: Grimaces and yells out to noxious x4 extremities  Coordination: Unable to assess  Vascular: S1/S2 of normal intensity, no S3/S4 appreciated, no murmurs appreciated  Lungs: CTA bilaterally without wheezing; diminished at bases b/l   Abdomen: Soft, non-distended, non-tender, BS +         Medications:  Continuoussodium chloride 0.9%, Last Rate: Stopped (05/20/24 1021)  dexmedeTOMIDine (Precedex) infusion (titrating), Last Rate: 0.1 mcg/kg/hr (05/20/24 2201)    Scheduledacetaminophen, 1,000 mg, Q6H  bacitracin zinc, , BID  brivaracetam, 50 mg, BID  heparin (porcine), 5,000 Units, Q8H  hydroCHLOROthiazide, 12.5 mg, Daily  polyethylene glycol, 17 g, Daily  QUEtiapine, 25 mg, QHS  senna-docusate 8.6-50 mg, 1 tablet, Daily    PRNdiphenhydrAMINE, 25 mg, Q6H PRN  hydrALAZINE, 10 mg, Q4H PRN  magnesium oxide,  800 mg, PRN  magnesium oxide, 800 mg, PRN  OLANZapine, 5 mg, Q8H PRN  ondansetron, 8 mg, Q6H PRN  oxyCODONE, 5 mg, Q4H PRN  potassium bicarbonate, 35 mEq, PRN  potassium bicarbonate, 50 mEq, PRN  potassium bicarbonate, 60 mEq, PRN  potassium, sodium phosphates, 2 packet, PRN  potassium, sodium phosphates, 2 packet, PRN  potassium, sodium phosphates, 2 packet, PRN      Today I personally reviewed pertinent imaging, cardiology results, laboratory results, notably: ECG w/ Qtc 449. CBC unremarkable, no leukocytosis. CMP w/ Na 134, slight downtrend in bicarb to 16 w/o AG, BUN and creatinine wnl.     Diet  Diet Adult Regular (IDDSI Level 7)  Diet Adult Regular (IDDSI Level 7)

## 2024-05-21 NOTE — PLAN OF CARE
"[facility] Care Plan    POC reviewed with Moses Peng at 0300. Pt verbalizes understanding. Questions and concerns addressed. No acute events overnight. Pt progressing toward goals.  See below and flowsheets for full assessment and VS info.     -Hourly neuro checks  -CEC remains in place; safety respondent at the bedside   -Precedex for agitation  -CTH pending  -PRN oxy for pain  -CIWA Q4      Is this a stroke patient? no    Neuro:  Coleman Coma Scale  Best Eye Response: 4-->(E4) spontaneous  Best Motor Response: 6-->(M6) obeys commands  Best Verbal Response:  (KAILYN; patient refuses to answer orientation questions)  [place] Coma Scale Score: 15  Assessment Qualifiers: patient not sedated/intubated, no eye obstruction present  Pupil PERRLA: yes     24hr Temp:  [97.2 °F (36.2 °C)-100.4 °F (38 °C)]     CV:   Rhythm: sinus bradycardia  BP goals:   SBP < 160  MAP > 65    Resp:           Plan: N/A    GI/:     Diet/Nutrition Received: regular  Last Bowel Movement: 05/21/24  Voiding Characteristics: external catheter    Intake/Output Summary (Last 24 hours) at 5/21/2024 0600  Last data filed at 5/21/2024 0501  Gross per 24 hour   Intake 873.29 ml   Output 943 ml   Net -69.71 ml     Unmeasured Output  Urine Occurrence: 1    Labs/Accuchecks:  Recent Labs   Lab 05/21/24  0143   WBC 4.60   RBC 3.47*   HGB 11.3*   HCT 34.0*         Recent Labs   Lab 05/21/24  0143      K 4.1   CO2 21*   *   BUN 18   CREATININE 1.0   ALKPHOS 78   ALT 5*   AST 24   BILITOT 0.3      Recent Labs   Lab 05/17/24  1417   INR 1.0   APTT 27.4    No results for input(s): "CPK", "CPKMB", "TROPONINI", "MB" in the last 168 hours.    Electrolytes: Electrolytes replaced  Accuchecks: none    Gtts:   dexmedeTOMIDine (Precedex) infusion (titrating)  0-0.6 mcg/kg/hr Intravenous Continuous 3.06 mL/hr at 05/21/24 0501 0.2 mcg/kg/hr at 05/21/24 0501       LDA/Wounds:    Nurses Note -- 4 Eyes    Is there altered skin present? yes     Please check the " following boxes that apply:   [] LDA Added if Not in Epic (Describe Wound)   [] New Altered Skin Integrity was Present on Admit and Documented in LDA   [] Wound Image Taken    Wound Care Consulted? No    Second RN/Staff Member:  DIANNA Montaño    Restraints: D/C

## 2024-05-21 NOTE — PT/OT/SLP EVAL
Occupational Therapy   Co-Evaluation with PT  Co-evaluation/treatment performed due to patient's multiple deficits requiring two skilled therapists to appropriately and safely assess patient's strength, endurance, functional mobility, and ADL performance while facilitating functional tasks in addition to accommodating for patient's activity tolerance and medical acuity.    Name: Moses Peng  MRN: [id number]  Admitting Diagnosis: Subdural hematoma  Recent Surgery: Procedure(s) (LRB):  CRANIOTOMY, FOR SUBDURAL HEMATOMA EVACUATION (Right) 4 Days Post-Op    Recommendations:     Discharge Recommendations: High Intensity Therapy  Discharge Equipment Recommendations:  walker, rolling  Barriers to discharge:  Other (Comment) (increased skilled assist required)    Assessment:     Moses Peng is a 88 y.o. male with a medical diagnosis of Subdural hematoma.  He presents with the following performance deficits affecting function: weakness, gait instability, decreased upper extremity function, decreased lower extremity function, impaired balance, impaired endurance, impaired sensation, impaired self care skills, impaired functional mobility, decreased coordination, impaired cognition, abnormal tone, decreased safety awareness, decreased ROM, impaired coordination, impaired fine motor.  Pt found with HOB elevated, agreeable to OT. Pt is independent at baseline - currently requires high levels of assistance for mobility. Pt is far from baseline at this time. Patient presents with good participation and motivation to return to prior level of function with high intensity therapy.  The patient demonstrates appropriate endurance and strength to participate in up to 3 hours or 15hrs of combined therapy post acute.     Rehab Prognosis: Good; patient would benefit from acute skilled OT services to address these deficits and reach maximum level of function.       Plan:     Patient to be seen 4 x/week to address the above listed problems via  "self-care/home management, therapeutic activities, therapeutic exercises, neuromuscular re-education, cognitive retraining  Plan of Care Expires: 06/21/24  Plan of Care Reviewed with: patient    Subjective     Chief Complaint: none verbalized  Patient/Family Comments/goals: "It feels good to get up!"    Occupational Profile:  Living Environment:  (*obtained from pt who is a questionable historian): Pt lives alone in a H with 2 MONIK and no handrail. Bathroom set-up: tub/shower combo   Previous level of function: independent  Roles and Routines: drives, completes IADLs independently  Equipment Used at Home: none  Assistance upon Discharge: unknown    Pain/Comfort:  Pain Rating 1: 0/10  Pain Rating Post-Intervention 1: 0/10    Patients cultural, spiritual, Caodaism conflicts given the current situation: no    Objective:     Communicated with: RN prior to session.  Patient found HOB elevated with blood pressure cuff, Condom Catheter, peripheral IV, SCD, telemetry, pulse ox (continuous) upon OT entry to room.    General Precautions: Standard, fall  Orthopedic Precautions: N/A  Braces: N/A  Respiratory Status: Room air    Occupational Performance:    Bed Mobility:    Patient completed Scooting/Bridging with stand by assistance  Patient completed Supine to Sit with moderate assistance  Patient completed Sit to Supine with stand by assistance    Functional Mobility/Transfers:  Patient completed Sit <> Stand Transfer with minimum assistance and of 2 persons  with  hand-held assist   Functional Mobility: Pt engaging in functional mobility to simulate household/community distances approx 5' with Mod A of 2 and utilizing HHA of 2 in order to maximize functional activity tolerance and standing balance required for engagement in occupations of choice.      Activities of Daily Living:  Upper Body Dressing: moderate assistance to don gown as robe  Lower Body Dressing: maximal assistance to don socks  Toileting: dependence cath " in place    Cognitive/Visual Perceptual:  Cognitive/Psychosocial Skills:     -       Oriented to: Person, Place, Time, and Situation   -       Follows Commands/attention:Follows one-step commands  -       Communication: clear/fluent  -       Safety awareness/insight to disability: impaired     Physical Exam:  Sensation:    -       Intact  Dominant hand:    -       Right  Upper Extremity Range of Motion:     -       Right Upper Extremity: WFL  -       Left Upper Extremity: WFL  Upper Extremity Strength:    -       Right Upper Extremity: WFL  -       Left Upper Extremity: WFL   Strength:    -       Right Upper Extremity: WFL  -       Left Upper Extremity: WFL  Fine Motor Coordination:    -       Intact    AMPAC 6 Click ADL:  AMPAC Total Score: 15    Treatment & Education:  Pt educated on the following:  - role of OT and OT POC  - use of call light to request for assistance with all functional mobility to ensure safety during hospital stay  - importance of continued mobilization  - Safe transfer techniques and proper body mechanics for fall prevention and improved independence with functional transfers   - Importance of OOB activities to increase endurance and tolerance for increased participation in daily ADLs.    - Various therex pt can perform outside of therapy session to increase functional endurance and strength for overall improved independence in occupations of choice.   - benefits of continued participation in therapy.   - Pt educated on importance of calling for staff assist for functional mobility/transfers.  - All pt questions within OT scope of practice addressed, pt verbalized understanding.      Patient left HOB elevated with all lines intact, call button in reach, bed alarm on, RN notified, and sitter present    GOALS:   Multidisciplinary Problems       Occupational Therapy Goals          Problem: Occupational Therapy    Goal Priority Disciplines Outcome Interventions   Occupational Therapy Goal      OT, PT/OT Progressing    Description: Goals to be met by: 6/21/24     Patient will increase functional independence with ADLs by performing:    Feeding with [place].  UE Dressing with [place].  LE Dressing with [place].  Grooming while standing at sink with [place].  Toileting from toilet with [place] for hygiene and clothing management.   Supine to sit with [place].  Step transfer with [place]  Toilet transfer to toilet with [place].                         History:     History reviewed. No pertinent past medical history.      Past Surgical History:   Procedure Laterality Date    CRANIOTOMY FOR EVACUATION OF SUBDURAL HEMATOMA Right 5/17/2024    Procedure: CRANIOTOMY, FOR SUBDURAL HEMATOMA EVACUATION;  Surgeon: Herb Price MD;  Location: [facility] OR [address] [place] [address];  Service: Neurosurgery;  Laterality: Right;  right jacob hole craniotomy for SDH evacuation       Time Tracking:     OT Date of Treatment: 05/21/24  OT Start Time: 0823  OT Stop Time: 0847  OT Total Time (min): 24 min    Billable Minutes:Evaluation 10  Neuromuscular Re-education 14    5/21/2024

## 2024-05-21 NOTE — PLAN OF CARE
Problem: Physical Therapy  Goal: Physical Therapy Goal  Description: Goals to be met by: 24     Patient will increase functional independence with mobility by performin. Supine to sit with Stand-by Assistance  2. Sit to supine with Modified [place]  3. Sit to stand transfer with Stand-by Assistance  4. Bed to chair transfer with Contact Guard Assistance using LRAD  5. Gait  x 50 feet with Minimal Assistance using LRAD.   6. Ascend/descend 2 stairs with no handrails Minimal Assistance using LRAD.   7. Lower extremity exercise program x10 reps per handout, with independence    Outcome: Progressing

## 2024-05-21 NOTE — PROGRESS NOTES
"CONSULTATION LIAISON PSYCHIATRY PROGRESS NOTE    Patient Name: Moses Peng  MRN: [id number]  Patient Class: IP- Inpatient  Admission Date: 5/17/2024  Attending Physician: Nilda Thompson MD      SUBJECTIVE:   Moses Peng is a 88 y.o. male with no [place] past psychiatric history & no past pertinent medical history presents to the ED with numbness in his RUE and RLE, and admitted to the hospital for Subdural hematoma     Psychiatry consulted for "suicidal ideation."    Today, patient is lying in bed without restraints, not in acute distress. He is oriented to person, place, and year (2024), but not the month or date. He is not aware of situation, and does not remember the suicidal ideation expressed when he initially presented to ED.    His main concern is pain in both of his hands up to wrists, burning and numbness, for a long time. He reports that he requires gloves to be able to hold the steering wheel to drive. During interview, he makes the comment that "you want to cut them off?" Otherwise, he does not have other physical complaints.     When assessing for gun safety, he confirms that he owns a gun with bullets, not locked in a safe. He is agreeable to get rid of the gun by going to a pawn shop or giving it to a family member, his daughter. He continues to deny SI, HI, AH, or VH. And he does not have any previous attempts.     He does report smoking cigars, Citizen of [place]. He also reports drinking [place], 1 bottle per week. He does not endorse other drug uses.    I have tried to contact his daughter Shaan Rees twice without success. Will attempt at another time.       OBJECTIVE:    Mental Status Exam:  General Appearance: appears stated age, normal weight, dressed in hospital garb, lying in bed, in no acute distress, disheveled  Behavior: normal; cooperative; reasonably friendly, pleasant, and polite; appropriate eye-contact; under good behavioral control  Involuntary Movements and Motor Activity: no " "abnormal involuntary movements noted; no tics, no tremors, no akathisia, no dystonia, no evidence of tardive dyskinesia; no psychomotor agitation or retardation  Gait and Station: unable to assess - patient lying down or seated  Speech and Language: spontaneous, talkative, interruptible, slurred  Mood: "fine"  Affect: normal, euthymic, reactive, full-range, mood-congruent, appropriate to situation and context  Thought Process and Associations: linear, goal-directed, organized, logical  Thought Content and Perceptions:: no suicidal or homicidal ideation, no auditory or visual hallucinations, no paranoid ideation, no ideas of reference, no evidence of delusions or psychosis  Sensorium and Orientation: alert, oriented to person and place, oriented partially to time, oriented to year, able to state season, not oriented to situation.  Recent and Remote Memory: grossly intact  Attention and Concentration: grossly intact, attentive to the conversation and not readily distractible  Fund of Knowledge: grossly intact, used appropriate vocabulary and demonstrated an awareness of current events, consistent with educational level achieved  Insight: limited  Judgment: limited    CAM ICU positive? No, answered questions correctly; did not assess SAVEAHAART due to physical pain in hands.      ASSESSMENT & RECOMMENDATIONS   Encephalopathy 2/2 subdural hematoma,       Delirium Behavior Management  Minimize use of physical restraints if able   Keep window shades open and room lit during day and room dim at night in order to promote normal sleep-wake cycles  Encourage family at bedside. [place] patient often to situation, location, date.  Continue to Limit or Discontinue use of Narcotics, Benzos and Anticholinergic medications as they may worsen delirium.  Continue medical workup for causative etiology of Delirium.         RISK ASSESSMENT  Currently under CEC; will continue out of caution and re-evaluate     FOLLOW UP  Will continue to " follow     DISPOSITION - once medically cleared:   Defer to medical team        Please contact ON CALL psychiatry service (24/7) for any acute issues that may arise.    Nicolas Conte, MS-3  [employer]    Parts of this note were prepared by medical student. I have individually assessed the patient and collaborated with above student on documentation.    Joe Breen MD  [facility], PGY-IV   CL Psychiatry  [facility]  5/21/2024 12:18 PM        --------------------------------------------------------------------------------------------------------------------------------------------------------------------------------------------------------------------------------------    CONTINUED OBJECTIVE clinical data & findings reviewed and noted for above decision making    Current Medications:   Scheduled Meds:    bacitracin zinc   Topical (Top) BID    folic acid  1 mg Oral Daily    gabapentin  300 mg Oral TID    heparin (porcine)  5,000 Units Subcutaneous Q8H    multivitamin  1 tablet Oral Daily    PHENobarbitaL  30 mg Oral BID    Followed by    [START ON 5/23/2024] PHENobarbitaL  15 mg Oral BID    Followed by    [START ON 5/25/2024] PHENobarbitaL  10 mg Oral BID    Followed by    [START ON 5/26/2024] PHENobarbitaL  5 mg Oral BID    polyethylene glycol  17 g Oral Daily    QUEtiapine  25 mg Oral QHS    senna-docusate 8.6-50 mg  1 tablet Oral Daily    thiamine  100 mg Oral Daily     PRN Meds:   Current Facility-Administered Medications:     acetaminophen, 1,000 mg, Oral, Q8H PRN    hydrALAZINE, 10 mg, Intravenous, Q4H PRN    magnesium oxide, 800 mg, Oral, PRN    magnesium oxide, 800 mg, Oral, PRN    OLANZapine, 5 mg, Intramuscular, Q8H PRN    ondansetron, 8 mg, Intravenous, Q6H PRN    oxyCODONE, 5 mg, Oral, Q4H PRN    potassium bicarbonate, 35 mEq, Oral, PRN    potassium bicarbonate, 50 mEq, Oral, PRN    potassium bicarbonate, 60 mEq, Oral, PRN    potassium, sodium phosphates,  2 packet, Oral, PRN    potassium, sodium phosphates, 2 packet, Oral, PRN    potassium, sodium phosphates, 2 packet, Oral, PRN    Allergies:   Review of patient's allergies indicates:  No Known Allergies    Vitals  Vitals:    05/21/24 1530   BP: (!) 190/80   Pulse: 71   Resp: (!) 23   Temp:        Labs/Imaging/Studies:  Recent Results (from the past 24 hour(s))   CBC auto differential    Collection Time: 05/21/24  1:43 AM   Result Value Ref Range    WBC 4.60 3.90 - 12.70 K/uL    RBC 3.47 (L) 4.60 - 6.20 M/uL    Hemoglobin 11.3 (L) 14.0 - 18.0 g/dL    Hematocrit 34.0 (L) 40.0 - 54.0 %    MCV 98 82 - 98 fL    MCH 32.6 (H) 27.0 - 31.0 pg    MCHC 33.2 32.0 - 36.0 g/dL    RDW 13.6 11.5 - 14.5 %    Platelets 179 150 - 450 K/uL    MPV 10.3 9.2 - 12.9 fL    Immature Granulocytes 0.2 0.0 - 0.5 %    Gran # (ANC) 2.7 1.8 - 7.7 K/uL    Immature Grans (Abs) 0.01 0.00 - 0.04 K/uL    Lymph # 1.5 1.0 - 4.8 K/uL    Mono # 0.3 0.3 - 1.0 K/uL    Eos # 0.1 0.0 - 0.5 K/uL    Baso # 0.02 0.00 - 0.20 K/uL    nRBC 0 0 /100 WBC    Gran % 59.6 38.0 - 73.0 %    Lymph % 32.0 18.0 - 48.0 %    Mono % 6.1 4.0 - 15.0 %    Eosinophil % 1.7 0.0 - 8.0 %    Basophil % 0.4 0.0 - 1.9 %    Differential Method Automated    Comprehensive metabolic panel    Collection Time: 05/21/24  1:43 AM   Result Value Ref Range    Sodium 140 136 - 145 mmol/L    Potassium 4.1 3.5 - 5.1 mmol/L    Chloride 112 (H) 95 - 110 mmol/L    CO2 21 (L) 23 - 29 mmol/L    Glucose 79 70 - 110 mg/dL    BUN 18 8 - 23 mg/dL    Creatinine 1.0 0.5 - 1.4 mg/dL    Calcium 8.3 (L) 8.7 - 10.5 mg/dL    Total Protein 5.1 (L) 6.0 - 8.4 g/dL    Albumin 2.3 (L) 3.5 - 5.2 g/dL    Total Bilirubin 0.3 0.1 - 1.0 mg/dL    Alkaline Phosphatase 78 55 - 135 U/L    AST 24 10 - 40 U/L    ALT 5 (L) 10 - 44 U/L    eGFR >60.0 >60 mL/min/1.73 m^2    Anion Gap 7 (L) 8 - 16 mmol/L   Magnesium    Collection Time: 05/21/24  1:43 AM   Result Value Ref Range    Magnesium 1.8 1.6 - 2.6 mg/dL   Phosphorus    Collection  Time: 05/21/24  1:43 AM   Result Value Ref Range    Phosphorus 2.9 2.7 - 4.5 mg/dL     Imaging Results              X-Ray Chest 1 View (Final result)  Result time 05/17/24 22:02:05      Final result by Stefano Blair MD (05/17/24 22:02:05)                   Impression:      No acute intrathoracic process.  No evidence of a pneumonia.      Electronically signed by: Stefano Blair MD  Date:    05/17/2024  Time:    22:02               Narrative:    EXAMINATION:  XR CHEST 1 VIEW    CLINICAL HISTORY:  eval PNA;    TECHNIQUE:  Single frontal view of the chest was performed.    COMPARISON:  12/28/2023    FINDINGS:  Monitoring EKG leads are present.  The trachea is unremarkable.  The cardiomediastinal silhouette is stable.  There is no evidence of free air beneath the hemidiaphragms.  There are no pleural effusions.  There is no evidence of a pneumothorax.  There is no evidence of pneumomediastinum.  No airspace opacity is present.  There are degenerative changes in the osseous structures.                                       CT Head Without Contrast (Final result)  Result time 05/17/24 20:30:06      Final result by Stefano Blair MD (05/17/24 20:30:06)                   Impression:      Interval postsurgical changes of right craniotomy for subdural hematoma evacuation with drainage catheter placement.  Decreased volume of the extra-axial collection with improved mass effect and midline shift.  Expected pneumocephalus overlies the right frontal cerebral convexity.    Stable subdural hematoma overlying the left cerebral convexity predominantly hyperattenuating.  No significant mass effect or midline shift.  Continued attention on follow-up recommended.    Chronic microvascular ischemic changes and generalized cerebral volume loss.    Additional findings as above.    Electronically signed by resident: Evon Talbert  Date:    05/17/2024  Time:    19:53    Electronically signed by: Stefano Blair  MD  Date:    05/17/2024  Time:    20:30               Narrative:    EXAMINATION:  CT HEAD WITHOUT CONTRAST    CLINICAL HISTORY:  post-op SDH jacob holes;    TECHNIQUE:  Low dose axial CT images obtained throughout the head without the use of intravenous contrast.  Axial, sagittal and coronal reconstructions were performed.    COMPARISON:  MRI 05/17/2024; CT 12/28/2023    FINDINGS:  Postsurgical changes of right craniotomy for subdural hematoma evacuation with decreased volume of predominantly low-density extra-axial collection.  Draining catheter terminates within the collection.  Postsurgical pneumocephalus overlies the right frontal convexity.  The collection measures up to 1.5 cm in maximum thickness, previously 2.1 cm.  Stable mass effect upon the underlying parenchyma and right lateral ventricle with improved leftward midline shift, measuring 4 mm at the septum pellucidum, pre measurement of 7 mm.    Additional hyperattenuating extra-axial collection overlies the left frontal and parietal cerebral convexity measuring 5 mm in maximal thickness, similar when compared to same day MRI.  No significant mass effect upon the underlying parenchyma.    Generalized cerebral volume loss.  Minimal re-expansion of the right lateral ventricle.  No evidence of hydrocephalus.    Mild patchy hypoattenuation in the supratentorial white matter, nonspecific but most likely reflecting chronic small vessel ischemic changes.  No abnormal parenchymal abnormality to suggest acute intraparenchymal hemorrhage or major vascular distribution infarct.    No displaced calvarial fracture.  Mild soft tissue stranding and air overlies the operative site.  Postsurgical changes of bilateral globes.    Left maxillary mucous retention cyst.  Peripheral mucosal thickening of the right maxillary sinus.  Remaining paranasal sinuses and mastoid air cells are clear.                                        MRI Brain Without Contrast (Final result)  Result  time 05/17/24 13:45:52      Final result by Cheng Fajardo MD (05/17/24 13:45:52)                   Impression:      Bilateral subdural hematomas, larger on the right, as further discussed above.  There is significant intracranial mass effect with leftward midline shift.    Underlying chronic small vessel ischemic change and cerebral volume loss.    Neuro surgical consultation is advised.    This report was flagged in Epic as abnormal.      Electronically signed by: Cheng Fajardo MD  Date:    05/17/2024  Time:    13:45               Narrative:    EXAMINATION:  MRI BRAIN WITHOUT CONTRAST    CLINICAL HISTORY:  Transient ischemic attack (TIA);    TECHNIQUE:  Multiplanar multisequence MR imaging of the brain was performed without intravenous contrast.    Examination mildly degraded by patient motion artifact.    COMPARISON:  CT head 12/28/2023    FINDINGS:  Intracranial Compartment:    There is a large subdural hematoma over the right cerebral hemisphere.  Collection measures up to 2.1 cm in thickness.  Collection is predominantly hyperintense on T1 and T2-weighted images, with some interspersed areas of more heterogeneous signal anteriorly and posteriorly.  Minimal septation evident.  Additional subdural hemorrhage over the left cerebral convexity with similar imaging characteristics.  This collection measuring just 0.6 cm in maximal thickness.  No extra-axial blood or fluid collections elsewhere.  There is significant intracranial mass effect.  Diffuse right-sided sulcal and ventricular crowding, with approximately 0.7 cm of leftward midline shift measured at the septum pellucidum.    Mild chronic small vessel ischemic change throughout the supratentorial white matter.  No evidence of recent or remote major vascular distribution infarct.  No evidence of recent or remote parenchymal hemorrhage.    Moderate cerebral volume loss.  No ventricular entrapment or obstructive hydrocephalus.    Normal vascular flow voids  are preserved.    Skull/Extracranial Contents (limited evaluation):    Bone marrow signal intensity is unremarkable.    Postsurgical change both globes.    Findings were relayed to the ordering provider (Drake) via the epic secure chat system at approximately 13:40.

## 2024-05-21 NOTE — PROGRESS NOTES
[facility]  Neurosurgery  Progress Note    Subjective:     History of Present Illness: 88 M unknown PMH presents to the ED by EMS stating he wants to kill himself. Came yesterday with c/o R hand paresthesias and was discharged. Currently under PEC per psych evaluation. He states he may have hit the left side of his head on the door earlier this week. Denies falls. Denies headache, seizures, weakness, b/b dysfunction. MRI with large right SDH with midline shift. Denies use of AC/APT. NSGY consulted for evaluation.     Post-Op Info:  Procedure(s) (LRB):  CRANIOTOMY, FOR SUBDURAL HEMATOMA EVACUATION (Right)   4 Days Post-Op   Interval History: NAEON. Subdural drain removed. Pending post pull CT this AM.     Medications:  Continuous Infusions:   dexmedeTOMIDine (Precedex) infusion (titrating)  0-0.6 mcg/kg/hr Intravenous Continuous 3.06 mL/hr at 05/21/24 0701 0.2 mcg/kg/hr at 05/21/24 0701     Scheduled Meds:   acetaminophen  1,000 mg Oral Q6H    bacitracin zinc   Topical (Top) BID    brivaracetam  50 mg Oral BID    heparin (porcine)  5,000 Units Subcutaneous Q8H    PHENobarbitaL  30 mg Oral BID    Followed by    [START ON 5/23/2024] PHENobarbitaL  15 mg Oral BID    Followed by    [START ON 5/25/2024] PHENobarbitaL  10 mg Oral BID    Followed by    [START ON 5/26/2024] PHENobarbitaL  5 mg Oral BID    polyethylene glycol  17 g Oral Daily    QUEtiapine  25 mg Oral QHS    senna-docusate 8.6-50 mg  1 tablet Oral Daily     PRN Meds:  Current Facility-Administered Medications:     diphenhydrAMINE, 25 mg, Intravenous, Q6H PRN    hydrALAZINE, 10 mg, Intravenous, Q4H PRN    magnesium oxide, 800 mg, Oral, PRN    magnesium oxide, 800 mg, Oral, PRN    OLANZapine, 5 mg, Intramuscular, Q8H PRN    ondansetron, 8 mg, Intravenous, Q6H PRN    oxyCODONE, 5 mg, Oral, Q4H PRN    potassium bicarbonate, 35 mEq, Oral, PRN    potassium bicarbonate, 50 mEq, Oral, PRN    potassium bicarbonate, 60 mEq, Oral, PRN    potassium,  sodium phosphates, 2 packet, Oral, PRN    potassium, sodium phosphates, 2 packet, Oral, PRN    potassium, sodium phosphates, 2 packet, Oral, PRN     Review of Systems  Objective:     Weight: 61.2 kg (135 lb)  Body mass index is 21.14 kg/m².  Vital Signs (Most Recent):  Temp: 97.9 °F (36.6 °C) (05/21/24 0715)  Pulse: (!) 57 (05/21/24 0727)  Resp: (!) 32 (05/21/24 0715)  BP: (!) 121/58 (05/21/24 0701)  SpO2: 97 % (05/21/24 0715) Vital Signs (24h Range):  Temp:  [97.2 °F (36.2 °C)-100.4 °F (38 °C)] 97.9 °F (36.6 °C)  Pulse:  [55-75] 57  Resp:  [9-35] 32  SpO2:  [96 %-100 %] 97 %  BP: ()/(52-66) 121/58     Date 05/21/24 0700 - 05/22/24 0659   Shift 9397-1504 9717-3836 6519-0409 24 Hour Total   INTAKE   I.V.(mL/kg) 3.1(0.1)   3.1(0.1)   Shift Total(mL/kg) 3.1(0.1)   3.1(0.1)   OUTPUT   Shift Total(mL/kg)       Weight (kg) 61.2 61.2 61.2 61.2                       Male External Urinary Catheter 05/19/24 1502 (Active)   Collection Container Urimeter 05/21/24 0701   Securement Method secured to top of thigh w/ adhesive device 05/21/24 0701   Skin no redness;no breakdown 05/21/24 0701   Tolerance no signs/symptoms of discomfort 05/21/24 0701   Output (mL) 225 mL 05/21/24 0301   Catheter Change Date 05/20/24 05/21/24 0301   Catheter Change Time 1215 05/21/24 0301          Physical Exam         Neurosurgery Physical Exam  General: well developed, well nourished, sedated  Head: normocephalic, atraumatic  Neurologic: Alert and oriented. Precedex paused  GCS: Motor: 6/Verbal: 5/Eyes: 4 GCS Total: 15  Mental Status: Awake, Alert, Oriented x 4  Language: No aphasia  Speech: No dysarthria  Cranial nerves: face symmetric, tongue midline, CN II-XII grossly intact.   Eyes: pupils equal, round, reactive to light with accomodation, EOMI.   Pulmonary: normal respirations, no signs of respiratory distress  Sensory: intact to light touch throughout  Motor Strength: Moves all extremities spontaneously with good tone.  Full strength  "upper and lower extremities. No abnormal movements seen.      Skin: Incision c/d/i    Significant Labs:  Recent Labs   Lab 05/20/24  0152 05/21/24  0143    79   * 140   K 3.9 4.1    112*   CO2 16* 21*   BUN 14 18   CREATININE 1.0 1.0   CALCIUM 8.4* 8.3*   MG 1.9 1.8     Recent Labs   Lab 05/20/24  0152 05/21/24  0143   WBC 4.19 4.60   HGB 11.7* 11.3*   HCT 34.9* 34.0*    179     No results for input(s): "LABPT", "INR", "APTT" in the last 48 hours.  Microbiology Results (last 7 days)       ** No results found for the last 168 hours. **          All pertinent labs from the last 24 hours have been reviewed.    Significant Diagnostics:  I have reviewed and interpreted all pertinent imaging results/findings within the past 24 hours.  Assessment/Plan:     * Subdural hematoma  89 yo male with large right SDH with 7 mm MLS, GCS 15 now s/p burrhole craniotomy for SDH evacuation.     --Patient admitted to [facility] on telemetry; sitter bedside; currently PEC'd      -q1h neurochecks in ICU,  --Post op CTH satisfactory; repeat CT 5/19 with improving pneumocephalus. AM CTH pending s/p SD drain pull  --SBP <140  --Na >135  --Keppra 500 BID x5d  --Liberalize HOB now that subdural drain has been removed  --Rec Neuro IR consultation for MMA embolization during this admission - planning for 5/22  --PT/OT/OOBTC  --PRN pain control with aggressive bowel regimen while on narcotics  --Continue to monitor clinically, notify NSGY immediately with any changes in neuro status    Dispo: ongoing, sitter bedside    Closed with Monocryl    D/w Dr. Price by NSGY team    Please contact the on call neurosurgery provider with questions or concerns. Can be reached via on-call schedule and/or .              Mary Jara MD  Neurosurgery  [facility] - Neuro Critical Care  "

## 2024-05-21 NOTE — ASSESSMENT & PLAN NOTE
Na 132 on 5/19    - Improved to 134 this AM  - D/c home HCTZ  - Monitor daily, encourage PO intake  - Goal eunatremia

## 2024-05-21 NOTE — ASSESSMENT & PLAN NOTE
History of,  - EKG, Echo  - SBP < 160  - PRN labetalol, hydralazine  - Hold HCTZ given mild hyponatremia

## 2024-05-22 PROBLEM — F10.939 ALCOHOL WITHDRAWAL: Status: ACTIVE | Noted: 2024-05-22

## 2024-05-22 PROBLEM — D64.9 ANEMIA: Status: ACTIVE | Noted: 2024-05-22

## 2024-05-22 LAB
ALBUMIN SERPL BCP-MCNC: 2.8 G/DL (ref 3.5–5.2)
ALP SERPL-CCNC: 96 U/L (ref 55–135)
ALT SERPL W/O P-5'-P-CCNC: 9 U/L (ref 10–44)
ANION GAP SERPL CALC-SCNC: 8 MMOL/L (ref 8–16)
AST SERPL-CCNC: 33 U/L (ref 10–40)
BASOPHILS # BLD AUTO: 0.02 K/UL (ref 0–0.2)
BASOPHILS NFR BLD: 0.3 % (ref 0–1.9)
BILIRUB SERPL-MCNC: 0.3 MG/DL (ref 0.1–1)
BUN SERPL-MCNC: 13 MG/DL (ref 8–23)
CALCIUM SERPL-MCNC: 9 MG/DL (ref 8.7–10.5)
CHLORIDE SERPL-SCNC: 106 MMOL/L (ref 95–110)
CO2 SERPL-SCNC: 23 MMOL/L (ref 23–29)
CREAT SERPL-MCNC: 0.9 MG/DL (ref 0.5–1.4)
DIFFERENTIAL METHOD BLD: ABNORMAL
EOSINOPHIL # BLD AUTO: 0.1 K/UL (ref 0–0.5)
EOSINOPHIL NFR BLD: 1.7 % (ref 0–8)
ERYTHROCYTE [DISTWIDTH] IN BLOOD BY AUTOMATED COUNT: 13.4 % (ref 11.5–14.5)
EST. GFR  (NO RACE VARIABLE): >60 ML/MIN/1.73 M^2
GLUCOSE SERPL-MCNC: 84 MG/DL (ref 70–110)
HCT VFR BLD AUTO: 36.7 % (ref 40–54)
HGB BLD-MCNC: 12.3 G/DL (ref 14–18)
IMM GRANULOCYTES # BLD AUTO: 0.01 K/UL (ref 0–0.04)
IMM GRANULOCYTES NFR BLD AUTO: 0.2 % (ref 0–0.5)
LYMPHOCYTES # BLD AUTO: 1.9 K/UL (ref 1–4.8)
LYMPHOCYTES NFR BLD: 29.1 % (ref 18–48)
MAGNESIUM SERPL-MCNC: 1.6 MG/DL (ref 1.6–2.6)
MCH RBC QN AUTO: 32.3 PG (ref 27–31)
MCHC RBC AUTO-ENTMCNC: 33.5 G/DL (ref 32–36)
MCV RBC AUTO: 96 FL (ref 82–98)
MONOCYTES # BLD AUTO: 0.4 K/UL (ref 0.3–1)
MONOCYTES NFR BLD: 6.3 % (ref 4–15)
NEUTROPHILS # BLD AUTO: 4.1 K/UL (ref 1.8–7.7)
NEUTROPHILS NFR BLD: 62.4 % (ref 38–73)
NRBC BLD-RTO: 0 /100 WBC
PHOSPHATE SERPL-MCNC: 3.4 MG/DL (ref 2.7–4.5)
PLATELET # BLD AUTO: 200 K/UL (ref 150–450)
PMV BLD AUTO: 9.6 FL (ref 9.2–12.9)
POTASSIUM SERPL-SCNC: 4.2 MMOL/L (ref 3.5–5.1)
PROT SERPL-MCNC: 6.3 G/DL (ref 6–8.4)
RBC # BLD AUTO: 3.81 M/UL (ref 4.6–6.2)
SODIUM SERPL-SCNC: 137 MMOL/L (ref 136–145)
WBC # BLD AUTO: 6.49 K/UL (ref 3.9–12.7)

## 2024-05-22 PROCEDURE — 25000003 PHARM REV CODE 250: Performed by: NURSE PRACTITIONER

## 2024-05-22 PROCEDURE — 85025 COMPLETE CBC W/AUTO DIFF WBC: CPT | Performed by: STUDENT IN AN ORGANIZED HEALTH CARE EDUCATION/TRAINING PROGRAM

## 2024-05-22 PROCEDURE — 25000003 PHARM REV CODE 250: Performed by: PSYCHIATRY & NEUROLOGY

## 2024-05-22 PROCEDURE — 25000003 PHARM REV CODE 250: Performed by: STUDENT IN AN ORGANIZED HEALTH CARE EDUCATION/TRAINING PROGRAM

## 2024-05-22 PROCEDURE — 84100 ASSAY OF PHOSPHORUS: CPT | Performed by: STUDENT IN AN ORGANIZED HEALTH CARE EDUCATION/TRAINING PROGRAM

## 2024-05-22 PROCEDURE — 25000003 PHARM REV CODE 250: Performed by: PHYSICIAN ASSISTANT

## 2024-05-22 PROCEDURE — 63600175 PHARM REV CODE 636 W HCPCS: Performed by: STUDENT IN AN ORGANIZED HEALTH CARE EDUCATION/TRAINING PROGRAM

## 2024-05-22 PROCEDURE — 97112 NEUROMUSCULAR REEDUCATION: CPT

## 2024-05-22 PROCEDURE — 99233 SBSQ HOSP IP/OBS HIGH 50: CPT | Mod: GC,,, | Performed by: PSYCHIATRY & NEUROLOGY

## 2024-05-22 PROCEDURE — 63600175 PHARM REV CODE 636 W HCPCS: Mod: JZ,JG | Performed by: PHYSICIAN ASSISTANT

## 2024-05-22 PROCEDURE — 11000001 HC ACUTE MED/SURG PRIVATE ROOM

## 2024-05-22 PROCEDURE — 97530 THERAPEUTIC ACTIVITIES: CPT

## 2024-05-22 PROCEDURE — 80053 COMPREHEN METABOLIC PANEL: CPT | Performed by: STUDENT IN AN ORGANIZED HEALTH CARE EDUCATION/TRAINING PROGRAM

## 2024-05-22 PROCEDURE — 25000003 PHARM REV CODE 250

## 2024-05-22 PROCEDURE — 94761 N-INVAS EAR/PLS OXIMETRY MLT: CPT

## 2024-05-22 PROCEDURE — 83735 ASSAY OF MAGNESIUM: CPT | Performed by: STUDENT IN AN ORGANIZED HEALTH CARE EDUCATION/TRAINING PROGRAM

## 2024-05-22 PROCEDURE — 63600175 PHARM REV CODE 636 W HCPCS: Performed by: NURSE PRACTITIONER

## 2024-05-22 RX ORDER — GABAPENTIN 300 MG/1
600 CAPSULE ORAL 3 TIMES DAILY
Status: DISCONTINUED | OUTPATIENT
Start: 2024-05-22 | End: 2024-05-26 | Stop reason: HOSPADM

## 2024-05-22 RX ORDER — LOSARTAN POTASSIUM 25 MG/1
25 TABLET ORAL DAILY
Status: DISCONTINUED | OUTPATIENT
Start: 2024-05-22 | End: 2024-05-26 | Stop reason: HOSPADM

## 2024-05-22 RX ADMIN — HEPARIN SODIUM 5000 UNITS: 5000 INJECTION INTRAVENOUS; SUBCUTANEOUS at 08:05

## 2024-05-22 RX ADMIN — BACITRACIN 1 EACH: 500 OINTMENT TOPICAL at 08:05

## 2024-05-22 RX ADMIN — QUETIAPINE FUMARATE 25 MG: 25 TABLET ORAL at 08:05

## 2024-05-22 RX ADMIN — HEPARIN SODIUM 5000 UNITS: 5000 INJECTION INTRAVENOUS; SUBCUTANEOUS at 06:05

## 2024-05-22 RX ADMIN — HYDRALAZINE HYDROCHLORIDE 10 MG: 20 INJECTION, SOLUTION INTRAMUSCULAR; INTRAVENOUS at 01:05

## 2024-05-22 RX ADMIN — THERA TABS 1 TABLET: TAB at 08:05

## 2024-05-22 RX ADMIN — PHENOBARBITAL 30 MG: 20 ELIXIR ORAL at 08:05

## 2024-05-22 RX ADMIN — Medication 100 MG: at 08:05

## 2024-05-22 RX ADMIN — HEPARIN SODIUM 5000 UNITS: 5000 INJECTION INTRAVENOUS; SUBCUTANEOUS at 03:05

## 2024-05-22 RX ADMIN — FOLIC ACID 1 MG: 1 TABLET ORAL at 08:05

## 2024-05-22 RX ADMIN — ACETAMINOPHEN 1000 MG: 500 TABLET ORAL at 08:05

## 2024-05-22 RX ADMIN — GABAPENTIN 600 MG: 300 CAPSULE ORAL at 03:05

## 2024-05-22 RX ADMIN — GABAPENTIN 600 MG: 300 CAPSULE ORAL at 08:05

## 2024-05-22 RX ADMIN — SENNOSIDES AND DOCUSATE SODIUM 1 TABLET: 50; 8.6 TABLET ORAL at 08:05

## 2024-05-22 RX ADMIN — OXYCODONE 5 MG: 5 TABLET ORAL at 02:05

## 2024-05-22 RX ADMIN — LOSARTAN POTASSIUM 25 MG: 25 TABLET, FILM COATED ORAL at 10:05

## 2024-05-22 RX ADMIN — LABETALOL HYDROCHLORIDE 10 MG: 5 INJECTION, SOLUTION INTRAVENOUS at 06:05

## 2024-05-22 RX ADMIN — GABAPENTIN 300 MG: 300 CAPSULE ORAL at 08:05

## 2024-05-22 RX ADMIN — POLYETHYLENE GLYCOL 3350 17 G: 17 POWDER, FOR SOLUTION ORAL at 08:05

## 2024-05-22 RX ADMIN — HYDRALAZINE HYDROCHLORIDE 10 MG: 20 INJECTION, SOLUTION INTRAMUSCULAR; INTRAVENOUS at 06:05

## 2024-05-22 NOTE — PROGRESS NOTES
[facility] Neuro Critical Care  Neurocritical Care  Progress Note    Admit Date: 5/17/2024  Service Date: 05/21/2024  Length of Stay: 4    Subjective:     Chief Complaint: Subdural hematoma    History of Present Illness: Mr Peng is an 89 y/o M unknown PMHx admitted to [facility] w/ bilateral SDH R>L and MLS. Per chart review, he presented to the ED by EMS stating he wants to kill himself. Came yesterday with c/o R hand paresthesias and was discharged. Currently PEC'ed per psych evaluation.  Denies falls. MRI with large right SDH with midline shift. Denies use of AC/APT. NSGY consulted for evaluation. NPO currently for OR  for SDH evac w/NSGY. Patient admitted to [facility] for close monitoring and higher level of care.       Hospital Course: 05/18/2024 Started miralax, electrolytes, and hydralazine prn, dced cardene, CT in am  05/19/2024 NAEON. CTH overnight with decreased right SDH and MLS with stable mixed amount of left SDH. Patient acutely agitated this AM, threatening to leave and sitting upright in bed. Security called to bedside. Agitation settled after receiving 10mg haldol IM, 2mg versed, and 25mg benadryl. Patient started on precedex gtt for sedation. Keppra changed to briviact in the hopes of reducing agitation. Seroquel 25mg added qhs. Drain in place, NSGY to pull drain tomorrow.  05/20/2024 Persistent agitation overnight, on precedex gtt -> less agitated this AM, appears tired, likely from multiple medications over last 24 hrs. Weaning precedex gtt, plan for zyprexa 5 mg q8hrs PRN per psych recs. Subdural drain d/c'd by NSGY, HOB liberalized.   05/21/2024 Post-subdural drain removal CT w/ slight increase in pneumocephalus, no re-accumulation of SDH, midline shift/mass effect unchanged from prior. Mental status markedly improved this AM, does remain mildly altered (perseverating on poorly defined pain and numbness in hands), but no longer requiring 4 pt restraints, precedex d/c'd.     Interval History:  Please see  hospital course above for full details    Review of Systems   Constitutional:  Positive for activity change and fatigue. Negative for fever.   Respiratory: Negative.     Cardiovascular: Negative.    Gastrointestinal: Negative.    Musculoskeletal:         B/l hand pain   Neurological:  Positive for numbness. Negative for seizures and headaches.   Psychiatric/Behavioral:  Positive for confusion and decreased concentration. Negative for suicidal ideas. The patient is nervous/anxious.        Objective:     Vitals:  Temp: 98.6 °F (37 °C)  Pulse: 83  Rhythm: normal sinus rhythm  BP: 124/62  MAP (mmHg): 88  Resp: 15  SpO2: 97 %    Temp  Min: 97.2 °F (36.2 °C)  Max: 98.6 °F (37 °C)  Pulse  Min: 55  Max: 111  BP  Min: 107/59  Max: 214/79  MAP (mmHg)  Min: 76  Max: 129  Resp  Min: 11  Max: 40  SpO2  Min: 85 %  Max: 100 %    05/20 0701 - 05/21 0700  In: 994.4 [P.O.:472; I.V.:522.4]  Out: 943 [Urine:925; Drains:18]   Unmeasured Output  Urine Occurrence: 1  Stool Occurrence: 1  Pad Count: 1        Physical Exam  General Appearance: Not in acute hemodynamic distress  Mental Status Exam: awake, alert, aware, oriented to person, place and time, partially aware of situation. Perseverating on hands and stating that he needs to go back to work (unclear if actually still employed)  Cranial Nerves: VFF, EOM full, pupils are equal and reactive to light bilaterally, symmetric facial sensory, symmetric facial motor; severe dysarthria w/ pronounced [place] accent, however no clear facial asymmetry   Motor: no drift in the UE/LE. Power is 5/5 in both UE/LE. Normal tone.  Sensory: Symmetric to LT in all 4 limbs   Coordination: Unable to assess  Vascular: S1/S2 of normal intensity, no S3/S4 appreciated, no murmurs appreciated  Lungs: CTA bilaterally without wheezing  Abdomen: Soft, non-distended, non-tender, BS +         Medications:  Continuous Scheduledbacitracin zinc, , BID  folic acid, 1 mg, Daily  gabapentin, 300 mg, TID  heparin (porcine),  5,000 Units, Q8H  multivitamin, 1 tablet, Daily  PHENobarbitaL, 30 mg, BID   Followed by  [START ON 5/23/2024] PHENobarbitaL, 15 mg, BID   Followed by  [START ON 5/25/2024] PHENobarbitaL, 10 mg, BID   Followed by  [START ON 5/26/2024] PHENobarbitaL, 5 mg, BID  polyethylene glycol, 17 g, Daily  QUEtiapine, 25 mg, QHS  senna-docusate 8.6-50 mg, 1 tablet, Daily  thiamine, 100 mg, Daily    PRNacetaminophen, 1,000 mg, Q8H PRN  hydrALAZINE, 10 mg, Q4H PRN  labetalol, 10 mg, Q6H PRN  magnesium oxide, 800 mg, PRN  magnesium oxide, 800 mg, PRN  OLANZapine, 5 mg, Q8H PRN  ondansetron, 8 mg, Q6H PRN  oxyCODONE, 5 mg, Q4H PRN  potassium bicarbonate, 35 mEq, PRN  potassium bicarbonate, 50 mEq, PRN  potassium bicarbonate, 60 mEq, PRN  potassium, sodium phosphates, 2 packet, PRN  potassium, sodium phosphates, 2 packet, PRN  potassium, sodium phosphates, 2 packet, PRN      Today I personally reviewed pertinent imaging, laboratory results, notably: CT head reviewed. Labs unremarkable    Diet  Diet Adult Regular (IDDSI Level 7) PEC/CEC - Styrofoam  Diet Adult Regular (IDDSI Level 7) PEC/CEC - Styrofoam      Assessment/Plan:     Neuro  * Subdural hematoma  89 yo male w/Bilateral SDH R>L large right SDH with 7 mm MLS s/p evac     - [facility]  - NSGY consulted, following  - S/p removal of subdural drain, head CT stable  - q1h neuro checks, vitals, I/Os  - Echo, EKG, CXR   - No AC/AP, denies trauma  - Daily CBC, CMP, mag, phos  - SBP < 160   - PRN labetalol, hydralazine  - Keppra dc'd given agitation, on phenobarb taper   - Precedex gtt for agitation -> d/c  - Zyprexa 5 mg q8hrs PRN  - C/w seroquel 25mg qhs  - Seizure precautions  - Diet Regular  - PT/OT/SLP as appropriate  - VTE Prophylaxis: mechanical, HSQ    Acute encephalopathy  Multifactorial. Suspect 2/2 b/l SDH +/- component of ICU delirium     - Some family report of EtOH use prior to admission, unclear quantity; no evidence of EtOH w/d to date, monitor  - On phenobarb  "taper  - Start thiamine/folate/MV   - Improved this AM    Midline shift of brain due to hematoma  See Subdural hematoma    History of jacob hole surgery  See Subdural hematoma    Brain compression  See Subdural hematoma    Psychiatric  Suicidal ideation  Per chart review- patient reported "if I had a gun I would kill myself"-   psychiatry following   CEC'ed  Denies on AM exam today    Cardiac/Vascular  Essential hypertension  History of,  - EKG, Echo  - SBP < 160  - PRN labetalol, hydralazine  - Hold HCTZ given mild hyponatremia    Endocrine  Hyponatremia  Na 132 on 5/19    - Improved to 135 this AM s/p holding HCTZ  - Hold home HCTZ  - Monitor daily, encourage PO intake  - Goal eunatremia          The patient is being Prophylaxed for:  Venous Thromboembolism with: Mechanical or Chemical  Stress Ulcer with: None  Ventilator Pneumonia with: not applicable    Activity Orders            Diet Adult Regular (IDDSI Level 7) PEC/CEC - Styrofoam: Regular starting at 05/21 0736    Turn patient every 2 hours starting at 05/20 1400          Full Code    Monitor in NCC overnight, possible step down in AM     Level III visit    Nilda Thompson MD  Neurocritical Care  [facility] - Neuro Critical Care      "

## 2024-05-22 NOTE — PT/OT/SLP PROGRESS
Physical Therapy Treatment with OT    Patient Name:  Moses Peng   MRN:  [id number]    Recent Surgery: Procedure(s) (LRB):  CRANIOTOMY, FOR SUBDURAL HEMATOMA EVACUATION (Right) 5 Days Post-Op    *Co-treatment with OT due to patient complexity and need for two skilled therapists to ensure safe mobilization.   Recommendations:     Discharge Recommendations:   High Intensity Therapy  Discharge Equipment Recommendations: none   Barriers to discharge: Increased level of assist    Highest Level of Mobility: Gait 8' FW and BW  Assistance Required: Min(A) x2 persons    Assessment:     Moses Peng is a 88 y.o. male admitted with a medical diagnosis of Subdural hematoma.    Pt met with HOB elevated, sitter present and agreeable to PT treatment. Today's PT treatment focus was on OOB mobility to improve activity tolerance and promote independent mobility. Pt ambulated 8' FW and BW with Min(A) x2 persons where he required multiple cues for proper foot placement/safety. He was able to correct his gait pattern when cued to do so, but is easily distractible at this time. He continues to progress with his mobility/assistance levels and remains a good candidate for high intensity therapy at this time.    Pt is progressing towards acute PT goals appropriately and continues to benefit from acute PT sessions.    Rehab Prognosis: Good; patient would benefit from acute skilled PT services to address these deficits and reach maximum level of function.      Plan:     During this hospitalization, patient to be seen 4 x/week to address the identified rehab impairments via gait training, therapeutic activities, therapeutic exercises, neuromuscular re-education and progress toward the following goals:    Plan of Care Expires:  06/21/24    This plan of care has been discussed with the patient/caregiver, who was included in its development and is in agreement with the identified goals and treatment plan.     Subjective     Communicated with RN prior to  session.  Patient agreeable to participate.     Pain/Comfort:  Pain Rating 1:  (Unrated)  Location - Side 1: Bilateral  Location 1: hand  Pain Addressed 1: Reposition, Distraction, Cessation of Activity  Pain Rating Post-Intervention 1:  (Unrated)    Chief Complaint: S/p craniotomy  Patient/Family Comments/goals: None stated      Objective:     Patient found HOB elevated with Condom Catheter, telemetry, pulse ox (continuous), blood pressure cuff  upon PT entry to room.    General Precautions: Standard, fall   Orthopedic Precautions:N/A   Braces: N/A         Exams:    Cognition:  Patient is oriented to Person, Place, Time, Situation  Follows one-step commands  Insight to deficits/safety awareness: Impaired    Gross Motor Coordination: No deficits noted during functional mobility tasks     Functional Mobility:    Bed Mobility:  Supine to Sit: Supervision or Set-up Assistance on L side of bed  Sit to Supine: Supervision or Set-up Assistance  Scooting anteriorly to EOB to plant feet on floor: Supervision or Set-up Assistance    Transfers:   Sit to Stand Transfer: Minimal Assistance  from EOB with HHA x2             Gait:  Patient received gait training 8 feet FW and BW with Minimal Assistance x2 persons and HHA x2  Gait Assessment: occasional unsteady gait, decreased step length, narrow base of support, decreased foot clearance, and shuffle gait  Gait Pattern Observed: Step-to  Comments: All lines remained intact throughout ambulation trial, gait belt utilized  Pt requires cues to widen SENA/take larger steps during gait. He was able to correct, but required multiple cues to remain safe throughout.    Balance:  Static Sit:   Stand-By Assist at EOB  Dynamic sit:  Stand-By Assist   Static Stand:   Min Assist with Hand-held assist x 2  Dynamic Stand:  Min Assist x2 persons with Hand-held assist x 2  Pt cued to perform standing marching to assist with foot clearance before completing gait.      Therapeutic  Activities/Exercises     Patient assisted with functional mobility as noted above  Standing marching 1 set x10 bilaterally.  Patient educated on the importance of early mobility to prevent functional decline during hospital stay  Patient was instructed to utilize staff assistance for mobility/transfers.  Patient is appropriate to transfer with Min(A) x2 with a stand pivot and RN/PCT assist  Patient educated on PT POC and role of PT in acute care  White board updated regarding patient's safest level of mobility with staff assistance, RN also updated.     AM-PAC 6 CLICK MOBILITY  Turning over in bed (including adjusting bedclothes, sheets and blankets)?: 3  Sitting down on and standing up from a chair with arms (e.g., wheelchair, bedside commode, etc.): 2  Moving from lying on back to sitting on the side of the bed?: 2  Moving to and from a bed to a chair (including a wheelchair)?: 2  Need to walk in hospital room?: 2  Climbing 3-5 steps with a railing?: 1  Basic Mobility Total Score: 12     Patient left HOB elevated with all lines intact, call button in reach, rn notified, and RN/sitter present.        History/Goals:     PAST MEDICAL HISTORY:  History reviewed. No pertinent past medical history.    Past Surgical History:   Procedure Laterality Date    CRANIOTOMY FOR EVACUATION OF SUBDURAL HEMATOMA Right 2024    Procedure: CRANIOTOMY, FOR SUBDURAL HEMATOMA EVACUATION;  Surgeon: Herb Price MD;  Location: [facility] OR [address] [place] [address];  Service: Neurosurgery;  Laterality: Right;  right jacob hole craniotomy for SDH evacuation       GOALS:   Multidisciplinary Problems       Physical Therapy Goals          Problem: Physical Therapy    Goal Priority Disciplines Outcome Goal Variances Interventions   Physical Therapy Goal     PT, PT/OT Progressing     Description: Goals to be met by: 24     Patient will increase functional independence with mobility by performin. Supine to sit with Stand-by Assistance  2. Sit to  supine with Modified [place]  3. Sit to stand transfer with Stand-by Assistance  4. Bed to chair transfer with Contact Guard Assistance using LRAD  5. Gait  x 50 feet with Minimal Assistance using LRAD.   6. Ascend/descend 2 stairs with no handrails Minimal Assistance using LRAD.   7. Lower extremity exercise program x10 reps per handout, with independence                         Time Tracking:     PT Received On: 05/22/24  PT Start Time: 0941     PT Stop Time: 0958  PT Total Time (min): 17 min     Billable Minutes: Therapeutic Activity 17      Easton Guevara, [facility]  05/22/2024  Pager# [phone]

## 2024-05-22 NOTE — MEDICAL/APP STUDENT
"CONSULTATION LIAISON PSYCHIATRY PROGRESS NOTE    Patient Name: Moses Peng  MRN: [id number]  Patient Class: IP- Inpatient  Admission Date: 5/17/2024  Attending Physician: Nilda Thompson MD      SUBJECTIVE:   Moses Peng is a 88 y.o. male with no known past psychiatric history & no known past medical history presents to the ED with numbness in RUE and RLE, and admitted to the hospital for Subdural hematoma    Psychiatry consulted for "suicidal ideation."    Today, patient is lying in bed with blanket over his head, not in restraints or in acute distress. He is oriented to person, place, situation, and only to month (able to state May 2024).     He continues to complain the pain and numbness in both of his hands up to wrist, same as yesterday. He denies any effect from gabapentin, and states heat pack does not provide relief.    He continues to deny SI, HI, AH, or VH during interview. Per SW note (Vanita Melendrez, 5/21/2024), his daughter claims patient does not have guns; however, patient admits having a gun in an unlocked  his bedroom. Although he was willing to get rid of it yesterday, he now states he will keep it. Patient reports his gun has been there for 40 years and he neither looks at nor uses it. Patient reports a strained relationship with his daughter.    He does not raise other concerns.     OBJECTIVE:    Mental Status Exam:  General Appearance: appears stated age, normal weight, dressed in hospital garb, lying in bed, covers pulled up over head, in no acute distress, disheveled  Behavior: cooperative, appropriate eye-contact, under good behavioral control  Involuntary Movements and Motor Activity: no abnormal involuntary movements noted; no tics, no tremors, no akathisia, no dystonia, no evidence of tardive dyskinesia; no psychomotor agitation or retardation  Gait and Station: unable to assess - patient lying down or seated  Speech and Language: normal rate, rhythm, volume, tone, and pitch, " "slurred  Mood: "fine"  Affect: euthymic, reactive, appropriate to situation and context, full-range  Thought Process and Associations: linear, goal-directed, organized, logical, no loosening of associations  Thought Content and Perceptions:: no suicidal or homicidal ideation, no auditory or visual hallucinations, no paranoid ideation, no ideas of reference, no evidence of delusions or psychosis  Sensorium and Orientation: grossly intact, oriented to person and place, oriented to year, oriented to month, oriented to situation  Recent and Remote Memory: grossly intact  Attention and Concentration: grossly intact, attentive to the conversation and not readily distractible  Fund of Knowledge: grossly intact, aware of current events, vocabulary appropriate, consistent with educational level achieved  Insight: age appropriate, demonstrates awareness of illness, demonstrates awareness of situation  Judgment: poor    CAM ICU positive? no, failed SAVEAHAART but passed questions and commands.      ASSESSMENT & RECOMMENDATIONS   Encephalopathy 2/2 subdural hematoma    PSYCH MEDICATIONS  Scheduled: quetiapine 25 mg PO qhs  PRN: olanzapine 5 mg q8h PRN for non-redirectable agitation associated with delirium   Delirium Behavior Management  Minimize use of physical restraints if able   Keep window shades open and room lit during day and room dim at night in order to promote normal sleep-wake cycles  Encourage family at bedside. [place] patient often to situation, location, date.  Continue to Limit or Discontinue use of Narcotics, Benzos and Anticholinergic medications as they may worsen delirium.  Continue medical workup for causative etiology of Delirium.     RISK ASSESSMENT  Under C[facility]; will continue out of caution and re-evaluate      FOLLOW UP  Will follow up while in house    DISPOSITION - once medically cleared:  Defer to medical team    Please contact ON CALL psychiatry service (24/7) for any acute issues that may " arise.    Nicolas Conte, MS-3  [employer]    CL Psychiatry  [facility]  5/22/2024 9:27 AM        --------------------------------------------------------------------------------------------------------------------------------------------------------------------------------------------------------------------------------------    CONTINUED OBJECTIVE clinical data & findings reviewed and noted for above decision making    Current Medications:   Scheduled Meds:    bacitracin zinc   Topical (Top) BID    folic acid  1 mg Oral Daily    gabapentin  300 mg Oral TID    heparin (porcine)  5,000 Units Subcutaneous Q8H    losartan  25 mg Oral Daily    multivitamin  1 tablet Oral Daily    PHENobarbitaL  30 mg Oral BID    Followed by    [START ON 5/23/2024] PHENobarbitaL  15 mg Oral BID    Followed by    [START ON 5/25/2024] PHENobarbitaL  10 mg Oral BID    Followed by    [START ON 5/26/2024] PHENobarbitaL  5 mg Oral BID    polyethylene glycol  17 g Oral Daily    QUEtiapine  25 mg Oral QHS    senna-docusate 8.6-50 mg  1 tablet Oral Daily    thiamine  100 mg Oral Daily     PRN Meds:   Current Facility-Administered Medications:     acetaminophen, 1,000 mg, Oral, Q8H PRN    hydrALAZINE, 10 mg, Intravenous, Q4H PRN    labetalol, 10 mg, Intravenous, Q6H PRN    magnesium oxide, 800 mg, Oral, PRN    magnesium oxide, 800 mg, Oral, PRN    OLANZapine, 5 mg, Intramuscular, Q8H PRN    ondansetron, 8 mg, Intravenous, Q6H PRN    oxyCODONE, 5 mg, Oral, Q4H PRN    potassium bicarbonate, 35 mEq, Oral, PRN    potassium bicarbonate, 50 mEq, Oral, PRN    potassium bicarbonate, 60 mEq, Oral, PRN    potassium, sodium phosphates, 2 packet, Oral, PRN    potassium, sodium phosphates, 2 packet, Oral, PRN    potassium, sodium phosphates, 2 packet, Oral, PRN    Allergies:   Review of patient's allergies indicates:  No Known Allergies    Vitals  Vitals:    05/22/24 0845   BP: (!) 167/79   Pulse: 75   Resp: (!) 22   Temp:         Labs/Imaging/Studies:  Recent Results (from the past 24 hour(s))   CBC auto differential    Collection Time: 05/22/24  2:25 AM   Result Value Ref Range    WBC 6.49 3.90 - 12.70 K/uL    RBC 3.81 (L) 4.60 - 6.20 M/uL    Hemoglobin 12.3 (L) 14.0 - 18.0 g/dL    Hematocrit 36.7 (L) 40.0 - 54.0 %    MCV 96 82 - 98 fL    MCH 32.3 (H) 27.0 - 31.0 pg    MCHC 33.5 32.0 - 36.0 g/dL    RDW 13.4 11.5 - 14.5 %    Platelets 200 150 - 450 K/uL    MPV 9.6 9.2 - 12.9 fL    Immature Granulocytes 0.2 0.0 - 0.5 %    Gran # (ANC) 4.1 1.8 - 7.7 K/uL    Immature Grans (Abs) 0.01 0.00 - 0.04 K/uL    Lymph # 1.9 1.0 - 4.8 K/uL    Mono # 0.4 0.3 - 1.0 K/uL    Eos # 0.1 0.0 - 0.5 K/uL    Baso # 0.02 0.00 - 0.20 K/uL    nRBC 0 0 /100 WBC    Gran % 62.4 38.0 - 73.0 %    Lymph % 29.1 18.0 - 48.0 %    Mono % 6.3 4.0 - 15.0 %    Eosinophil % 1.7 0.0 - 8.0 %    Basophil % 0.3 0.0 - 1.9 %    Differential Method Automated    Comprehensive metabolic panel    Collection Time: 05/22/24  2:25 AM   Result Value Ref Range    Sodium 137 136 - 145 mmol/L    Potassium 4.2 3.5 - 5.1 mmol/L    Chloride 106 95 - 110 mmol/L    CO2 23 23 - 29 mmol/L    Glucose 84 70 - 110 mg/dL    BUN 13 8 - 23 mg/dL    Creatinine 0.9 0.5 - 1.4 mg/dL    Calcium 9.0 8.7 - 10.5 mg/dL    Total Protein 6.3 6.0 - 8.4 g/dL    Albumin 2.8 (L) 3.5 - 5.2 g/dL    Total Bilirubin 0.3 0.1 - 1.0 mg/dL    Alkaline Phosphatase 96 55 - 135 U/L    AST 33 10 - 40 U/L    ALT 9 (L) 10 - 44 U/L    eGFR >60.0 >60 mL/min/1.73 m^2    Anion Gap 8 8 - 16 mmol/L   Magnesium    Collection Time: 05/22/24  2:25 AM   Result Value Ref Range    Magnesium 1.6 1.6 - 2.6 mg/dL   Phosphorus    Collection Time: 05/22/24  2:25 AM   Result Value Ref Range    Phosphorus 3.4 2.7 - 4.5 mg/dL     Imaging Results              X-Ray Chest 1 View (Final result)  Result time 05/17/24 22:02:05      Final result by Stefano Blair MD (05/17/24 22:02:05)                   Impression:      No acute intrathoracic process.  No  evidence of a pneumonia.      Electronically signed by: Stefano Blair MD  Date:    05/17/2024  Time:    22:02               Narrative:    EXAMINATION:  XR CHEST 1 VIEW    CLINICAL HISTORY:  eval PNA;    TECHNIQUE:  Single frontal view of the chest was performed.    COMPARISON:  12/28/2023    FINDINGS:  Monitoring EKG leads are present.  The trachea is unremarkable.  The cardiomediastinal silhouette is stable.  There is no evidence of free air beneath the hemidiaphragms.  There are no pleural effusions.  There is no evidence of a pneumothorax.  There is no evidence of pneumomediastinum.  No airspace opacity is present.  There are degenerative changes in the osseous structures.                                       CT Head Without Contrast (Final result)  Result time 05/17/24 20:30:06      Final result by Stefano Blair MD (05/17/24 20:30:06)                   Impression:      Interval postsurgical changes of right craniotomy for subdural hematoma evacuation with drainage catheter placement.  Decreased volume of the extra-axial collection with improved mass effect and midline shift.  Expected pneumocephalus overlies the right frontal cerebral convexity.    Stable subdural hematoma overlying the left cerebral convexity predominantly hyperattenuating.  No significant mass effect or midline shift.  Continued attention on follow-up recommended.    Chronic microvascular ischemic changes and generalized cerebral volume loss.    Additional findings as above.    Electronically signed by resident: Evon Talbert  Date:    05/17/2024  Time:    19:53    Electronically signed by: Stefano Blair MD  Date:    05/17/2024  Time:    20:30               Narrative:    EXAMINATION:  CT HEAD WITHOUT CONTRAST    CLINICAL HISTORY:  post-op SDH jacob holes;    TECHNIQUE:  Low dose axial CT images obtained throughout the head without the use of intravenous contrast.  Axial, sagittal and coronal reconstructions were performed.    COMPARISON:  MRI  05/17/2024; CT 12/28/2023    FINDINGS:  Postsurgical changes of right craniotomy for subdural hematoma evacuation with decreased volume of predominantly low-density extra-axial collection.  Draining catheter terminates within the collection.  Postsurgical pneumocephalus overlies the right frontal convexity.  The collection measures up to 1.5 cm in maximum thickness, previously 2.1 cm.  Stable mass effect upon the underlying parenchyma and right lateral ventricle with improved leftward midline shift, measuring 4 mm at the septum pellucidum, pre measurement of 7 mm.    Additional hyperattenuating extra-axial collection overlies the left frontal and parietal cerebral convexity measuring 5 mm in maximal thickness, similar when compared to same day MRI.  No significant mass effect upon the underlying parenchyma.    Generalized cerebral volume loss.  Minimal re-expansion of the right lateral ventricle.  No evidence of hydrocephalus.    Mild patchy hypoattenuation in the supratentorial white matter, nonspecific but most likely reflecting chronic small vessel ischemic changes.  No abnormal parenchymal abnormality to suggest acute intraparenchymal hemorrhage or major vascular distribution infarct.    No displaced calvarial fracture.  Mild soft tissue stranding and air overlies the operative site.  Postsurgical changes of bilateral globes.    Left maxillary mucous retention cyst.  Peripheral mucosal thickening of the right maxillary sinus.  Remaining paranasal sinuses and mastoid air cells are clear.                                        MRI Brain Without Contrast (Final result)  Result time 05/17/24 13:45:52      Final result by Cheng Fajardo MD (05/17/24 13:45:52)                   Impression:      Bilateral subdural hematomas, larger on the right, as further discussed above.  There is significant intracranial mass effect with leftward midline shift.    Underlying chronic small vessel ischemic change and cerebral  volume loss.    Neuro surgical consultation is advised.    This report was flagged in Epic as abnormal.      Electronically signed by: Cheng Fajardo MD  Date:    05/17/2024  Time:    13:45               Narrative:    EXAMINATION:  MRI BRAIN WITHOUT CONTRAST    CLINICAL HISTORY:  Transient ischemic attack (TIA);    TECHNIQUE:  Multiplanar multisequence MR imaging of the brain was performed without intravenous contrast.    Examination mildly degraded by patient motion artifact.    COMPARISON:  CT head 12/28/2023    FINDINGS:  Intracranial Compartment:    There is a large subdural hematoma over the right cerebral hemisphere.  Collection measures up to 2.1 cm in thickness.  Collection is predominantly hyperintense on T1 and T2-weighted images, with some interspersed areas of more heterogeneous signal anteriorly and posteriorly.  Minimal septation evident.  Additional subdural hemorrhage over the left cerebral convexity with similar imaging characteristics.  This collection measuring just 0.6 cm in maximal thickness.  No extra-axial blood or fluid collections elsewhere.  There is significant intracranial mass effect.  Diffuse right-sided sulcal and ventricular crowding, with approximately 0.7 cm of leftward midline shift measured at the septum pellucidum.    Mild chronic small vessel ischemic change throughout the supratentorial white matter.  No evidence of recent or remote major vascular distribution infarct.  No evidence of recent or remote parenchymal hemorrhage.    Moderate cerebral volume loss.  No ventricular entrapment or obstructive hydrocephalus.    Normal vascular flow voids are preserved.    Skull/Extracranial Contents (limited evaluation):    Bone marrow signal intensity is unremarkable.    Postsurgical change both globes.    Findings were relayed to the ordering provider (Drake) via the epic secure chat system at approximately 13:40.

## 2024-05-22 NOTE — SUBJECTIVE & OBJECTIVE
Interval History:  Please see hospital course above for full details    Review of Systems   Constitutional:  Positive for activity change and fatigue. Negative for fever.   Respiratory: Negative.     Cardiovascular: Negative.    Gastrointestinal: Negative.    Musculoskeletal:         B/l hand pain   Neurological:  Positive for numbness. Negative for seizures and headaches.   Psychiatric/Behavioral:  Positive for confusion and decreased concentration. Negative for suicidal ideas. The patient is nervous/anxious.        Objective:     Vitals:  Temp: 98.6 °F (37 °C)  Pulse: 83  Rhythm: normal sinus rhythm  BP: 124/62  MAP (mmHg): 88  Resp: 15  SpO2: 97 %    Temp  Min: 97.2 °F (36.2 °C)  Max: 98.6 °F (37 °C)  Pulse  Min: 55  Max: 111  BP  Min: 107/59  Max: 214/79  MAP (mmHg)  Min: 76  Max: 129  Resp  Min: 11  Max: 40  SpO2  Min: 85 %  Max: 100 %    05/20 0701 - 05/21 0700  In: 994.4 [P.O.:472; I.V.:522.4]  Out: 943 [Urine:925; Drains:18]   Unmeasured Output  Urine Occurrence: 1  Stool Occurrence: 1  Pad Count: 1        Physical Exam  General Appearance: Not in acute hemodynamic distress  Mental Status Exam: awake, alert, aware, oriented to person, place and time, partially aware of situation. Perseverating on hands and stating that he needs to go back to work (unclear if actually still employed)  Cranial Nerves: VFF, EOM full, pupils are equal and reactive to light bilaterally, symmetric facial sensory, symmetric facial motor; severe dysarthria w/ pronounced [place] accent, however no clear facial asymmetry   Motor: no drift in the UE/LE. Power is 5/5 in both UE/LE. Normal tone.  Sensory: Symmetric to LT in all 4 limbs   Coordination: Unable to assess  Vascular: S1/S2 of normal intensity, no S3/S4 appreciated, no murmurs appreciated  Lungs: CTA bilaterally without wheezing  Abdomen: Soft, non-distended, non-tender, BS +         Medications:  Continuous Scheduledbacitracin zinc, , BID  folic acid, 1 mg, Daily  gabapentin,  300 mg, TID  heparin (porcine), 5,000 Units, Q8H  multivitamin, 1 tablet, Daily  PHENobarbitaL, 30 mg, BID   Followed by  [START ON 5/23/2024] PHENobarbitaL, 15 mg, BID   Followed by  [START ON 5/25/2024] PHENobarbitaL, 10 mg, BID   Followed by  [START ON 5/26/2024] PHENobarbitaL, 5 mg, BID  polyethylene glycol, 17 g, Daily  QUEtiapine, 25 mg, QHS  senna-docusate 8.6-50 mg, 1 tablet, Daily  thiamine, 100 mg, Daily    PRNacetaminophen, 1,000 mg, Q8H PRN  hydrALAZINE, 10 mg, Q4H PRN  labetalol, 10 mg, Q6H PRN  magnesium oxide, 800 mg, PRN  magnesium oxide, 800 mg, PRN  OLANZapine, 5 mg, Q8H PRN  ondansetron, 8 mg, Q6H PRN  oxyCODONE, 5 mg, Q4H PRN  potassium bicarbonate, 35 mEq, PRN  potassium bicarbonate, 50 mEq, PRN  potassium bicarbonate, 60 mEq, PRN  potassium, sodium phosphates, 2 packet, PRN  potassium, sodium phosphates, 2 packet, PRN  potassium, sodium phosphates, 2 packet, PRN      Today I personally reviewed pertinent imaging, laboratory results, notably: CT head reviewed. Labs unremarkable    Diet  Diet Adult Regular (IDDSI Level 7) PEC/CEC - Styrofoam  Diet Adult Regular (IDDSI Level 7) PEC/CEC - Styrofoam

## 2024-05-22 NOTE — PLAN OF CARE
Hospital Medicine ICU Acceptance Note    Date of Admit: 5/17/2024  Date of Transfer: 5/22/2024  [place], C/J, L, Onc (IV chemo w/in 1 month), Gyn/Onc, or other special case?: no   ICU team stepping patient down: [facility]   Accepting  team: [facility]    Brief History of Present Illness:      Moses Peng is a 88 y.o. male with PMHx admitted to [facility] w/ bilateral SDH R>L and MLS. Per chart review, he presented to the ED by EMS stating he wants to kill himself. Came yesterday with c/o R hand paresthesias and was discharged. Currently PEC'ed per psych evaluation.  Denies falls. MRI with large right SDH with midline shift. Denies use of AC/APT. NSGY consulted for evaluation. NPO currently for OR  for SDH evac w/NSGY. Patient admitted to [facility] for close monitoring and higher level of care.         Hospital Course: 05/18/2024 Started miralax, electrolytes, and hydralazine prn, dced cardene, CT in am  05/19/2024 NAEON. CTH overnight with decreased right SDH and MLS with stable mixed amount of left SDH. Patient acutely agitated this AM, threatening to leave and sitting upright in bed. Security called to bedside. Agitation settled after receiving 10mg haldol IM, 2mg versed, and 25mg benadryl. Patient started on precedex gtt for sedation. Keppra changed to briviact in the hopes of reducing agitation. Seroquel 25mg added qhs. Drain in place, NSGY to pull drain tomorrow.  05/20/2024 Persistent agitation overnight, on precedex gtt -> less agitated this AM, appears tired, likely from multiple medications over last 24 hrs. Weaning precedex gtt, plan for zyprexa 5 mg q8hrs PRN per psych recs. Subdural drain d/c'd by NSGY, HOB liberalized.   05/21/2024 Post-subdural drain removal CT w/ slight increase in pneumocephalus, no re-accumulation of SDH, midline shift/mass effect unchanged from prior. Mental status markedly improved this AM, does remain mildly altered (perseverating on poorly defined pain and numbness in hands), but no longer requiring  4 pt restraints, precedex d/c'd.      To Do / Pending Studies / Follow ups:    PEC/CECed - psychiatry recs   phenobarbital taper     Patient has been accepted by Hospital Medicine Team [facility] , who will assume care of the patient upon arrival to the floor from the ICU. Please contact ICU team with any concerns prior to arrival. Please contact [facility] at [phone] or [phone] (please do NOT leave a voicemail) when patient arrives to the floor.    Varun Graham MD  Attending Staff Physician  [facility]  pager- [phone]  Spectralpvl - [phone]

## 2024-05-22 NOTE — PROGRESS NOTES
[facility]  Neurosurgery  Progress Note    Subjective:     History of Present Illness: 88 M unknown PMH presents to the ED by EMS stating he wants to kill himself. Came yesterday with c/o R hand paresthesias and was discharged. Currently under PEC per psych evaluation. He states he may have hit the left side of his head on the door earlier this week. Denies falls. Denies headache, seizures, weakness, b/b dysfunction. MRI with large right SDH with midline shift. Denies use of AC/APT. NSGY consulted for evaluation.     Post-Op Info:  Procedure(s) (LRB):  CRANIOTOMY, FOR SUBDURAL HEMATOMA EVACUATION (Right)   5 Days Post-Op   Interval History: 5/22: CTH post drain pull stable. Exam stable, off pcdx    Medications:  Continuous Infusions:      Scheduled Meds:   bacitracin zinc   Topical (Top) BID    folic acid  1 mg Oral Daily    gabapentin  300 mg Oral TID    heparin (porcine)  5,000 Units Subcutaneous Q8H    multivitamin  1 tablet Oral Daily    PHENobarbitaL  30 mg Oral BID    Followed by    [START ON 5/23/2024] PHENobarbitaL  15 mg Oral BID    Followed by    [START ON 5/25/2024] PHENobarbitaL  10 mg Oral BID    Followed by    [START ON 5/26/2024] PHENobarbitaL  5 mg Oral BID    polyethylene glycol  17 g Oral Daily    QUEtiapine  25 mg Oral QHS    senna-docusate 8.6-50 mg  1 tablet Oral Daily    thiamine  100 mg Oral Daily     PRN Meds:  Current Facility-Administered Medications:     acetaminophen, 1,000 mg, Oral, Q8H PRN    hydrALAZINE, 10 mg, Intravenous, Q4H PRN    labetalol, 10 mg, Intravenous, Q6H PRN    magnesium oxide, 800 mg, Oral, PRN    magnesium oxide, 800 mg, Oral, PRN    OLANZapine, 5 mg, Intramuscular, Q8H PRN    ondansetron, 8 mg, Intravenous, Q6H PRN    oxyCODONE, 5 mg, Oral, Q4H PRN    potassium bicarbonate, 35 mEq, Oral, PRN    potassium bicarbonate, 50 mEq, Oral, PRN    potassium bicarbonate, 60 mEq, Oral, PRN    potassium, sodium phosphates, 2 packet, Oral, PRN    potassium,  "sodium phosphates, 2 packet, Oral, PRN    potassium, sodium phosphates, 2 packet, Oral, PRN     Review of Systems  Objective:     Weight: 61.2 kg (135 lb)  Body mass index is 21.14 kg/m².  Vital Signs (Most Recent):  Temp: 98.2 °F (36.8 °C) (05/22/24 0301)  Pulse: 76 (05/22/24 0601)  Resp: 20 (05/22/24 0601)  BP: (!) 196/88 (05/22/24 0630)  SpO2: 99 % (05/22/24 0601) Vital Signs (24h Range):  Temp:  [97.2 °F (36.2 °C)-98.8 °F (37.1 °C)] 98.2 °F (36.8 °C)  Pulse:  [] 76  Resp:  [11-40] 20  SpO2:  [85 %-100 %] 99 %  BP: (111-214)/() 196/88                           Male External Urinary Catheter 05/19/24 1502 (Active)   Collection Container Urimeter 05/21/24 0701   Securement Method secured to top of thigh w/ adhesive device 05/21/24 0701   Skin no redness;no breakdown 05/21/24 0701   Tolerance no signs/symptoms of discomfort 05/21/24 0701   Output (mL) 225 mL 05/21/24 0301   Catheter Change Date 05/20/24 05/21/24 0301   Catheter Change Time 1215 05/21/24 0301          Physical Exam         Neurosurgery Physical Exam  [id number]  AOx2  PERRL  EOMI  Face Symmetric  Tongue midline  BUE 5/5  BLE 5/5  No drift      Significant Labs:  Recent Labs   Lab 05/21/24 0143 05/22/24 0225   GLU 79 84    137   K 4.1 4.2   * 106   CO2 21* 23   BUN 18 13   CREATININE 1.0 0.9   CALCIUM 8.3* 9.0   MG 1.8 1.6     Recent Labs   Lab 05/21/24 0143 05/22/24 0225   WBC 4.60 6.49   HGB 11.3* 12.3*   HCT 34.0* 36.7*    200     No results for input(s): "LABPT", "INR", "APTT" in the last 48 hours.  Microbiology Results (last 7 days)       ** No results found for the last 168 hours. **          All pertinent labs from the last 24 hours have been reviewed.    Significant Diagnostics:  I have reviewed and interpreted all pertinent imaging results/findings within the past 24 hours.  Assessment/Plan:     * Subdural hematoma  87 yo male with large right SDH with 7 mm MLS, GCS 15 now s/p burrhole craniotomy for SDH " evacuation.     --Patient admitted to [facility] on telemetry; sitter bedside; currently PEC'd      -q1h neurochecks in ICU,   - Ttf when able per ICU  --Post op CTH satisfactory; repeat CT 5/19 with improving pneumocephalus. AM CTH pending s/p SD drain pull  --SBP <140  --Na >135  --Keppra 500 BID x5d  --Liberalize HOB now that subdural drain has been removed  --Rec Neuro IR consultation for MMA embolization during this admission - planning for 5/22  --PT/OT/OOBTC  --PRN pain control with aggressive bowel regimen while on narcotics  --Continue to monitor clinically, notify NSGY immediately with any changes in neuro status    Dispo: ongoing, sitter bedside    Closed with Monocryl    D/w Dr. Price by NSGY team    Please contact the on call neurosurgery provider with questions or concerns. Can be reached via on-call schedule and/or .              Chuy Mcgregor MD  Neurosurgery  [facility]

## 2024-05-22 NOTE — NURSING TRANSFER
Nursing Transfer Note      5/22/2024   3:25 PM    Nurse giving handoff: Ion Cortes RN  Nurse receiving handoff:DIANNA Koch    Reason patient is being transferred: Stepdown    Transfer To:     Transfer via wheelchair    Transfer with cardiac monitoring    Transported by RN & Jeana PCT    Transfer Vital Signs:  Blood Pressure:153/66  Heart Rate:90  O2:100  Temperature:98.5  Respirations:16    Telemetry: Box Number [id number], Rate NSR, Rhythm 92, and Telemetry  Trav  Order for Tele Monitor? Yes    Additional Lines: n/a    4eyes on Skin: yes    Medicines sent: n/a    Any special needs or follow-up needed: n/a    Patient belongings transferred with patient: Yes    Chart send with patient: Yes    Notified: n/a pt CEC    Patient reassessed at: 05/22/2024 1520     Upon arrival to floor: cardiac monitor applied, patient oriented to room, call bell in reach, and bed in lowest position

## 2024-05-22 NOTE — PLAN OF CARE
"[facility] Care Plan    POC reviewed with Moses Peng  at 0300. Pt verbalized understanding. Questions and concerns addressed. No acute events overnight. Pt progressing toward goals.  See below and flowsheets for full assessment and VS info.     -Angio pending      Is this a stroke patient? no    Neuro:  [place] Coma Scale  Best Eye Response: 4-->(E4) spontaneous  Best Motor Response: 6-->(M6) obeys commands  Best Verbal Response: 5-->(V5) oriented  [place] Coma Scale Score: 15  Assessment Qualifiers: patient not sedated/intubated, no eye obstruction present  Pupil PERRLA: yes     24hr Temp:  [97.2 °F (36.2 °C)-98.8 °F (37.1 °C)]     CV:   Rhythm: normal sinus rhythm  BP goals:   SBP < 160  MAP > 65    Resp:           Plan: N/A    GI/:     Diet/Nutrition Received: (S) NPO  Last Bowel Movement: 05/21/24  Voiding Characteristics: external catheter    Intake/Output Summary (Last 24 hours) at 5/22/2024 0650  Last data filed at 5/22/2024 0601  Gross per 24 hour   Intake 796.3 ml   Output 720 ml   Net 76.3 ml     Unmeasured Output  Urine Occurrence: 1  Stool Occurrence: 1  Pad Count: 1    Labs/Accuchecks:  Recent Labs   Lab 05/22/24  0225   WBC 6.49   RBC 3.81*   HGB 12.3*   HCT 36.7*         Recent Labs   Lab 05/22/24  0225      K 4.2   CO2 23      BUN 13   CREATININE 0.9   ALKPHOS 96   ALT 9*   AST 33   BILITOT 0.3      Recent Labs   Lab 05/17/24  1417   INR 1.0   APTT 27.4    No results for input(s): "CPK", "CPKMB", "TROPONINI", "MB" in the last 168 hours.    Electrolytes: N/A - electrolytes WDL  Accuchecks: none    Gtts:      LDA/Wounds:    Nurses Note -- 4 Eyes    Is there altered skin present? yes     Please check the following boxes that apply:   [] LDA Added if Not in Epic (Describe Wound)   [] New Altered Skin Integrity was Present on Admit and Documented in LDA   [] Wound Image Taken    Wound Care Consulted? No    Second RN/Staff Member:  DIANNA Montaño  "

## 2024-05-22 NOTE — ASSESSMENT & PLAN NOTE
Multifactorial. Suspect 2/2 b/l SDH +/- component of ICU delirium      - Some family report of EtOH use prior to admission, unclear quantity; no evidence of EtOH w/d to date, monitor  - On phenobarb taper  - Start thiamine/folate/MV   - Improved this AM     5/22 AAOX 3.  Continue Seroquel 25 mg PO nightly and Zyprexa 5 mg q8 hours PRN for non-redirectable agitation associated with delirium

## 2024-05-22 NOTE — ASSESSMENT & PLAN NOTE
87 yo male with large right SDH with 7 mm MLS, GCS 15 now s/p burrhole craniotomy for SDH evacuation.     --Patient admitted to [facility] on telemetry; sitter bedside; currently PEC'd      -q1h neurochecks in ICU,   - Ttf when able per ICU  --Post op CTH satisfactory; repeat CT 5/19 with improving pneumocephalus. AM CTH pending s/p SD drain pull  --SBP <140  --Na >135  --Keppra 500 BID x5d  --Liberalize HOB now that subdural drain has been removed  --Rec Neuro IR consultation for MMA embolization during this admission - planning for 5/22  --PT/OT/OOBTC  --PRN pain control with aggressive bowel regimen while on narcotics  --Continue to monitor clinically, notify NSGY immediately with any changes in neuro status    Dispo: ongoing, sitter bedside    Closed with Monocryl    D/w Dr. Price by NSGY team    Please contact the on call neurosurgery provider with questions or concerns. Can be reached via on-call schedule and/or .

## 2024-05-22 NOTE — PROGRESS NOTES
"CONSULTATION LIAISON PSYCHIATRY PROGRESS NOTE    Patient Name: Moses Peng  MRN: [id number]  Patient Class: IP- Inpatient  Admission Date: 5/17/2024  Attending Physician: Nilad Thompson MD      SUBJECTIVE:   Moses Peng is a 88 y.o. male with no [place] past psychiatric history & no past pertinent medical history presents to the ED with numbness in his RUE and RLE, and admitted to the hospital for Subdural hematoma     Psychiatry consulted for "suicidal ideation."    Today, patient is lying in bed with blanket over his head, not in restraints or in acute distress. He is oriented to person, place, situation, and only to month (able to state May 2024).      He continues to perseverate on the pain and numbness in both of his hands up to wrist, same as yesterday. He denies any effect from gabapentin, and states heat pack does not provide relief.     He continues to deny SI, HI, AH, or VH during interview. Per SW note (Vanita Melendrez, 5/21/2024), his daughter claims patient does not have guns; however, patient admits having a gun in an unlocked  his bedroom. Although he was willing to get rid of it yesterday, he now states he will keep it. Patient reports his gun has been there for 40 years and he neither looks at nor uses it. Patient reports a strained relationship with his daughter.     He does not raise other concerns.       OBJECTIVE:    Mental Status Exam:  General Appearance: appears stated age, normal weight, dressed in hospital garb, lying in bed, in no acute distress, disheveled  Behavior: normal; cooperative; reasonably friendly, pleasant, and polite; appropriate eye-contact; under good behavioral control  Involuntary Movements and Motor Activity: no abnormal involuntary movements noted; no tics, no tremors, no akathisia, no dystonia, no evidence of tardive dyskinesia; no psychomotor agitation or retardation  Gait and Station: unable to assess - patient lying down or seated  Speech and Language: spontaneous, " "talkative, interruptible, slurred  Mood: "fine"  Affect: normal, euthymic, reactive, full-range, mood-congruent, appropriate to situation and context  Thought Process and Associations: linear, goal-directed, organized, logical  Thought Content and Perceptions:: no suicidal or homicidal ideation, no auditory or visual hallucinations, no paranoid ideation, no ideas of reference, no evidence of delusions or psychosis  Sensorium and Orientation: alert, oriented to person and place, oriented partially to time, oriented to year, able to state season, not oriented to situation.  Recent and Remote Memory: grossly intact  Attention and Concentration: grossly intact, attentive to the conversation and not readily distractible  Fund of Knowledge: grossly intact, used appropriate vocabulary and demonstrated an awareness of current events, consistent with educational level achieved  Insight: limited  Judgment: limited    CAM ICU positive? No, answered questions correctly; did not assess SAVEAHAART due to physical pain in hands.      ASSESSMENT & RECOMMENDATIONS   Encephalopathy 2/2 subdural hematoma,       Delirium Behavior Management  Minimize use of physical restraints if able   Keep window shades open and room lit during day and room dim at night in order to promote normal sleep-wake cycles  Encourage family at bedside. [place] patient often to situation, location, date.  Continue to Limit or Discontinue use of Narcotics, Benzos and Anticholinergic medications as they may worsen delirium.  Continue medical workup for causative etiology of Delirium.         RISK ASSESSMENT  Currently under CEC; will continue out of caution and re-evaluate     FOLLOW UP  Will continue to follow     DISPOSITION - once medically cleared:   Defer to medical team        Please contact ON CALL psychiatry service (24/7) for any acute issues that may arise.    Nicolas Conte, MS-3  [employer]    Parts of this note were prepared by medical " student. I have individually assessed the patient and collaborated with above student on documentation.    Joe Breen MD  [facility], PGY-IV   CL Psychiatry  [facility]  5/22/2024 12:18 PM        --------------------------------------------------------------------------------------------------------------------------------------------------------------------------------------------------------------------------------------    CONTINUED OBJECTIVE clinical data & findings reviewed and noted for above decision making    Current Medications:   Scheduled Meds:    bacitracin zinc   Topical (Top) BID    folic acid  1 mg Oral Daily    gabapentin  600 mg Oral TID    heparin (porcine)  5,000 Units Subcutaneous Q8H    losartan  25 mg Oral Daily    multivitamin  1 tablet Oral Daily    PHENobarbitaL  30 mg Oral BID    Followed by    [START ON 5/23/2024] PHENobarbitaL  15 mg Oral BID    Followed by    [START ON 5/25/2024] PHENobarbitaL  10 mg Oral BID    Followed by    [START ON 5/26/2024] PHENobarbitaL  5 mg Oral BID    polyethylene glycol  17 g Oral Daily    QUEtiapine  25 mg Oral QHS    senna-docusate 8.6-50 mg  1 tablet Oral Daily    thiamine  100 mg Oral Daily     PRN Meds:   Current Facility-Administered Medications:     acetaminophen, 1,000 mg, Oral, Q8H PRN    hydrALAZINE, 10 mg, Intravenous, Q4H PRN    labetalol, 10 mg, Intravenous, Q6H PRN    magnesium oxide, 800 mg, Oral, PRN    magnesium oxide, 800 mg, Oral, PRN    OLANZapine, 5 mg, Intramuscular, Q8H PRN    ondansetron, 8 mg, Intravenous, Q6H PRN    oxyCODONE, 5 mg, Oral, Q4H PRN    potassium bicarbonate, 35 mEq, Oral, PRN    potassium bicarbonate, 50 mEq, Oral, PRN    potassium bicarbonate, 60 mEq, Oral, PRN    potassium, sodium phosphates, 2 packet, Oral, PRN    potassium, sodium phosphates, 2 packet, Oral, PRN    potassium, sodium phosphates, 2 packet, Oral, PRN    Allergies:   Review of patient's allergies indicates:  No  Known Allergies    Vitals  Vitals:    05/22/24 1530   BP: 135/61   Pulse: 90   Resp:    Temp:        Labs/Imaging/Studies:  Recent Results (from the past 24 hour(s))   CBC auto differential    Collection Time: 05/22/24  2:25 AM   Result Value Ref Range    WBC 6.49 3.90 - 12.70 K/uL    RBC 3.81 (L) 4.60 - 6.20 M/uL    Hemoglobin 12.3 (L) 14.0 - 18.0 g/dL    Hematocrit 36.7 (L) 40.0 - 54.0 %    MCV 96 82 - 98 fL    MCH 32.3 (H) 27.0 - 31.0 pg    MCHC 33.5 32.0 - 36.0 g/dL    RDW 13.4 11.5 - 14.5 %    Platelets 200 150 - 450 K/uL    MPV 9.6 9.2 - 12.9 fL    Immature Granulocytes 0.2 0.0 - 0.5 %    Gran # (ANC) 4.1 1.8 - 7.7 K/uL    Immature Grans (Abs) 0.01 0.00 - 0.04 K/uL    Lymph # 1.9 1.0 - 4.8 K/uL    Mono # 0.4 0.3 - 1.0 K/uL    Eos # 0.1 0.0 - 0.5 K/uL    Baso # 0.02 0.00 - 0.20 K/uL    nRBC 0 0 /100 WBC    Gran % 62.4 38.0 - 73.0 %    Lymph % 29.1 18.0 - 48.0 %    Mono % 6.3 4.0 - 15.0 %    Eosinophil % 1.7 0.0 - 8.0 %    Basophil % 0.3 0.0 - 1.9 %    Differential Method Automated    Comprehensive metabolic panel    Collection Time: 05/22/24  2:25 AM   Result Value Ref Range    Sodium 137 136 - 145 mmol/L    Potassium 4.2 3.5 - 5.1 mmol/L    Chloride 106 95 - 110 mmol/L    CO2 23 23 - 29 mmol/L    Glucose 84 70 - 110 mg/dL    BUN 13 8 - 23 mg/dL    Creatinine 0.9 0.5 - 1.4 mg/dL    Calcium 9.0 8.7 - 10.5 mg/dL    Total Protein 6.3 6.0 - 8.4 g/dL    Albumin 2.8 (L) 3.5 - 5.2 g/dL    Total Bilirubin 0.3 0.1 - 1.0 mg/dL    Alkaline Phosphatase 96 55 - 135 U/L    AST 33 10 - 40 U/L    ALT 9 (L) 10 - 44 U/L    eGFR >60.0 >60 mL/min/1.73 m^2    Anion Gap 8 8 - 16 mmol/L   Magnesium    Collection Time: 05/22/24  2:25 AM   Result Value Ref Range    Magnesium 1.6 1.6 - 2.6 mg/dL   Phosphorus    Collection Time: 05/22/24  2:25 AM   Result Value Ref Range    Phosphorus 3.4 2.7 - 4.5 mg/dL     Imaging Results              X-Ray Chest 1 View (Final result)  Result time 05/17/24 22:02:05      Final result by Stefano Blair MD  (05/17/24 22:02:05)                   Impression:      No acute intrathoracic process.  No evidence of a pneumonia.      Electronically signed by: Stefano Blair MD  Date:    05/17/2024  Time:    22:02               Narrative:    EXAMINATION:  XR CHEST 1 VIEW    CLINICAL HISTORY:  eval PNA;    TECHNIQUE:  Single frontal view of the chest was performed.    COMPARISON:  12/28/2023    FINDINGS:  Monitoring EKG leads are present.  The trachea is unremarkable.  The cardiomediastinal silhouette is stable.  There is no evidence of free air beneath the hemidiaphragms.  There are no pleural effusions.  There is no evidence of a pneumothorax.  There is no evidence of pneumomediastinum.  No airspace opacity is present.  There are degenerative changes in the osseous structures.                                       CT Head Without Contrast (Final result)  Result time 05/17/24 20:30:06      Final result by Stefano Blair MD (05/17/24 20:30:06)                   Impression:      Interval postsurgical changes of right craniotomy for subdural hematoma evacuation with drainage catheter placement.  Decreased volume of the extra-axial collection with improved mass effect and midline shift.  Expected pneumocephalus overlies the right frontal cerebral convexity.    Stable subdural hematoma overlying the left cerebral convexity predominantly hyperattenuating.  No significant mass effect or midline shift.  Continued attention on follow-up recommended.    Chronic microvascular ischemic changes and generalized cerebral volume loss.    Additional findings as above.    Electronically signed by resident: Evon Talbert  Date:    05/17/2024  Time:    19:53    Electronically signed by: Stefano Blair MD  Date:    05/17/2024  Time:    20:30               Narrative:    EXAMINATION:  CT HEAD WITHOUT CONTRAST    CLINICAL HISTORY:  post-op SDH jacob holes;    TECHNIQUE:  Low dose axial CT images obtained throughout the head without the use of intravenous  contrast.  Axial, sagittal and coronal reconstructions were performed.    COMPARISON:  MRI 05/17/2024; CT 12/28/2023    FINDINGS:  Postsurgical changes of right craniotomy for subdural hematoma evacuation with decreased volume of predominantly low-density extra-axial collection.  Draining catheter terminates within the collection.  Postsurgical pneumocephalus overlies the right frontal convexity.  The collection measures up to 1.5 cm in maximum thickness, previously 2.1 cm.  Stable mass effect upon the underlying parenchyma and right lateral ventricle with improved leftward midline shift, measuring 4 mm at the septum pellucidum, pre measurement of 7 mm.    Additional hyperattenuating extra-axial collection overlies the left frontal and parietal cerebral convexity measuring 5 mm in maximal thickness, similar when compared to same day MRI.  No significant mass effect upon the underlying parenchyma.    Generalized cerebral volume loss.  Minimal re-expansion of the right lateral ventricle.  No evidence of hydrocephalus.    Mild patchy hypoattenuation in the supratentorial white matter, nonspecific but most likely reflecting chronic small vessel ischemic changes.  No abnormal parenchymal abnormality to suggest acute intraparenchymal hemorrhage or major vascular distribution infarct.    No displaced calvarial fracture.  Mild soft tissue stranding and air overlies the operative site.  Postsurgical changes of bilateral globes.    Left maxillary mucous retention cyst.  Peripheral mucosal thickening of the right maxillary sinus.  Remaining paranasal sinuses and mastoid air cells are clear.                                        MRI Brain Without Contrast (Final result)  Result time 05/17/24 13:45:52      Final result by Cheng Fajardo MD (05/17/24 13:45:52)                   Impression:      Bilateral subdural hematomas, larger on the right, as further discussed above.  There is significant intracranial mass effect with  leftward midline shift.    Underlying chronic small vessel ischemic change and cerebral volume loss.    Neuro surgical consultation is advised.    This report was flagged in Epic as abnormal.      Electronically signed by: Cheng Fajardo MD  Date:    05/17/2024  Time:    13:45               Narrative:    EXAMINATION:  MRI BRAIN WITHOUT CONTRAST    CLINICAL HISTORY:  Transient ischemic attack (TIA);    TECHNIQUE:  Multiplanar multisequence MR imaging of the brain was performed without intravenous contrast.    Examination mildly degraded by patient motion artifact.    COMPARISON:  CT head 12/28/2023    FINDINGS:  Intracranial Compartment:    There is a large subdural hematoma over the right cerebral hemisphere.  Collection measures up to 2.1 cm in thickness.  Collection is predominantly hyperintense on T1 and T2-weighted images, with some interspersed areas of more heterogeneous signal anteriorly and posteriorly.  Minimal septation evident.  Additional subdural hemorrhage over the left cerebral convexity with similar imaging characteristics.  This collection measuring just 0.6 cm in maximal thickness.  No extra-axial blood or fluid collections elsewhere.  There is significant intracranial mass effect.  Diffuse right-sided sulcal and ventricular crowding, with approximately 0.7 cm of leftward midline shift measured at the septum pellucidum.    Mild chronic small vessel ischemic change throughout the supratentorial white matter.  No evidence of recent or remote major vascular distribution infarct.  No evidence of recent or remote parenchymal hemorrhage.    Moderate cerebral volume loss.  No ventricular entrapment or obstructive hydrocephalus.    Normal vascular flow voids are preserved.    Skull/Extracranial Contents (limited evaluation):    Bone marrow signal intensity is unremarkable.    Postsurgical change both globes.    Findings were relayed to the ordering provider (Drake) via the epic secure chat system at  approximately 13:40.

## 2024-05-22 NOTE — HOSPITAL COURSE
Pt underwent SDH  evaucation on 5/1/24. Repeat CTH   with decreased right SDH and MLS with stable mixed amount of left SDH. Patient started on precedex gtt for agitation. Keppra changed to briviact in the hopes of reducing agitation. Seroquel 25mg added qhs. On 5/20/24 Subdural drain d/c'd by NSGY, HOB liberalized. Post-subdural drain removal CT w/ slight increase in pneumocephalus, no re-accumulation of SDH, midline shift/mass effect unchanged from prior. Mental status markedly improved , precedex was dc'd.  Pt was CEC'd due to suicidal ideation. Per NSGY recs  underwent MMA Embolization on 5/25 w/ IR; stable post procedure head CT w/ clearance for discharge from Neurosurgery as well as Psychiatry, see pec was lifted.  Patient has met maximum benefit from hospitalization is clinically stable for discharge.  Was related to the daughter low reached in detail about therapy concerns about the patient falling and being unsafe to be home alone, she affirmed that she will handle the patient and if needed she will reconsider placement.    Clear lungs bilaterally, unlabored breathing, on room air, no cyanosis   Heart sounds indicate a regular rate and rhythm  Awake alert, no acute distress   Pupils equal bilaterally   Extraocular motions intact   No facial droop, no slurred speech  5/5 proximal upper and lower extremity strength bilaterally including fist  and hip flexor strength   No obvious lower extremity edema     Patient denied any chest pain headache or suicidal ideation in the day of discharge

## 2024-05-22 NOTE — SUBJECTIVE & OBJECTIVE
Interval History: 5/22: CTH post drain pull stable. Exam stable, off pcdx    Medications:  Continuous Infusions:      Scheduled Meds:   bacitracin zinc   Topical (Top) BID    folic acid  1 mg Oral Daily    gabapentin  300 mg Oral TID    heparin (porcine)  5,000 Units Subcutaneous Q8H    multivitamin  1 tablet Oral Daily    PHENobarbitaL  30 mg Oral BID    Followed by    [START ON 5/23/2024] PHENobarbitaL  15 mg Oral BID    Followed by    [START ON 5/25/2024] PHENobarbitaL  10 mg Oral BID    Followed by    [START ON 5/26/2024] PHENobarbitaL  5 mg Oral BID    polyethylene glycol  17 g Oral Daily    QUEtiapine  25 mg Oral QHS    senna-docusate 8.6-50 mg  1 tablet Oral Daily    thiamine  100 mg Oral Daily     PRN Meds:  Current Facility-Administered Medications:     acetaminophen, 1,000 mg, Oral, Q8H PRN    hydrALAZINE, 10 mg, Intravenous, Q4H PRN    labetalol, 10 mg, Intravenous, Q6H PRN    magnesium oxide, 800 mg, Oral, PRN    magnesium oxide, 800 mg, Oral, PRN    OLANZapine, 5 mg, Intramuscular, Q8H PRN    ondansetron, 8 mg, Intravenous, Q6H PRN    oxyCODONE, 5 mg, Oral, Q4H PRN    potassium bicarbonate, 35 mEq, Oral, PRN    potassium bicarbonate, 50 mEq, Oral, PRN    potassium bicarbonate, 60 mEq, Oral, PRN    potassium, sodium phosphates, 2 packet, Oral, PRN    potassium, sodium phosphates, 2 packet, Oral, PRN    potassium, sodium phosphates, 2 packet, Oral, PRN     Review of Systems  Objective:     Weight: 61.2 kg (135 lb)  Body mass index is 21.14 kg/m².  Vital Signs (Most Recent):  Temp: 98.2 °F (36.8 °C) (05/22/24 0301)  Pulse: 76 (05/22/24 0601)  Resp: 20 (05/22/24 0601)  BP: (!) 196/88 (05/22/24 0630)  SpO2: 99 % (05/22/24 0601) Vital Signs (24h Range):  Temp:  [97.2 °F (36.2 °C)-98.8 °F (37.1 °C)] 98.2 °F (36.8 °C)  Pulse:  [] 76  Resp:  [11-40] 20  SpO2:  [85 %-100 %] 99 %  BP: (111-214)/() 196/88                           Male External Urinary Catheter 05/19/24 1502 (Active)   Collection  "Container Urimeter 05/21/24 0701   Securement Method secured to top of thigh w/ adhesive device 05/21/24 0701   Skin no redness;no breakdown 05/21/24 0701   Tolerance no signs/symptoms of discomfort 05/21/24 0701   Output (mL) 225 mL 05/21/24 0301   Catheter Change Date 05/20/24 05/21/24 0301   Catheter Change Time 1215 05/21/24 0301          Physical Exam         Neurosurgery Physical Exam  [id number]  AOx2  PERRL  EOMI  Face Symmetric  Tongue midline  BUE 5/5  BLE 5/5  No drift      Significant Labs:  Recent Labs   Lab 05/21/24  0143 05/22/24  0225   GLU 79 84    137   K 4.1 4.2   * 106   CO2 21* 23   BUN 18 13   CREATININE 1.0 0.9   CALCIUM 8.3* 9.0   MG 1.8 1.6     Recent Labs   Lab 05/21/24  0143 05/22/24  0225   WBC 4.60 6.49   HGB 11.3* 12.3*   HCT 34.0* 36.7*    200     No results for input(s): "LABPT", "INR", "APTT" in the last 48 hours.  Microbiology Results (last 7 days)       ** No results found for the last 168 hours. **          All pertinent labs from the last 24 hours have been reviewed.    Significant Diagnostics:  I have reviewed and interpreted all pertinent imaging results/findings within the past 24 hours.  "

## 2024-05-22 NOTE — ASSESSMENT & PLAN NOTE
resolved     Na 132 on 5/19     - Improved to 135 this AM s/p holding HCTZ  - Hold home HCTZ  - Monitor daily, encourage PO in

## 2024-05-22 NOTE — ASSESSMENT & PLAN NOTE
89 yo male w/Bilateral SDH R>L large right SDH with 7 mm MLS s/p evac     - Admit to [facility]  - NSGY consulted, following  - S/p removal of subdural drain, head CT stable  - q1h neuro checks, vitals, I/Os  - Echo, EKG, CXR   - No AC/AP, denies trauma  - Daily CBC, CMP, mag, phos  - SBP < 160   - PRN labetalol, hydralazine  - Keppra dc'd given agitation, on phenobarb taper   - Precedex gtt for agitation -> d/c  - Zyprexa 5 mg q8hrs PRN  - C/w seroquel 25mg qhs  - Seizure precautions  - Diet Regular  - PT/OT/SLP as appropriate  - VTE Prophylaxis: mechanical, HSQ       5/22 Transfer to hospital medicine. admitted with suicidal ideations and was found to have a SDH. went to the OR with NSGY for burrhole eval. Post op course complicated by etoh withdrawals, which are being treated and he is HDS. He is currently CECd. s/p NSGY eval - CT head - Evolving operative change from right frontal parietal jacob holes for subdural hematoma evacuation with interval removal of subdural drainage catheter.  There is slight increased postoperative pneumocephalus overlying the right frontal convexity with evolving hypodense collection overlying the right cerebral convexity.  Mass effect with leftward midline shift similar to prior. Superimposed left cerebral convexity subdural hematoma stable. No evidence for new hemorrhage or significant new abnormal parenchymal attenuation. goal SBP <140. Na >135. continue Keppra 500 BID x5d. NSGY recs  Neuro IR consultation for MMA embolization during this admission . discussed with neuro IR. NPO after midnight for MMA embolization tomorrow

## 2024-05-22 NOTE — HPI
Mr Peng is an 87 y/o M unknown PMHx admitted to [facility] w/ bilateral SDH R>L and MLS. Per chart review, he presented to the ED by EMS stating he wants to kill himself. Came yesterday with c/o R hand paresthesias and was discharged. Currently PEC'ed per psych evaluation.  Denies falls. MRI with large right SDH with midline shift. Denies use of AC/APT. NSGY consulted for evaluation. NPO currently for OR  for SDH evac w/NSGY. Patient admitted to [facility] for close monitoring and higher level of care.

## 2024-05-22 NOTE — ASSESSMENT & PLAN NOTE
Patient's with Normocytic anemia.. Hemoglobin stable. Etiology likely due to chronic disease .  Current CBC reviewed-    Recent Labs   Lab 05/20/24  0152 05/21/24  0143 05/22/24 0225   HGB 11.7* 11.3* 12.3*         Component Value Date/Time    MCV 96 05/22/2024 0225    RDW 13.4 05/22/2024 0225     Monitor CBC and transfuse if H/H drops below 7/21.

## 2024-05-22 NOTE — ASSESSMENT & PLAN NOTE
"  Per chart review- patient reported "if I had a gun I would kill myself"-   psychiatry following   CEC'ed  Denies on AM exam today     "

## 2024-05-22 NOTE — PROGRESS NOTES
Ankit Martin - Neurosurgery ([facility])  [facility] Medicine  Progress Note    Patient Name: Moses Peng  MRN: [id number]  Patient Class: IP- Inpatient   Admission Date: 5/17/2024  Length of Stay: 5 days  Attending Physician: Varun Graham MD  Primary Care Provider: Salma, Primary Doctor        Subjective:     Principal Problem:Subdural hematoma        HPI:  Mr Peng is an 87 y/o M unknown PMHx admitted to [facility] w/ bilateral SDH R>L and MLS. Per chart review, he presented to the ED by EMS stating he wants to kill himself. Came yesterday with c/o R hand paresthesias and was discharged. Currently PEC'ed per psych evaluation.  Denies falls. MRI with large right SDH with midline shift. Denies use of AC/APT. NSGY consulted for evaluation. NPO currently for OR  for SDH evac w/NSGY. Patient admitted to [facility] for close monitoring and higher level of care.             Overview/Hospital Course:   05/18/2024 Started miralax, electrolytes, and hydralazine prn, dced cardene, CT in am  05/19/2024 NAEON. CTH overnight with decreased right SDH and MLS with stable mixed amount of left SDH. Patient acutely agitated this AM, threatening to leave and sitting upright in bed. Security called to bedside. Agitation settled after receiving 10mg haldol IM, 2mg versed, and 25mg benadryl. Patient started on precedex gtt for sedation. Keppra changed to briviact in the hopes of reducing agitation. Seroquel 25mg added qhs. Drain in place, NSGY to pull drain tomorrow.  05/20/2024 Persistent agitation overnight, on precedex gtt -> less agitated this AM, appears tired, likely from multiple medications over last 24 hrs. Weaning precedex gtt, plan for zyprexa 5 mg q8hrs PRN per psych recs. Subdural drain d/c'd by NSGY, HOB liberalized.   05/21/2024 Post-subdural drain removal CT w/ slight increase in pneumocephalus, no re-accumulation of SDH, midline shift/mass effect unchanged from prior. Mental status markedly improved this AM, does remain mildly altered  (perseverating on poorly defined pain and numbness in hands), but no longer requiring 4 pt restraints, precedex d/c'd.      5/22 Transfer to hospital medicine. admitted with suicidal ideations and was found to have a SDH. went to the OR with TD for burrhole eval. Post op course complicated by etoh withdrawals, which are being treated and he is HDS. He is currently CECd.   s/p NSGY eval - CT head - Evolving operative change from right frontal parietal jacob holes for subdural hematoma evacuation with interval removal of subdural drainage catheter.  There is slight increased postoperative pneumocephalus overlying the right frontal convexity with evolving hypodense collection overlying the right cerebral convexity.  Mass effect with leftward midline shift similar to prior. Superimposed left cerebral convexity subdural hematoma stable. No evidence for new hemorrhage or significant new abnormal parenchymal attenuation. goal SBP <140. Na >135. continue Keppra 500 BID x5d. NSGY recs  Neuro IR consultation for MMA embolization during this admission . discussed with neuro IR. NPO after midnight for MMA embolization tomorrow. AAOX 3        Review of Systems:   Pain scale:   Constitutional:  fever,  chills, headache, vision loss, hearing loss, weight loss, Generalized weakness, falls, loss of smell, loss of taste, poor appetite,  sore throat  Respiratory: cough, shortness of breath.   Cardiovascular: chest pain, dizziness, palpitations, orthopnea, swelling of feet, syncope  Gastrointestinal: nausea, vomiting, abdominal pain, diarrhea, black stool,  blood in stool, change in bowel habits, constipation  Genitourinary: hematuria, dysuria, urgency, frequency  Integument/Breast: rash,  pruritis  Hematologic/Lymphatic: easy bruising, lymphadenopathy  Musculoskeletal: arthralgias , myalgias, back pain, neck pain, knee pain  Neurological: confusion -resolved , seizures, tremors, slurred speech, numbness . hands and feet    Behavioral/Psych:  depression, anxiety, auditory or visual hallucinations     OBJECTIVE:     Physical Exam:  Body mass index is 21.14 kg/m².    Constitutional: Appears well-developed and well-nourished. sitter at the bedside   Head: Normocephalic and left parietal craniotomy wound - healing   Neck: Normal range of motion. Neck supple.   Cardiovascular: Normal heart rate.  Regular heart rhythm.  Pulmonary/Chest: Effort normal.   Abdominal: No distension.  No tenderness  Musculoskeletal: Normal range of motion. No edema.   Neurological: Alert and oriented to person, place, and time. able to move bilateral upper and lower extremities without limitation   Skin: Skin is warm and dry.   Psychiatric: Normal mood and affect. Behavior is normal.                  Vital Signs  Temp: 98.4 °F (36.9 °C) (05/22/24 1634)  Pulse: 85 (05/22/24 1634)  Resp: 20 (05/22/24 1634)  BP: 135/70 (05/22/24 1634)  SpO2: 100 % (05/22/24 1634)     24 Hour VS Range    Temp:  [98.2 °F (36.8 °C)-98.8 °F (37.1 °C)]   Pulse:  []   Resp:  [9-53]   BP: (119-203)/(56-89)   SpO2:  [96 %-100 %]     Intake/Output Summary (Last 24 hours) at 5/22/2024 1903  Last data filed at 5/22/2024 1000  Gross per 24 hour   Intake 336 ml   Output 260 ml   Net 76 ml         I/O This Shift:  No intake/output data recorded.    Wt Readings from Last 3 Encounters:   05/18/24 61.2 kg (135 lb)   12/28/23 61.2 kg (135 lb)   11/07/22 61.2 kg (135 lb)       I have personally reviewed the vitals and recorded Intake/Output     Laboratory/Diagnostic Data:    CBC/Anemia Labs: Coags:    Recent Labs   Lab 05/20/24  0152 05/21/24  0143 05/22/24  0225   WBC 4.19 4.60 6.49   HGB 11.7* 11.3* 12.3*   HCT 34.9* 34.0* 36.7*    179 200   MCV 96 98 96   RDW 13.0 13.6 13.4    Recent Labs   Lab 05/17/24  1417   INR 1.0   APTT 27.4        Chemistries: ABG:   Recent Labs   Lab 05/20/24  0152 05/21/24  0143 05/22/24  0225   * 140 137   K 3.9 4.1 4.2    112* 106   CO2 16* 21*  "23   BUN 14 18 13   CREATININE 1.0 1.0 0.9   CALCIUM 8.4* 8.3* 9.0   PROT 5.5* 5.1* 6.3   BILITOT 0.3 0.3 0.3   ALKPHOS 77 78 96   ALT 6* 5* 9*   AST 18 24 33   MG 1.9 1.8 1.6   PHOS 3.0 2.9 3.4    No results for input(s): "PH", "PCO2", "PO2", "HCO3", "POCSATURATED", "BE" in the last 168 hours.     POCT Glucose: HbA1c:    No results for input(s): "POCTGLUCOSE" in the last 168 hours. Hemoglobin A1C   Date Value Ref Range Status   05/17/2024 5.1 4.0 - 5.6 % Final     Comment:     ADA Screening Guidelines:  5.7-6.4%  Consistent with prediabetes  >or=6.5%  Consistent with diabetes    High levels of fetal hemoglobin interfere with the HbA1C  assay. Heterozygous hemoglobin variants (HbS, HgC, etc)do  not significantly interfere with this assay.   However, presence of multiple variants may affect accuracy.          Cardiac Enzymes: Ejection Fractions:    No results for input(s): "CPK", "CPKMB", "MB", "TROPONINI" in the last 72 hours. EF   Date Value Ref Range Status   05/18/2024 68 % Final          No results for input(s): "COLORU", "APPEARANCEUA", "PHUR", "SPECGRAV", "PROTEINUA", "GLUCUA", "KETONESU", "BILIRUBINUA", "OCCULTUA", "NITRITE", "UROBILINOGEN", "LEUKOCYTESUR", "RBCUA", "WBCUA", "BACTERIA", "SQUAMEPITHEL", "HYALINECASTS" in the last 48 hours.    Invalid input(s): "WRIGHTSUR"    No results found for: "PROCAL", "LACTATE"  No results found for: "BNP"  No results found for: "CRP", "SEDRATE"  No results found for: "DDIMER"  No results found for: "FERRITIN"  No results found for: "LDH"  No results found for: "TROPONINI", "CPK"  No results found for this or any previous visit.  No results found for: "[id number]"    Microbiology labs for the last week  Microbiology Results (last 7 days)       ** No results found for the last 168 hours. **            Reviewed and noted in plan where applicable- Please see chart for full lab data.    Lines/Drains:       Peripheral IV - Single Lumen 05/17/24 1418 18 G Anterior;Right " Forearm (Active)   Site Assessment Clean;Dry;Intact 05/22/24 1700   Extremity Assessment Distal to IV No abnormal discoloration;No redness;No swelling;No warmth 05/22/24 1501   Line Status Capped;Flushed;Saline locked 05/22/24 1501   Dressing Status Clean;Dry;Intact 05/22/24 1501   Dressing Intervention Integrity maintained 05/22/24 1501   Dressing Change Due 05/22/24 05/22/24 1501   Site Change Due 05/22/24 05/22/24 0701   Reason Not Rotated Not due 05/22/24 1501   Number of days: 5            Peripheral IV - Single Lumen 05/17/24 1549 20 G Anterior;Left Forearm (Active)   Site Assessment Clean;Dry;Intact 05/22/24 1700   Extremity Assessment Distal to IV No abnormal discoloration;No redness;No swelling;No warmth 05/22/24 1501   Line Status Infusing 05/22/24 1501   Dressing Status Clean;Dry;Intact 05/22/24 1501   Dressing Intervention Integrity maintained 05/22/24 1501   Dressing Change Due 05/22/24 05/22/24 1501   Site Change Due 05/22/24 05/22/24 0701   Reason Not Rotated Not due 05/22/24 1501   Number of days: 5       Male External Urinary Catheter 05/19/24 1502 (Active)   Collection Container Urimeter 05/22/24 1700   Securement Method secured to top of thigh w/ adhesive device 05/22/24 1700   Skin no redness;no breakdown 05/22/24 1501   Tolerance no signs/symptoms of discomfort 05/22/24 1501   Output (mL) 50 mL 05/22/24 0601   Catheter Change Date 05/21/24 05/22/24 0301   Catheter Change Time 1901 05/22/24 0301   Number of days: 3       Imaging  ECG Results    None         Results for orders placed during the hospital encounter of 05/17/24    Echo    Interpretation Summary    Left Ventricle: The left ventricle is normal in size. Ventricular mass is normal. Mildly increased wall thickness. Mild septal thickening. There is concentric remodeling. Normal wall motion. There is normal systolic function with a visually estimated ejection fraction of 55 - 70%. Ejection fraction by visual approximation is 68%. There is  indeterminate diastolic function.    Right Ventricle: Normal right ventricular cavity size. There is moderate hypertrophy. Systolic function is hyperdynamic.    Aortic Valve: There is severe aortic valve sclerosis. There is moderate stenosis. Aortic valve area by VTI is 1.34 cm². Aortic valve peak velocity is 2.81 m/s. Mean gradient is 17 mmHg. The dimensionless index is 0.43. There is mild aortic regurgitation.    Tricuspid Valve: There is mild regurgitation.    Pulmonary Artery: The estimated pulmonary artery systolic pressure is 36 mmHg.    IVC/SVC: Normal venous pressure at 3 mmHg.      CT Head Without Contrast  Narrative: EXAMINATION:  CT HEAD WITHOUT CONTRAST    CLINICAL HISTORY:  subdural drain pull post scan;    TECHNIQUE:  Multiple sequential 5 mm axial images of the head without contrast.  Coronal and sagittal reformatted imaging from the axial acquisition.    COMPARISON:  05/19/2024    FINDINGS:  Interval removal right subdural drainage catheter with evolving operative change from right parietal jacob holes and subdural hematoma evacuation.  There is evolving mixed density extra-axial collection overlying the right cerebral hemisphere compatible with postoperative gas and fluid with slight increased postoperative pneumocephalus overlying the right frontal lobe.  Mass effect on the right cerebral hemisphere with approximately 3-4 mm of leftward midline shift similar to prior.  Ventricles stable without hydrocephalus.    Thin intermediate to hyperdense extra-axial collection overlies the left cerebral convexity compatible with evolving subdural hemorrhage this measures 0.5 cm in thickness overlying left inferior frontal lobe.    No definite new hemorrhage or significant new abnormal parenchymal attenuation    Remote operative change bilateral lens replacement and scleral banding.  Impression: Evolving operative change from right frontal parietal jacob holes for subdural hematoma evacuation with interval  removal of subdural drainage catheter.  There is slight increased postoperative pneumocephalus overlying the right frontal convexity with evolving hypodense collection overlying the right cerebral convexity.  Mass effect with leftward midline shift similar to prior    Superimposed left cerebral convexity subdural hematoma stable    No evidence for new hemorrhage or significant new abnormal parenchymal attenuation.  Clinical correlation and continued follow-up advised.    Electronically signed by: Usman Morgan DO  Date:    05/21/2024  Time:    10:07      Labs, Imaging, EKG and Diagnostic results from 5/22/2024 were reviewed.    Medications:  Medication list was reviewed and changes noted under Assessment/Plan.  No current facility-administered medications on file prior to encounter.     Current Outpatient Medications on File Prior to Encounter   Medication Sig Dispense Refill    hydroCHLOROthiazide (HYDRODIURIL) 12.5 MG Tab Take 1 tablet (12.5 mg total) by mouth once daily. 30 tablet 11     Scheduled Medications:  Current Facility-Administered Medications   Medication Dose Route Frequency    bacitracin zinc   Topical (Top) BID    folic acid  1 mg Oral Daily    gabapentin  600 mg Oral TID    heparin (porcine)  5,000 Units Subcutaneous Q8H    losartan  25 mg Oral Daily    multivitamin  1 tablet Oral Daily    PHENobarbitaL  30 mg Oral BID    Followed by    [START ON 5/23/2024] PHENobarbitaL  15 mg Oral BID    Followed by    [START ON 5/25/2024] PHENobarbitaL  10 mg Oral BID    Followed by    [START ON 5/26/2024] PHENobarbitaL  5 mg Oral BID    polyethylene glycol  17 g Oral Daily    QUEtiapine  25 mg Oral QHS    senna-docusate 8.6-50 mg  1 tablet Oral Daily    thiamine  100 mg Oral Daily     PRN:   Current Facility-Administered Medications:     acetaminophen, 1,000 mg, Oral, Q8H PRN    hydrALAZINE, 10 mg, Intravenous, Q4H PRN    labetalol, 10 mg, Intravenous, Q6H PRN    OLANZapine, 5 mg, Intramuscular, Q8H PRN     ondansetron, 8 mg, Intravenous, Q6H PRN    oxyCODONE, 5 mg, Oral, Q4H PRN  Infusions:   Estimated Creatinine Clearance: 49.1 mL/min (based on SCr of 0.9 mg/dL).             Assessment/Plan:      * Subdural hematoma     89 yo male w/Bilateral SDH R>L large right SDH with 7 mm MLS s/p evac     - Admit to [facility]  - NSGY consulted, following  - S/p removal of subdural drain, head CT stable  - q1h neuro checks, vitals, I/Os  - Echo, EKG, CXR   - No AC/AP, denies trauma  - Daily CBC, CMP, mag, phos  - SBP < 160   - PRN labetalol, hydralazine  - Keppra dc'd given agitation, on phenobarb taper   - Precedex gtt for agitation -> d/c  - Zyprexa 5 mg q8hrs PRN  - C/w seroquel 25mg qhs  - Seizure precautions  - Diet Regular  - PT/OT/SLP as appropriate  - VTE Prophylaxis: mechanical, HSQ       5/22 Transfer to hospital medicine. admitted with suicidal ideations and was found to have a SDH. went to the OR with NSGY for burrhole eval. Post op course complicated by etoh withdrawals, which are being treated and he is HDS. He is currently CECd. s/p NSGY eval - CT head - Evolving operative change from right frontal parietal jacob holes for subdural hematoma evacuation with interval removal of subdural drainage catheter.  There is slight increased postoperative pneumocephalus overlying the right frontal convexity with evolving hypodense collection overlying the right cerebral convexity.  Mass effect with leftward midline shift similar to prior. Superimposed left cerebral convexity subdural hematoma stable. No evidence for new hemorrhage or significant new abnormal parenchymal attenuation. goal SBP <140. Na >135. continue Keppra 500 BID x5d. NSGY recs  Neuro IR consultation for MMA embolization during this admission . discussed with neuro IR. NPO after midnight for MMA embolization tomorrow    Alcohol withdrawal  continue phenobarbital taper       Anemia    Patient's with Normocytic anemia.. Hemoglobin stable. Etiology likely due to  "chronic disease .  Current CBC reviewed-    Recent Labs   Lab 05/20/24  0152 05/21/24  0143 05/22/24  0225   HGB 11.7* 11.3* 12.3*         Component Value Date/Time    MCV 96 05/22/2024 0225    RDW 13.4 05/22/2024 0225     Monitor CBC and transfuse if H/H drops below 7/21.      Hyponatremia  resolved     Na 132 on 5/19     - Improved to 135 this AM s/p holding HCTZ  - Hold home HCTZ  - Monitor daily, encourage PO in    Acute encephalopathy    Multifactorial. Suspect 2/2 b/l SDH +/- component of ICU delirium      - Some family report of EtOH use prior to admission, unclear quantity; no evidence of EtOH w/d to date, monitor  - On phenobarb taper  - Start thiamine/folate/MV   - Improved this AM     5/22 AAOX 3.  Continue Seroquel 25 mg PO nightly and Zyprexa 5 mg q8 hours PRN for non-redirectable agitation associated with delirium       Midline shift of brain due to hematoma   repeat CT head 5/20 - operative change from right frontal parietal jacob holes for subdural hematoma evacuation with interval removal of subdural drainage catheter.  There is slight increased postoperative pneumocephalus overlying the right frontal convexity with evolving hypodense collection overlying the right cerebral convexity.  Mass effect with leftward midline shift similar to prior     Superimposed left cerebral convexity subdural hematoma stable. No evidence for new hemorrhage or significant new abnormal parenchymal attenuation.     History of jacob hole surgery    See Subdural hematoma       Suicidal ideation    Per chart review- patient reported "if I had a gun I would kill myself"-   psychiatry following   [facility]  Denies on AM exam today       Brain compression  secondary to above   See Subdural hematoma    Essential hypertension    History   - EKG, Echo  - SBP < 160  - PRN labetalol, hydralazine  - Hold HCTZ given mild hyponatremia         VTE Risk Mitigation (From admission, onward)           Ordered     heparin (porcine) injection " 5,000 Units  Every 8 hours         05/17/24 1826     IP VTE HIGH RISK PATIENT  Once         05/17/24 1826     Place sequential compression device  Until discontinued         05/17/24 1826                    Discharge Planning   MIKE: 5/23/2024     Code Status: Full Code   Is the patient medically ready for discharge?:     Reason for patient still in hospital (select all that apply): Treatment  Discharge Plan A: Home                  Varun Graham MD  Department of Hospital Medicine   [facility] - Neurosurgery ([facility])

## 2024-05-22 NOTE — ASSESSMENT & PLAN NOTE
"Per chart review- patient reported "if I had a gun I would kill myself"-   psychiatry following   [facility]'ed  Denies on AM exam today  "

## 2024-05-22 NOTE — ASSESSMENT & PLAN NOTE
History   - EKG, Echo  - SBP < 160  - PRN labetalol, hydralazine  - Hold HCTZ given mild hyponatremia

## 2024-05-22 NOTE — PLAN OF CARE
OT POC reviewed. Goals remain appropriate.    Problem: Occupational Therapy  Goal: Occupational Therapy Goal  Description: Goals to be met by: 6/21/24     Patient will increase functional independence with ADLs by performing:    Feeding with [place].  UE Dressing with [place].  LE Dressing with [place].  Grooming while standing at sink with [place].  Toileting from toilet with [place] for hygiene and clothing management.   Supine to sit with [place].  Step transfer with [place]  Toilet transfer to toilet with [place].    Outcome: Progressing

## 2024-05-22 NOTE — ASSESSMENT & PLAN NOTE
Na 132 on 5/19    - Improved to 135 this AM s/p holding HCTZ  - Hold home HCTZ  - Monitor daily, encourage PO intake  - Goal eunatremia

## 2024-05-22 NOTE — ASSESSMENT & PLAN NOTE
repeat CT head 5/20 - operative change from right frontal parietal jacob holes for subdural hematoma evacuation with interval removal of subdural drainage catheter.  There is slight increased postoperative pneumocephalus overlying the right frontal convexity with evolving hypodense collection overlying the right cerebral convexity.  Mass effect with leftward midline shift similar to prior     Superimposed left cerebral convexity subdural hematoma stable. No evidence for new hemorrhage or significant new abnormal parenchymal attenuation.

## 2024-05-22 NOTE — PT/OT/SLP PROGRESS
"Occupational Therapy   Co-Treatment with PT  Patient required co-tx with PT secondary to need for multiple set of skilled hands to provide safest therapy and best outcomes.        Name: Moses Peng  MRN: [id number]  Admitting Diagnosis:  Subdural hematoma  5 Days Post-Op    Recommendations:     Discharge Recommendations: High Intensity Therapy  Discharge Equipment Recommendations:  walker, rolling  Barriers to discharge:  Other (Comment) (increased skilled assist required)    Assessment:     Moses Peng is a 88 y.o. male with a medical diagnosis of Subdural hematoma.  He presents with the following performance deficits affecting function:  weakness, gait instability, decreased upper extremity function, decreased lower extremity function, impaired balance, impaired endurance, impaired self care skills, decreased coordination, impaired functional mobility, impaired cognition, impaired coordination, impaired fine motor, decreased safety awareness, decreased ROM. Pt continues to demo good progress toward treatment goals, continues to require many cues for safety and redirection. Pt very distractible this date. Patient has demonstrated sufficient progression to warrant high intensity therapy evidenced by objectives noted below.      Rehab Prognosis:  Good; patient would benefit from acute skilled OT services to address these deficits and reach maximum level of function.       Plan:     Patient to be seen 4 x/week to address the above listed problems via self-care/home management, therapeutic activities, therapeutic exercises, neuromuscular re-education, cognitive retraining  Plan of Care Expires: 06/21/24  Plan of Care Reviewed with: patient    Subjective     Chief Complaint: B hand numbness  Patient/Family Comments/goals: "When is a doctor going to come check out my hands?"  Pain/Comfort:  Pain Rating 1:  (unrated)  Location - Side 1: Bilateral  Location 1: hand  Pain Addressed 1: Reposition, Distraction, Cessation of " Activity    Objective:     Communicated with: RN prior to session.  Patient found HOB elevated with blood pressure cuff, telemetry, Condom Catheter, pulse ox (continuous) upon OT entry to room.    General Precautions: Standard, fall    Orthopedic Precautions:N/A  Braces: N/A  Respiratory Status: Room air     Occupational Performance:     Bed Mobility:    Patient completed Scooting/Bridging with supervision  Patient completed Supine to Sit with supervision  Patient completed Sit to Supine with supervision     Functional Mobility/Transfers:  Patient completed Sit <> Stand Transfer with minimum assistance  with  hand-held assist   Functional Mobility: Pt engaging in functional mobility to simulate household/community distances with Min A of 2 persons and utilizing HHA x2 in order to maximize functional activity tolerance and standing balance required for engagement in occupations of choice.    Activities of Daily Living:  Bathing: maximal assistance to complete modified bed bath following episode of urinary incontinence  Upper Body Dressing: maximal assistance to change paper scrubs  Toileting: dependence cath in place      [facility] 6 Click ADL: 15    Treatment & Education:  Pt educated on the following:  - role of OT and OT POC  - use of call light to request for assistance with all functional mobility to ensure safety during hospital stay  - importance of continued mobilization  - Safe transfer techniques and proper body mechanics for fall prevention and improved independence with functional transfers   - Importance of OOB activities to increase endurance and tolerance for increased participation in daily ADLs.    - Various therex pt can perform outside of therapy session to increase functional endurance and strength for overall improved independence in occupations of choice.   - benefits of continued participation in therapy.   - Pt educated on importance of calling for staff assist for functional  mobility/transfers.  - All pt questions within OT scope of practice addressed, pt verbalized understanding.      Patient left HOB elevated with all lines intact, call button in reach, bed alarm on, RN notified, and sitter present    GOALS:   Multidisciplinary Problems       Occupational Therapy Goals          Problem: Occupational Therapy    Goal Priority Disciplines Outcome Interventions   Occupational Therapy Goal     OT, PT/OT Progressing    Description: Goals to be met by: 6/21/24     Patient will increase functional independence with ADLs by performing:    Feeding with [place].  UE Dressing with [place].  LE Dressing with [place].  Grooming while standing at sink with [place].  Toileting from toilet with [place] for hygiene and clothing management.   Supine to sit with [place].  Step transfer with [place]  Toilet transfer to toilet with [place].                         Time Tracking:     OT Date of Treatment: 05/22/24  OT Start Time: 0941  OT Stop Time: 0958  OT Total Time (min): 17 min    Billable Minutes:Neuromuscular Re-education 17    OT/JUSTINA: OT          5/22/2024

## 2024-05-22 NOTE — ASSESSMENT & PLAN NOTE
Multifactorial. Suspect 2/2 b/l SDH +/- component of ICU delirium     - Some family report of EtOH use prior to admission, unclear quantity; no evidence of EtOH w/d to date, monitor  - On phenobarb taper  - Start thiamine/folate/MV   - Improved this AM

## 2024-05-22 NOTE — ASSESSMENT & PLAN NOTE
89 yo male w/Bilateral SDH R>L large right SDH with 7 mm MLS s/p evac     - Admit to [facility]  - NSGY consulted, following  - S/p removal of subdural drain, head CT stable  - q1h neuro checks, vitals, I/Os  - Echo, EKG, CXR   - No AC/AP, denies trauma  - Daily CBC, CMP, mag, phos  - SBP < 160   - PRN labetalol, hydralazine  - Keppra dc'd given agitation, on phenobarb taper   - Precedex gtt for agitation -> d/c  - Zyprexa 5 mg q8hrs PRN  - C/w seroquel 25mg qhs  - Seizure precautions  - Diet Regular  - PT/OT/SLP as appropriate  - VTE Prophylaxis: mechanical, HSQ

## 2024-05-23 PROBLEM — G93.41 ENCEPHALOPATHY, METABOLIC: Status: ACTIVE | Noted: 2024-05-18

## 2024-05-23 PROBLEM — M79.641 BILATERAL HAND PAIN: Status: ACTIVE | Noted: 2024-05-23

## 2024-05-23 PROBLEM — R20.0 SENSORY LOSS: Status: ACTIVE | Noted: 2024-05-23

## 2024-05-23 PROBLEM — R45.1 AGITATION: Status: ACTIVE | Noted: 2024-05-23

## 2024-05-23 PROBLEM — E83.39 HYPOPHOSPHATEMIA: Status: ACTIVE | Noted: 2024-05-23

## 2024-05-23 PROBLEM — M79.642 BILATERAL HAND PAIN: Status: ACTIVE | Noted: 2024-05-23

## 2024-05-23 PROBLEM — R52 PAIN: Status: ACTIVE | Noted: 2024-05-23

## 2024-05-23 LAB
ALBUMIN SERPL BCP-MCNC: 2.4 G/DL (ref 3.5–5.2)
ALP SERPL-CCNC: 89 U/L (ref 55–135)
ALT SERPL W/O P-5'-P-CCNC: 9 U/L (ref 10–44)
ANION GAP SERPL CALC-SCNC: 7 MMOL/L (ref 8–16)
AST SERPL-CCNC: 28 U/L (ref 10–40)
BASOPHILS # BLD AUTO: 0.01 K/UL (ref 0–0.2)
BASOPHILS NFR BLD: 0.2 % (ref 0–1.9)
BILIRUB SERPL-MCNC: 0.2 MG/DL (ref 0.1–1)
BUN SERPL-MCNC: 12 MG/DL (ref 8–23)
CALCIUM SERPL-MCNC: 8.6 MG/DL (ref 8.7–10.5)
CHLORIDE SERPL-SCNC: 104 MMOL/L (ref 95–110)
CO2 SERPL-SCNC: 23 MMOL/L (ref 23–29)
CREAT SERPL-MCNC: 0.9 MG/DL (ref 0.5–1.4)
DIFFERENTIAL METHOD BLD: ABNORMAL
EOSINOPHIL # BLD AUTO: 0.1 K/UL (ref 0–0.5)
EOSINOPHIL NFR BLD: 3 % (ref 0–8)
ERYTHROCYTE [DISTWIDTH] IN BLOOD BY AUTOMATED COUNT: 13.2 % (ref 11.5–14.5)
EST. GFR  (NO RACE VARIABLE): >60 ML/MIN/1.73 M^2
GLUCOSE SERPL-MCNC: 84 MG/DL (ref 70–110)
HCT VFR BLD AUTO: 34 % (ref 40–54)
HGB BLD-MCNC: 11.1 G/DL (ref 14–18)
IMM GRANULOCYTES # BLD AUTO: 0.01 K/UL (ref 0–0.04)
IMM GRANULOCYTES NFR BLD AUTO: 0.2 % (ref 0–0.5)
LYMPHOCYTES # BLD AUTO: 1.6 K/UL (ref 1–4.8)
LYMPHOCYTES NFR BLD: 37.3 % (ref 18–48)
MAGNESIUM SERPL-MCNC: 1.7 MG/DL (ref 1.6–2.6)
MCH RBC QN AUTO: 31.6 PG (ref 27–31)
MCHC RBC AUTO-ENTMCNC: 32.6 G/DL (ref 32–36)
MCV RBC AUTO: 97 FL (ref 82–98)
MONOCYTES # BLD AUTO: 0.4 K/UL (ref 0.3–1)
MONOCYTES NFR BLD: 9.1 % (ref 4–15)
NEUTROPHILS # BLD AUTO: 2.2 K/UL (ref 1.8–7.7)
NEUTROPHILS NFR BLD: 50.2 % (ref 38–73)
NRBC BLD-RTO: 0 /100 WBC
PHOSPHATE SERPL-MCNC: 3.6 MG/DL (ref 2.7–4.5)
PLATELET # BLD AUTO: 193 K/UL (ref 150–450)
PMV BLD AUTO: 10.4 FL (ref 9.2–12.9)
POTASSIUM SERPL-SCNC: 4.1 MMOL/L (ref 3.5–5.1)
PROT SERPL-MCNC: 5.6 G/DL (ref 6–8.4)
RBC # BLD AUTO: 3.51 M/UL (ref 4.6–6.2)
SODIUM SERPL-SCNC: 134 MMOL/L (ref 136–145)
WBC # BLD AUTO: 4.29 K/UL (ref 3.9–12.7)

## 2024-05-23 PROCEDURE — 85025 COMPLETE CBC W/AUTO DIFF WBC: CPT | Performed by: STUDENT IN AN ORGANIZED HEALTH CARE EDUCATION/TRAINING PROGRAM

## 2024-05-23 PROCEDURE — 97530 THERAPEUTIC ACTIVITIES: CPT | Mod: CQ

## 2024-05-23 PROCEDURE — 25000003 PHARM REV CODE 250

## 2024-05-23 PROCEDURE — 25000003 PHARM REV CODE 250: Performed by: STUDENT IN AN ORGANIZED HEALTH CARE EDUCATION/TRAINING PROGRAM

## 2024-05-23 PROCEDURE — 36415 COLL VENOUS BLD VENIPUNCTURE: CPT | Performed by: STUDENT IN AN ORGANIZED HEALTH CARE EDUCATION/TRAINING PROGRAM

## 2024-05-23 PROCEDURE — 25000003 PHARM REV CODE 250: Performed by: PSYCHIATRY & NEUROLOGY

## 2024-05-23 PROCEDURE — 25000003 PHARM REV CODE 250: Performed by: PHYSICIAN ASSISTANT

## 2024-05-23 PROCEDURE — 84100 ASSAY OF PHOSPHORUS: CPT | Performed by: STUDENT IN AN ORGANIZED HEALTH CARE EDUCATION/TRAINING PROGRAM

## 2024-05-23 PROCEDURE — 63600175 PHARM REV CODE 636 W HCPCS: Performed by: STUDENT IN AN ORGANIZED HEALTH CARE EDUCATION/TRAINING PROGRAM

## 2024-05-23 PROCEDURE — 99024 POSTOP FOLLOW-UP VISIT: CPT | Mod: ,,, | Performed by: PHYSICIAN ASSISTANT

## 2024-05-23 PROCEDURE — 25000003 PHARM REV CODE 250: Performed by: NURSE PRACTITIONER

## 2024-05-23 PROCEDURE — 63600175 PHARM REV CODE 636 W HCPCS: Performed by: NURSE PRACTITIONER

## 2024-05-23 PROCEDURE — 11000001 HC ACUTE MED/SURG PRIVATE ROOM

## 2024-05-23 PROCEDURE — 80053 COMPREHEN METABOLIC PANEL: CPT | Performed by: STUDENT IN AN ORGANIZED HEALTH CARE EDUCATION/TRAINING PROGRAM

## 2024-05-23 PROCEDURE — 83735 ASSAY OF MAGNESIUM: CPT | Performed by: STUDENT IN AN ORGANIZED HEALTH CARE EDUCATION/TRAINING PROGRAM

## 2024-05-23 RX ADMIN — BACITRACIN 1 EACH: 500 OINTMENT TOPICAL at 09:05

## 2024-05-23 RX ADMIN — GABAPENTIN 600 MG: 300 CAPSULE ORAL at 09:05

## 2024-05-23 RX ADMIN — QUETIAPINE FUMARATE 25 MG: 25 TABLET ORAL at 09:05

## 2024-05-23 RX ADMIN — OXYCODONE 5 MG: 5 TABLET ORAL at 05:05

## 2024-05-23 RX ADMIN — Medication 100 MG: at 09:05

## 2024-05-23 RX ADMIN — HEPARIN SODIUM 5000 UNITS: 5000 INJECTION INTRAVENOUS; SUBCUTANEOUS at 05:05

## 2024-05-23 RX ADMIN — GABAPENTIN 600 MG: 300 CAPSULE ORAL at 03:05

## 2024-05-23 RX ADMIN — HYDRALAZINE HYDROCHLORIDE 10 MG: 20 INJECTION, SOLUTION INTRAMUSCULAR; INTRAVENOUS at 05:05

## 2024-05-23 RX ADMIN — LOSARTAN POTASSIUM 25 MG: 25 TABLET, FILM COATED ORAL at 09:05

## 2024-05-23 RX ADMIN — HEPARIN SODIUM 5000 UNITS: 5000 INJECTION INTRAVENOUS; SUBCUTANEOUS at 09:05

## 2024-05-23 RX ADMIN — PHENOBARBITAL 15 MG: 20 ELIXIR ORAL at 09:05

## 2024-05-23 RX ADMIN — SENNOSIDES AND DOCUSATE SODIUM 1 TABLET: 50; 8.6 TABLET ORAL at 09:05

## 2024-05-23 RX ADMIN — HYDRALAZINE HYDROCHLORIDE 10 MG: 20 INJECTION, SOLUTION INTRAMUSCULAR; INTRAVENOUS at 11:05

## 2024-05-23 RX ADMIN — FOLIC ACID 1 MG: 1 TABLET ORAL at 09:05

## 2024-05-23 RX ADMIN — HEPARIN SODIUM 5000 UNITS: 5000 INJECTION INTRAVENOUS; SUBCUTANEOUS at 03:05

## 2024-05-23 RX ADMIN — ACETAMINOPHEN 1000 MG: 500 TABLET ORAL at 09:05

## 2024-05-23 RX ADMIN — THERA TABS 1 TABLET: TAB at 09:05

## 2024-05-23 NOTE — ASSESSMENT & PLAN NOTE
History of,  - EKG, Echo  - SBP < 160  - PRN labetalol, hydralazine  - Hold HCTZ given mild hyponatremia  - Mildly hypertensive overnight, start losartan 25 mg qday

## 2024-05-23 NOTE — ASSESSMENT & PLAN NOTE
"  Per chart review- patient reported "if I had a gun I would kill myself"-   psychiatry following   CEC'ed        "

## 2024-05-23 NOTE — PROGRESS NOTES
[facility] - Neurosurgery ([facility])  Neurocritical Care  Progress Note    Admit Date: 5/17/2024  Service Date: 05/22/2024  Length of Stay: 5    Subjective:     Chief Complaint: Subdural hematoma    History of Present Illness: Mr Peng is an 89 y/o M unknown PMHx admitted to [facility] w/ bilateral SDH R>L and MLS. Per chart review, he presented to the ED by EMS stating he wants to kill himself. Came yesterday with c/o R hand paresthesias and was discharged. Currently PEC'ed per psych evaluation.  Denies falls. MRI with large right SDH with midline shift. Denies use of AC/APT. NSGY consulted for evaluation. NPO currently for OR  for SDH evac w/NSGY. Patient admitted to [facility] for close monitoring and higher level of care.       Hospital Course: 05/18/2024 Started miralax, electrolytes, and hydralazine prn, dced cardene, CT in am  05/19/2024 NAEON. CTH overnight with decreased right SDH and MLS with stable mixed amount of left SDH. Patient acutely agitated this AM, threatening to leave and sitting upright in bed. Security called to bedside. Agitation settled after receiving 10mg haldol IM, 2mg versed, and 25mg benadryl. Patient started on precedex gtt for sedation. Keppra changed to briviact in the hopes of reducing agitation. Seroquel 25mg added qhs. Drain in place, NSGY to pull drain tomorrow.  05/20/2024 Persistent agitation overnight, on precedex gtt -> less agitated this AM, appears tired, likely from multiple medications over last 24 hrs. Weaning precedex gtt, plan for zyprexa 5 mg q8hrs PRN per psych recs. Subdural drain d/c'd by NSGY, HOB liberalized.   05/21/2024 Post-subdural drain removal CT w/ slight increase in pneumocephalus, no re-accumulation of SDH, midline shift/mass effect unchanged from prior. Mental status markedly improved this AM, does remain mildly altered (perseverating on poorly defined pain and numbness in hands), but no longer requiring 4 pt restraints, precedex d/c'd.   05/22/2024 MMA embolization  postponed due to anesthesia scheduling. Continues to complain of b/l hand numbness/pain, no focal findings on exam; gabapentin increased. Started on losartan for elevated BP overnight. Stable for transfer to floor    Interval History:  Please see hospital course above for full details    Review of Systems   Constitutional:  Positive for fatigue.   Respiratory: Negative.     Cardiovascular: Negative.    Gastrointestinal: Negative.    Musculoskeletal:         B/l hand pain w/ numbness/paresthesias   Neurological:  Positive for numbness.   Psychiatric/Behavioral:  Positive for sleep disturbance. Negative for suicidal ideas. The patient is nervous/anxious.        Objective:     Vitals:  Temp: 97.5 °F (36.4 °C)  Pulse: 76  Rhythm: normal sinus rhythm  BP: (!) 168/74  MAP (mmHg): 107  Resp: 18  SpO2: 99 %    Temp  Min: 97.5 °F (36.4 °C)  Max: 99 °F (37.2 °C)  Pulse  Min: 64  Max: 105  BP  Min: 119/56  Max: 203/83  MAP (mmHg)  Min: 81  Max: 128  Resp  Min: 9  Max: 53  SpO2  Min: 93 %  Max: 100 %    05/22 0701 - 05/23 0700  In: 100 [P.O.:100]  Out: -    Unmeasured Output  Urine Occurrence: 1  Stool Occurrence: 0  Pad Count: 1        Physical Exam  General Appearance: Not in acute hemodynamic distress  Mental Status Exam: awake, alert, aware, oriented to person, place and time, basics of situation (improved from prior). Does continue to require frequent redirection due to tangential and perseverative speech  Cranial Nerves: VFF, EOM full, pupils are equal and reactive to light bilaterally, symmetric facial sensory, symmetric facial motor; moderate dysarthria w/ pronounced [place] accent, however no clear facial asymmetry -> slightly more comprehensible compared to prior  Motor: no drift in the UE/LE. Power is 5/5 in both UE/LE. Normal tone. No focal tenderness appreciated on examination of hands, no deformities. Negative Tinel's sign, strong thumb opposition b/l   Sensory: Symmetric to LT in all 4 limbs   Coordination: Unable  to assess  Vascular: S1/S2 of normal intensity, no S3/S4 appreciated, no murmurs appreciated  Lungs: CTA bilaterally without wheezing  Abdomen: Soft, non-distended, non-tender, BS +          Medications:  Continuous Scheduledbacitracin zinc, , BID  folic acid, 1 mg, Daily  gabapentin, 600 mg, TID  heparin (porcine), 5,000 Units, Q8H  losartan, 25 mg, Daily  multivitamin, 1 tablet, Daily  PHENobarbitaL, 15 mg, BID   Followed by  [START ON 5/25/2024] PHENobarbitaL, 10 mg, BID   Followed by  [START ON 5/26/2024] PHENobarbitaL, 5 mg, BID  polyethylene glycol, 17 g, Daily  QUEtiapine, 25 mg, QHS  senna-docusate 8.6-50 mg, 1 tablet, Daily  thiamine, 100 mg, Daily    PRNacetaminophen, 1,000 mg, Q8H PRN  hydrALAZINE, 10 mg, Q4H PRN  labetalol, 10 mg, Q6H PRN  OLANZapine, 5 mg, Q8H PRN  ondansetron, 8 mg, Q6H PRN  oxyCODONE, 5 mg, Q4H PRN      Today I personally reviewed pertinent laboratory results, notably: CBC unremarkable. CMP w/ improvement in serum Na to 137, otherwise unremarkable    Diet  Regular diet    Assessment/Plan:     Neuro  * Subdural hematoma  87 yo male w/Bilateral SDH R>L large right SDH with 7 mm MLS s/p evac     - Admit to [facility]  - NSGY consulted, following  - S/p removal of subdural drain, head CT stable  - q1h neuro checks, vitals, I/Os  - Echo, EKG, CXR   - No AC/AP, denies trauma  - Daily CBC, CMP, mag, phos  - SBP < 160   - PRN labetalol, hydralazine  - Keppra dc'd given agitation, on phenobarb taper   - Zyprexa 5 mg q8hrs PRN -> no requirement overnight  - C/w seroquel 25mg qhs  - Seizure precautions  - Diet Regular  - PT/OT/SLP as appropriate  - VTE Prophylaxis: mechanical, HSQ    Stable for transfer to floor. MMA embolization pending    Acute encephalopathy  Multifactorial. Suspect 2/2 b/l SDH +/- component of ICU delirium     - Some family report of EtOH use prior to admission, unclear quantity; no evidence of EtOH w/d to date, monitor  - On phenobarb taper  - C/w thiamine/folate/MV   - Delirium  "precautions  - Improved this AM      Midline shift of brain due to hematoma  See Subdural hematoma    History of jacob hole surgery  See Subdural hematoma    Brain compression  See Subdural hematoma    Psychiatric  Alcohol withdrawal  Concern for    - Unclear EtOH use prior to admission, on phenobarb taper    Suicidal ideation  Per chart review- patient reported "if I had a gun I would kill myself"-   psychiatry following   [facility] [place]  Denies on AM exam today    Cardiac/Vascular  Essential hypertension  History of,  - EKG, Echo  - SBP < 160  - PRN labetalol, hydralazine  - Hold HCTZ given mild hyponatremia  - Mildly hypertensive overnight, start losartan 25 mg qday    Endocrine  Hyponatremia  Na 132 on 5/19 -> RESOLVED s/p holding HCTZ    - Improved to 137 this AM   - Hold home HCTZ  - Monitor daily, encourage PO intake  - Goal eunatremia    Orthopedic  Bilateral hand pain  Reports chronic b/l hand pain w/ numbness and paresthesias, poorly localized. Pt poor historian, but exam and description of pain most consistent with small fiber neuropathy    - Neurovascular exam unremarkable, no deformities or focal tenderness on exam  - Basic bedside exam w/o evidence of carpel tunnel syndrome -> may benefit from outpt EMG if continues to complain of pain   - Increase gabapentin to 600 mg TID for symptomatic management          The patient is being Prophylaxed for:  Venous Thromboembolism with: Mechanical or Chemical  Stress Ulcer with: None  Ventilator Pneumonia with: not applicable    Activity Orders            Diet NPO Except for: Medication: NPO starting at 05/23 0001    Turn patient every 2 hours starting at 05/20 1400          Full Code    Transfer to hospital medicine for ongoing management    Level III visit    Nilda Thompson MD  Neurocritical Care  [facility] - Neurocritical Care Unit      "

## 2024-05-23 NOTE — PLAN OF CARE
End of Shift Note    Pertinent Events this Shift: VSS (PRN BP med given once), Pt on RA, No acute events. Sitter remains at bedside. Angiogram delayed due to scheduling. Plan to be NPO at  midnight.     Nursing Concerns: None     Pt/Family concerns: None     Mental Status: A/O x4, FC    Mobility: Pt up to edge of bed with assistance. Turns self in bed.     Barriers towards goals/Discharge: Not medically ready.         Dolores Sumner RN

## 2024-05-23 NOTE — PT/OT/SLP PROGRESS
"Physical Therapy Treatment    Patient Name:  Moses Peng   MRN:  [id number]    Recommendations:     Discharge Recommendations: High Intensity Therapy  Discharge Equipment Recommendations: none  Barriers to discharge: None    Assessment:     Moses Peng is a 88 y.o. male admitted with a medical diagnosis of Subdural hematoma.  He presents with the following impairments/functional limitations: weakness, impaired endurance, impaired functional mobility, gait instability, impaired balance, decreased coordination, abnormal tone, pain, impaired coordination.   Pt was agreeable to therapy and tolerated fairly well. Pt demonstrated poor insight to deficits and decrease safety awareness especially with standing activities. Pt had several lateral and posterior LOB when ambulating. Pt is motivated for therapy and progressing well towards goals. Patient has demonstrated sufficient progression to warrant high intensity therapy evidenced by objectives noted below.    Rehab Prognosis: Good and Fair; patient would benefit from acute skilled PT services to address these deficits and reach maximum level of function.    Recent Surgery: Procedure(s) (LRB):  CRANIOTOMY, FOR SUBDURAL HEMATOMA EVACUATION (Right) 6 Days Post-Op    Plan:     During this hospitalization, patient to be seen 4 x/week to address the identified rehab impairments via gait training, therapeutic activities, therapeutic exercises, neuromuscular re-education and progress toward the following goals:    Plan of Care Expires:  06/21/24    Subjective     Chief Complaint: pt reports being hungry (pt is NPO)  Patient/Family Comments/goals: wanting to go home  Pain/Comfort:  Pain Rating 1:  (did not rate, states " a lot")  Location - Side 1: Bilateral  Location - Orientation 1: generalized  Location 1: hand  Pain Addressed 1: Reposition, Distraction  Pain Rating Post-Intervention 1:  (did not rate)      Objective:     Communicated with nurse prior to session.  Patient found HOB " elevated with blood pressure cuff, telemetry, Condom Catheter (sitter) upon PT entry to room.     General Precautions: Standard, fall  Orthopedic Precautions: N/A  Braces: N/A  Respiratory Status: Room air     Functional Mobility:  Additional staff present: Rehab Tech  Bed Mobility:   Scooting to EOB: stand by assistance  Supine to Sit: stand by assistance; HOB elevated  Sit to Supine: stand by assistance  Transfers:   Sit <> Stand Transfer: minimum assistance with no assistive device  2 trials from EOB   Noted posterior lean initially, but able to correct with [place] and cues   Gait:  Pt ambulated ~20 ft with moderate assistance and of 2 persons and hand-held assist.  no rest break and several lateral and posterior LOB  All lines remained intact throughout ambulation trial, gait belt utilized.  Gait Deviation(s): very unsteady gait with poor sequencing, poor coordination of LE placement, scissoring gait, and increase essie  Verbal/tactile cues to slow down to improve stability.   Balance:   Static/Dynamic sitting EOB balance: SBA-CGA   Completed seated LE therex  Standing balance: modAx2 and HHAx2   Completed standing LE therex    AM-PAC 6 CLICK MOBILITY  Turning over in bed (including adjusting bedclothes, sheets and blankets)?: 4  Sitting down on and standing up from a chair with arms (e.g., wheelchair, bedside commode, etc.): 3  Moving from lying on back to sitting on the side of the bed?: 4  Moving to and from a bed to a chair (including a wheelchair)?: 2  Need to walk in hospital room?: 2  Climbing 3-5 steps with a railing?: 1  Basic Mobility Total Score: 16       Treatment & Education:  Seated BLE therex x10 reps: ankle pumps and marches  Cues to complete activities and proper form.   Standing BLE therex x5 reps: marching   Patient educated on role of therapy, goals of session, and benefits of out of bed mobility.   Instructed on use of call button and importance of calling nursing staff for assistance with  mobility   Questions/concerns addressed within PTA scope of practice    Patient left HOB elevated with all lines intact, call button in reach, bed alarm on, and sitter present..    GOALS:   Multidisciplinary Problems       Physical Therapy Goals          Problem: Physical Therapy    Goal Priority Disciplines Outcome Goal Variances Interventions   Physical Therapy Goal     PT, PT/OT Progressing     Description: Goals to be met by: 24     Patient will increase functional independence with mobility by performin. Supine to sit with Stand-by Assistance  2. Sit to supine with Modified [place]  3. Sit to stand transfer with Stand-by Assistance  4. Bed to chair transfer with Contact Guard Assistance using LRAD  5. Gait  x 50 feet with Minimal Assistance using LRAD.   6. Ascend/descend 2 stairs with no handrails Minimal Assistance using LRAD.   7. Lower extremity exercise program x10 reps per handout, with independence                         Time Tracking:     PT Received On: 24  PT Start Time: 914     PT Stop Time: 931  PT Total Time (min): 17 min     Billable Minutes: Therapeutic Activity 17    Treatment Type: Treatment  PT/PTA: PTA     Number of PTA visits since last PT visit: 2024

## 2024-05-23 NOTE — ASSESSMENT & PLAN NOTE
multifactorial. Suspect 2/2 b/l SDH +/- component of ICU delirium      - Some family report of EtOH use prior to admission, unclear quantity; no evidence of EtOH w/d to date, monitor  - On phenobarb taper  Improving overall; Continue Seroquel 25 mg PO nightly and Zyprexa 5 mg q8 hours PRN for non-redirectable agitation associated with delirium

## 2024-05-23 NOTE — ASSESSMENT & PLAN NOTE
89 yo male with large right SDH with 7 mm MLS, GCS 15 now s/p burrhole craniotomy for SDH evacuation.     --TTF 5/22; sitter bedside; currently PEC'd  --Post op CTH satisfactory; repeat CT 5/19 with improving pneumocephalus. AM CTH stable s/p SD drain pull  --SBP goal <140  --Na >135  --Keppra 500 BID x5d  --Liberalize HOB now that subdural drain has been removed  --Rec Neuro IR consultation for MMA embolization during this admission - planning for 5/24  --PT/OT/OOBTC  --PRN pain control with aggressive bowel regimen while on narcotics  --Continue to monitor clinically, notify NSGY immediately with any changes in neuro status    Dispo: ongoing, sitter bedside    Closed with Monocryl    D/w Dr. Price by NSGY team    Please contact the on call neurosurgery provider with questions or concerns. Can be reached via on-call schedule and/or .

## 2024-05-23 NOTE — PROGRESS NOTES
Ankit Martin - Neurosurgery ([facility])  [facility] Medicine  Progress Note    Patient Name: Moses Peng  MRN: [id number]  Patient Class: IP- Inpatient   Admission Date: 5/17/2024  Length of Stay: 6 days  Attending Physician: Paul Anne MD  Primary Care Provider: Salma, Primary Doctor        Subjective:     Principal Problem:Subdural hematoma        HPI:  Mr Peng is an 87 y/o M unknown PMHx admitted to [facility] w/ bilateral SDH R>L and MLS. Per chart review, he presented to the ED by EMS stating he wants to kill himself. Came yesterday with c/o R hand paresthesias and was discharged. Currently PEC'ed per psych evaluation.  Denies falls. MRI with large right SDH with midline shift. Denies use of AC/APT. NSGY consulted for evaluation. NPO currently for OR  for SDH evac w/NSGY. Patient admitted to [facility] for close monitoring and higher level of care.             Overview/Hospital Course:  Pt underwent SDH  evaucation on 5/1/24. Repeat CTH   with decreased right SDH and MLS with stable mixed amount of left SDH. Patient started on precedex gtt for agitation. Keppra changed to briviact in the hopes of reducing agitation. Seroquel 25mg added qhs. On 5/20/24 Subdural drain d/c'd by NSGY, HOB liberalized. Post-subdural drain removal CT w/ slight increase in pneumocephalus, no re-accumulation of SDH, midline shift/mass effect unchanged from prior. Mental status markedly improved , precedex was dc'd.  Pt was CEC'd due to suicidal ideation.   Per NSGY recs  MMA embolization by IR pending.    Interval History:  Sitter is at the bedside affirmed the patient having confusion.  Patient himself denies any chest pain or headache.  Denies having any suicidal ideation at this time.    Review of Systems  Objective:     Vital Signs (Most Recent):  Temp: 98.9 °F (37.2 °C) (05/23/24 1136)  Pulse: 81 (05/23/24 1136)  Resp: 18 (05/23/24 1136)  BP: (!) 156/70 (05/23/24 1215)  SpO2: 99 % (05/23/24 1136) Vital Signs (24h Range):  Temp:  [97.5 °F (36.4  °C)-99 °F (37.2 °C)] 98.9 °F (37.2 °C)  Pulse:  [] 81  Resp:  [17-30] 18  SpO2:  [93 %-100 %] 99 %  BP: (133-213)/(61-98) 156/70     Weight: 61.2 kg (135 lb)  Body mass index is 21.14 kg/m².    Intake/Output Summary (Last 24 hours) at 5/23/2024 1446  Last data filed at 5/23/2024 0528  Gross per 24 hour   Intake --   Output 350 ml   Net -350 ml         Physical Exam      Clear lungs bilaterally, unlabored breathing, on room air, no cyanosis   Heart sounds indicate a regular rate and rhythm   Awake alert, no acute distress   Oriented x2   No facial droop, no slurred speech   No pupillary asymmetry   5/5 proximal upper and lower extremity strength bilaterally including fist  and hip flexor strength   No obvious lower extremity edema   Pleasant     Assessment/Plan:      * Subdural hematoma  Bilateral SDH R>L large right SDH with 7 mm MLS s/p evac   S/p  burrhole evac on 5/17.   Stable repeat head Cts  Pending MMA embolization per NSGY recs  Limited keppra course for prophy.  neurochecks    Brain compression  secondary to above   See Subdural hematoma    History of jacob hole surgery    See Subdural hematoma       Midline shift of brain due to hematoma   repeat CT head 5/20 - operative change from right frontal parietal jacob holes for subdural hematoma evacuation with interval removal of subdural drainage catheter.  There is slight increased postoperative pneumocephalus overlying the right frontal convexity with evolving hypodense collection overlying the right cerebral convexity.  Mass effect with leftward midline shift similar to prior     Superimposed left cerebral convexity subdural hematoma stable. No evidence for new hemorrhage or significant new abnormal parenchymal attenuation.     Encephalopathy, metabolic  multifactorial. Suspect 2/2 b/l SDH +/- component of ICU delirium      - Some family report of EtOH use prior to admission, unclear quantity; no evidence of EtOH w/d to date, monitor  - On phenobarb  "taper  Improving overall; Continue Seroquel 25 mg PO nightly and Zyprexa 5 mg q8 hours PRN for non-redirectable agitation associated with delirium       Suicidal ideation    Per chart review- patient reported "if I had a gun I would kill myself"-   psychiatry following   [facility]ed          Alcohol withdrawal  continue phenobarbital taper       Essential hypertension    History   - EKG, Echo  - SBP < 160  - PRN labetalol, hydralazine  - Hold HCTZ given mild hyponatremia         VTE Risk Mitigation (From admission, onward)           Ordered     heparin (porcine) injection 5,000 Units  Every 8 hours         05/17/24 1826     IP VTE HIGH RISK PATIENT  Once         05/17/24 1826     Place sequential compression device  Until discontinued         05/17/24 1826                    Discharge Planning   MIKE: 5/23/2024     Code Status: Full Code   Is the patient medically ready for discharge?:     Reason for patient still in hospital (select all that apply): Patient trending condition, Laboratory test, Consult recommendations, and Pending disposition  Discharge Plan A: [facility]                  Paul Anne MD  Department of Hospital Medicine   [facility] - Neurosurgery ([facility])    "

## 2024-05-23 NOTE — PROGRESS NOTES
"CONSULTATION LIAISON PSYCHIATRY PROGRESS NOTE    Patient Name: Moses Peng  MRN: [id number]  Patient Class: IP- Inpatient  Admission Date: 5/17/2024  Attending Physician: Paul Anne MD      SUBJECTIVE:   Moses Peng is a 88 y.o. male with no [place] past psychiatric history & no past pertinent medical history presents to the ED with numbness in his RUE and RLE, and admitted to the hospital for Subdural hematoma     Psychiatry consulted for "suicidal ideation."    Patient transferred to floor. Is currently NPO for procedure (MMA embolization) this morning. No agitation of PRNs required overnight.    Today, patient is lying in bed with nursing staff and sitter at bedside. Not in restraints. Patient not in acute distress. He is oriented to person, place, situation, month, and year but not specific date.      He continues to perseverate on the pain and numbness in both of his hands up to wrist, same as yesterday. He denies any effect from gabapentin, and states heat pack does not provide relief.     He continues to deny SI, HI, AH, or VH during interview. States that he was joking and made suicidal statement out of frustration while in ambulance. States he would not ever hurt himself. 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 He does not raise other concerns.    Collateral: spoke with patient's daughter, she states she will come to the hospital to get patient's keys, then will go to check the house for the firearm.       OBJECTIVE:    Mental Status Exam:  General Appearance: appears stated age, normal weight, dressed in hospital garb, lying in bed, in no acute distress, disheveled  Behavior: normal; cooperative; reasonably friendly, pleasant, and polite; appropriate eye-contact; under good behavioral control  Involuntary Movements and Motor Activity: no abnormal involuntary movements noted; no tics, no tremors, no akathisia, no dystonia, no evidence of tardive dyskinesia; no psychomotor agitation " "or retardation  Gait and Station: unable to assess - patient lying down or seated  Speech and Language: spontaneous, talkative, interruptible, slurred  Mood: "fine"  Affect: normal, euthymic, reactive, full-range, mood-congruent, appropriate to situation and context  Thought Process and Associations: linear, goal-directed, organized, logical  Thought Content and Perceptions:: no suicidal or homicidal ideation, no auditory or visual hallucinations, no paranoid ideation, no ideas of reference, no evidence of delusions or psychosis  Sensorium and Orientation: alert, oriented to person and place, oriented partially to time, oriented to year, oriented to month, disoriented to the date  Recent and Remote Memory: grossly intact  Attention and Concentration: grossly intact, attentive to the conversation and not readily distractible  Fund of Knowledge: grossly intact, used appropriate vocabulary and demonstrated an awareness of current events, consistent with educational level achieved  Insight: adequate  Judgment: adequate    CAM ICU positive? No      ASSESSMENT & RECOMMENDATIONS   Encephalopathy 2/2 subdural hematoma,       Delirium Behavior Management  Minimize use of physical restraints if able   Keep window shades open and room lit during day and room dim at night in order to promote normal sleep-wake cycles  Encourage family at bedside. [place] patient often to situation, location, date.  Continue to Limit or Discontinue use of Narcotics, Benzos and Anticholinergic medications as they may worsen delirium.  Continue medical workup for causative etiology of Delirium.         RISK ASSESSMENT  Currently under CEC; will continue out of caution and re-evaluate     FOLLOW UP  Will continue to follow     DISPOSITION - once medically cleared:   Defer to medical team        Please contact ON CALL psychiatry service (24/7) for any acute issues that may arise.      Joe Breen MD  \[facility]\""-[facility], PGY-IV   CL " Psychiatry  [facility]  5/23/2024 12:18 PM        --------------------------------------------------------------------------------------------------------------------------------------------------------------------------------------------------------------------------------------    CONTINUED OBJECTIVE clinical data & findings reviewed and noted for above decision making    Current Medications:   Scheduled Meds:    bacitracin zinc   Topical (Top) BID    folic acid  1 mg Oral Daily    gabapentin  600 mg Oral TID    heparin (porcine)  5,000 Units Subcutaneous Q8H    losartan  25 mg Oral Daily    multivitamin  1 tablet Oral Daily    PHENobarbitaL  15 mg Oral BID    Followed by    [START ON 5/25/2024] PHENobarbitaL  10 mg Oral BID    Followed by    [START ON 5/26/2024] PHENobarbitaL  5 mg Oral BID    polyethylene glycol  17 g Oral Daily    QUEtiapine  25 mg Oral QHS    senna-docusate 8.6-50 mg  1 tablet Oral Daily    thiamine  100 mg Oral Daily     PRN Meds:   Current Facility-Administered Medications:     acetaminophen, 1,000 mg, Oral, Q8H PRN    hydrALAZINE, 10 mg, Intravenous, Q4H PRN    labetalol, 10 mg, Intravenous, Q6H PRN    OLANZapine, 5 mg, Intramuscular, Q8H PRN    ondansetron, 8 mg, Intravenous, Q6H PRN    oxyCODONE, 5 mg, Oral, Q4H PRN    Allergies:   Review of patient's allergies indicates:  No Known Allergies    Vitals  Vitals:    05/23/24 0740   BP: 133/64   Pulse: 77   Resp: 18   Temp: 98.4 °F (36.9 °C)       Labs/Imaging/Studies:  Recent Results (from the past 24 hour(s))   CBC auto differential    Collection Time: 05/23/24  6:23 AM   Result Value Ref Range    WBC 4.29 3.90 - 12.70 K/uL    RBC 3.51 (L) 4.60 - 6.20 M/uL    Hemoglobin 11.1 (L) 14.0 - 18.0 g/dL    Hematocrit 34.0 (L) 40.0 - 54.0 %    MCV 97 82 - 98 fL    MCH 31.6 (H) 27.0 - 31.0 pg    MCHC 32.6 32.0 - 36.0 g/dL    RDW 13.2 11.5 - 14.5 %    Platelets 193 150 - 450 K/uL    MPV 10.4 9.2 - 12.9 fL    Immature Granulocytes  0.2 0.0 - 0.5 %    Gran # (ANC) 2.2 1.8 - 7.7 K/uL    Immature Grans (Abs) 0.01 0.00 - 0.04 K/uL    Lymph # 1.6 1.0 - 4.8 K/uL    Mono # 0.4 0.3 - 1.0 K/uL    Eos # 0.1 0.0 - 0.5 K/uL    Baso # 0.01 0.00 - 0.20 K/uL    nRBC 0 0 /100 WBC    Gran % 50.2 38.0 - 73.0 %    Lymph % 37.3 18.0 - 48.0 %    Mono % 9.1 4.0 - 15.0 %    Eosinophil % 3.0 0.0 - 8.0 %    Basophil % 0.2 0.0 - 1.9 %    Differential Method Automated    Comprehensive metabolic panel    Collection Time: 05/23/24  6:23 AM   Result Value Ref Range    Sodium 134 (L) 136 - 145 mmol/L    Potassium 4.1 3.5 - 5.1 mmol/L    Chloride 104 95 - 110 mmol/L    CO2 23 23 - 29 mmol/L    Glucose 84 70 - 110 mg/dL    BUN 12 8 - 23 mg/dL    Creatinine 0.9 0.5 - 1.4 mg/dL    Calcium 8.6 (L) 8.7 - 10.5 mg/dL    Total Protein 5.6 (L) 6.0 - 8.4 g/dL    Albumin 2.4 (L) 3.5 - 5.2 g/dL    Total Bilirubin 0.2 0.1 - 1.0 mg/dL    Alkaline Phosphatase 89 55 - 135 U/L    AST 28 10 - 40 U/L    ALT 9 (L) 10 - 44 U/L    eGFR >60.0 >60 mL/min/1.73 m^2    Anion Gap 7 (L) 8 - 16 mmol/L   Magnesium    Collection Time: 05/23/24  6:23 AM   Result Value Ref Range    Magnesium 1.7 1.6 - 2.6 mg/dL   Phosphorus    Collection Time: 05/23/24  6:23 AM   Result Value Ref Range    Phosphorus 3.6 2.7 - 4.5 mg/dL     Imaging Results              X-Ray Chest 1 View (Final result)  Result time 05/17/24 22:02:05      Final result by Stefano Blair MD (05/17/24 22:02:05)                   Impression:      No acute intrathoracic process.  No evidence of a pneumonia.      Electronically signed by: Stefano Blair MD  Date:    05/17/2024  Time:    22:02               Narrative:    EXAMINATION:  XR CHEST 1 VIEW    CLINICAL HISTORY:  eval PNA;    TECHNIQUE:  Single frontal view of the chest was performed.    COMPARISON:  12/28/2023    FINDINGS:  Monitoring EKG leads are present.  The trachea is unremarkable.  The cardiomediastinal silhouette is stable.  There is no evidence of free air beneath the hemidiaphragms.   There are no pleural effusions.  There is no evidence of a pneumothorax.  There is no evidence of pneumomediastinum.  No airspace opacity is present.  There are degenerative changes in the osseous structures.                                       CT Head Without Contrast (Final result)  Result time 05/17/24 20:30:06      Final result by Stefano Blair MD (05/17/24 20:30:06)                   Impression:      Interval postsurgical changes of right craniotomy for subdural hematoma evacuation with drainage catheter placement.  Decreased volume of the extra-axial collection with improved mass effect and midline shift.  Expected pneumocephalus overlies the right frontal cerebral convexity.    Stable subdural hematoma overlying the left cerebral convexity predominantly hyperattenuating.  No significant mass effect or midline shift.  Continued attention on follow-up recommended.    Chronic microvascular ischemic changes and generalized cerebral volume loss.    Additional findings as above.    Electronically signed by resident: Evon Talbert  Date:    05/17/2024  Time:    19:53    Electronically signed by: Stefano Blair MD  Date:    05/17/2024  Time:    20:30               Narrative:    EXAMINATION:  CT HEAD WITHOUT CONTRAST    CLINICAL HISTORY:  post-op SDH jacob holes;    TECHNIQUE:  Low dose axial CT images obtained throughout the head without the use of intravenous contrast.  Axial, sagittal and coronal reconstructions were performed.    COMPARISON:  MRI 05/17/2024; CT 12/28/2023    FINDINGS:  Postsurgical changes of right craniotomy for subdural hematoma evacuation with decreased volume of predominantly low-density extra-axial collection.  Draining catheter terminates within the collection.  Postsurgical pneumocephalus overlies the right frontal convexity.  The collection measures up to 1.5 cm in maximum thickness, previously 2.1 cm.  Stable mass effect upon the underlying parenchyma and right lateral ventricle with  improved leftward midline shift, measuring 4 mm at the septum pellucidum, pre measurement of 7 mm.    Additional hyperattenuating extra-axial collection overlies the left frontal and parietal cerebral convexity measuring 5 mm in maximal thickness, similar when compared to same day MRI.  No significant mass effect upon the underlying parenchyma.    Generalized cerebral volume loss.  Minimal re-expansion of the right lateral ventricle.  No evidence of hydrocephalus.    Mild patchy hypoattenuation in the supratentorial white matter, nonspecific but most likely reflecting chronic small vessel ischemic changes.  No abnormal parenchymal abnormality to suggest acute intraparenchymal hemorrhage or major vascular distribution infarct.    No displaced calvarial fracture.  Mild soft tissue stranding and air overlies the operative site.  Postsurgical changes of bilateral globes.    Left maxillary mucous retention cyst.  Peripheral mucosal thickening of the right maxillary sinus.  Remaining paranasal sinuses and mastoid air cells are clear.                                        MRI Brain Without Contrast (Final result)  Result time 05/17/24 13:45:52      Final result by Cheng Fajardo MD (05/17/24 13:45:52)                   Impression:      Bilateral subdural hematomas, larger on the right, as further discussed above.  There is significant intracranial mass effect with leftward midline shift.    Underlying chronic small vessel ischemic change and cerebral volume loss.    Neuro surgical consultation is advised.    This report was flagged in Epic as abnormal.      Electronically signed by: Cheng Fajardo MD  Date:    05/17/2024  Time:    13:45               Narrative:    EXAMINATION:  MRI BRAIN WITHOUT CONTRAST    CLINICAL HISTORY:  Transient ischemic attack (TIA);    TECHNIQUE:  Multiplanar multisequence MR imaging of the brain was performed without intravenous contrast.    Examination mildly degraded by patient motion  artifact.    COMPARISON:  CT head 12/28/2023    FINDINGS:  Intracranial Compartment:    There is a large subdural hematoma over the right cerebral hemisphere.  Collection measures up to 2.1 cm in thickness.  Collection is predominantly hyperintense on T1 and T2-weighted images, with some interspersed areas of more heterogeneous signal anteriorly and posteriorly.  Minimal septation evident.  Additional subdural hemorrhage over the left cerebral convexity with similar imaging characteristics.  This collection measuring just 0.6 cm in maximal thickness.  No extra-axial blood or fluid collections elsewhere.  There is significant intracranial mass effect.  Diffuse right-sided sulcal and ventricular crowding, with approximately 0.7 cm of leftward midline shift measured at the septum pellucidum.    Mild chronic small vessel ischemic change throughout the supratentorial white matter.  No evidence of recent or remote major vascular distribution infarct.  No evidence of recent or remote parenchymal hemorrhage.    Moderate cerebral volume loss.  No ventricular entrapment or obstructive hydrocephalus.    Normal vascular flow voids are preserved.    Skull/Extracranial Contents (limited evaluation):    Bone marrow signal intensity is unremarkable.    Postsurgical change both globes.    Findings were relayed to the ordering provider (Drake) via the epic secure chat system at approximately 13:40.

## 2024-05-23 NOTE — CARE UPDATE
I have reviewed the chart of Moses Peng who is hospitalized for the following:    Active Hospital Problems    Diagnosis    *Subdural hematoma    Bilateral hand pain    Sensory loss    Hypophosphatemia    Agitation    Pain    Anemia    Alcohol withdrawal    Hyponatremia    History of jacob hole surgery    Midline shift of brain due to hematoma    Encephalopathy, metabolic    Essential hypertension    Brain compression    Suicidal ideation        Analy Parker, ADRIENNE  Unit Based [facility]

## 2024-05-23 NOTE — CARE UPDATE
Unit [facility] Care Support Interaction      I have reviewed the chart of Moses Peng who is hospitalized for Subdural hematoma. The patient is currently located in the following unit: npu         I have seen and examined the patient and provided the following support:     Clinical support - confirmed care plan       Analy Parker NP  Unit Based [facility]

## 2024-05-23 NOTE — ASSESSMENT & PLAN NOTE
Na 132 on 5/19 -> RESOLVED s/p holding HCTZ    - Improved to 137 this AM   - Hold home HCTZ  - Monitor daily, encourage PO intake  - Goal eunatremia

## 2024-05-23 NOTE — SUBJECTIVE & OBJECTIVE
Interval History: NAEON. Pleasant, lying in bed. Denies headache. Pending MMA. Neuro exam stable.     Medications:  Continuous Infusions:  Scheduled Meds:   bacitracin zinc   Topical (Top) BID    folic acid  1 mg Oral Daily    gabapentin  600 mg Oral TID    heparin (porcine)  5,000 Units Subcutaneous Q8H    losartan  25 mg Oral Daily    multivitamin  1 tablet Oral Daily    PHENobarbitaL  15 mg Oral BID    Followed by    [START ON 5/25/2024] PHENobarbitaL  10 mg Oral BID    Followed by    [START ON 5/26/2024] PHENobarbitaL  5 mg Oral BID    polyethylene glycol  17 g Oral Daily    QUEtiapine  25 mg Oral QHS    senna-docusate 8.6-50 mg  1 tablet Oral Daily    thiamine  100 mg Oral Daily     PRN Meds:  Current Facility-Administered Medications:     acetaminophen, 1,000 mg, Oral, Q8H PRN    hydrALAZINE, 10 mg, Intravenous, Q4H PRN    labetalol, 10 mg, Intravenous, Q6H PRN    OLANZapine, 5 mg, Intramuscular, Q8H PRN    ondansetron, 8 mg, Intravenous, Q6H PRN    oxyCODONE, 5 mg, Oral, Q4H PRN     Review of Systems  Objective:     Weight: 61.2 kg (135 lb)  Body mass index is 21.14 kg/m².  Vital Signs (Most Recent):  Temp: 97.6 °F (36.4 °C) (05/23/24 1519)  Pulse: 87 (05/23/24 1519)  Resp: 16 (05/23/24 1519)  BP: 108/78 (05/23/24 1519)  SpO2: 98 % (05/23/24 1519) Vital Signs (24h Range):  Temp:  [97.5 °F (36.4 °C)-99 °F (37.2 °C)] 97.6 °F (36.4 °C)  Pulse:  [63-87] 87  Resp:  [16-18] 16  SpO2:  [93 %-99 %] 98 %  BP: (108-213)/(64-98) 108/78     Date 05/23/24 0700 - 05/24/24 0659   Shift 3533-4316 6661-8981 2667-2350 24 Hour Total   INTAKE   Shift Total(mL/kg)       OUTPUT   Urine(mL/kg/hr)  1000  1000   Shift Total(mL/kg)  1000(16.3)  1000(16.3)   Weight (kg) 61.2 61.2 61.2 61.2                       Male External Urinary Catheter 05/19/24 1502 (Active)   Collection Container Standard drainage bag 05/23/24 0800   Securement Method secured to top of thigh w/ adhesive device 05/23/24 0800   Skin no redness;no breakdown  "05/23/24 0800   Tolerance no signs/symptoms of discomfort 05/23/24 0800   Output (mL) 50 mL 05/22/24 0601   Catheter Change Date 05/23/24 05/23/24 0800   Catheter Change Time 1000 05/23/24 0800          Physical Exam         Neurosurgery Physical Exam    General: well developed, well nourished, no distress.   Head: normocephalic  Neurologic: Alert and oriented. Thought content appropriate.  GCS: Motor: 6/Verbal: 5/Eyes: 4 GCS Total: 15  Mental Status: Awake, Alert, Oriented x 4  Language: No aphasia  Speech: No dysarthria  Cranial nerves: face symmetric, tongue midline, CN II-XII grossly intact.   Eyes: pupils equal, round, reactive to light with accomodation, EOMI.  Pulmonary: normal respirations, no signs of respiratory distress  Sensory: intact to light touch throughout  Motor Strength: Moves all extremities spontaneously with good tone.  Full strength upper and lower extremities. No abnormal movements seen.   Pronator Drift: no drift noted  Finger-to-nose: Intact bilaterally  Skin: Skin is warm, dry and intact.  Incision c/d/I with skin edges well approximated.    Significant Labs:  Recent Labs   Lab 05/22/24 0225 05/23/24 0623   GLU 84 84    134*   K 4.2 4.1    104   CO2 23 23   BUN 13 12   CREATININE 0.9 0.9   CALCIUM 9.0 8.6*   MG 1.6 1.7     Recent Labs   Lab 05/22/24 0225 05/23/24  0623   WBC 6.49 4.29   HGB 12.3* 11.1*   HCT 36.7* 34.0*    193     No results for input(s): "LABPT", "INR", "APTT" in the last 48 hours.  Microbiology Results (last 7 days)       ** No results found for the last 168 hours. **          Recent Lab Results         05/23/24 0623        Albumin 2.4       ALP 89       ALT 9       Anion Gap 7       AST 28       Baso # 0.01       Basophil % 0.2       BILIRUBIN TOTAL 0.2  Comment: For infants and newborns, interpretation of results should be based  on gestational age, weight and in agreement with clinical  observations.    Premature Infant recommended reference " ranges:  Up to 24 hours.............<8.0 mg/dL  Up to 48 hours............<12.0 mg/dL  3-5 days..................<15.0 mg/dL  6-29 days.................<15.0 mg/dL         BUN 12       Calcium 8.6       Chloride 104       CO2 23       Creatinine 0.9       Differential Method Automated       eGFR >60.0       Eos # 0.1       Eos % 3.0       Glucose 84       Gran # (ANC) 2.2       Gran % 50.2       Hematocrit 34.0       Hemoglobin 11.1       Immature Grans (Abs) 0.01  Comment: Mild elevation in immature granulocytes is non specific and   can be seen in a variety of conditions including stress response,   acute inflammation, trauma and pregnancy. Correlation with other   laboratory and clinical findings is essential.         Immature Granulocytes 0.2       Lymph # 1.6       Lymph % 37.3       Magnesium  1.7       MCH 31.6       MCHC 32.6       MCV 97       Mono # 0.4       Mono % 9.1       MPV 10.4       nRBC 0       Phosphorus Level 3.6       Platelet Count 193       Potassium 4.1       PROTEIN TOTAL 5.6       RBC 3.51       RDW 13.2       Sodium 134       WBC 4.29             All pertinent labs from the last 24 hours have been reviewed.    Significant Diagnostics:  I have reviewed all pertinent imaging results/findings within the past 24 hours.

## 2024-05-23 NOTE — ASSESSMENT & PLAN NOTE
Bilateral SDH R>L large right SDH with 7 mm MLS s/p evac   S/p  burrhole evac on 5/17.   Stable repeat head Cts  Pending MMA embolization per NSGY recs  Limited keppra course for prophy.  neurochecks

## 2024-05-23 NOTE — ASSESSMENT & PLAN NOTE
Reports chronic b/l hand pain w/ numbness and paresthesias, poorly localized. Pt poor historian, but exam and description of pain most consistent with small fiber neuropathy    - Neurovascular exam unremarkable, no deformities or focal tenderness on exam  - Basic bedside exam w/o evidence of carpel tunnel syndrome -> may benefit from outpt EMG if continues to complain of pain   - Increase gabapentin to 600 mg TID for symptomatic management

## 2024-05-23 NOTE — SUBJECTIVE & OBJECTIVE
Interval History:  Please see hospital course above for full details    Review of Systems   Constitutional:  Positive for fatigue.   Respiratory: Negative.     Cardiovascular: Negative.    Gastrointestinal: Negative.    Musculoskeletal:         B/l hand pain w/ numbness/paresthesias   Neurological:  Positive for numbness.   Psychiatric/Behavioral:  Positive for sleep disturbance. Negative for suicidal ideas. The patient is nervous/anxious.        Objective:     Vitals:  Temp: 97.5 °F (36.4 °C)  Pulse: 76  Rhythm: normal sinus rhythm  BP: (!) 168/74  MAP (mmHg): 107  Resp: 18  SpO2: 99 %    Temp  Min: 97.5 °F (36.4 °C)  Max: 99 °F (37.2 °C)  Pulse  Min: 64  Max: 105  BP  Min: 119/56  Max: 203/83  MAP (mmHg)  Min: 81  Max: 128  Resp  Min: 9  Max: 53  SpO2  Min: 93 %  Max: 100 %    05/22 0701 - 05/23 0700  In: 100 [P.O.:100]  Out: -    Unmeasured Output  Urine Occurrence: 1  Stool Occurrence: 0  Pad Count: 1        Physical Exam  General Appearance: Not in acute hemodynamic distress  Mental Status Exam: awake, alert, aware, oriented to person, place and time, basics of situation (improved from prior). Does continue to require frequent redirection due to tangential and perseverative speech  Cranial Nerves: VFF, EOM full, pupils are equal and reactive to light bilaterally, symmetric facial sensory, symmetric facial motor; moderate dysarthria w/ pronounced [place] accent, however no clear facial asymmetry -> slightly more comprehensible compared to prior  Motor: no drift in the UE/LE. Power is 5/5 in both UE/LE. Normal tone. No focal tenderness appreciated on examination of hands, no deformities. Negative Tinel's sign, strong thumb opposition b/l   Sensory: Symmetric to LT in all 4 limbs   Coordination: Unable to assess  Vascular: S1/S2 of normal intensity, no S3/S4 appreciated, no murmurs appreciated  Lungs: CTA bilaterally without wheezing  Abdomen: Soft, non-distended, non-tender, BS +          Medications:  Continuous  Scheduledbacitracin zinc, , BID  folic acid, 1 mg, Daily  gabapentin, 600 mg, TID  heparin (porcine), 5,000 Units, Q8H  losartan, 25 mg, Daily  multivitamin, 1 tablet, Daily  PHENobarbitaL, 15 mg, BID   Followed by  [START ON 5/25/2024] PHENobarbitaL, 10 mg, BID   Followed by  [START ON 5/26/2024] PHENobarbitaL, 5 mg, BID  polyethylene glycol, 17 g, Daily  QUEtiapine, 25 mg, QHS  senna-docusate 8.6-50 mg, 1 tablet, Daily  thiamine, 100 mg, Daily    PRNacetaminophen, 1,000 mg, Q8H PRN  hydrALAZINE, 10 mg, Q4H PRN  labetalol, 10 mg, Q6H PRN  OLANZapine, 5 mg, Q8H PRN  ondansetron, 8 mg, Q6H PRN  oxyCODONE, 5 mg, Q4H PRN      Today I personally reviewed pertinent laboratory results, notably: CBC unremarkable. CMP w/ improvement in serum Na to 137, otherwise unremarkable    Diet  Regular diet

## 2024-05-23 NOTE — PROGRESS NOTES
[facility] - Neurosurgery ([facility]  Neurosurgery  Progress Note    Subjective:     History of Present Illness: 88 M unknown PMH presents to the ED by EMS stating he wants to kill himself. Came yesterday with c/o R hand paresthesias and was discharged. Currently under PEC per psych evaluation. He states he may have hit the left side of his head on the door earlier this week. Denies falls. Denies headache, seizures, weakness, b/b dysfunction. MRI with large right SDH with midline shift. Denies use of AC/APT. NSGY consulted for evaluation.     Post-Op Info:  Procedure(s) (LRB):  CRANIOTOMY, FOR SUBDURAL HEMATOMA EVACUATION (Right)   6 Days Post-Op   Interval History: NAEON. Pleasant, lying in bed. Denies headache. Pending MMA. Neuro exam stable.     Medications:  Continuous Infusions:  Scheduled Meds:   bacitracin zinc   Topical (Top) BID    folic acid  1 mg Oral Daily    gabapentin  600 mg Oral TID    heparin (porcine)  5,000 Units Subcutaneous Q8H    losartan  25 mg Oral Daily    multivitamin  1 tablet Oral Daily    PHENobarbitaL  15 mg Oral BID    Followed by    [START ON 5/25/2024] PHENobarbitaL  10 mg Oral BID    Followed by    [START ON 5/26/2024] PHENobarbitaL  5 mg Oral BID    polyethylene glycol  17 g Oral Daily    QUEtiapine  25 mg Oral QHS    senna-docusate 8.6-50 mg  1 tablet Oral Daily    thiamine  100 mg Oral Daily     PRN Meds:  Current Facility-Administered Medications:     acetaminophen, 1,000 mg, Oral, Q8H PRN    hydrALAZINE, 10 mg, Intravenous, Q4H PRN    labetalol, 10 mg, Intravenous, Q6H PRN    OLANZapine, 5 mg, Intramuscular, Q8H PRN    ondansetron, 8 mg, Intravenous, Q6H PRN    oxyCODONE, 5 mg, Oral, Q4H PRN     Review of Systems  Objective:     Weight: 61.2 kg (135 lb)  Body mass index is 21.14 kg/m².  Vital Signs (Most Recent):  Temp: 97.6 °F (36.4 °C) (05/23/24 1519)  Pulse: 87 (05/23/24 1519)  Resp: 16 (05/23/24 1519)  BP: 108/78 (05/23/24 1519)  SpO2: 98 % (05/23/24 1519) Vital Signs (24h  Range):  Temp:  [97.5 °F (36.4 °C)-99 °F (37.2 °C)] 97.6 °F (36.4 °C)  Pulse:  [63-87] 87  Resp:  [16-18] 16  SpO2:  [93 %-99 %] 98 %  BP: (108-213)/(64-98) 108/78     Date 05/23/24 0700 - 05/24/24 0659   Shift 6345-7360 8418-0491 5948-3826 24 Hour Total   INTAKE   Shift Total(mL/kg)       OUTPUT   Urine(mL/kg/hr)  1000  1000   Shift Total(mL/kg)  1000(16.3)  1000(16.3)   Weight (kg) 61.2 61.2 61.2 61.2                       Male External Urinary Catheter 05/19/24 1502 (Active)   Collection Container Standard drainage bag 05/23/24 0800   Securement Method secured to top of thigh w/ adhesive device 05/23/24 0800   Skin no redness;no breakdown 05/23/24 0800   Tolerance no signs/symptoms of discomfort 05/23/24 0800   Output (mL) 50 mL 05/22/24 0601   Catheter Change Date 05/23/24 05/23/24 0800   Catheter Change Time 1000 05/23/24 0800          Physical Exam         Neurosurgery Physical Exam    General: well developed, well nourished, no distress.   Head: normocephalic  Neurologic: Alert and oriented. Thought content appropriate.  GCS: Motor: 6/Verbal: 5/Eyes: 4 GCS Total: 15  Mental Status: Awake, Alert, Oriented x 4  Language: No aphasia  Speech: No dysarthria  Cranial nerves: face symmetric, tongue midline, CN II-XII grossly intact.   Eyes: pupils equal, round, reactive to light with accomodation, EOMI.  Pulmonary: normal respirations, no signs of respiratory distress  Sensory: intact to light touch throughout  Motor Strength: Moves all extremities spontaneously with good tone.  Full strength upper and lower extremities. No abnormal movements seen.   Pronator Drift: no drift noted  Finger-to-nose: Intact bilaterally  Skin: Skin is warm, dry and intact.  Incision c/d/I with skin edges well approximated.    Significant Labs:  Recent Labs   Lab 05/22/24  0225 05/23/24 0623   GLU 84 84    134*   K 4.2 4.1    104   CO2 23 23   BUN 13 12   CREATININE 0.9 0.9   CALCIUM 9.0 8.6*   MG 1.6 1.7     Recent Labs  "  Lab 05/22/24  0225 05/23/24  0623   WBC 6.49 4.29   HGB 12.3* 11.1*   HCT 36.7* 34.0*    193     No results for input(s): "LABPT", "INR", "APTT" in the last 48 hours.  Microbiology Results (last 7 days)       ** No results found for the last 168 hours. **          Recent Lab Results         05/23/24  0623        Albumin 2.4       ALP 89       ALT 9       Anion Gap 7       AST 28       Baso # 0.01       Basophil % 0.2       BILIRUBIN TOTAL 0.2  Comment: For infants and newborns, interpretation of results should be based  on gestational age, weight and in agreement with clinical  observations.    Premature Infant recommended reference ranges:  Up to 24 hours.............<8.0 mg/dL  Up to 48 hours............<12.0 mg/dL  3-5 days..................<15.0 mg/dL  6-29 days.................<15.0 mg/dL         BUN 12       Calcium 8.6       Chloride 104       CO2 23       Creatinine 0.9       Differential Method Automated       eGFR >60.0       Eos # 0.1       Eos % 3.0       Glucose 84       Gran # (ANC) 2.2       Gran % 50.2       Hematocrit 34.0       Hemoglobin 11.1       Immature Grans (Abs) 0.01  Comment: Mild elevation in immature granulocytes is non specific and   can be seen in a variety of conditions including stress response,   acute inflammation, trauma and pregnancy. Correlation with other   laboratory and clinical findings is essential.         Immature Granulocytes 0.2       Lymph # 1.6       Lymph % 37.3       Magnesium  1.7       MCH 31.6       MCHC 32.6       MCV 97       Mono # 0.4       Mono % 9.1       MPV 10.4       nRBC 0       Phosphorus Level 3.6       Platelet Count 193       Potassium 4.1       PROTEIN TOTAL 5.6       RBC 3.51       RDW 13.2       Sodium 134       WBC 4.29             All pertinent labs from the last 24 hours have been reviewed.    Significant Diagnostics:  I have reviewed all pertinent imaging results/findings within the past 24 hours.  Assessment/Plan:     * Subdural " hematoma  89 yo male with large right SDH with 7 mm MLS, GCS 15 now s/p burrhole craniotomy for SDH evacuation.     --TTF 5/22; sitter bedside; currently PEC'd  --Post op CTH satisfactory; repeat CT 5/19 with improving pneumocephalus. AM CTH stable s/p SD drain pull  --SBP goal <140  --Na >135  --Keppra 500 BID x5d  --Liberalize HOB now that subdural drain has been removed  --Rec Neuro IR consultation for MMA embolization during this admission - planning for 5/24  --PT/OT/OOBTC  --PRN pain control with aggressive bowel regimen while on narcotics  --Continue to monitor clinically, notify NSGY immediately with any changes in neuro status    Dispo: ongoing, sitter bedside    Closed with Monocryl    D/w Dr. Price by NSGY team    Please contact the on call neurosurgery provider with questions or concerns. Can be reached via on-call schedule and/or .              Lurdes Pollard PA-C  Neurosurgery  [facility] - Neurosurgery ([facility])

## 2024-05-23 NOTE — PLAN OF CARE
Patient resting in bed, took medications with no issues. Sitter at bedside. CLWR, BR upx3, bed low, locked in position, bed alarm engaged. Safety maintained, will continue to monitor.     Problem: Infection  Goal: Absence of Infection Signs and Symptoms  Outcome: Progressing     Problem: Fall Injury Risk  Goal: Absence of Fall and Fall-Related Injury  Outcome: Progressing     Problem: Skin Injury Risk Increased  Goal: Skin Health and Integrity  Outcome: Progressing      normal for race

## 2024-05-23 NOTE — SUBJECTIVE & OBJECTIVE
Interval History:  Sitter is at the bedside affirmed the patient having confusion.  Patient himself denies any chest pain or headache.  Denies having any suicidal ideation at this time.    Review of Systems  Objective:     Vital Signs (Most Recent):  Temp: 98.9 °F (37.2 °C) (05/23/24 1136)  Pulse: 81 (05/23/24 1136)  Resp: 18 (05/23/24 1136)  BP: (!) 156/70 (05/23/24 1215)  SpO2: 99 % (05/23/24 1136) Vital Signs (24h Range):  Temp:  [97.5 °F (36.4 °C)-99 °F (37.2 °C)] 98.9 °F (37.2 °C)  Pulse:  [] 81  Resp:  [17-30] 18  SpO2:  [93 %-100 %] 99 %  BP: (133-213)/(61-98) 156/70     Weight: 61.2 kg (135 lb)  Body mass index is 21.14 kg/m².    Intake/Output Summary (Last 24 hours) at 5/23/2024 1446  Last data filed at 5/23/2024 0528  Gross per 24 hour   Intake --   Output 350 ml   Net -350 ml         Physical Exam      Clear lungs bilaterally, unlabored breathing, on room air, no cyanosis   Heart sounds indicate a regular rate and rhythm   Awake alert, no acute distress   Oriented x2   No facial droop, no slurred speech   No pupillary asymmetry   5/5 proximal upper and lower extremity strength bilaterally including fist  and hip flexor strength   No obvious lower extremity edema   Pleasant

## 2024-05-23 NOTE — ASSESSMENT & PLAN NOTE
87 yo male w/Bilateral SDH R>L large right SDH with 7 mm MLS s/p evac     - Admit to [facility]  - NSGY consulted, following  - S/p removal of subdural drain, head CT stable  - q1h neuro checks, vitals, I/Os  - Echo, EKG, CXR   - No AC/AP, denies trauma  - Daily CBC, CMP, mag, phos  - SBP < 160   - PRN labetalol, hydralazine  - Keppra dc'd given agitation, on phenobarb taper   - Zyprexa 5 mg q8hrs PRN -> no requirement overnight  - C/w seroquel 25mg qhs  - Seizure precautions  - Diet Regular  - PT/OT/SLP as appropriate  - VTE Prophylaxis: mechanical, HSQ    Stable for transfer to floor. MMA embolization pending

## 2024-05-24 ENCOUNTER — TELEPHONE (OUTPATIENT)
Dept: NEUROSURGERY | Facility: CLINIC | Age: 89
End: 2024-05-24
Payer: MEDICARE

## 2024-05-24 ENCOUNTER — ANESTHESIA EVENT (OUTPATIENT)
Dept: ENDOSCOPY | Facility: HOSPITAL | Age: 89
DRG: 020 | End: 2024-05-24
Payer: MEDICARE

## 2024-05-24 DIAGNOSIS — Z98.890 S/P CRANIOTOMY: Primary | ICD-10-CM

## 2024-05-24 LAB
ALBUMIN SERPL BCP-MCNC: 2.5 G/DL (ref 3.5–5.2)
ALP SERPL-CCNC: 85 U/L (ref 55–135)
ALT SERPL W/O P-5'-P-CCNC: 12 U/L (ref 10–44)
ANION GAP SERPL CALC-SCNC: 4 MMOL/L (ref 8–16)
AST SERPL-CCNC: 28 U/L (ref 10–40)
BASOPHILS # BLD AUTO: 0.02 K/UL (ref 0–0.2)
BASOPHILS NFR BLD: 0.4 % (ref 0–1.9)
BILIRUB SERPL-MCNC: 0.2 MG/DL (ref 0.1–1)
BUN SERPL-MCNC: 19 MG/DL (ref 8–23)
CALCIUM SERPL-MCNC: 8.6 MG/DL (ref 8.7–10.5)
CHLORIDE SERPL-SCNC: 103 MMOL/L (ref 95–110)
CO2 SERPL-SCNC: 26 MMOL/L (ref 23–29)
CREAT SERPL-MCNC: 1.1 MG/DL (ref 0.5–1.4)
DIFFERENTIAL METHOD BLD: ABNORMAL
EOSINOPHIL # BLD AUTO: 0.2 K/UL (ref 0–0.5)
EOSINOPHIL NFR BLD: 3.7 % (ref 0–8)
ERYTHROCYTE [DISTWIDTH] IN BLOOD BY AUTOMATED COUNT: 13.4 % (ref 11.5–14.5)
EST. GFR  (NO RACE VARIABLE): >60 ML/MIN/1.73 M^2
GLUCOSE SERPL-MCNC: 112 MG/DL (ref 70–110)
HCT VFR BLD AUTO: 32.3 % (ref 40–54)
HGB BLD-MCNC: 10.7 G/DL (ref 14–18)
IMM GRANULOCYTES # BLD AUTO: 0.01 K/UL (ref 0–0.04)
IMM GRANULOCYTES NFR BLD AUTO: 0.2 % (ref 0–0.5)
LYMPHOCYTES # BLD AUTO: 1.6 K/UL (ref 1–4.8)
LYMPHOCYTES NFR BLD: 35.2 % (ref 18–48)
MAGNESIUM SERPL-MCNC: 1.7 MG/DL (ref 1.6–2.6)
MCH RBC QN AUTO: 32.7 PG (ref 27–31)
MCHC RBC AUTO-ENTMCNC: 33.1 G/DL (ref 32–36)
MCV RBC AUTO: 99 FL (ref 82–98)
MONOCYTES # BLD AUTO: 0.4 K/UL (ref 0.3–1)
MONOCYTES NFR BLD: 7.6 % (ref 4–15)
NEUTROPHILS # BLD AUTO: 2.4 K/UL (ref 1.8–7.7)
NEUTROPHILS NFR BLD: 52.9 % (ref 38–73)
NRBC BLD-RTO: 0 /100 WBC
PHOSPHATE SERPL-MCNC: 3.5 MG/DL (ref 2.7–4.5)
PLATELET # BLD AUTO: 195 K/UL (ref 150–450)
PMV BLD AUTO: 10.5 FL (ref 9.2–12.9)
POTASSIUM SERPL-SCNC: 4.2 MMOL/L (ref 3.5–5.1)
PROT SERPL-MCNC: 5.7 G/DL (ref 6–8.4)
RBC # BLD AUTO: 3.27 M/UL (ref 4.6–6.2)
SODIUM SERPL-SCNC: 133 MMOL/L (ref 136–145)
WBC # BLD AUTO: 4.6 K/UL (ref 3.9–12.7)

## 2024-05-24 PROCEDURE — 25000003 PHARM REV CODE 250: Performed by: PHYSICIAN ASSISTANT

## 2024-05-24 PROCEDURE — 99499 UNLISTED E&M SERVICE: CPT | Mod: ,,, | Performed by: PSYCHIATRY & NEUROLOGY

## 2024-05-24 PROCEDURE — 97535 SELF CARE MNGMENT TRAINING: CPT

## 2024-05-24 PROCEDURE — 11000001 HC ACUTE MED/SURG PRIVATE ROOM

## 2024-05-24 PROCEDURE — 36415 COLL VENOUS BLD VENIPUNCTURE: CPT | Performed by: STUDENT IN AN ORGANIZED HEALTH CARE EDUCATION/TRAINING PROGRAM

## 2024-05-24 PROCEDURE — 25000003 PHARM REV CODE 250: Performed by: NURSE PRACTITIONER

## 2024-05-24 PROCEDURE — 63600175 PHARM REV CODE 636 W HCPCS: Performed by: NURSE PRACTITIONER

## 2024-05-24 PROCEDURE — 83735 ASSAY OF MAGNESIUM: CPT | Performed by: STUDENT IN AN ORGANIZED HEALTH CARE EDUCATION/TRAINING PROGRAM

## 2024-05-24 PROCEDURE — 99024 POSTOP FOLLOW-UP VISIT: CPT | Mod: ,,, | Performed by: PHYSICIAN ASSISTANT

## 2024-05-24 PROCEDURE — 97110 THERAPEUTIC EXERCISES: CPT

## 2024-05-24 PROCEDURE — 25000003 PHARM REV CODE 250

## 2024-05-24 PROCEDURE — 80053 COMPREHEN METABOLIC PANEL: CPT | Performed by: STUDENT IN AN ORGANIZED HEALTH CARE EDUCATION/TRAINING PROGRAM

## 2024-05-24 PROCEDURE — 63600175 PHARM REV CODE 636 W HCPCS: Performed by: STUDENT IN AN ORGANIZED HEALTH CARE EDUCATION/TRAINING PROGRAM

## 2024-05-24 PROCEDURE — 63600175 PHARM REV CODE 636 W HCPCS: Performed by: HOSPITALIST

## 2024-05-24 PROCEDURE — 94761 N-INVAS EAR/PLS OXIMETRY MLT: CPT

## 2024-05-24 PROCEDURE — 85025 COMPLETE CBC W/AUTO DIFF WBC: CPT | Performed by: STUDENT IN AN ORGANIZED HEALTH CARE EDUCATION/TRAINING PROGRAM

## 2024-05-24 PROCEDURE — 25000003 PHARM REV CODE 250: Performed by: PSYCHIATRY & NEUROLOGY

## 2024-05-24 PROCEDURE — 25000003 PHARM REV CODE 250: Performed by: STUDENT IN AN ORGANIZED HEALTH CARE EDUCATION/TRAINING PROGRAM

## 2024-05-24 PROCEDURE — 84100 ASSAY OF PHOSPHORUS: CPT | Performed by: STUDENT IN AN ORGANIZED HEALTH CARE EDUCATION/TRAINING PROGRAM

## 2024-05-24 RX ORDER — MAGNESIUM SULFATE HEPTAHYDRATE 40 MG/ML
2 INJECTION, SOLUTION INTRAVENOUS ONCE
Status: COMPLETED | OUTPATIENT
Start: 2024-05-24 | End: 2024-05-24

## 2024-05-24 RX ADMIN — PHENOBARBITAL 15 MG: 20 ELIXIR ORAL at 08:05

## 2024-05-24 RX ADMIN — BACITRACIN 1 EACH: 500 OINTMENT TOPICAL at 08:05

## 2024-05-24 RX ADMIN — MAGNESIUM SULFATE HEPTAHYDRATE 2 G: 40 INJECTION, SOLUTION INTRAVENOUS at 09:05

## 2024-05-24 RX ADMIN — HYDRALAZINE HYDROCHLORIDE 10 MG: 20 INJECTION, SOLUTION INTRAMUSCULAR; INTRAVENOUS at 07:05

## 2024-05-24 RX ADMIN — FOLIC ACID 1 MG: 1 TABLET ORAL at 08:05

## 2024-05-24 RX ADMIN — HEPARIN SODIUM 5000 UNITS: 5000 INJECTION INTRAVENOUS; SUBCUTANEOUS at 08:05

## 2024-05-24 RX ADMIN — GABAPENTIN 600 MG: 300 CAPSULE ORAL at 08:05

## 2024-05-24 RX ADMIN — THERA TABS 1 TABLET: TAB at 08:05

## 2024-05-24 RX ADMIN — SENNOSIDES AND DOCUSATE SODIUM 1 TABLET: 50; 8.6 TABLET ORAL at 08:05

## 2024-05-24 RX ADMIN — LOSARTAN POTASSIUM 25 MG: 25 TABLET, FILM COATED ORAL at 08:05

## 2024-05-24 RX ADMIN — QUETIAPINE FUMARATE 25 MG: 25 TABLET ORAL at 08:05

## 2024-05-24 RX ADMIN — Medication 100 MG: at 08:05

## 2024-05-24 RX ADMIN — GABAPENTIN 600 MG: 300 CAPSULE ORAL at 03:05

## 2024-05-24 NOTE — PT/OT/SLP PROGRESS
Occupational Therapy   Treatment    Name: Moses Peng  MRN: [id number]  Admitting Diagnosis:  Subdural hematoma  7 Days Post-Op    Recommendations:     Discharge Recommendations: High Intensity Therapy  Discharge Equipment Recommendations:  walker, rolling  Patient demonstrates a mobility limitation that significantly impairs their ability to participate in one or more mobility related activities of daily living. Patient's mobility limitation cannot be sufficiently resolved with the use of a cane, but can be sufficiently resolved with the use of a rolling walker.The use of a rolling walker will considerably improve their ability to participate in MRADLs. Patient will use the walker on a regular basis at home.    Barriers to discharge:  Other (Comment) (level of assitance required)    Assessment:     Moses Peng is a 88 y.o. male with a medical diagnosis of Subdural hematoma.  He presents with impaired safety awareness with impulsivity and increased distractibility. Patient currently unable to follow multi step commands requiring increased verbal cueing. Performance deficits affecting function are weakness, impaired endurance, impaired functional mobility, gait instability, impaired balance, decreased lower extremity function, decreased upper extremity function, impaired self care skills, decreased coordination, impaired cognition, impaired skin. Patient has demonstrated sufficient progression to warrant high intensity therapy evidenced by objectives noted below.     Rehab Prognosis:  Good; patient would benefit from acute skilled OT services to address these deficits and reach maximum level of function.       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  Patient found HOB elevated with telemetry and sitter in room upon OT entry to room.    General Precautions: Standard, fall    Orthopedic Precautions:N/A  Braces: N/A  Respiratory Status: Room air     Occupational Performance:     Bed Mobility:    Patient completed Supine to Sit with contact guard assistance     Functional Mobility/Transfers: Gait belt utilized during session for patient safety  Patient completed Sit <> Stand Transfer with minimum assistance  with  no assistive device   Functional Mobility: patient ambulated ~10 ft with moderate assistance and HHA; patient with multiple loss of balances     Activities of Daily Living:   Lower Body Dressing: moderate assistance to don socks via figure 4 technique    Therapeutic Exercise: RW placed in front of patient for balance; gait belt and minimum assistance for balance provided for safety  1x10 shoulder flexion in stance (alternating)   1x10 standing [facility] 6 Click ADL: 15    Treatment & Education:  OT requested patient mobilize to bathroom and was initially receptive and then upon entry to bathroom reported refusal to complete standing grooming activities due to previous completion    Patient educated on:   -purpose of OT and OT POC  -facilitation and education on proper body mechanics, energy conservation, and safety  -importance of early mobility and out of bed activities with staff assist  -overall benefits of therapy     All questions answered within OT scope and to patient's satisfaction    Patient left up in chair with all lines intact, call  button in reach, chair alarm on, nurse notified, and sitter present    GOALS:   Multidisciplinary Problems       Occupational Therapy Goals          Problem: Occupational Therapy    Goal Priority Disciplines Outcome Interventions   Occupational Therapy Goal     OT, PT/OT Progressing    Description: Goals to be met by: 6/21/24     Patient will increase functional independence with ADLs by performing:    Feeding with [place].  UE Dressing with [place].  LE Dressing with [place].  Grooming while standing at sink with [place].  Toileting from toilet with [place] for hygiene and clothing management.   Supine to sit with [place].  Step transfer with [place]  Toilet transfer to toilet with [place].                         Time Tracking:     OT Date of Treatment: 05/24/24  OT Start Time: 1200  OT Stop Time: 1224  OT Total Time (min): 24 min    Billable Minutes:Self Care/Home Management 10  Therapeutic Exercise 14    OT/[place]: OT          5/24/2024

## 2024-05-24 NOTE — PROGRESS NOTES
[facility] - Neurosurgery ([facility])  [facility] Medicine  Progress Note    Patient Name: Moses Peng  MRN: [id number]  Patient Class: IP- Inpatient   Admission Date: 5/17/2024  Length of Stay: 7 days  Attending Physician: Paul Anne MD  Primary Care Provider: Salma, Primary Doctor        Subjective:     Principal Problem:Subdural hematoma        HPI:  Mr Peng is an 87 y/o M unknown PMHx admitted to [facility] w/ bilateral SDH R>L and MLS. Per chart review, he presented to the ED by EMS stating he wants to kill himself. Came yesterday with c/o R hand paresthesias and was discharged. Currently PEC'ed per psych evaluation.  Denies falls. MRI with large right SDH with midline shift. Denies use of AC/APT. NSGY consulted for evaluation. NPO currently for OR  for SDH evac w/NSGY. Patient admitted to [facility] for close monitoring and higher level of care.             Overview/Hospital Course:  Pt underwent SDH  evaucation on 5/1/24. Repeat CTH   with decreased right SDH and MLS with stable mixed amount of left SDH. Patient started on precedex gtt for agitation. Keppra changed to briviact in the hopes of reducing agitation. Seroquel 25mg added qhs. On 5/20/24 Subdural drain d/c'd by NSGY, HOB liberalized. Post-subdural drain removal CT w/ slight increase in pneumocephalus, no re-accumulation of SDH, midline shift/mass effect unchanged from prior. Mental status markedly improved , precedex was dc'd.  Pt was CEC'd due to suicidal ideation. Per NSGY recs  MMA embolization by IR pending.    Interval History:  Patient denies any chest pain, no shortness a breath, no headache.  Afebrile.  Pending MMA embolization.  Patient denies any suicidal ideation    Review of Systems  Objective:     Vital Signs (Most Recent):  Temp: 98 °F (36.7 °C) (05/24/24 0746)  Pulse: 83 (05/24/24 1230)  Resp: 18 (05/24/24 0746)  BP: (!) 144/68 (05/24/24 0746)  SpO2: 97 % (05/24/24 0746) Vital Signs (24h Range):  Temp:  [98 °F (36.7 °C)-99.1 °F (37.3 °C)] 98 °F (36.7  °C)  Pulse:  [72-95] 83  Resp:  [16-18] 18  SpO2:  [96 %-97 %] 97 %  BP: (105-171)/(57-78) 144/68     Weight: 61.2 kg (135 lb)  Body mass index is 21.14 kg/m².    Intake/Output Summary (Last 24 hours) at 5/24/2024 1519  Last data filed at 5/24/2024 0800  Gross per 24 hour   Intake 0 ml   Output 1000 ml   Net -1000 ml         Physical Exam      Awake alert, no acute distress   No facial droop, no slurred speech   No pupillary asymmetry noted   Clear lungs bilaterally, unlabored breathing, on room air, no cyanosis   Heart sounds indicate a regular rate and rhythm   No obvious upper and lower extremity edema   5/5 proximal upper and lower extremity strength bilaterally including fist  and hip flexor strength         Assessment/Plan:      * Subdural hematoma  Bilateral SDH R>L large right SDH with 7 mm MLS s/p evac   S/p  burrhole evac on 5/17.   Stable repeat head Cts  Pending MMA embolization per NSGY recs  Limited keppra course for prophy.  neurochecks    Brain compression  secondary to above   See Subdural hematoma    History of jacob hole surgery    See Subdural hematoma       Midline shift of brain due to hematoma   repeat CT head 5/20 - operative change from right frontal parietal jacob holes for subdural hematoma evacuation with interval removal of subdural drainage catheter.  There is slight increased postoperative pneumocephalus overlying the right frontal convexity with evolving hypodense collection overlying the right cerebral convexity.  Mass effect with leftward midline shift similar to prior     Superimposed left cerebral convexity subdural hematoma stable. No evidence for new hemorrhage or significant new abnormal parenchymal attenuation.     Encephalopathy, metabolic  multifactorial. Suspect 2/2 b/l SDH +/- component of ICU delirium      - Some family report of EtOH use prior to admission, unclear quantity; no evidence of EtOH w/d to date, monitor  - On phenobarb taper  Improving overall; Continue  "Seroquel 25 mg PO nightly and Zyprexa 5 mg q8 hours PRN for non-redirectable agitation associated with delirium       Suicidal ideation    Per chart review- patient reported "if I had a gun I would kill myself"-   psychiatry following   CEC'ed          Alcohol withdrawal  continue phenobarbital taper       Essential hypertension    History   - EKG, Echo  - SBP < 160  - PRN labetalol, hydralazine  - Hold HCTZ given mild hyponatremia         VTE Risk Mitigation (From admission, onward)           Ordered     heparin (porcine) injection 5,000 Units  Every 8 hours         05/17/24 1826     IP VTE HIGH RISK PATIENT  Once         05/17/24 1826     Place sequential compression device  Until discontinued         05/17/24 1826                    Discharge Planning   MIKE: 5/25/2024     Code Status: Full Code   Is the patient medically ready for discharge?:     Reason for patient still in hospital (select all that apply): Treatment and Consult recommendations  Discharge Plan A: [facility]                  Paul Anne MD  Department of Hospital Medicine   [facility] - Neurosurgery ([facility])    "

## 2024-05-24 NOTE — PLAN OF CARE
[facility] - Neurosurgery (Hospital)  Discharge Reassessment    Primary Care Provider: No, Primary Doctor    Expected Discharge Date: 5/25/2024    Reassessment (most recent)       Discharge Reassessment - 05/24/24 1231          Discharge Reassessment    Assessment Type Discharge Planning Brief Assessment (P)      Did the patient's condition or plan change since previous assessment? Yes (P)      Discharge Plan discussed with: Adult children (P)      Communicated MIKE with patient/caregiver Date not available/Unable to determine (P)      Discharge Plan A Psychiatric hospital (P)      Discharge Plan B Home with family (P)      DME Needed Upon Discharge  none (P)      Transition of Care Barriers None (P)      Why the patient remains in the hospital Requires continued medical care (P)                    Pt not ready for discharge due to: CEC'd and pending MMA  CM will remain available for families/patient on NPU  Currently pt has d/c plans in progress at this time.    Discharge Plan A and Plan B have been determined by review of patient's clinical status, future medical and therapeutic needs, and coverage/benefits for post-acute care in coordination with multidisciplinary team members.

## 2024-05-24 NOTE — PROGRESS NOTES
Ankit Martin - Neurosurgery ([facility])  Neurosurgery  Progress Note    Subjective:     History of Present Illness: 88 M unknown PMH presents to the ED by EMS stating he wants to kill himself. Came yesterday with c/o R hand paresthesias and was discharged. Currently under PEC per psych evaluation. He states he may have hit the left side of his head on the door earlier this week. Denies falls. Denies headache, seizures, weakness, b/b dysfunction. MRI with large right SDH with midline shift. Denies use of AC/APT. NSGY consulted for evaluation.     Post-Op Info:  Procedure(s) (LRB):  CRANIOTOMY, FOR SUBDURAL HEMATOMA EVACUATION (Right)   7 Days Post-Op   Interval History: NAEON. Pleasant, lying in bed watching TV. Denies headache. Pending MMA today. Neuro exam stable.     Medications:  Continuous Infusions:  Scheduled Meds:   bacitracin zinc   Topical (Top) BID    folic acid  1 mg Oral Daily    gabapentin  600 mg Oral TID    heparin (porcine)  5,000 Units Subcutaneous Q8H    losartan  25 mg Oral Daily    multivitamin  1 tablet Oral Daily    PHENobarbitaL  15 mg Oral BID    Followed by    [START ON 5/25/2024] PHENobarbitaL  10 mg Oral BID    Followed by    [START ON 5/26/2024] PHENobarbitaL  5 mg Oral BID    polyethylene glycol  17 g Oral Daily    QUEtiapine  25 mg Oral QHS    senna-docusate 8.6-50 mg  1 tablet Oral Daily    thiamine  100 mg Oral Daily     PRN Meds:  Current Facility-Administered Medications:     acetaminophen, 1,000 mg, Oral, Q8H PRN    hydrALAZINE, 10 mg, Intravenous, Q4H PRN    labetalol, 10 mg, Intravenous, Q6H PRN    OLANZapine, 5 mg, Intramuscular, Q8H PRN    ondansetron, 8 mg, Intravenous, Q6H PRN    oxyCODONE, 5 mg, Oral, Q4H PRN     Review of Systems  Objective:     Weight: 61.2 kg (135 lb)  Body mass index is 21.14 kg/m².  Vital Signs (Most Recent):  Temp: 98 °F (36.7 °C) (05/24/24 0746)  Pulse: 83 (05/24/24 1230)  Resp: 18 (05/24/24 0746)  BP: (!) 144/68 (05/24/24 0746)  SpO2: 97 % (05/24/24 0746)  Vital Signs (24h Range):  Temp:  [97.6 °F (36.4 °C)-99.1 °F (37.3 °C)] 98 °F (36.7 °C)  Pulse:  [72-95] 83  Resp:  [16-18] 18  SpO2:  [96 %-98 %] 97 %  BP: (105-171)/(57-78) 144/68     Date 05/24/24 0700 - 05/25/24 0659   Shift 8644-8015 6339-2944 6488-8756 24 Hour Total   INTAKE   P.O. 0   0   Shift Total(mL/kg) 0(0)   0(0)   OUTPUT   Shift Total(mL/kg)       Weight (kg) 61.2 61.2 61.2 61.2                       Male External Urinary Catheter 05/19/24 1502 (Active)   Collection Container Standard drainage bag 05/23/24 0800   Securement Method secured to top of thigh w/ adhesive device 05/23/24 0800   Skin no redness;no breakdown 05/23/24 0800   Tolerance no signs/symptoms of discomfort 05/23/24 0800   Output (mL) 50 mL 05/22/24 0601   Catheter Change Date 05/23/24 05/23/24 0800   Catheter Change Time 1000 05/23/24 0800          Physical Exam         Neurosurgery Physical Exam    General: well developed, well nourished, no distress.   Head: normocephalic  Neurologic: Alert and oriented. Thought content appropriate.  GCS: Motor: 6/Verbal: 5/Eyes: 4 GCS Total: 15  Mental Status: Awake, Alert, Oriented x 4  Language: No aphasia  Speech: No dysarthria  Cranial nerves: face symmetric, tongue midline, CN II-XII grossly intact.   Eyes: pupils equal, round, reactive to light with accomodation, EOMI.  Pulmonary: normal respirations, no signs of respiratory distress  Sensory: intact to light touch throughout  Motor Strength: Moves all extremities spontaneously with good tone.  Full strength upper and lower extremities. No abnormal movements seen.   Pronator Drift: no drift noted  Finger-to-nose: Intact bilaterally  Skin: Skin is warm, dry and intact.  Incision c/d/I with skin edges well approximated.    Significant Labs:  Recent Labs   Lab 05/23/24  0623 05/24/24  0655   GLU 84 112*   * 133*   K 4.1 4.2    103   CO2 23 26   BUN 12 19   CREATININE 0.9 1.1   CALCIUM 8.6* 8.6*   MG 1.7 1.7     Recent Labs   Lab  "05/23/24  0623 05/24/24  0655   WBC 4.29 4.60   HGB 11.1* 10.7*   HCT 34.0* 32.3*    195     No results for input(s): "LABPT", "INR", "APTT" in the last 48 hours.  Microbiology Results (last 7 days)       ** No results found for the last 168 hours. **          Recent Lab Results         05/24/24  0655        Albumin 2.5       ALP 85       ALT 12       Anion Gap 4       AST 28       Baso # 0.02       Basophil % 0.4       BILIRUBIN TOTAL 0.2  Comment: For infants and newborns, interpretation of results should be based  on gestational age, weight and in agreement with clinical  observations.    Premature Infant recommended reference ranges:  Up to 24 hours.............<8.0 mg/dL  Up to 48 hours............<12.0 mg/dL  3-5 days..................<15.0 mg/dL  6-29 days.................<15.0 mg/dL         BUN 19       Calcium 8.6       Chloride 103       CO2 26       Creatinine 1.1       Differential Method Automated       eGFR >60.0       Eos # 0.2       Eos % 3.7       Glucose 112       Gran # (ANC) 2.4       Gran % 52.9       Hematocrit 32.3       Hemoglobin 10.7       Immature Grans (Abs) 0.01  Comment: Mild elevation in immature granulocytes is non specific and   can be seen in a variety of conditions including stress response,   acute inflammation, trauma and pregnancy. Correlation with other   laboratory and clinical findings is essential.         Immature Granulocytes 0.2       Lymph # 1.6       Lymph % 35.2       Magnesium  1.7       MCH 32.7       MCHC 33.1       MCV 99       Mono # 0.4       Mono % 7.6       MPV 10.5       nRBC 0       Phosphorus Level 3.5       Platelet Count 195       Potassium 4.2       PROTEIN TOTAL 5.7       RBC 3.27       RDW 13.4       Sodium 133       WBC 4.60             All pertinent labs from the last 24 hours have been reviewed.    Significant Diagnostics:  I have reviewed all pertinent imaging results/findings within the past 24 hours.  Assessment/Plan:     * Subdural " hematoma  89 yo male with large right SDH with 7 mm MLS, GCS 15 now s/p burrhole craniotomy for SDH evacuation.     --TTF 5/22; sitter bedside; currently PEC'd  --Post op CTH satisfactory; repeat CT 5/19 with improving pneumocephalus. AM CTH stable s/p SD drain pull  --SBP goal <140  --Na >135  --Keppra 500 BID x5d  --Liberalize HOB now that subdural drain has been removed  --Rec Neuro IR consultation for MMA embolization during this admission - planning for 5/24  --PT/OT/OOBTC  --PRN pain control with aggressive bowel regimen while on narcotics  --Continue to monitor clinically, notify NSGY immediately with any changes in neuro status  --Repeat CTH after angiogram and at 6 week interval f/u appointment. Will arrange hospital follow-up. Will follow peripherally while patient remains in the hospital.     Dispo: ongoing, sitter bedside    Closed with Monocryl    D/w Dr. Price     Please contact the on call neurosurgery provider with questions or concerns. Can be reached via on-call schedule and/or .              Lurdes Pollard PA-C  Neurosurgery  [facility] - Neurosurgery ([facility])

## 2024-05-24 NOTE — ASSESSMENT & PLAN NOTE
89 yo male with large right SDH with 7 mm MLS, GCS 15 now s/p burrhole craniotomy for SDH evacuation.     --TTF 5/22; sitter bedside; currently PEC'd  --Post op CTH satisfactory; repeat CT 5/19 with improving pneumocephalus. AM CTH stable s/p SD drain pull  --SBP goal <140  --Na >135  --Keppra 500 BID x5d  --Liberalize HOB now that subdural drain has been removed  --Rec Neuro IR consultation for MMA embolization during this admission - planning for 5/24  --PT/OT/OOBTC  --PRN pain control with aggressive bowel regimen while on narcotics  --Continue to monitor clinically, notify NSGY immediately with any changes in neuro status  --Repeat CTH after angiogram and at 6 week interval f/u appointment. Will arrange hospital follow-up. Will follow peripherally while patient remains in the hospital.     Dispo: ongoing, sitter bedside    Closed with Monocryl    D/w Dr. Price     Please contact the on call neurosurgery provider with questions or concerns. Can be reached via on-call schedule and/or .

## 2024-05-24 NOTE — CONSULTS
Patient consented May 20th for bilateral MMA Embolization by Neuro-IR team.             Hyun Tobar MD  Fellow, NeuroEndovascular Surgery, [facility]  Board certified Vascular Neurologist  [place], [place]

## 2024-05-24 NOTE — SUBJECTIVE & OBJECTIVE
Interval History:  Patient denies any chest pain, no shortness a breath, no headache.  Afebrile.  Pending MMA embolization.  Patient denies any suicidal ideation    Review of Systems  Objective:     Vital Signs (Most Recent):  Temp: 98 °F (36.7 °C) (05/24/24 0746)  Pulse: 83 (05/24/24 1230)  Resp: 18 (05/24/24 0746)  BP: (!) 144/68 (05/24/24 0746)  SpO2: 97 % (05/24/24 0746) Vital Signs (24h Range):  Temp:  [98 °F (36.7 °C)-99.1 °F (37.3 °C)] 98 °F (36.7 °C)  Pulse:  [72-95] 83  Resp:  [16-18] 18  SpO2:  [96 %-97 %] 97 %  BP: (105-171)/(57-78) 144/68     Weight: 61.2 kg (135 lb)  Body mass index is 21.14 kg/m².    Intake/Output Summary (Last 24 hours) at 5/24/2024 1519  Last data filed at 5/24/2024 0800  Gross per 24 hour   Intake 0 ml   Output 1000 ml   Net -1000 ml         Physical Exam      Awake alert, no acute distress   No facial droop, no slurred speech   No pupillary asymmetry noted   Clear lungs bilaterally, unlabored breathing, on room air, no cyanosis   Heart sounds indicate a regular rate and rhythm   No obvious upper and lower extremity edema   5/5 proximal upper and lower extremity strength bilaterally including fist  and hip flexor strength

## 2024-05-25 ENCOUNTER — ANESTHESIA (OUTPATIENT)
Dept: ENDOSCOPY | Facility: HOSPITAL | Age: 89
DRG: 020 | End: 2024-05-25
Payer: MEDICARE

## 2024-05-25 LAB
ALBUMIN SERPL BCP-MCNC: 2.7 G/DL (ref 3.5–5.2)
ALP SERPL-CCNC: 87 U/L (ref 55–135)
ALT SERPL W/O P-5'-P-CCNC: 11 U/L (ref 10–44)
ANION GAP SERPL CALC-SCNC: 6 MMOL/L (ref 8–16)
AST SERPL-CCNC: 31 U/L (ref 10–40)
BASOPHILS # BLD AUTO: 0.02 K/UL (ref 0–0.2)
BASOPHILS NFR BLD: 0.5 % (ref 0–1.9)
BILIRUB SERPL-MCNC: 0.2 MG/DL (ref 0.1–1)
BUN SERPL-MCNC: 16 MG/DL (ref 8–23)
CALCIUM SERPL-MCNC: 8.9 MG/DL (ref 8.7–10.5)
CHLORIDE SERPL-SCNC: 103 MMOL/L (ref 95–110)
CO2 SERPL-SCNC: 25 MMOL/L (ref 23–29)
CREAT SERPL-MCNC: 0.9 MG/DL (ref 0.5–1.4)
DIFFERENTIAL METHOD BLD: ABNORMAL
EOSINOPHIL # BLD AUTO: 0.2 K/UL (ref 0–0.5)
EOSINOPHIL NFR BLD: 4.1 % (ref 0–8)
ERYTHROCYTE [DISTWIDTH] IN BLOOD BY AUTOMATED COUNT: 13.2 % (ref 11.5–14.5)
EST. GFR  (NO RACE VARIABLE): >60 ML/MIN/1.73 M^2
GLUCOSE SERPL-MCNC: 85 MG/DL (ref 70–110)
HCT VFR BLD AUTO: 31.6 % (ref 40–54)
HGB BLD-MCNC: 10.7 G/DL (ref 14–18)
IMM GRANULOCYTES # BLD AUTO: 0.01 K/UL (ref 0–0.04)
IMM GRANULOCYTES NFR BLD AUTO: 0.2 % (ref 0–0.5)
LYMPHOCYTES # BLD AUTO: 1.4 K/UL (ref 1–4.8)
LYMPHOCYTES NFR BLD: 31 % (ref 18–48)
MAGNESIUM SERPL-MCNC: 2 MG/DL (ref 1.6–2.6)
MCH RBC QN AUTO: 32.3 PG (ref 27–31)
MCHC RBC AUTO-ENTMCNC: 33.9 G/DL (ref 32–36)
MCV RBC AUTO: 96 FL (ref 82–98)
MONOCYTES # BLD AUTO: 0.4 K/UL (ref 0.3–1)
MONOCYTES NFR BLD: 8.9 % (ref 4–15)
NEUTROPHILS # BLD AUTO: 2.4 K/UL (ref 1.8–7.7)
NEUTROPHILS NFR BLD: 55.3 % (ref 38–73)
NRBC BLD-RTO: 0 /100 WBC
PHOSPHATE SERPL-MCNC: 3.8 MG/DL (ref 2.7–4.5)
PLATELET # BLD AUTO: 195 K/UL (ref 150–450)
PMV BLD AUTO: 10.7 FL (ref 9.2–12.9)
POTASSIUM SERPL-SCNC: 4.4 MMOL/L (ref 3.5–5.1)
PROT SERPL-MCNC: 5.9 G/DL (ref 6–8.4)
RBC # BLD AUTO: 3.31 M/UL (ref 4.6–6.2)
SODIUM SERPL-SCNC: 134 MMOL/L (ref 136–145)
WBC # BLD AUTO: 4.36 K/UL (ref 3.9–12.7)

## 2024-05-25 PROCEDURE — 80053 COMPREHEN METABOLIC PANEL: CPT | Performed by: STUDENT IN AN ORGANIZED HEALTH CARE EDUCATION/TRAINING PROGRAM

## 2024-05-25 PROCEDURE — 85025 COMPLETE CBC W/AUTO DIFF WBC: CPT | Performed by: STUDENT IN AN ORGANIZED HEALTH CARE EDUCATION/TRAINING PROGRAM

## 2024-05-25 PROCEDURE — 25000003 PHARM REV CODE 250: Performed by: NURSE ANESTHETIST, CERTIFIED REGISTERED

## 2024-05-25 PROCEDURE — 84100 ASSAY OF PHOSPHORUS: CPT | Performed by: STUDENT IN AN ORGANIZED HEALTH CARE EDUCATION/TRAINING PROGRAM

## 2024-05-25 PROCEDURE — 37000008 HC ANESTHESIA 1ST 15 MINUTES

## 2024-05-25 PROCEDURE — B315YZZ FLUOROSCOPY OF BILATERAL COMMON CAROTID ARTERIES USING OTHER CONTRAST: ICD-10-PCS | Performed by: RADIOLOGY

## 2024-05-25 PROCEDURE — 71000033 HC RECOVERY, INTIAL HOUR

## 2024-05-25 PROCEDURE — 71000039 HC RECOVERY, EACH ADD'L HOUR

## 2024-05-25 PROCEDURE — 03VG3DZ RESTRICTION OF INTRACRANIAL ARTERY WITH INTRALUMINAL DEVICE, PERCUTANEOUS APPROACH: ICD-10-PCS | Performed by: RADIOLOGY

## 2024-05-25 PROCEDURE — 11000001 HC ACUTE MED/SURG PRIVATE ROOM

## 2024-05-25 PROCEDURE — 25000003 PHARM REV CODE 250: Performed by: PHYSICIAN ASSISTANT

## 2024-05-25 PROCEDURE — 25000003 PHARM REV CODE 250: Performed by: PSYCHIATRY & NEUROLOGY

## 2024-05-25 PROCEDURE — 63600175 PHARM REV CODE 636 W HCPCS: Mod: JZ,JG | Performed by: HOSPITALIST

## 2024-05-25 PROCEDURE — 63600175 PHARM REV CODE 636 W HCPCS: Performed by: NURSE PRACTITIONER

## 2024-05-25 PROCEDURE — D9220A PRA ANESTHESIA: Mod: CRNA,,, | Performed by: NURSE ANESTHETIST, CERTIFIED REGISTERED

## 2024-05-25 PROCEDURE — 63600175 PHARM REV CODE 636 W HCPCS: Performed by: NURSE ANESTHETIST, CERTIFIED REGISTERED

## 2024-05-25 PROCEDURE — 37000009 HC ANESTHESIA EA ADD 15 MINS

## 2024-05-25 PROCEDURE — 25000003 PHARM REV CODE 250

## 2024-05-25 PROCEDURE — 36415 COLL VENOUS BLD VENIPUNCTURE: CPT | Performed by: STUDENT IN AN ORGANIZED HEALTH CARE EDUCATION/TRAINING PROGRAM

## 2024-05-25 PROCEDURE — 25000003 PHARM REV CODE 250: Performed by: STUDENT IN AN ORGANIZED HEALTH CARE EDUCATION/TRAINING PROGRAM

## 2024-05-25 PROCEDURE — B31CYZZ FLUOROSCOPY OF BILATERAL EXTERNAL CAROTID ARTERIES USING OTHER CONTRAST: ICD-10-PCS | Performed by: RADIOLOGY

## 2024-05-25 PROCEDURE — 25000003 PHARM REV CODE 250: Performed by: RADIOLOGY

## 2024-05-25 PROCEDURE — 99232 SBSQ HOSP IP/OBS MODERATE 35: CPT | Mod: 95,,, | Performed by: STUDENT IN AN ORGANIZED HEALTH CARE EDUCATION/TRAINING PROGRAM

## 2024-05-25 PROCEDURE — 83735 ASSAY OF MAGNESIUM: CPT | Performed by: STUDENT IN AN ORGANIZED HEALTH CARE EDUCATION/TRAINING PROGRAM

## 2024-05-25 PROCEDURE — D9220A PRA ANESTHESIA: Mod: ANES,,, | Performed by: ANESTHESIOLOGY

## 2024-05-25 RX ORDER — PROPOFOL 10 MG/ML
VIAL (ML) INTRAVENOUS
Status: DISCONTINUED | OUTPATIENT
Start: 2024-05-25 | End: 2024-05-25

## 2024-05-25 RX ORDER — ROCURONIUM BROMIDE 10 MG/ML
INJECTION, SOLUTION INTRAVENOUS
Status: DISCONTINUED | OUTPATIENT
Start: 2024-05-25 | End: 2024-05-25

## 2024-05-25 RX ORDER — VERAPAMIL HYDROCHLORIDE 2.5 MG/ML
INJECTION, SOLUTION INTRAVENOUS
Status: COMPLETED | OUTPATIENT
Start: 2024-05-25 | End: 2024-05-25

## 2024-05-25 RX ORDER — DEXAMETHASONE SODIUM PHOSPHATE 4 MG/ML
INJECTION, SOLUTION INTRA-ARTICULAR; INTRALESIONAL; INTRAMUSCULAR; INTRAVENOUS; SOFT TISSUE
Status: DISCONTINUED | OUTPATIENT
Start: 2024-05-25 | End: 2024-05-25

## 2024-05-25 RX ORDER — LIDOCAINE HYDROCHLORIDE 20 MG/ML
INJECTION INTRAVENOUS
Status: DISCONTINUED | OUTPATIENT
Start: 2024-05-25 | End: 2024-05-25

## 2024-05-25 RX ORDER — PHENYLEPHRINE HYDROCHLORIDE 10 MG/ML
INJECTION INTRAVENOUS
Status: DISCONTINUED | OUTPATIENT
Start: 2024-05-25 | End: 2024-05-25

## 2024-05-25 RX ORDER — KETAMINE HCL IN 0.9 % NACL 50 MG/5 ML
SYRINGE (ML) INTRAVENOUS
Status: DISCONTINUED | OUTPATIENT
Start: 2024-05-25 | End: 2024-05-25

## 2024-05-25 RX ORDER — LABETALOL HCL 20 MG/4 ML
10 SYRINGE (ML) INTRAVENOUS ONCE AS NEEDED
Status: COMPLETED | OUTPATIENT
Start: 2024-05-25 | End: 2024-05-25

## 2024-05-25 RX ORDER — SODIUM CHLORIDE 0.9 % (FLUSH) 0.9 %
10 SYRINGE (ML) INJECTION
Status: DISCONTINUED | OUTPATIENT
Start: 2024-05-25 | End: 2024-05-26 | Stop reason: HOSPADM

## 2024-05-25 RX ORDER — ACETAMINOPHEN 10 MG/ML
INJECTION, SOLUTION INTRAVENOUS
Status: DISCONTINUED | OUTPATIENT
Start: 2024-05-25 | End: 2024-05-25

## 2024-05-25 RX ORDER — SODIUM CHLORIDE 0.9 % (FLUSH) 0.9 %
3 SYRINGE (ML) INJECTION
Status: DISCONTINUED | OUTPATIENT
Start: 2024-05-25 | End: 2024-05-26 | Stop reason: HOSPADM

## 2024-05-25 RX ORDER — ONDANSETRON HYDROCHLORIDE 2 MG/ML
INJECTION, SOLUTION INTRAMUSCULAR; INTRAVENOUS
Status: DISCONTINUED | OUTPATIENT
Start: 2024-05-25 | End: 2024-05-25

## 2024-05-25 RX ORDER — NICARDIPINE HYDROCHLORIDE 0.2 MG/ML
INJECTION INTRAVENOUS CONTINUOUS PRN
Status: DISCONTINUED | OUTPATIENT
Start: 2024-05-25 | End: 2024-05-25

## 2024-05-25 RX ORDER — FENTANYL CITRATE 50 UG/ML
INJECTION, SOLUTION INTRAMUSCULAR; INTRAVENOUS
Status: DISCONTINUED | OUTPATIENT
Start: 2024-05-25 | End: 2024-05-25

## 2024-05-25 RX ORDER — IODIXANOL 320 MG/ML
75 INJECTION, SOLUTION INTRAVASCULAR
Status: DISCONTINUED | OUTPATIENT
Start: 2024-05-25 | End: 2024-05-26 | Stop reason: HOSPADM

## 2024-05-25 RX ADMIN — LABETALOL HYDROCHLORIDE 10 MG: 5 INJECTION, SOLUTION INTRAVENOUS at 07:05

## 2024-05-25 RX ADMIN — QUETIAPINE FUMARATE 25 MG: 25 TABLET ORAL at 09:05

## 2024-05-25 RX ADMIN — BACITRACIN 1 EACH: 500 OINTMENT TOPICAL at 09:05

## 2024-05-25 RX ADMIN — LOSARTAN POTASSIUM 25 MG: 25 TABLET, FILM COATED ORAL at 07:05

## 2024-05-25 RX ADMIN — GLYCOPYRROLATE 0.2 MG: 0.2 INJECTION, SOLUTION INTRAMUSCULAR; INTRAVENOUS at 02:05

## 2024-05-25 RX ADMIN — HYDRALAZINE HYDROCHLORIDE 10 MG: 20 INJECTION, SOLUTION INTRAMUSCULAR; INTRAVENOUS at 12:05

## 2024-05-25 RX ADMIN — PHENYLEPHRINE HYDROCHLORIDE 0.25 MCG/KG/MIN: 10 INJECTION INTRAVENOUS at 02:05

## 2024-05-25 RX ADMIN — GABAPENTIN 600 MG: 300 CAPSULE ORAL at 09:05

## 2024-05-25 RX ADMIN — NICARDIPINE HYDROCHLORIDE 0.4 MG/HR: 0.2 INJECTION, SOLUTION INTRAVENOUS at 03:05

## 2024-05-25 RX ADMIN — ROCURONIUM BROMIDE 20 MG: 10 INJECTION, SOLUTION INTRAVENOUS at 02:05

## 2024-05-25 RX ADMIN — LIDOCAINE HYDROCHLORIDE 100 MG: 20 INJECTION INTRAVENOUS at 01:05

## 2024-05-25 RX ADMIN — PHENOBARBITAL 10 MG: 20 ELIXIR ORAL at 09:05

## 2024-05-25 RX ADMIN — PROPOFOL 30 MG: 10 INJECTION, EMULSION INTRAVENOUS at 02:05

## 2024-05-25 RX ADMIN — SUGAMMADEX 400 MG: 100 INJECTION, SOLUTION INTRAVENOUS at 04:05

## 2024-05-25 RX ADMIN — FENTANYL CITRATE 25 MCG: 0.05 INJECTION, SOLUTION INTRAMUSCULAR; INTRAVENOUS at 01:05

## 2024-05-25 RX ADMIN — VERAPAMIL HYDROCHLORIDE 5 MG: 2.5 INJECTION, SOLUTION INTRAVENOUS at 02:05

## 2024-05-25 RX ADMIN — ROCURONIUM BROMIDE 15 MG: 10 INJECTION, SOLUTION INTRAVENOUS at 02:05

## 2024-05-25 RX ADMIN — HYDRALAZINE HYDROCHLORIDE 10 MG: 20 INJECTION, SOLUTION INTRAMUSCULAR; INTRAVENOUS at 07:05

## 2024-05-25 RX ADMIN — ONDANSETRON 4 MG: 2 INJECTION INTRAMUSCULAR; INTRAVENOUS at 03:05

## 2024-05-25 RX ADMIN — PHENYLEPHRINE HYDROCHLORIDE 100 MCG: 10 INJECTION INTRAVENOUS at 02:05

## 2024-05-25 RX ADMIN — ROCURONIUM BROMIDE 50 MG: 10 INJECTION, SOLUTION INTRAVENOUS at 01:05

## 2024-05-25 RX ADMIN — HYDRALAZINE HYDROCHLORIDE 10 MG: 20 INJECTION, SOLUTION INTRAMUSCULAR; INTRAVENOUS at 06:05

## 2024-05-25 RX ADMIN — SODIUM CHLORIDE, SODIUM GLUCONATE, SODIUM ACETATE, POTASSIUM CHLORIDE, MAGNESIUM CHLORIDE, SODIUM PHOSPHATE, DIBASIC, AND POTASSIUM PHOSPHATE: .53; .5; .37; .037; .03; .012; .00082 INJECTION, SOLUTION INTRAVENOUS at 03:05

## 2024-05-25 RX ADMIN — PHENYLEPHRINE HYDROCHLORIDE 100 MCG: 10 INJECTION INTRAVENOUS at 01:05

## 2024-05-25 RX ADMIN — SODIUM CHLORIDE, SODIUM GLUCONATE, SODIUM ACETATE, POTASSIUM CHLORIDE, MAGNESIUM CHLORIDE, SODIUM PHOSPHATE, DIBASIC, AND POTASSIUM PHOSPHATE: .53; .5; .37; .037; .03; .012; .00082 INJECTION, SOLUTION INTRAVENOUS at 01:05

## 2024-05-25 RX ADMIN — PROPOFOL 170 MG: 10 INJECTION, EMULSION INTRAVENOUS at 01:05

## 2024-05-25 RX ADMIN — PHENYLEPHRINE HYDROCHLORIDE 200 MCG: 10 INJECTION INTRAVENOUS at 01:05

## 2024-05-25 NOTE — PROGRESS NOTES
"CONSULTATION LIAISON PSYCHIATRY PROGRESS NOTE    Patient Name: Moses Peng  MRN: [id number]  Patient Class: IP- Inpatient  Admission Date: 5/17/2024  Attending Physician: Pual Anne MD      SUBJECTIVE:   Moses Peng is a 88 y.o. male with no [place] past psychiatric history & no past pertinent medical history presents to the ED with numbness in his RUE and RLE, and admitted to the hospital for Subdural hematoma     Psychiatry consulted for "suicidal ideation."    No agitation of PRNs required overnight.    Today, patient is lying in bed with nursing staff and sitter at bedside. Not in restraints. Cheerfully dancing in bed, no acute complaints at this time. Denied any SI, HI, or AVH. Patient in agreement with plan to get angiogram later today, endorsing his NPO status.       OBJECTIVE:    Mental Status Exam:  General Appearance: appears stated age, normal weight, dressed in hospital garb, lying in bed, in no acute distress, disheveled  Behavior: normal; cooperative; reasonably friendly, pleasant, and polite; appropriate eye-contact; under good behavioral control  Involuntary Movements and Motor Activity: no abnormal involuntary movements noted; no tics, no tremors, no akathisia, no dystonia, no evidence of tardive dyskinesia; no psychomotor agitation or retardation  Gait and Station: unable to assess - patient lying down or seated  Speech and Language: spontaneous, talkative, interruptible, slurred  Mood: "Good"  Affect: normal, euthymic, reactive, full-range, mood-congruent, appropriate to situation and context  Thought Process and Associations: linear, goal-directed, organized, logical  Thought Content and Perceptions:: no suicidal or homicidal ideation, no auditory or visual hallucinations, no paranoid ideation, no ideas of reference, no evidence of delusions or psychosis  Sensorium and Orientation: oriented to person and place, disoriented to the date  Recent and Remote Memory: grossly intact  Attention and " Concentration: grossly intact, attentive to the conversation and not readily distractible  Fund of Knowledge: grossly intact, used appropriate vocabulary and demonstrated an awareness of current events, consistent with educational level achieved  Insight: adequate  Judgment: adequate    CAM ICU positive? No    ASSESSMENT & RECOMMENDATIONS   Delirium due to another medical condition (subdural hematoma), resolving     Delirium Behavior Management  Minimize use of physical restraints if able   Keep window shades open and room lit during day and room dim at night in order to promote normal sleep-wake cycles  Encourage family at bedside. [place] patient often to situation, location, date.  Continue to Limit or Discontinue use of Narcotics, Benzos and Anticholinergic medications as they may worsen delirium.  Continue medical workup for causative etiology of Delirium.         RISK ASSESSMENT  Currently under CEC; will continue out of caution and re-evaluate     FOLLOW UP  Will continue to follow     DISPOSITION - once medically cleared:   Defer to medical team      Please contact ON CALL psychiatry service (24/7) for any acute issues that may arise.      Maximilian Plata MD  [facility] PGY-II   Psychiatry  [facility]  5/25/2024 12:18 PM        --------------------------------------------------------------------------------------------------------------------------------------------------------------------------------------------------------------------------------------    CONTINUED OBJECTIVE clinical data & findings reviewed and noted for above decision making    Current Medications:   Scheduled Meds:    bacitracin zinc   Topical (Top) BID    folic acid  1 mg Oral Daily    gabapentin  600 mg Oral TID    heparin (porcine)  5,000 Units Subcutaneous Q8H    losartan  25 mg Oral Daily    multivitamin  1 tablet Oral Daily    PHENobarbitaL  10 mg Oral BID    Followed by    [START ON 5/26/2024]  PHENobarbitaL  5 mg Oral BID    polyethylene glycol  17 g Oral Daily    QUEtiapine  25 mg Oral QHS    senna-docusate 8.6-50 mg  1 tablet Oral Daily    thiamine  100 mg Oral Daily     PRN Meds:   Current Facility-Administered Medications:     acetaminophen, 1,000 mg, Oral, Q8H PRN    hydrALAZINE, 10 mg, Intravenous, Q4H PRN    labetalol, 10 mg, Intravenous, Q6H PRN    OLANZapine, 5 mg, Intramuscular, Q8H PRN    ondansetron, 8 mg, Intravenous, Q6H PRN    oxyCODONE, 5 mg, Oral, Q4H PRN    Allergies:   Review of patient's allergies indicates:  No Known Allergies    Vitals  Vitals:    05/25/24 1105   BP: (!) 166/74   Pulse: 74   Resp: 20   Temp: 98.1 °F (36.7 °C)       Labs/Imaging/Studies:  Recent Results (from the past 24 hour(s))   CBC auto differential    Collection Time: 05/25/24  5:09 AM   Result Value Ref Range    WBC 4.36 3.90 - 12.70 K/uL    RBC 3.31 (L) 4.60 - 6.20 M/uL    Hemoglobin 10.7 (L) 14.0 - 18.0 g/dL    Hematocrit 31.6 (L) 40.0 - 54.0 %    MCV 96 82 - 98 fL    MCH 32.3 (H) 27.0 - 31.0 pg    MCHC 33.9 32.0 - 36.0 g/dL    RDW 13.2 11.5 - 14.5 %    Platelets 195 150 - 450 K/uL    MPV 10.7 9.2 - 12.9 fL    Immature Granulocytes 0.2 0.0 - 0.5 %    Gran # (ANC) 2.4 1.8 - 7.7 K/uL    Immature Grans (Abs) 0.01 0.00 - 0.04 K/uL    Lymph # 1.4 1.0 - 4.8 K/uL    Mono # 0.4 0.3 - 1.0 K/uL    Eos # 0.2 0.0 - 0.5 K/uL    Baso # 0.02 0.00 - 0.20 K/uL    nRBC 0 0 /100 WBC    Gran % 55.3 38.0 - 73.0 %    Lymph % 31.0 18.0 - 48.0 %    Mono % 8.9 4.0 - 15.0 %    Eosinophil % 4.1 0.0 - 8.0 %    Basophil % 0.5 0.0 - 1.9 %    Differential Method Automated    Comprehensive metabolic panel    Collection Time: 05/25/24  5:09 AM   Result Value Ref Range    Sodium 134 (L) 136 - 145 mmol/L    Potassium 4.4 3.5 - 5.1 mmol/L    Chloride 103 95 - 110 mmol/L    CO2 25 23 - 29 mmol/L    Glucose 85 70 - 110 mg/dL    BUN 16 8 - 23 mg/dL    Creatinine 0.9 0.5 - 1.4 mg/dL    Calcium 8.9 8.7 - 10.5 mg/dL    Total Protein 5.9 (L) 6.0 - 8.4  g/dL    Albumin 2.7 (L) 3.5 - 5.2 g/dL    Total Bilirubin 0.2 0.1 - 1.0 mg/dL    Alkaline Phosphatase 87 55 - 135 U/L    AST 31 10 - 40 U/L    ALT 11 10 - 44 U/L    eGFR >60.0 >60 mL/min/1.73 m^2    Anion Gap 6 (L) 8 - 16 mmol/L   Magnesium    Collection Time: 05/25/24  5:09 AM   Result Value Ref Range    Magnesium 2.0 1.6 - 2.6 mg/dL   Phosphorus    Collection Time: 05/25/24  5:09 AM   Result Value Ref Range    Phosphorus 3.8 2.7 - 4.5 mg/dL     Imaging Results              X-Ray Chest 1 View (Final result)  Result time 05/17/24 22:02:05      Final result by Stefano Blair MD (05/17/24 22:02:05)                   Impression:      No acute intrathoracic process.  No evidence of a pneumonia.      Electronically signed by: Stefano Blair MD  Date:    05/17/2024  Time:    22:02               Narrative:    EXAMINATION:  XR CHEST 1 VIEW    CLINICAL HISTORY:  eval PNA;    TECHNIQUE:  Single frontal view of the chest was performed.    COMPARISON:  12/28/2023    FINDINGS:  Monitoring EKG leads are present.  The trachea is unremarkable.  The cardiomediastinal silhouette is stable.  There is no evidence of free air beneath the hemidiaphragms.  There are no pleural effusions.  There is no evidence of a pneumothorax.  There is no evidence of pneumomediastinum.  No airspace opacity is present.  There are degenerative changes in the osseous structures.                                       CT Head Without Contrast (Final result)  Result time 05/17/24 20:30:06      Final result by Stefano Blair MD (05/17/24 20:30:06)                   Impression:      Interval postsurgical changes of right craniotomy for subdural hematoma evacuation with drainage catheter placement.  Decreased volume of the extra-axial collection with improved mass effect and midline shift.  Expected pneumocephalus overlies the right frontal cerebral convexity.    Stable subdural hematoma overlying the left cerebral convexity predominantly hyperattenuating.  No  significant mass effect or midline shift.  Continued attention on follow-up recommended.    Chronic microvascular ischemic changes and generalized cerebral volume loss.    Additional findings as above.    Electronically signed by resident: Evon Talbert  Date:    05/17/2024  Time:    19:53    Electronically signed by: Stefano Blair MD  Date:    05/17/2024  Time:    20:30               Narrative:    EXAMINATION:  CT HEAD WITHOUT CONTRAST    CLINICAL HISTORY:  post-op SDH jacob holes;    TECHNIQUE:  Low dose axial CT images obtained throughout the head without the use of intravenous contrast.  Axial, sagittal and coronal reconstructions were performed.    COMPARISON:  MRI 05/17/2024; CT 12/28/2023    FINDINGS:  Postsurgical changes of right craniotomy for subdural hematoma evacuation with decreased volume of predominantly low-density extra-axial collection.  Draining catheter terminates within the collection.  Postsurgical pneumocephalus overlies the right frontal convexity.  The collection measures up to 1.5 cm in maximum thickness, previously 2.1 cm.  Stable mass effect upon the underlying parenchyma and right lateral ventricle with improved leftward midline shift, measuring 4 mm at the septum pellucidum, pre measurement of 7 mm.    Additional hyperattenuating extra-axial collection overlies the left frontal and parietal cerebral convexity measuring 5 mm in maximal thickness, similar when compared to same day MRI.  No significant mass effect upon the underlying parenchyma.    Generalized cerebral volume loss.  Minimal re-expansion of the right lateral ventricle.  No evidence of hydrocephalus.    Mild patchy hypoattenuation in the supratentorial white matter, nonspecific but most likely reflecting chronic small vessel ischemic changes.  No abnormal parenchymal abnormality to suggest acute intraparenchymal hemorrhage or major vascular distribution infarct.    No displaced calvarial fracture.  Mild soft tissue stranding  and air overlies the operative site.  Postsurgical changes of bilateral globes.    Left maxillary mucous retention cyst.  Peripheral mucosal thickening of the right maxillary sinus.  Remaining paranasal sinuses and mastoid air cells are clear.                                        MRI Brain Without Contrast (Final result)  Result time 05/17/24 13:45:52      Final result by Cheng Fajardo MD (05/17/24 13:45:52)                   Impression:      Bilateral subdural hematomas, larger on the right, as further discussed above.  There is significant intracranial mass effect with leftward midline shift.    Underlying chronic small vessel ischemic change and cerebral volume loss.    Neuro surgical consultation is advised.    This report was flagged in Epic as abnormal.      Electronically signed by: Cheng Fajardo MD  Date:    05/17/2024  Time:    13:45               Narrative:    EXAMINATION:  MRI BRAIN WITHOUT CONTRAST    CLINICAL HISTORY:  Transient ischemic attack (TIA);    TECHNIQUE:  Multiplanar multisequence MR imaging of the brain was performed without intravenous contrast.    Examination mildly degraded by patient motion artifact.    COMPARISON:  CT head 12/28/2023    FINDINGS:  Intracranial Compartment:    There is a large subdural hematoma over the right cerebral hemisphere.  Collection measures up to 2.1 cm in thickness.  Collection is predominantly hyperintense on T1 and T2-weighted images, with some interspersed areas of more heterogeneous signal anteriorly and posteriorly.  Minimal septation evident.  Additional subdural hemorrhage over the left cerebral convexity with similar imaging characteristics.  This collection measuring just 0.6 cm in maximal thickness.  No extra-axial blood or fluid collections elsewhere.  There is significant intracranial mass effect.  Diffuse right-sided sulcal and ventricular crowding, with approximately 0.7 cm of leftward midline shift measured at the septum  pellucidum.    Mild chronic small vessel ischemic change throughout the supratentorial white matter.  No evidence of recent or remote major vascular distribution infarct.  No evidence of recent or remote parenchymal hemorrhage.    Moderate cerebral volume loss.  No ventricular entrapment or obstructive hydrocephalus.    Normal vascular flow voids are preserved.    Skull/Extracranial Contents (limited evaluation):    Bone marrow signal intensity is unremarkable.    Postsurgical change both globes.    Findings were relayed to the ordering provider (Drake) via the epic secure chat system at approximately 13:40.

## 2024-05-25 NOTE — PROGRESS NOTES
[facility] - Neurosurgery ([facility]  [facility] Medicine  Progress Note    Patient Name: Moses Peng  MRN: [id number]  Patient Class: IP- Inpatient   Admission Date: 5/17/2024  Length of Stay: 8 days  Attending Physician: Paul Anne MD  Primary Care Provider: Salma, Primary Doctor        Subjective:     Principal Problem:Subdural hematoma        HPI:  Mr Peng is an 89 y/o M unknown PMHx admitted to [facility] w/ bilateral SDH R>L and MLS. Per chart review, he presented to the ED by EMS stating he wants to kill himself. Came yesterday with c/o R hand paresthesias and was discharged. Currently PEC'ed per psych evaluation.  Denies falls. MRI with large right SDH with midline shift. Denies use of AC/APT. NSGY consulted for evaluation. NPO currently for OR  for SDH evac w/NSGY. Patient admitted to [facility] for close monitoring and higher level of care.             Overview/Hospital Course:  Pt underwent SDH  evaucation on 5/1/24. Repeat CTH   with decreased right SDH and MLS with stable mixed amount of left SDH. Patient started on precedex gtt for agitation. Keppra changed to briviact in the hopes of reducing agitation. Seroquel 25mg added qhs. On 5/20/24 Subdural drain d/c'd by NSGY, HOB liberalized. Post-subdural drain removal CT w/ slight increase in pneumocephalus, no re-accumulation of SDH, midline shift/mass effect unchanged from prior. Mental status markedly improved , precedex was dc'd.  Pt was CEC'd due to suicidal ideation. Per NSGY recs  MMA embolization by IR pending.    Interval History: pt denies any CP or HA. No N/V. Afebrile. Pending procedure    Review of Systems  Objective:     Vital Signs (Most Recent):  Temp: 97.5 °F (36.4 °C) (05/25/24 1630)  Pulse: 88 (05/25/24 1645)  Resp: 20 (05/25/24 1645)  BP: (!) 159/73 (05/25/24 1645)  SpO2: 96 % (05/25/24 1645) Vital Signs (24h Range):  Temp:  [97.5 °F (36.4 °C)-98.6 °F (37 °C)] 97.5 °F (36.4 °C)  Pulse:  [72-95] 88  Resp:  [16-20] 20  SpO2:  [91 %-100 %] 96 %  BP:  ()/(54-82) 159/73     Weight: 61.2 kg (135 lb)  Body mass index is 21.14 kg/m².    Intake/Output Summary (Last 24 hours) at 5/25/2024 1656  Last data filed at 5/25/2024 1618  Gross per 24 hour   Intake 1200 ml   Output 570 ml   Net 630 ml         Physical Exam      Clear lungs bilaterally, unlabored breathing, on room air, no cyanosis   Heart sounds indicate a regular rate and rhythm  Awake alert, no acute distress   No facial droop, no slurred speech   Pupils equal bilaterally   5/5 proximal upper and lower extremity strength bilaterally including fist  and hip flexor strength   Following motor commands well   No obvious lower extremity edema   Oriented x3        Assessment/Plan:      * Subdural hematoma  Bilateral SDH R>L large right SDH with 7 mm MLS s/p evac   S/p  burrhole evac on 5/17.   Stable repeat head Cts  Pending MMA embolization per NSGY recs  Limited keppra course for prophy.  neurochecks    Brain compression  secondary to above   See Subdural hematoma    History of jacob hole surgery    See Subdural hematoma       Midline shift of brain due to hematoma   repeat CT head 5/20 - operative change from right frontal parietal jacob holes for subdural hematoma evacuation with interval removal of subdural drainage catheter.  There is slight increased postoperative pneumocephalus overlying the right frontal convexity with evolving hypodense collection overlying the right cerebral convexity.  Mass effect with leftward midline shift similar to prior     Superimposed left cerebral convexity subdural hematoma stable. No evidence for new hemorrhage or significant new abnormal parenchymal attenuation.     Encephalopathy, metabolic  multifactorial. Suspect 2/2 b/l SDH +/- component of ICU delirium      - Some family report of EtOH use prior to admission, unclear quantity; no evidence of EtOH w/d to date, monitor  - On phenobarb taper  Improving overall; Continue Seroquel 25 mg PO nightly and Zyprexa 5 mg q8  "hours PRN for non-redirectable agitation associated with delirium       Suicidal ideation    Per chart review- patient reported "if I had a gun I would kill myself"-   psychiatry [place]   [facility]          Alcohol withdrawal  continue phenobarbital taper       Essential hypertension    History   - EKG, Echo  - SBP < 160  - PRN labetalol, hydralazine  - Hold HCTZ given mild hyponatremia       D/w psychiatry; possible discharge home tomorrow if ok with psychiatry; spoke with daughter who says she searched pt's home and could not find any firearms .       VTE Risk Mitigation (From admission, onward)           Ordered     heparin (porcine) injection 5,000 Units  Every 8 hours         05/17/24 1826     IP VTE HIGH RISK PATIENT  Once         05/17/24 1826     Place sequential compression device  Until discontinued         05/17/24 1826                    Discharge Planning   MIKE: 5/25/2024     Code Status: Full Code   Is the patient medically ready for discharge?:     Reason for patient still in hospital (select all that apply): Treatment and Consult recommendations  Discharge Plan A: [facility]                  Paul Anne MD  Department of Hospital Medicine   [facility] - Neurosurgery ([facility])    "

## 2024-05-25 NOTE — SUBJECTIVE & OBJECTIVE
Interval History: pt denies any CP or HA. No N/V. Afebrile. Pending procedure    Review of Systems  Objective:     Vital Signs (Most Recent):  Temp: 97.5 °F (36.4 °C) (05/25/24 1630)  Pulse: 88 (05/25/24 1645)  Resp: 20 (05/25/24 1645)  BP: (!) 159/73 (05/25/24 1645)  SpO2: 96 % (05/25/24 1645) Vital Signs (24h Range):  Temp:  [97.5 °F (36.4 °C)-98.6 °F (37 °C)] 97.5 °F (36.4 °C)  Pulse:  [72-95] 88  Resp:  [16-20] 20  SpO2:  [91 %-100 %] 96 %  BP: ()/(54-82) 159/73     Weight: 61.2 kg (135 lb)  Body mass index is 21.14 kg/m².    Intake/Output Summary (Last 24 hours) at 5/25/2024 1656  Last data filed at 5/25/2024 1618  Gross per 24 hour   Intake 1200 ml   Output 570 ml   Net 630 ml         Physical Exam      Clear lungs bilaterally, unlabored breathing, on room air, no cyanosis   Heart sounds indicate a regular rate and rhythm  Awake alert, no acute distress   No facial droop, no slurred speech   Pupils equal bilaterally   5/5 proximal upper and lower extremity strength bilaterally including fist  and hip flexor strength   Following motor commands well   No obvious lower extremity edema   Oriented x3

## 2024-05-25 NOTE — ANESTHESIA PROCEDURE NOTES
Intubation    Date/Time: 5/25/2024 1:47 PM    Performed by: Noemi Arnold CRNA  Authorized by: Home Frias MD    Intubation:     Induction:  Intravenous    Intubated:  Postinduction    Mask Ventilation:  Easy with oral airway    Attempts:  1    Attempted By:  CRNA    Method of Intubation:  Video laryngoscopy    Blade:  Alvarez 3    Laryngeal View Grade: Grade I - full view of cords      Difficult Airway Encountered?: No      Complications:  None    Airway Device:  Oral endotracheal tube    Airway Device Size:  7.5    Style/Cuff Inflation:  Cuffed (inflated to minimal occlusive pressure)    Tube secured:  21    Secured at:  The lips    Placement Verified By:  Capnometry    Complicating Factors:  None    Findings Post-Intubation:  BS equal bilateral and atraumatic/condition of teeth unchanged

## 2024-05-25 NOTE — PLAN OF CARE
Patient rested in bed most of the night. Sitter remains at bedside. Angio scheduled for today.    Problem: Adult Inpatient Plan of Care  Goal: Readiness for Transition of Care  Outcome: Progressing     Problem: Infection  Goal: Absence of Infection Signs and Symptoms  Outcome: Progressing     Problem: Wound  Goal: Optimal Functional Ability  Outcome: Progressing

## 2024-05-25 NOTE — TRANSFER OF CARE
"Anesthesia Transfer of Care Note    Patient: Moses Peng    Procedure(s) Performed: Procedure(s) (LRB):  ANGIOGRAM-CEREBRAL (Bilateral)    Patient location: PACU    Anesthesia Type: general    Transport from OR: Transported from OR on 6-10 L/min O2 by face mask with adequate spontaneous ventilation    Post pain: adequate analgesia    Post assessment: no apparent anesthetic complications    Post vital signs: stable    Level of consciousness: awake and alert    Nausea/Vomiting: no nausea/vomiting    Complications: none    Transfer of care protocol was followed      Last vitals: Visit Vitals  BP (!) 166/74 (Patient Position: Lying)   Pulse 74   Temp 36.7 °C (98.1 °F) (Oral)   Resp 20   Ht 5' 7" (1.702 m)   Wt 61.2 kg (135 lb)   SpO2 (!) 91%   BMI 21.14 kg/m²     "

## 2024-05-25 NOTE — ANESTHESIA PREPROCEDURE EVALUATION
[facility]  Anesthesia Pre-Operative Evaluation         Patient Name: Moses Pegn  YOB: 1935  MRN: [id number]    SUBJECTIVE:     Pre-operative evaluation for Procedure(s) (LRB):  ANGIOGRAM-CEREBRAL (Bilateral)     05/24/2024    Moses Peng is a 88 y.o. male w/ a significant PMHx of unknown PMHx admitted to [facility] w/ bilateral SDH R>L and MLS s/p evacuation 5/1/24. CEC'd due to suicidal ideation. Now neurosurgery recommending MMA embolization with IR.     Patient alert and oriented. CEC'd due to suicidal ideation. Consent given by daughter Shaan over the phone.    Patient now presents for the above procedure(s).    Echo 5/18/2024    Left Ventricle: The left ventricle is normal in size. Ventricular mass is normal. Mildly increased wall thickness. Mild septal thickening. There is concentric remodeling. Normal wall motion. There is normal systolic function with a visually estimated ejection fraction of 55 - 70%. Ejection fraction by visual approximation is 68%. There is indeterminate diastolic function.    Right Ventricle: Normal right ventricular cavity size. There is moderate hypertrophy. Systolic function is hyperdynamic.    Aortic Valve: There is severe aortic valve sclerosis. There is moderate stenosis. Aortic valve area by VTI is 1.34 cm². Aortic valve peak velocity is 2.81 m/s. Mean gradient is 17 mmHg. The dimensionless index is 0.43. There is mild aortic regurgitation.    Tricuspid Valve: There is mild regurgitation.    Pulmonary Artery: The estimated pulmonary artery systolic pressure is 36 mmHg.    IVC/SVC: Normal venous pressure at 3 mmHg.      LDA:  Peripheral IV 18G R Forearm  Peripheral IV 20G L Forearm    Prev airway:     Intubation     Date/Time: 5/17/2024 5:22 PM     Performed by: Lombardi, Nicole A., CRNA  Authorized by: Nikki Yeh MD    Intubation:     Induction:  Rapid sequence induction    Intubated:  Postinduction    Mask Ventilation:  Not attempted     Attempts:  1    Attempted By:  CRNA    Method of Intubation:  Video laryngoscopy    Blade:  Alvarez 3    Laryngeal View Grade: Grade I - full view of cords      Difficult Airway Encountered?: No      Complications:  None    Airway Device:  Oral endotracheal tube    Airway Device Size:  7.0    Style/Cuff Inflation:  Cuffed (inflated to minimal occlusive pressure)    Inflation Amount (mL):  6    Tube secured:  22    Secured at:  The lips    Placement Verified By:  Capnometry    Complicating Factors:  None    Findings Post-Intubation:  BS equal bilateral and atraumatic/condition of teeth unchanged          Drips: None documented.    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    Encephalopathy, metabolic    Alcohol withdrawal    Bilateral hand pain    Sensory loss    Hypophosphatemia    Agitation    Pain       Review of patient's allergies indicates:  No Known Allergies    Current Outpatient Medications:    Current Facility-Administered Medications:     acetaminophen tablet 1,000 mg, 1,000 mg, Oral, Q8H PRN, Nikki Guzmán, PA-C, 1,000 mg at 05/23/24 2159    bacitracin zinc ointment, , Topical (Top), BID, Wenceslao Barnett MD, 1 each at 05/24/24 0854    folic acid tablet 1 mg, 1 mg, Oral, Daily, Nikki Guzmán PA-C, 1 mg at 05/24/24 0853    gabapentin capsule 600 mg, 600 mg, Oral, TID, Nikki Guzmán, PA-C, 600 mg at 05/24/24 1527    heparin (porcine) injection 5,000 Units, 5,000 Units, Subcutaneous, Q8H, Wenceslao Barnett MD, 5,000 Units at 05/23/24 2158    hydrALAZINE injection 10 mg, 10 mg, Intravenous, Q4H PRN, AndJonas sellers NP, 10 mg at 05/24/24 0718    labetalol 20 mg/4 mL (5 mg/mL) IV syring, 10 mg, Intravenous, Q6H PRN, Nikki Guzmán, PA-C, 10 mg at 05/22/24 0649    losartan tablet 25 mg, 25 mg, Oral, Daily, Nilda Thompson MD, 25 mg  at 05/24/24 0853    multivitamin tablet, 1 tablet, Oral, Daily, Nikki Guzmán PA-JOSE, 1 tablet at 05/24/24 0854    OLANZapine injection 5 mg, 5 mg, Intramuscular, Q8H PRN, Soledad Mascorro PA-C    ondansetron injection 8 mg, 8 mg, Intravenous, Q6H PRN, Wenceslao Barnett MD    oxyCODONE immediate release tablet 5 mg, 5 mg, Oral, Q4H PRN, Wenceslao Barnett MD, 5 mg at 05/23/24 0528    [COMPLETED] PHENobarbitaL elixir 30 mg, 30 mg, Oral, BID, 30 mg at 05/22/24 2038 **FOLLOWED BY** PHENobarbitaL elixir 15 mg, 15 mg, Oral, BID, 15 mg at 05/24/24 0854 **FOLLOWED BY** [START ON 5/25/2024] PHENobarbitaL elixir 10 mg, 10 mg, Oral, BID **FOLLOWED BY** [START ON 5/26/2024] PHENobarbitaL elixir 5 mg, 5 mg, Oral, BID, Nikki Guzmán PA-C    polyethylene glycol packet 17 g, 17 g, Oral, Daily, AndJonas sellers NP, 17 g at 05/22/24 0838    QUEtiapine tablet 25 mg, 25 mg, Oral, QHS, Soledad Mascorro PA-C, 25 mg at 05/23/24 2158    senna-docusate 8.6-50 mg per tablet 1 tablet, 1 tablet, Oral, Daily, Deanna Khanna NP, 1 tablet at 05/24/24 0854    thiamine tablet 100 mg, 100 mg, Oral, Daily, Nikki Guzmán PA-C, 100 mg at 05/24/24 0854    Past Surgical History:   Procedure Laterality Date    CRANIOTOMY FOR EVACUATION OF SUBDURAL HEMATOMA Right 5/17/2024    Procedure: CRANIOTOMY, FOR SUBDURAL HEMATOMA EVACUATION;  Surgeon: Herb Price MD;  Location: [facility] OR [address] [place] [address];  Service: Neurosurgery;  Laterality: Right;  right jacob hole craniotomy for SDH evacuation       Social History     Socioeconomic History    Marital status:    Tobacco Use    Smoking status: Every Day    Smokeless tobacco: Never     Social Determinants of Health     Financial Resource Strain: Low Risk  (5/19/2024)    Overall Financial Resource Strain (CARDIA)     Difficulty of Paying Living Expenses: Not very hard   Food Insecurity: No Food Insecurity (5/19/2024)    Hunger Vital Sign     Worried About Running Out of Food in the Last Year: Never  true     Ran Out of Food in the Last Year: Never true   Transportation Needs: No Transportation Needs (5/19/2024)    TRANSPORTATION NEEDS     Transportation : No   Physical Activity: Inactive (5/18/2024)    Exercise Vital Sign     Days of Exercise per Week: 0 days     Minutes of Exercise per Session: 0 min   Stress: No Stress Concern Present (5/19/2024)    [employer] [place] [employer] - Occupational Stress Questionnaire     Feeling of Stress : Not at all   Housing Stability: Low Risk  (5/19/2024)    Housing Stability Vital Sign     Unable to Pay for Housing in the Last Year: No     Homeless in the Last Year: No       OBJECTIVE:     Vital Signs Range (Last 24H):  Temp:  [36.5 °C (97.7 °F)-37.3 °C (99.1 °F)]   Pulse:  [70-95]   Resp:  [16-20]   BP: (105-171)/(57-78)   SpO2:  [96 %-98 %]       Significant Labs:  Lab Results   Component Value Date    WBC 4.60 05/24/2024    HGB 10.7 (L) 05/24/2024    HCT 32.3 (L) 05/24/2024     05/24/2024    ALT 12 05/24/2024    AST 28 05/24/2024     (L) 05/24/2024    K 4.2 05/24/2024     05/24/2024    CREATININE 1.1 05/24/2024    BUN 19 05/24/2024    CO2 26 05/24/2024    TSH 0.289 (L) 05/17/2024    INR 1.0 05/17/2024    HGBA1C 5.1 05/17/2024       Diagnostic Studies: No relevant studies.    EKG:   Results for orders placed or performed during the hospital encounter of 05/17/24   EKG 12-lead    Collection Time: 05/19/24 12:27 PM   Result Value Ref Range    QRS Duration 94 ms    [facility] QTC Calculation 457 ms    Narrative    Test Reason : Z91.89,    Vent. Rate : 053 BPM     Atrial Rate : 053 BPM     P-R Int : 130 ms          QRS Dur : 094 ms      QT Int : 488 ms       P-R-T Axes : 105 125 113 degrees     QTc Int : 457 ms     Suspect arm lead reversal, interpretation assumes no reversal  Sinus bradycardia with occasional Premature ventricular complexes  Voltage criteria for left ventricular hypertrophy  Lateral infarct ,age undetermined vs lead reversal  Abnormal  ECG  When compared with ECG of 28-DEC-2023 11:31,  Significant changes have occurred  Confirmed by Charan CUEVA MD ([id number]) on 5/20/2024 11:41:46 AM    Referred By: [email or web address]   SELF           Confirmed By:Charan CUEVA MD       2D ECHO:  TTE:  Results for orders placed or performed during the hospital encounter of 05/17/24   Echo   Result Value Ref Range    RA Width 4.27 cm    LA volume (mod) 53.82 cm3    Left Atrium Major Axis 5.54 cm    Left Atrium Minor Axis 5.52 cm    RA Major Axis 4.73 cm    LV Diastolic Volume 40.61 mL    LV Systolic Volume 18.82 mL    MV Peak A Abhinav 1.16 m/s    MV stenosis pressure 1/2 time 62.92 ms    TR Max Abhinav 2.89 m/s    MV Peak E Abhinav 0.81 m/s    Ao VTI 52.28 cm    Ao peak abhinav 2.81 m/s    LVOT peak VTI 22.28 cm    LVOT peak abhinav 1.10 m/s    LVOT diameter 2.0 cm    IVRT 102.76 msec    E wave deceleration time 216.96 msec    AV mean gradient 17 mmHg    TAPSE 2.09 cm    RVDD 3.22 cm    LA size 2.99 cm    Ascending aorta 3.25 cm    STJ 2.40 cm    Sinus 3.12 cm    LVIDs 2.34 2.1 - 4.0 cm    Posterior Wall 1.14 (A) 0.6 - 1.1 cm    IVS 1.16 (A) 0.6 - 1.1 cm    LVIDd 3.19 (A) 3.5 - 6.0 cm    TDI LATERAL 0.09 m/s    LA WIDTH 3.77 cm    TDI SEPTAL 0.07 m/s    LV LATERAL E/E' RATIO 9.00 m/s    LV SEPTAL E/E' RATIO 11.57 m/s    RV/LV Ratio 1.01 cm    FS 27 (A) 28 - 44 %    LA volume 52.99 cm3    LV mass 111.26 g    ZLVIDD -3.98     ZLVIDS -1.81     Left Ventricle Relative Wall Thickness 0.71 cm    AV valve area 1.34 cm²    AV Velocity Ratio 0.39     AV index (prosthetic) 0.43     MV valve area p 1/2 method 3.50 cm2    E/A ratio 0.70     Mean e' 0.08 m/s    LVOT area 3.1 cm2    LVOT stroke volume 69.96 cm3    AV peak gradient 32 mmHg    E/E' ratio 10.13 m/s    LV Systolic Volume Index 11.0 mL/m2    LV Diastolic Volume Index 23.75 mL/m2    LA Volume Index 31.0 mL/m2    LV Mass Index 65 g/m2    Triscuspid Valve Regurgitation Peak Gradient 33 mmHg    LA Volume Index (Mod) 31.5 mL/m2    JOSÉ MIGUEL by Velocity Ratio 1.23  cm²    BSA 1.7 m2    EF 68 %    TV resting pulmonary artery pressure 36 mmHg    RV TB RVSP 6 mmHg    Est. RA pres 3 mmHg    Narrative      Left Ventricle: The left ventricle is normal in size. Ventricular mass   is normal. Mildly increased wall thickness. Mild septal thickening. There   is concentric remodeling. Normal wall motion. There is normal systolic   function with a visually estimated ejection fraction of 55 - 70%. Ejection   fraction by visual approximation is 68%. There is indeterminate diastolic   function.    Right Ventricle: Normal right ventricular cavity size. There is   moderate hypertrophy. Systolic function is hyperdynamic.    Aortic Valve: There is severe aortic valve sclerosis. There is moderate   stenosis. Aortic valve area by VTI is 1.34 cm². Aortic valve peak velocity   is 2.81 m/s. Mean gradient is 17 mmHg. The dimensionless index is 0.43.   There is mild aortic regurgitation.    Tricuspid Valve: There is mild regurgitation.    Pulmonary Artery: The estimated pulmonary artery systolic pressure is   36 mmHg.    IVC/SVC: Normal venous pressure at 3 mmHg.         RIGO:  No results found for this or any previous visit.    ASSESSMENT/PLAN:           Pre-op Assessment    I have reviewed the Patient Summary Reports.     I have reviewed the Nursing Notes. I have reviewed the NPO Status.   I have reviewed the Medications.     Review of Systems  Anesthesia Hx:  No problems with previous Anesthesia   History of prior surgery of interest to airway management or planning:          Denies Family Hx of Anesthesia complications.    Denies Personal Hx of Anesthesia complications.                    Cardiovascular:  Exercise tolerance: good    Denies Hypertension.    Denies CAD.    Denies CABG/stent.     Denies CHF.    no hyperlipidemia                             Pulmonary:    Denies COPD.  Denies Asthma.     Denies Sleep Apnea.                Renal/:   Denies Chronic Renal Disease.                 Hepatic/GI:      Denies GERD.             Neurological:   CVA    Denies Headaches. Denies Seizures.                                Endocrine:  Denies Diabetes.         Denies Obesity / BMI > 30  Psych:  Psychiatric History                  Physical Exam  General: Well nourished, Alert and Oriented    Airway:  Mallampati: unable to assess   Mouth Opening: Normal  TM Distance: Normal  Tongue: Normal  Neck ROM: Normal ROM    Dental:  Intact        Anesthesia Plan  Type of Anesthesia, risks & benefits discussed:    Anesthesia Type: Gen ETT, Gen Natural Airway, MAC  Intra-op Monitoring Plan: Standard ASA Monitors  Post Op Pain Control Plan: IV/PO Opioids PRN and multimodal analgesia  Induction:  IV  Airway Plan: Direct and Video, Post-Induction  Informed Consent: Informed consent signed with the Patient representative and all parties understand the risks and agree with anesthesia plan.  All questions answered. Patient consented to blood products? Yes  ASA Score: 4  Day of Surgery Review of History & Physical: H&P Update referred to the surgeon/provider.  Anesthesia Plan Notes: Discussed case and plan with Dr. Jarvis  Requesting ETT general    Ready For Surgery From Anesthesia Perspective.     .

## 2024-05-25 NOTE — PROGRESS NOTES
"CONSULTATION LIAISON PSYCHIATRY PROGRESS NOTE    Patient Name: Moses Peng  MRN: [id number]  Patient Class: IP- Inpatient  Admission Date: 5/17/2024  Attending Physician: Paul Anne MD      SUBJECTIVE:   Moses Peng is a 88 y.o. male with no [place] past psychiatric history & no past pertinent medical history presents to the ED with numbness in his RUE and RLE, and admitted to the hospital for Subdural hematoma     Psychiatry consulted for "suicidal ideation."    No agitation of PRNs required overnight.    Today, patient is lying in bed with nursing staff and sitter at bedside. Not in restraints. Patient not in acute distress. He is oriented to person, place, situation, month, and year but not specific date. Denies thoughts of self-harm. Not perseverating on hand pain today.      OBJECTIVE:    Mental Status Exam:  General Appearance: appears stated age, normal weight, dressed in hospital garb, lying in bed, in no acute distress, disheveled  Behavior: normal; cooperative; reasonably friendly, pleasant, and polite; appropriate eye-contact; under good behavioral control  Involuntary Movements and Motor Activity: no abnormal involuntary movements noted; no tics, no tremors, no akathisia, no dystonia, no evidence of tardive dyskinesia; no psychomotor agitation or retardation  Gait and Station: unable to assess - patient lying down or seated  Speech and Language: spontaneous, talkative, interruptible, slurred  Mood: "fine"  Affect: normal, euthymic, reactive, full-range, mood-congruent, appropriate to situation and context  Thought Process and Associations: linear, goal-directed, organized, logical  Thought Content and Perceptions:: no suicidal or homicidal ideation, no auditory or visual hallucinations, no paranoid ideation, no ideas of reference, no evidence of delusions or psychosis  Sensorium and Orientation: alert, oriented to person and place, oriented partially to time, oriented to year, oriented to month, " disoriented to the date  Recent and Remote Memory: grossly intact  Attention and Concentration: grossly intact, attentive to the conversation and not readily distractible  Fund of Knowledge: grossly intact, used appropriate vocabulary and demonstrated an awareness of current events, consistent with educational level achieved  Insight: adequate  Judgment: adequate    CAM ICU positive? No      ASSESSMENT & RECOMMENDATIONS   Delirium due to another medical condition (subdural hematoma), resolving     Delirium Behavior Management  Minimize use of physical restraints if able   Keep window shades open and room lit during day and room dim at night in order to promote normal sleep-wake cycles  Encourage family at bedside. [place] patient often to situation, location, date.  Continue to Limit or Discontinue use of Narcotics, Benzos and Anticholinergic medications as they may worsen delirium.  Continue medical workup for causative etiology of Delirium.         RISK ASSESSMENT  Currently under CEC; will continue out of caution and re-evaluate     FOLLOW UP  Will continue to follow     DISPOSITION - once medically cleared:   Defer to medical team        Please contact ON CALL psychiatry service (24/7) for any acute issues that may arise.      Joe Breen MD  [facility], PGY-IV    Psychiatry  [facility]  5/24/2024 12:18 PM        --------------------------------------------------------------------------------------------------------------------------------------------------------------------------------------------------------------------------------------    CONTINUED OBJECTIVE clinical data & findings reviewed and noted for above decision making    Current Medications:   Scheduled Meds:    bacitracin zinc   Topical (Top) BID    folic acid  1 mg Oral Daily    gabapentin  600 mg Oral TID    heparin (porcine)  5,000 Units Subcutaneous Q8H    losartan  25 mg Oral Daily    multivitamin  1  tablet Oral Daily    [START ON 5/25/2024] PHENobarbitaL  10 mg Oral BID    Followed by    [START ON 5/26/2024] PHENobarbitaL  5 mg Oral BID    polyethylene glycol  17 g Oral Daily    QUEtiapine  25 mg Oral QHS    senna-docusate 8.6-50 mg  1 tablet Oral Daily    thiamine  100 mg Oral Daily     PRN Meds:   Current Facility-Administered Medications:     acetaminophen, 1,000 mg, Oral, Q8H PRN    hydrALAZINE, 10 mg, Intravenous, Q4H PRN    labetalol, 10 mg, Intravenous, Q6H PRN    OLANZapine, 5 mg, Intramuscular, Q8H PRN    ondansetron, 8 mg, Intravenous, Q6H PRN    oxyCODONE, 5 mg, Oral, Q4H PRN    Allergies:   Review of patient's allergies indicates:  No Known Allergies    Vitals  Vitals:    05/24/24 2011   BP: 118/64   Pulse: 92   Resp: 17   Temp: 98.4 °F (36.9 °C)       Labs/Imaging/Studies:  Recent Results (from the past 24 hour(s))   CBC auto differential    Collection Time: 05/24/24  6:55 AM   Result Value Ref Range    WBC 4.60 3.90 - 12.70 K/uL    RBC 3.27 (L) 4.60 - 6.20 M/uL    Hemoglobin 10.7 (L) 14.0 - 18.0 g/dL    Hematocrit 32.3 (L) 40.0 - 54.0 %    MCV 99 (H) 82 - 98 fL    MCH 32.7 (H) 27.0 - 31.0 pg    MCHC 33.1 32.0 - 36.0 g/dL    RDW 13.4 11.5 - 14.5 %    Platelets 195 150 - 450 K/uL    MPV 10.5 9.2 - 12.9 fL    Immature Granulocytes 0.2 0.0 - 0.5 %    Gran # (ANC) 2.4 1.8 - 7.7 K/uL    Immature Grans (Abs) 0.01 0.00 - 0.04 K/uL    Lymph # 1.6 1.0 - 4.8 K/uL    Mono # 0.4 0.3 - 1.0 K/uL    Eos # 0.2 0.0 - 0.5 K/uL    Baso # 0.02 0.00 - 0.20 K/uL    nRBC 0 0 /100 WBC    Gran % 52.9 38.0 - 73.0 %    Lymph % 35.2 18.0 - 48.0 %    Mono % 7.6 4.0 - 15.0 %    Eosinophil % 3.7 0.0 - 8.0 %    Basophil % 0.4 0.0 - 1.9 %    Differential Method Automated    Comprehensive metabolic panel    Collection Time: 05/24/24  6:55 AM   Result Value Ref Range    Sodium 133 (L) 136 - 145 mmol/L    Potassium 4.2 3.5 - 5.1 mmol/L    Chloride 103 95 - 110 mmol/L    CO2 26 23 - 29 mmol/L    Glucose 112 (H) 70 - 110 mg/dL    BUN  19 8 - 23 mg/dL    Creatinine 1.1 0.5 - 1.4 mg/dL    Calcium 8.6 (L) 8.7 - 10.5 mg/dL    Total Protein 5.7 (L) 6.0 - 8.4 g/dL    Albumin 2.5 (L) 3.5 - 5.2 g/dL    Total Bilirubin 0.2 0.1 - 1.0 mg/dL    Alkaline Phosphatase 85 55 - 135 U/L    AST 28 10 - 40 U/L    ALT 12 10 - 44 U/L    eGFR >60.0 >60 mL/min/1.73 m^2    Anion Gap 4 (L) 8 - 16 mmol/L   Magnesium    Collection Time: 05/24/24  6:55 AM   Result Value Ref Range    Magnesium 1.7 1.6 - 2.6 mg/dL   Phosphorus    Collection Time: 05/24/24  6:55 AM   Result Value Ref Range    Phosphorus 3.5 2.7 - 4.5 mg/dL     Imaging Results              X-Ray Chest 1 View (Final result)  Result time 05/17/24 22:02:05      Final result by Stefano Blair MD (05/17/24 22:02:05)                   Impression:      No acute intrathoracic process.  No evidence of a pneumonia.      Electronically signed by: Stefano Blair MD  Date:    05/17/2024  Time:    22:02               Narrative:    EXAMINATION:  XR CHEST 1 VIEW    CLINICAL HISTORY:  eval PNA;    TECHNIQUE:  Single frontal view of the chest was performed.    COMPARISON:  12/28/2023    FINDINGS:  Monitoring EKG leads are present.  The trachea is unremarkable.  The cardiomediastinal silhouette is stable.  There is no evidence of free air beneath the hemidiaphragms.  There are no pleural effusions.  There is no evidence of a pneumothorax.  There is no evidence of pneumomediastinum.  No airspace opacity is present.  There are degenerative changes in the osseous structures.                                       CT Head Without Contrast (Final result)  Result time 05/17/24 20:30:06      Final result by Stefano Blair MD (05/17/24 20:30:06)                   Impression:      Interval postsurgical changes of right craniotomy for subdural hematoma evacuation with drainage catheter placement.  Decreased volume of the extra-axial collection with improved mass effect and midline shift.  Expected pneumocephalus overlies the right frontal  cerebral convexity.    Stable subdural hematoma overlying the left cerebral convexity predominantly hyperattenuating.  No significant mass effect or midline shift.  Continued attention on follow-up recommended.    Chronic microvascular ischemic changes and generalized cerebral volume loss.    Additional findings as above.    Electronically signed by resident: Evon Talbert  Date:    05/17/2024  Time:    19:53    Electronically signed by: Stefano Blair MD  Date:    05/17/2024  Time:    20:30               Narrative:    EXAMINATION:  CT HEAD WITHOUT CONTRAST    CLINICAL HISTORY:  post-op SDH jacob holes;    TECHNIQUE:  Low dose axial CT images obtained throughout the head without the use of intravenous contrast.  Axial, sagittal and coronal reconstructions were performed.    COMPARISON:  MRI 05/17/2024; CT 12/28/2023    FINDINGS:  Postsurgical changes of right craniotomy for subdural hematoma evacuation with decreased volume of predominantly low-density extra-axial collection.  Draining catheter terminates within the collection.  Postsurgical pneumocephalus overlies the right frontal convexity.  The collection measures up to 1.5 cm in maximum thickness, previously 2.1 cm.  Stable mass effect upon the underlying parenchyma and right lateral ventricle with improved leftward midline shift, measuring 4 mm at the septum pellucidum, pre measurement of 7 mm.    Additional hyperattenuating extra-axial collection overlies the left frontal and parietal cerebral convexity measuring 5 mm in maximal thickness, similar when compared to same day MRI.  No significant mass effect upon the underlying parenchyma.    Generalized cerebral volume loss.  Minimal re-expansion of the right lateral ventricle.  No evidence of hydrocephalus.    Mild patchy hypoattenuation in the supratentorial white matter, nonspecific but most likely reflecting chronic small vessel ischemic changes.  No abnormal parenchymal abnormality to suggest acute  intraparenchymal hemorrhage or major vascular distribution infarct.    No displaced calvarial fracture.  Mild soft tissue stranding and air overlies the operative site.  Postsurgical changes of bilateral globes.    Left maxillary mucous retention cyst.  Peripheral mucosal thickening of the right maxillary sinus.  Remaining paranasal sinuses and mastoid air cells are clear.                                        MRI Brain Without Contrast (Final result)  Result time 05/17/24 13:45:52      Final result by Cheng Fajardo MD (05/17/24 13:45:52)                   Impression:      Bilateral subdural hematomas, larger on the right, as further discussed above.  There is significant intracranial mass effect with leftward midline shift.    Underlying chronic small vessel ischemic change and cerebral volume loss.    Neuro surgical consultation is advised.    This report was flagged in Epic as abnormal.      Electronically signed by: Cheng Fajardo MD  Date:    05/17/2024  Time:    13:45               Narrative:    EXAMINATION:  MRI BRAIN WITHOUT CONTRAST    CLINICAL HISTORY:  Transient ischemic attack (TIA);    TECHNIQUE:  Multiplanar multisequence MR imaging of the brain was performed without intravenous contrast.    Examination mildly degraded by patient motion artifact.    COMPARISON:  CT head 12/28/2023    FINDINGS:  Intracranial Compartment:    There is a large subdural hematoma over the right cerebral hemisphere.  Collection measures up to 2.1 cm in thickness.  Collection is predominantly hyperintense on T1 and T2-weighted images, with some interspersed areas of more heterogeneous signal anteriorly and posteriorly.  Minimal septation evident.  Additional subdural hemorrhage over the left cerebral convexity with similar imaging characteristics.  This collection measuring just 0.6 cm in maximal thickness.  No extra-axial blood or fluid collections elsewhere.  There is significant intracranial mass effect.  Diffuse  right-sided sulcal and ventricular crowding, with approximately 0.7 cm of leftward midline shift measured at the septum pellucidum.    Mild chronic small vessel ischemic change throughout the supratentorial white matter.  No evidence of recent or remote major vascular distribution infarct.  No evidence of recent or remote parenchymal hemorrhage.    Moderate cerebral volume loss.  No ventricular entrapment or obstructive hydrocephalus.    Normal vascular flow voids are preserved.    Skull/Extracranial Contents (limited evaluation):    Bone marrow signal intensity is unremarkable.    Postsurgical change both globes.    Findings were relayed to the ordering provider (Drake) via the epic secure chat system at approximately 13:40.

## 2024-05-25 NOTE — PLAN OF CARE
05-Oct-2018 14:06 Pt tolerated bl mma embolization procedure well under crna care, hemostasis obtain @1552, bed rest up @1952,  transfer pt to pacu, report given @ bedside   08-Oct-2018 12:45 09-Oct-2018 12:11 10-Oct-2018 09:42 12-Oct-2018 14:00 16-Oct-2018 16-Oct-2018 16:34 07-Oct-2018 12:38

## 2024-05-25 NOTE — PLAN OF CARE
Pt resting in bed. Sitter at bedside. AAOX3. Slightly confused to situation. Will continue to monitor.

## 2024-05-25 NOTE — ANESTHESIA POSTPROCEDURE EVALUATION
Anesthesia Post Evaluation    Patient: Moses Peng    Procedure(s) Performed: Procedure(s) (LRB):  ANGIOGRAM-CEREBRAL (Bilateral)    Final Anesthesia Type: general      Patient location during evaluation: PACU  Patient participation: Yes- Able to Participate  Level of consciousness: awake  Post-procedure vital signs: reviewed and stable  Pain management: adequate  Airway patency: patent    PONV status at discharge: No PONV  Anesthetic complications: no      Cardiovascular status: blood pressure returned to baseline  Respiratory status: unassisted  Hydration status: euvolemic  Follow-up not needed.              Vitals Value Taken Time   /74 05/25/24 1656   Temp 36.4 °C (97.5 °F) 05/25/24 1630   Pulse 84 05/25/24 1656   Resp 13 05/25/24 1656   SpO2 96 % 05/25/24 1656   Vitals shown include unfiled device data.      No case tracking events are documented in the log.      Pain/Rene Score: Pain Rating Prior to Med Admin: 2 (5/24/2024 10:57 AM)  Rene Score: 9 (5/25/2024  4:45 PM)

## 2024-05-25 NOTE — PLAN OF CARE
Pt arrived to 104 for bilateral MMA Embolization . Pt oriented to unit and staff, Pt safely transferred from stretcher to procedural table. Fall risk reviewed and comfort measures utilized with interventions. Safety strap applied, position pillows to minimize pressure points. Blankets applied. Pt prepped and draped utilizing standard sterile technique. Patient placed on continuous monitoring, as required by sedation policy. Timeouts implemented utilizing standard universal time-out per department and facility policy. CRNA to remain at bedside with continuous monitoring. Pt resting comfortably. Denies pain/discomfort. Will continue to monitor. See flow sheets for monitoring, medication administration, and updates. patient verbalizes understanding.

## 2024-05-26 VITALS
SYSTOLIC BLOOD PRESSURE: 151 MMHG | OXYGEN SATURATION: 96 % | HEART RATE: 82 BPM | BODY MASS INDEX: 21.19 KG/M2 | HEIGHT: 67 IN | DIASTOLIC BLOOD PRESSURE: 67 MMHG | WEIGHT: 135 LBS | RESPIRATION RATE: 18 BRPM | TEMPERATURE: 99 F

## 2024-05-26 LAB
ALBUMIN SERPL BCP-MCNC: 2.5 G/DL (ref 3.5–5.2)
ALP SERPL-CCNC: 76 U/L (ref 55–135)
ALT SERPL W/O P-5'-P-CCNC: 11 U/L (ref 10–44)
ANION GAP SERPL CALC-SCNC: 4 MMOL/L (ref 8–16)
AST SERPL-CCNC: 26 U/L (ref 10–40)
BASOPHILS # BLD AUTO: 0.03 K/UL (ref 0–0.2)
BASOPHILS NFR BLD: 0.6 % (ref 0–1.9)
BILIRUB SERPL-MCNC: 0.2 MG/DL (ref 0.1–1)
BUN SERPL-MCNC: 14 MG/DL (ref 8–23)
CALCIUM SERPL-MCNC: 8.4 MG/DL (ref 8.7–10.5)
CHLORIDE SERPL-SCNC: 102 MMOL/L (ref 95–110)
CO2 SERPL-SCNC: 28 MMOL/L (ref 23–29)
CREAT SERPL-MCNC: 1 MG/DL (ref 0.5–1.4)
DIFFERENTIAL METHOD BLD: ABNORMAL
EOSINOPHIL # BLD AUTO: 0.1 K/UL (ref 0–0.5)
EOSINOPHIL NFR BLD: 2.5 % (ref 0–8)
ERYTHROCYTE [DISTWIDTH] IN BLOOD BY AUTOMATED COUNT: 13.7 % (ref 11.5–14.5)
EST. GFR  (NO RACE VARIABLE): >60 ML/MIN/1.73 M^2
GLUCOSE SERPL-MCNC: 86 MG/DL (ref 70–110)
HCT VFR BLD AUTO: 29.5 % (ref 40–54)
HGB BLD-MCNC: 9.6 G/DL (ref 14–18)
IMM GRANULOCYTES # BLD AUTO: 0.01 K/UL (ref 0–0.04)
IMM GRANULOCYTES NFR BLD AUTO: 0.2 % (ref 0–0.5)
LYMPHOCYTES # BLD AUTO: 1.2 K/UL (ref 1–4.8)
LYMPHOCYTES NFR BLD: 25.5 % (ref 18–48)
MAGNESIUM SERPL-MCNC: 1.9 MG/DL (ref 1.6–2.6)
MCH RBC QN AUTO: 32.2 PG (ref 27–31)
MCHC RBC AUTO-ENTMCNC: 32.5 G/DL (ref 32–36)
MCV RBC AUTO: 99 FL (ref 82–98)
MONOCYTES # BLD AUTO: 0.4 K/UL (ref 0.3–1)
MONOCYTES NFR BLD: 8.5 % (ref 4–15)
NEUTROPHILS # BLD AUTO: 3 K/UL (ref 1.8–7.7)
NEUTROPHILS NFR BLD: 62.7 % (ref 38–73)
NRBC BLD-RTO: 0 /100 WBC
PHOSPHATE SERPL-MCNC: 4.2 MG/DL (ref 2.7–4.5)
PLATELET # BLD AUTO: 192 K/UL (ref 150–450)
PMV BLD AUTO: 10.7 FL (ref 9.2–12.9)
POTASSIUM SERPL-SCNC: 4.1 MMOL/L (ref 3.5–5.1)
PROT SERPL-MCNC: 5.5 G/DL (ref 6–8.4)
RBC # BLD AUTO: 2.98 M/UL (ref 4.6–6.2)
SODIUM SERPL-SCNC: 134 MMOL/L (ref 136–145)
WBC # BLD AUTO: 4.82 K/UL (ref 3.9–12.7)

## 2024-05-26 PROCEDURE — 25000003 PHARM REV CODE 250: Performed by: NURSE PRACTITIONER

## 2024-05-26 PROCEDURE — 25000003 PHARM REV CODE 250: Performed by: STUDENT IN AN ORGANIZED HEALTH CARE EDUCATION/TRAINING PROGRAM

## 2024-05-26 PROCEDURE — 25000003 PHARM REV CODE 250: Performed by: PHYSICIAN ASSISTANT

## 2024-05-26 PROCEDURE — 85025 COMPLETE CBC W/AUTO DIFF WBC: CPT | Performed by: STUDENT IN AN ORGANIZED HEALTH CARE EDUCATION/TRAINING PROGRAM

## 2024-05-26 PROCEDURE — 36415 COLL VENOUS BLD VENIPUNCTURE: CPT | Performed by: STUDENT IN AN ORGANIZED HEALTH CARE EDUCATION/TRAINING PROGRAM

## 2024-05-26 PROCEDURE — 97535 SELF CARE MNGMENT TRAINING: CPT

## 2024-05-26 PROCEDURE — 84100 ASSAY OF PHOSPHORUS: CPT | Performed by: STUDENT IN AN ORGANIZED HEALTH CARE EDUCATION/TRAINING PROGRAM

## 2024-05-26 PROCEDURE — 25000003 PHARM REV CODE 250: Performed by: PSYCHIATRY & NEUROLOGY

## 2024-05-26 PROCEDURE — 80053 COMPREHEN METABOLIC PANEL: CPT | Performed by: STUDENT IN AN ORGANIZED HEALTH CARE EDUCATION/TRAINING PROGRAM

## 2024-05-26 PROCEDURE — 25000003 PHARM REV CODE 250: Performed by: HOSPITALIST

## 2024-05-26 PROCEDURE — 83735 ASSAY OF MAGNESIUM: CPT | Performed by: STUDENT IN AN ORGANIZED HEALTH CARE EDUCATION/TRAINING PROGRAM

## 2024-05-26 RX ORDER — QUETIAPINE FUMARATE 25 MG/1
25 TABLET, FILM COATED ORAL NIGHTLY
Qty: 30 TABLET | Refills: 0 | Status: SHIPPED | OUTPATIENT
Start: 2024-05-26 | End: 2024-06-25

## 2024-05-26 RX ORDER — LOSARTAN POTASSIUM 25 MG/1
25 TABLET ORAL ONCE
Status: COMPLETED | OUTPATIENT
Start: 2024-05-26 | End: 2024-05-26

## 2024-05-26 RX ORDER — GABAPENTIN 300 MG/1
300 CAPSULE ORAL 3 TIMES DAILY
Qty: 90 CAPSULE | Refills: 0 | Status: SHIPPED | OUTPATIENT
Start: 2024-05-26 | End: 2024-06-25

## 2024-05-26 RX ORDER — HYDROCODONE BITARTRATE AND ACETAMINOPHEN 5; 325 MG/1; MG/1
1 TABLET ORAL EVERY 6 HOURS PRN
Status: DISCONTINUED | OUTPATIENT
Start: 2024-05-26 | End: 2024-05-26 | Stop reason: HOSPADM

## 2024-05-26 RX ORDER — LANOLIN ALCOHOL/MO/W.PET/CERES
100 CREAM (GRAM) TOPICAL DAILY
Qty: 30 TABLET | Refills: 0 | Status: SHIPPED | OUTPATIENT
Start: 2024-05-27 | End: 2024-06-26

## 2024-05-26 RX ORDER — NIFEDIPINE 30 MG/1
30 TABLET, EXTENDED RELEASE ORAL DAILY
Qty: 30 TABLET | Refills: 0 | Status: SHIPPED | OUTPATIENT
Start: 2024-05-26 | End: 2024-05-26 | Stop reason: HOSPADM

## 2024-05-26 RX ORDER — FOLIC ACID 1 MG/1
1 TABLET ORAL DAILY
Qty: 30 TABLET | Refills: 0 | Status: SHIPPED | OUTPATIENT
Start: 2024-05-27 | End: 2024-06-26

## 2024-05-26 RX ORDER — NIFEDIPINE 30 MG/1
30 TABLET, EXTENDED RELEASE ORAL DAILY
Status: DISCONTINUED | OUTPATIENT
Start: 2024-05-26 | End: 2024-05-26

## 2024-05-26 RX ORDER — LOSARTAN POTASSIUM 25 MG/1
25 TABLET ORAL DAILY
Qty: 30 TABLET | Refills: 0 | Status: SHIPPED | OUTPATIENT
Start: 2024-05-27 | End: 2024-05-26 | Stop reason: HOSPADM

## 2024-05-26 RX ORDER — LOSARTAN POTASSIUM 50 MG/1
50 TABLET ORAL DAILY
Qty: 30 TABLET | Refills: 0 | Status: SHIPPED | OUTPATIENT
Start: 2024-05-26 | End: 2024-05-31

## 2024-05-26 RX ADMIN — THERA TABS 1 TABLET: TAB at 08:05

## 2024-05-26 RX ADMIN — BACITRACIN: 500 OINTMENT TOPICAL at 09:05

## 2024-05-26 RX ADMIN — SENNOSIDES AND DOCUSATE SODIUM 1 TABLET: 50; 8.6 TABLET ORAL at 08:05

## 2024-05-26 RX ADMIN — FOLIC ACID 1 MG: 1 TABLET ORAL at 08:05

## 2024-05-26 RX ADMIN — LOSARTAN POTASSIUM 25 MG: 25 TABLET, FILM COATED ORAL at 12:05

## 2024-05-26 RX ADMIN — GABAPENTIN 600 MG: 300 CAPSULE ORAL at 03:05

## 2024-05-26 RX ADMIN — POLYETHYLENE GLYCOL 3350 17 G: 17 POWDER, FOR SOLUTION ORAL at 08:05

## 2024-05-26 RX ADMIN — GABAPENTIN 600 MG: 300 CAPSULE ORAL at 08:05

## 2024-05-26 RX ADMIN — PHENOBARBITAL 5 MG: 20 ELIXIR ORAL at 08:05

## 2024-05-26 RX ADMIN — LOSARTAN POTASSIUM 25 MG: 25 TABLET, FILM COATED ORAL at 08:05

## 2024-05-26 RX ADMIN — Medication 100 MG: at 08:05

## 2024-05-26 NOTE — PT/OT/SLP PROGRESS
"Occupational Therapy   Treatment    Name: Moses Peng  MRN: [id number]  Admitting Diagnosis:  Subdural hematoma  1 Day Post-Op    Recommendations:     Discharge Recommendations: High Intensity Therapy  Discharge Equipment Recommendations:  walker, rolling  Barriers to discharge:  Other (Comment) (Fall risk; increased assistance to safely perform ADLs and mobility)    Assessment:     Moses Peng is a 88 y.o. male with a medical diagnosis of Subdural hematoma.  Pt tolerated session well, voiding on the toilet and performing grooming ADLs while standing at the sink.  However, pt is very impulsive and unsteady and is resistant to assistance.  He requires assistance to safely perform mobility and ADLs.  He presents with the following.  Performance deficits affecting function are weakness, impaired endurance, impaired self care skills, impaired functional mobility, gait instability, impaired balance, impaired sensation, impaired fine motor, decreased coordination, decreased safety awareness.     Rehab Prognosis:  Good; patient would benefit from acute skilled OT services to address these deficits and reach maximum level of function.       Plan:     Patient to be seen 4 x/week to address the above listed problems via self-care/home management, therapeutic exercises, therapeutic activities, neuromuscular re-education  Plan of Care Expires: 06/21/24  Plan of Care Reviewed with: patient    Subjective   "I can't feel my hands.  It's hard to grab and hold onto things."  Chief Complaint: "Stand back.  I got this."  Patient/Family Comments/goals: "I want to go home."  Pain/Comfort:  Pain Rating 1: 0/10  Pain Rating Post-Intervention 1: 0/10    Objective:     Communicated with: nurse prior to session.  Patient found supine with telemetry with nursing present upon OT entry to room.    General Precautions: Standard, fall    Orthopedic Precautions:N/A  Braces: N/A  Respiratory Status: Room air     Occupational Performance:     Bed " Mobility:    Patient completed Rolling/Turning to Right with stand by assistance  Patient completed Scooting/Bridging with stand by assistance  Patient completed Supine to Sit with stand by assistance     Functional Mobility/Transfers:  Patient completed Sit <> Stand Transfer from EOB x 1 trial with CGA-Min A to correct posterior lean once standing  with  rolling walker.  Pt completed STS from the toilet with SBA with RW, initiating without informing therapist as instructed for safety.  Patient completed Bed <> Chair Transfer using Step Transfer technique with minimum assistance with rolling walker  Patient completed Toilet Transfer Step Transfer technique with minimum assistance with  rolling walker  Functional Mobility: Pt ambulated ~10 ft x 2 trials with CGA-Min A with RW, requiring maximal cueing for walker management and to stay close to the walker for fall prevention.  As he was approaching the toilet, pt began to reach for the shower grab bar to his R that was out of reach.  Therapist cued pt not to reach for grab bar and assisted him to safely reach the toilet.  Pt was very resistant to therapist and nursing's assistance despite maximal education and cueing.      Activities of Daily Living:  Grooming: SBA-CGA to correct intermittent posterior lean while pt washed his hands and performed oral care while standing at the sink.  Lower Body Dressing: maximal assistance to don non-skid socks while long-sitting in bed.  Pt attempted to perform but was unable to due to impaired sensation in B hands.   Toileting: SBA-CGA to urinate while standing.  Pt began to lean anteriorly and place his head against the wall.  SBA with cues to locate toilet paper to perform hygiene while seated on toilet; pt had a BM.  Noted that pt repeatedly closed door and held handle to prevent therapist and nurse from making sure he didn't stand up without assistance.      [facility] 6 Click ADL: 16    Treatment & Education:  Pt edu on role of OT,  POC, safety when performing self care tasks, benefit of performing OOB activity, and safety when performing functional transfers and mobility.  - Self care tasks completed-- as noted above      Patient left up in chair with all lines intact, call button in reach, chair alarm on, and nursing present    GOALS:   Multidisciplinary Problems       Occupational Therapy Goals          Problem: Occupational Therapy    Goal Priority Disciplines Outcome Interventions   Occupational Therapy Goal     OT, PT/OT Progressing    Description: Goals to be met by: 6/21/24     Patient will increase functional independence with ADLs by performing:    Feeding with [place].  UE Dressing with [place].  LE Dressing with [place].  Grooming while standing at sink with [place].  Toileting from toilet with [place] for hygiene and clothing management.   Supine to sit with [place].  Step transfer with [place]  Toilet transfer to toilet with [place].                         Time Tracking:     OT Date of Treatment: 05/26/24  OT Start Time: 1011  OT Stop Time: 1031  OT Total Time (min): 20 min    Billable Minutes:Self Care/Home Management 20 min    OT/JUSTINA: OT          5/26/2024

## 2024-05-26 NOTE — NURSING TRANSFER
Nursing Transfer Note      5/25/2024   8:31 PM    Nurse giving handoff:DIANNA Melvin  Nurse receiving handoff:Andrew    Reason patient is being transferred: post procedure    Transfer To:     Transfer via stretcher    Transfer with cardiac monitoring    Transported by DIANNA Melvin    Transfer Vital Signs:  Blood Pressure:128/63  Heart Rate:82  O2:98%  Temperature:98.8  Respirations:20    Telemetry: Box Number [id number]  Order for Tele Monitor? Yes    Patient belongings transferred with patient: No    Chart send with patient: Yes    Patient reassessed at: 2032    Upon arrival to floor: patient oriented to room, call bell in reach, and bed in lowest position

## 2024-05-26 NOTE — PLAN OF CARE
Problem: Adult Inpatient Plan of Care  Goal: Plan of Care Review  Outcome: Progressing  Goal: Patient-Specific Goal (Individualized)  Outcome: Progressing  Goal: Absence of Hospital-Acquired Illness or Injury  Outcome: Progressing  Goal: Optimal Comfort and Wellbeing  Outcome: Progressing  Goal: Readiness for Transition of Care  Outcome: Progressing     Problem: Infection  Goal: Absence of Infection Signs and Symptoms  Outcome: Progressing   POC and meds reviewed with patient.All needs addressed.

## 2024-05-26 NOTE — NURSING
04:42 - RN calls ED CT for update on patient's routine CTH order. Radiology technician notifies RN that routine exam will likely not be completed tonight. RN asks RT to call or message if scanner becomes available for patient to come down and complete the exam.   
0712 During change of shift patient became combative, kicking, swinging arms, and trying to climb out of the bed. Patient sat up out of bed and pulled arterial line pressure bag off of IV pole. Patient trying to kick and hit Rns and sitters. Security was called and at bedside. Cinthya LEIGH and Megha LEIGH at bedside.  
0830: pt continually touching head incision after repeated education and pt verbalizing understanding of reason not to (infection). This RN cleaned pts hands and fingers with Chlorhex wipes and gently cleaned incisions with Chlohex sponge.  Pt tolerated well.    0910: NeuroSx rounding.  This RN updated on pt continually touching head incision, VO received to apply bandage.  Bandage applied and pt tolerated well.  
Nurses Note -- 4 Eyes      5/24/24  1900      Skin assessed during: Q Shift Change      [x] No Altered Skin Integrity Present    []Prevention Measures Documented      [] Yes- Altered Skin Integrity Present or Discovered   [] LDA Added if Not in Epic (Describe Wound)   [] New Altered Skin Integrity was Present on Admit and Documented in LDA   [] Wound Image Taken    Wound Care Consulted? No    Attending Nurse:  Anna Marie Barahona RN/Staff Member:  Dolores BUSTAMANTE         
Nurses Note -- 4 Eyes    5/22/2024  1900      Skin assessed during: Q Shift Change      [] No Altered Skin Integrity Present    []Prevention Measures Documented      [] Yes- Altered Skin Integrity Present or Discovered   [] LDA Added if Not in Epic (Describe Wound)   [x] New Altered Skin Integrity was Present on Admit and Documented in LDA   [] Wound Image Taken    Wound Care Consulted? No    Attending Nurse:  Anna Marie Barahona RN/Staff Member:  Dolores BUSTAMANTE    Incisional wound noted to be approximated and open to air .Sutures noted to intact, no drainage.   
Patient Transferred to NPU Room 923       Upon arrival to the floor, patient greeted and oriented to room. Complete head to toe assessment completed per protocol. VSS, see flowsheet for details. Neuro assessment completed. Primary team notified of patient's transfer to floor. All current and transfer orders reviewed/reconciled per protocol. All emergency equipment set up in patient's room. Fall/seizure precautions initiated per providers orders. 4 Eyes skin assessment performed, see below for details. Sitter at bedside. Reviewed assessment and rounding frequency with patient. Questions were encouraged and addressed. Repositioned patient for comfort with bed locked in lowest position, side rails up x 2, bed alarm set, and call light within reach. Instructed patient to call staff for mobility/assistance, verbalized understanding. No acute signs of distress noted at this time.           Nurses Note -- 4 Eyes      5/22/2024   4:33 PM      Skin assessed during: Transfer      [] No Altered Skin Integrity Present    []Prevention Measures Documented      [x] Yes- Altered Skin Integrity Present or Discovered   [] LDA Added if Not in Epic (Describe Wound)   [] New Altered Skin Integrity was Present on Admit and Documented in LDA   [] Wound Image Taken    Previously documented in chart. Right side head incision from craniotomy.    Wound Care Consulted? No    Attending Nurse:  Dolores Barahona RN/Staff Member:  DIANNA Hollis           
Patient is discharged to home, iv access had been removed, discharge instructions given at bedside and patient verbalized understanding.  
Patient's belongings in charge office at this time. Valuables sealed in envelope and stored in lock box in charge office. Belongings verified with DIANNA Chacko.   
Pt SBP >190 after Hydralazine administration. Nikki LEIGH notified.   
Pt complaining of severe pain and numbness in hands. MD notified. Soothing techniques administered. Diversional televesion therapy used. Warm packs administered. Gabapentin, Thiamine, and B vitamins administered per MD order. Oxycodone administered per PRN order.     SBP remained elevated >190 post interventions. Hydralazine administered per PRN order.   
Detail Level: Simple

## 2024-05-26 NOTE — PROGRESS NOTES
"CONSULTATION LIAISON PSYCHIATRY PROGRESS NOTE    Patient Name: Moses Peng  MRN: [id number]  Patient Class: IP- Inpatient  Admission Date: 5/17/2024  Attending Physician: Paul Anne MD      SUBJECTIVE:   Moses Peng is a 88 y.o. male with no [place] past psychiatric history & no past pertinent medical history presents to the ED with numbness in his RUE and RLE, and admitted to the hospital for Subdural hematoma     Psychiatry consulted for "suicidal ideation."    No agitation of PRNs required overnight.    Patient sitting up talking with nursing staff and tech when approached for interview. His affect is bright and he is calm, cooperative and pleasant. He is talkative, but thought process is superficially linear. He adamantly denies suicidal thoughts or thoughts of self harm. When asked of his awareness of reason why he is being seen by psychiatry, he states, "cause I said I wanted to kill myself," but then states that he said this out of frustration due to medical complaints he was having on arrival to ED. He goes on to state that these medical complaints have resolved. He denies any previous history of SI or SA. Patient future oriented to discharge home, stating he is looking forward to getting back to [employer] [employer], and also looking forward to watching his favorite TV show, [place] and [employer]. States he lives at "[address]" and that his daughter lives "a two minute drive from him."  He otherwise has not further complaints. Denies HI/AVH or paranoia.     Collateral from daughter, Shaan Rees ([phone])    Spoke with patient's daughter today regarding anticipated discharge this afternoon. Reports that overall she feels safe with patient being discharged. States "he's not going to kill himself." Also says that she went to his house "for 2 hours," and she could not find any weapons. She states she lives five minutes from patient and plans to help with medications. Additionally adds that she is currently " "preparing dinner for him for tonight. Says she plans to check up on him throughout the evening, as well as consistently moving forward. Discussed with daughter indications for return to hospital, specifically if concern that patient is a danger to himself. Also encouraged daughter to be mindful of patient's ability to maintain ADLs moving forward in a safe manner.     OBJECTIVE:    Mental Status Exam:  General Appearance: appears stated age, normal weight, dressed in hospital garb, lying in bed, in no acute distress, disheveled  Behavior: normal; cooperative; reasonably friendly, pleasant, and polite; appropriate eye-contact; under good behavioral control  Involuntary Movements and Motor Activity: no abnormal involuntary movements noted; no tics, no tremors, no akathisia, no dystonia, no evidence of tardive dyskinesia; no psychomotor agitation or retardation  Gait and Station: unable to assess - patient lying down or seated  Speech and Language: spontaneous, talkative, interruptible, slurred  Mood: "Good"  Affect: normal, euthymic, reactive, full-range, mood-congruent, appropriate to situation and context  Thought Process and Associations: linear, goal-directed, organized, logical  Thought Content and Perceptions:: no suicidal or homicidal ideation, no auditory or visual hallucinations, no paranoid ideation, no ideas of reference, no evidence of delusions or psychosis  Sensorium and Orientation: oriented to person and place, disoriented to the date  Recent and Remote Memory: grossly intact  Attention and Concentration: grossly intact, attentive to the conversation and not readily distractible  Fund of Knowledge: grossly intact, used appropriate vocabulary and demonstrated an awareness of current events, consistent with educational level achieved  Insight: adequate  Judgment: adequate    CAM ICU positive? No    ASSESSMENT & RECOMMENDATIONS   Delirium due to another medical condition (subdural hematoma), resolving   "   Delirium Behavior Management  Minimize use of physical restraints if able   Keep window shades open and room lit during day and room dim at night in order to promote normal sleep-wake cycles  Encourage family at bedside. [place] patient often to situation, location, date.  Continue to Limit or Discontinue use of Narcotics, Benzos and Anticholinergic medications as they may worsen delirium.  Continue medical workup for causative etiology of Delirium.         RISK ASSESSMENT  Recommend rescind CEC     FOLLOW UP  Will sign off at this time     DISPOSITION - once medically cleared:   Defer to medical team      Please contact ON CALL psychiatry service (24/7) for any acute issues that may arise.      Rehan Elise MD  [facility] Psych[facility] PGY-II   Psychiatry  [facility]  5/26/2024 12:18 PM        --------------------------------------------------------------------------------------------------------------------------------------------------------------------------------------------------------------------------------------    CONTINUED OBJECTIVE clinical data & findings reviewed and noted for above decision making    Current Medications:   Scheduled Meds:    bacitracin zinc   Topical (Top) BID    folic acid  1 mg Oral Daily    gabapentin  600 mg Oral TID    losartan  25 mg Oral Daily    multivitamin  1 tablet Oral Daily    PHENobarbitaL  5 mg Oral BID    polyethylene glycol  17 g Oral Daily    QUEtiapine  25 mg Oral QHS    senna-docusate 8.6-50 mg  1 tablet Oral Daily    thiamine  100 mg Oral Daily     PRN Meds:   Current Facility-Administered Medications:     acetaminophen, 1,000 mg, Oral, Q8H PRN    hydrALAZINE, 10 mg, Intravenous, Q4H PRN    iodixanoL, 75 mL, Intra-arterial, ONCE PRN    labetalol, 10 mg, Intravenous, Q6H PRN    OLANZapine, 5 mg, Intramuscular, Q8H PRN    ondansetron, 8 mg, Intravenous, Q6H PRN    oxyCODONE, 5 mg, Oral, Q4H PRN    sodium chloride 0.9%, 10 mL, Intravenous,  PRN    sodium chloride 0.9%, 3 mL, Intravenous, PRN    Allergies:   Review of patient's allergies indicates:  No Known Allergies    Vitals  Vitals:    05/26/24 0711   BP: (!) 126/58   Pulse: 79   Resp: 18   Temp: 98.6 °F (37 °C)       Labs/Imaging/Studies:  Recent Results (from the past 24 hour(s))   CBC auto differential    Collection Time: 05/26/24  4:28 AM   Result Value Ref Range    WBC 4.82 3.90 - 12.70 K/uL    RBC 2.98 (L) 4.60 - 6.20 M/uL    Hemoglobin 9.6 (L) 14.0 - 18.0 g/dL    Hematocrit 29.5 (L) 40.0 - 54.0 %    MCV 99 (H) 82 - 98 fL    MCH 32.2 (H) 27.0 - 31.0 pg    MCHC 32.5 32.0 - 36.0 g/dL    RDW 13.7 11.5 - 14.5 %    Platelets 192 150 - 450 K/uL    MPV 10.7 9.2 - 12.9 fL    Immature Granulocytes 0.2 0.0 - 0.5 %    Gran # (ANC) 3.0 1.8 - 7.7 K/uL    Immature Grans (Abs) 0.01 0.00 - 0.04 K/uL    Lymph # 1.2 1.0 - 4.8 K/uL    Mono # 0.4 0.3 - 1.0 K/uL    Eos # 0.1 0.0 - 0.5 K/uL    Baso # 0.03 0.00 - 0.20 K/uL    nRBC 0 0 /100 WBC    Gran % 62.7 38.0 - 73.0 %    Lymph % 25.5 18.0 - 48.0 %    Mono % 8.5 4.0 - 15.0 %    Eosinophil % 2.5 0.0 - 8.0 %    Basophil % 0.6 0.0 - 1.9 %    Differential Method Automated    Comprehensive metabolic panel    Collection Time: 05/26/24  4:28 AM   Result Value Ref Range    Sodium 134 (L) 136 - 145 mmol/L    Potassium 4.1 3.5 - 5.1 mmol/L    Chloride 102 95 - 110 mmol/L    CO2 28 23 - 29 mmol/L    Glucose 86 70 - 110 mg/dL    BUN 14 8 - 23 mg/dL    Creatinine 1.0 0.5 - 1.4 mg/dL    Calcium 8.4 (L) 8.7 - 10.5 mg/dL    Total Protein 5.5 (L) 6.0 - 8.4 g/dL    Albumin 2.5 (L) 3.5 - 5.2 g/dL    Total Bilirubin 0.2 0.1 - 1.0 mg/dL    Alkaline Phosphatase 76 55 - 135 U/L    AST 26 10 - 40 U/L    ALT 11 10 - 44 U/L    eGFR >60.0 >60 mL/min/1.73 m^2    Anion Gap 4 (L) 8 - 16 mmol/L   Magnesium    Collection Time: 05/26/24  4:28 AM   Result Value Ref Range    Magnesium 1.9 1.6 - 2.6 mg/dL   Phosphorus    Collection Time: 05/26/24  4:28 AM   Result Value Ref Range    Phosphorus 4.2  2.7 - 4.5 mg/dL     Imaging Results              X-Ray Chest 1 View (Final result)  Result time 05/17/24 22:02:05      Final result by Stefano Blair MD (05/17/24 22:02:05)                   Impression:      No acute intrathoracic process.  No evidence of a pneumonia.      Electronically signed by: Stefano Blair MD  Date:    05/17/2024  Time:    22:02               Narrative:    EXAMINATION:  XR CHEST 1 VIEW    CLINICAL HISTORY:  eval PNA;    TECHNIQUE:  Single frontal view of the chest was performed.    COMPARISON:  12/28/2023    FINDINGS:  Monitoring EKG leads are present.  The trachea is unremarkable.  The cardiomediastinal silhouette is stable.  There is no evidence of free air beneath the hemidiaphragms.  There are no pleural effusions.  There is no evidence of a pneumothorax.  There is no evidence of pneumomediastinum.  No airspace opacity is present.  There are degenerative changes in the osseous structures.                                       CT Head Without Contrast (Final result)  Result time 05/17/24 20:30:06      Final result by Stefano Blair MD (05/17/24 20:30:06)                   Impression:      Interval postsurgical changes of right craniotomy for subdural hematoma evacuation with drainage catheter placement.  Decreased volume of the extra-axial collection with improved mass effect and midline shift.  Expected pneumocephalus overlies the right frontal cerebral convexity.    Stable subdural hematoma overlying the left cerebral convexity predominantly hyperattenuating.  No significant mass effect or midline shift.  Continued attention on follow-up recommended.    Chronic microvascular ischemic changes and generalized cerebral volume loss.    Additional findings as above.    Electronically signed by resident: Evon Talbert  Date:    05/17/2024  Time:    19:53    Electronically signed by: Stefano Blair MD  Date:    05/17/2024  Time:    20:30               Narrative:    EXAMINATION:  CT HEAD WITHOUT  CONTRAST    CLINICAL HISTORY:  post-op SDH jacob holes;    TECHNIQUE:  Low dose axial CT images obtained throughout the head without the use of intravenous contrast.  Axial, sagittal and coronal reconstructions were performed.    COMPARISON:  MRI 05/17/2024; CT 12/28/2023    FINDINGS:  Postsurgical changes of right craniotomy for subdural hematoma evacuation with decreased volume of predominantly low-density extra-axial collection.  Draining catheter terminates within the collection.  Postsurgical pneumocephalus overlies the right frontal convexity.  The collection measures up to 1.5 cm in maximum thickness, previously 2.1 cm.  Stable mass effect upon the underlying parenchyma and right lateral ventricle with improved leftward midline shift, measuring 4 mm at the septum pellucidum, pre measurement of 7 mm.    Additional hyperattenuating extra-axial collection overlies the left frontal and parietal cerebral convexity measuring 5 mm in maximal thickness, similar when compared to same day MRI.  No significant mass effect upon the underlying parenchyma.    Generalized cerebral volume loss.  Minimal re-expansion of the right lateral ventricle.  No evidence of hydrocephalus.    Mild patchy hypoattenuation in the supratentorial white matter, nonspecific but most likely reflecting chronic small vessel ischemic changes.  No abnormal parenchymal abnormality to suggest acute intraparenchymal hemorrhage or major vascular distribution infarct.    No displaced calvarial fracture.  Mild soft tissue stranding and air overlies the operative site.  Postsurgical changes of bilateral globes.    Left maxillary mucous retention cyst.  Peripheral mucosal thickening of the right maxillary sinus.  Remaining paranasal sinuses and mastoid air cells are clear.                                        MRI Brain Without Contrast (Final result)  Result time 05/17/24 13:45:52      Final result by Cheng Fajardo MD (05/17/24 13:45:52)                    Impression:      Bilateral subdural hematomas, larger on the right, as further discussed above.  There is significant intracranial mass effect with leftward midline shift.    Underlying chronic small vessel ischemic change and cerebral volume loss.    Neuro surgical consultation is advised.    This report was flagged in Epic as abnormal.      Electronically signed by: Cheng Fajardo MD  Date:    05/17/2024  Time:    13:45               Narrative:    EXAMINATION:  MRI BRAIN WITHOUT CONTRAST    CLINICAL HISTORY:  Transient ischemic attack (TIA);    TECHNIQUE:  Multiplanar multisequence MR imaging of the brain was performed without intravenous contrast.    Examination mildly degraded by patient motion artifact.    COMPARISON:  CT head 12/28/2023    FINDINGS:  Intracranial Compartment:    There is a large subdural hematoma over the right cerebral hemisphere.  Collection measures up to 2.1 cm in thickness.  Collection is predominantly hyperintense on T1 and T2-weighted images, with some interspersed areas of more heterogeneous signal anteriorly and posteriorly.  Minimal septation evident.  Additional subdural hemorrhage over the left cerebral convexity with similar imaging characteristics.  This collection measuring just 0.6 cm in maximal thickness.  No extra-axial blood or fluid collections elsewhere.  There is significant intracranial mass effect.  Diffuse right-sided sulcal and ventricular crowding, with approximately 0.7 cm of leftward midline shift measured at the septum pellucidum.    Mild chronic small vessel ischemic change throughout the supratentorial white matter.  No evidence of recent or remote major vascular distribution infarct.  No evidence of recent or remote parenchymal hemorrhage.    Moderate cerebral volume loss.  No ventricular entrapment or obstructive hydrocephalus.    Normal vascular flow voids are preserved.    Skull/Extracranial Contents (limited evaluation):    Bone marrow signal intensity  is unremarkable.    Postsurgical change both globes.    Findings were relayed to the ordering provider (Drake) via the epic secure chat system at approximately 13:40.

## 2024-05-26 NOTE — PLAN OF CARE
Ankit Martin - Neurosurgery ([facility])      HOME HEALTH ORDERS  FACE TO FACE ENCOUNTER    Patient Name: Moses Peng  YOB: 1935    PCP: No, Primary Doctor   PCP Address: None  PCP Phone Number: None  PCP Fax: None    Encounter Date: 5/17/24    Admit to Home Health    Diagnoses:  Active Hospital Problems    Diagnosis  POA    *Subdural hematoma [S06.5XAA]  Yes     Priority: 1 - High    Brain compression [G93.5]  Yes     Priority: 2     History of jacob hole surgery [Z98.890]  Not Applicable     Priority: 3     Midline shift of brain due to hematoma [G93.89, S06.2X0S]  Not Applicable     Priority: 4     Encephalopathy, metabolic [G93.41]  Yes     Priority: 5     Suicidal ideation [R45.851]  Not Applicable     Priority: 6     Bilateral hand pain [M79.641, M79.642]  Yes    Sensory loss [R20.0]  Yes    Hypophosphatemia [E83.39]  Yes    Agitation [R45.1]  Yes    Pain [R52]  Yes    Alcohol withdrawal [F10.939]  Yes    Essential hypertension [I10]  Yes      Resolved Hospital Problems    Diagnosis Date Resolved POA    Encephalopathy [G93.40] 05/20/2024 Yes       Follow Up Appointments:  Future Appointments   Date Time Provider Department Center   7/3/2024 11:00 AM Karla Cruz NP [facility] NEUROS8 Ankit Martin       Allergies:Review of patient's allergies indicates:  No Known Allergies    Medications: Review discharge medications with patient and family and provide education.    Current Facility-Administered Medications   Medication Dose Route Frequency Provider Last Rate Last Admin    acetaminophen tablet 1,000 mg  1,000 mg Oral Q8H PRN Nikki Guzmán PA-C   1,000 mg at 05/23/24 2159    bacitracin zinc ointment   Topical (Top) BID Wencesloa Barnett MD   Given at 05/26/24 0900    folic acid tablet 1 mg  1 mg Oral Daily Nikki Guzmán PA-C   1 mg at 05/26/24 0845    gabapentin capsule 600 mg  600 mg Oral TID Nikki Guzmán PA-C   600 mg at 05/26/24 0846    hydrALAZINE injection 10 mg  10 mg Intravenous Q4H PRN  Jonas Apodaca, NP   10 mg at 05/25/24 1955    HYDROcodone-acetaminophen 5-325 mg per tablet 1 tablet  1 tablet Oral Q6H PRN Paul Anne MD        iodixanoL (VISIPAQUE 320) injection 75 mL  75 mL Intra-arterial ONCE PRN Paul Anne MD        labetalol 20 mg/4 mL (5 mg/mL) IV syring  10 mg Intravenous Q6H PRN Nikki Guzmán PA-C   10 mg at 05/22/24 0649    losartan tablet 25 mg  25 mg Oral Daily Nilda Thompson MD   25 mg at 05/26/24 0845    losartan tablet 25 mg  25 mg Oral Once Paul Anne MD        multivitamin tablet  1 tablet Oral Daily Nikki Guzmán PA-C   1 tablet at 05/26/24 0845    OLANZapine injection 5 mg  5 mg Intramuscular Q8H PRN Soledad Mascorro PA-C        ondansetron injection 8 mg  8 mg Intravenous Q6H PRN Wenceslao Barnett MD        PHENobarbitaL elixir 5 mg  5 mg Oral BID Nikki Guzmán PA-C   5 mg at 05/26/24 0847    polyethylene glycol packet 17 g  17 g Oral Daily Jonas Apodaca NP   17 g at 05/26/24 0846    QUEtiapine tablet 25 mg  25 mg Oral QHS Soledad Mascorro PA-C   25 mg at 05/25/24 2139    senna-docusate 8.6-50 mg per tablet 1 tablet  1 tablet Oral Daily Deanna Khanna, NP   1 tablet at 05/26/24 0846    sodium chloride 0.9% flush 10 mL  10 mL Intravenous PRN Hyun Tobar MD        sodium chloride 0.9% flush 3 mL  3 mL Intravenous PRN Home Frias MD        thiamine tablet 100 mg  100 mg Oral Daily Nikki Guzmán PA-C   100 mg at 05/26/24 0845     Current Discharge Medication List        START taking these medications    Details   folic acid (FOLVITE) 1 MG tablet Take 1 tablet (1 mg total) by mouth once daily.  Qty: 30 tablet, Refills: 0      gabapentin (NEURONTIN) 300 MG capsule Take 1 capsule (300 mg total) by mouth 3 (three) times daily.  Qty: 90 capsule, Refills: 0      losartan (COZAAR) 50 MG tablet Take 1 tablet (50 mg total) by mouth once daily.  Qty: 30 tablet, Refills: 0    Comments: .      multivitamin Tab Take 1  tablet by mouth once daily.  Qty: 30 tablet, Refills: 0      QUEtiapine (SEROQUEL) 25 MG Tab Take 1 tablet (25 mg total) by mouth every evening.  Qty: 30 tablet, Refills: 0      thiamine 100 MG tablet Take 1 tablet (100 mg total) by mouth once daily.  Qty: 30 tablet, Refills: 0           STOP taking these medications       hydroCHLOROthiazide (HYDRODIURIL) 12.5 MG Tab Comments:   Reason for Stopping:                 I have seen and examined this patient within the last 30 days. My clinical findings that support the need for the home health skilled services and home bound status are the following:no   Patient with medication mismanagement issues requiring home bound status as evidenced by  Poor understanding of medication regimen/dosage.     Diet:   2 gram sodium diet    Labs:  Bmp weekly; send results to PCP    Referrals/ Consults   to evaluate for community resources/long-range planning.  Aide to provide assistance with personal care, ADLs, and vital signs.    Activities:   activity as tolerated  No driving; no operating heavy machinery    Nursing:   Agency to admit patient within 24 hours of hospital discharge unless specified on physician order or at patient request    SN to complete comprehensive assessment including routine vital signs. Instruct on disease process and s/s of complications to report to MD. Review/verify medication list sent home with the patient at time of discharge  and instruct patient/caregiver as needed. Frequency may be adjusted depending on start of care date.     Skilled nurse to perform up to 3 visits PRN for symptoms related to diagnosis    Notify MD if SBP > 160 or < 90; DBP > 90 or < 50; HR > 120 or < 50; Temp > 101; O2 < 88%;      Ok to schedule additional visits based on staff availability and patient request on consecutive days within the home health episode.    When multiple disciplines ordered:    Start of Care occurs on Sunday - Wednesday schedule remaining  discipline evaluations as ordered on separate consecutive days following the start of care.    Thursday SOC -schedule subsequent evaluations Friday and Monday the following week.     Friday - Saturday SOC - schedule subsequent discipline evaluations on consecutive days starting Monday of the following week.    For all post-discharge communication and subsequent orders please contact patient's primary care physician.           Home Health Aide:  Nursing Twice weekly, Medical Social Work Twice weekly, and Home Health Aide Twice weekly         I certify that this patient is confined to his home and needs intermittent skilled nursing care.

## 2024-05-26 NOTE — DISCHARGE INSTRUCTIONS
Follow up with the primary care doctor to determine whether or not further adjustments in your blood pressure medication are needed.    Follow up with Psychiatry to determine whether or not further adjustments to your medication of quetiapine or Seroquel are needed.    Patient must be supervised at all times and utilize a walker for same transfers and ambulation.

## 2024-05-26 NOTE — PLAN OF CARE
05/26/24 1550   Post-Acute Status   Post-Acute Authorization Home Health   Home Health Status Referrals Sent   Coverage Payor: [employer] MANAGED MEDICARE [place] [facility] [place]   Hospital Resources/Appts/Education Provided Provided patient/caregiver with written discharge plan information;Appointments scheduled and added to AVS   Patient choice form signed by patient/caregiver List with quality metrics by geographic area provided   Discharge Delays None known at this time   Discharge Plan   Discharge Plan A Home Health   Discharge Plan B Home     SW sent  referral to [facility].     SILAS Garvin  [facility] [place]  [phone]

## 2024-05-27 ENCOUNTER — TELEPHONE (OUTPATIENT)
Dept: NEUROSURGERY | Facility: CLINIC | Age: 89
End: 2024-05-27
Payer: MEDICARE

## 2024-05-27 NOTE — TELEPHONE ENCOUNTER
Called patient to schedule imaging ahead of next appointment. Patient unable to hear me on phone. Called patient's daughter from contact list and LVM with imaging appointment.  Future Appointments   Date Time Provider Department Center   6/26/2024 12:00 PM [facility] CT1 LIMIT [address] [facility] CT SCAN [place] MEJIAS   7/3/2024 11:00 AM Karla Cruz, NP [facility] NEUROS[address] [place] [address]

## 2024-05-27 NOTE — DISCHARGE SUMMARY
Ankit Martin - Neurosurgery ([facility])  [facility] Medicine  Discharge Summary      Patient Name: Moses Peng  MRN: [id number]  TC: [id number]  Patient Class: IP- Inpatient  Admission Date: 5/17/2024  Hospital Length of Stay: 9 days  Discharge Date and Time: 5/26/2024  5:45 PM  Attending Physician: Salma att. providers found   Discharging Provider: Paul Anne MD  Primary Care Provider: Salma, Primary Doctor  [facility] Medicine Team: [facility] HOSP MED N Paul Anne MD  Primary Care Team: [facility] MED N    HPI:   Mr Peng is an 89 y/o M unknown PMHx admitted to [facility] w/ bilateral SDH R>L and MLS. Per chart review, he presented to the ED by EMS stating he wants to kill himself. Came yesterday with c/o R hand paresthesias and was discharged. Currently PEC'ed per psych evaluation.  Denies falls. MRI with large right SDH with midline shift. Denies use of AC/APT. NSGY consulted for evaluation. NPO currently for OR  for SDH evac w/NSGY. Patient admitted to [facility] for close monitoring and higher level of care.             Procedure(s) (LRB):  ANGIOGRAM-CEREBRAL (Bilateral)      Hospital Course:   Pt underwent SDH  evaucation on 5/1/24. Repeat CTH   with decreased right SDH and MLS with stable mixed amount of left SDH. Patient started on precedex gtt for agitation. Keppra changed to briviact in the hopes of reducing agitation. Seroquel 25mg added qhs. On 5/20/24 Subdural drain d/c'd by NSGY, HOB liberalized. Post-subdural drain removal CT w/ slight increase in pneumocephalus, no re-accumulation of SDH, midline shift/mass effect unchanged from prior. Mental status markedly improved , precedex was dc'd.  Pt was CEC'd due to suicidal ideation. Per NSGY recs  underwent MMA Embolization on 5/25 w/ IR; stable post procedure head CT w/ clearance for discharge from Neurosurgery as well as Psychiatry, see pec was lifted.  Patient has met maximum benefit from hospitalization is clinically stable for discharge.  Was related to the daughter low  reached in detail about therapy concerns about the patient falling and being unsafe to be home alone, she affirmed that she will handle the patient and if needed she will reconsider placement.    Clear lungs bilaterally, unlabored breathing, on room air, no cyanosis   Heart sounds indicate a regular rate and rhythm  Awake alert, no acute distress   Pupils equal bilaterally   Extraocular motions intact   No facial droop, no slurred speech  5/5 proximal upper and lower extremity strength bilaterally including fist  and hip flexor strength   No obvious lower extremity edema     Patient denied any chest pain headache or suicidal ideation in the day of discharge       Goals of Care Treatment Preferences:  Code Status: Full Code      Consults:   Consults (From admission, onward)          Status Ordering Provider     Inpatient consult to Interventional Radiology  Once        Provider:  (Not yet assigned)    Completed YOGESH TELLO     Inpatient consult to Neurosurgery  Once        Provider:  (Not yet assigned)    Completed NAA HORTON     Inpatient consult to Psychiatry  Once        Provider:  (Not yet assigned)    Completed NAA HORTON            No new Assessment & Plan notes have been filed under this hospital service since the last note was generated.  Service: Hospital Medicine    Final Active Diagnoses:    Diagnosis Date Noted POA    PRINCIPAL PROBLEM:  Subdural hematoma [S06.5XAA] 05/17/2024 Yes    Brain compression [G93.5] 05/17/2024 Yes    History of jacob hole surgery [Z98.890] 05/18/2024 Not Applicable    Midline shift of brain due to hematoma [G93.89, S06.2X0S] 05/18/2024 Not Applicable    Encephalopathy, metabolic [G93.41] 05/18/2024 Yes    Suicidal ideation [R45.851] 05/17/2024 Not Applicable    Bilateral hand pain [M79.641, M79.642] 05/23/2024 Yes    Sensory loss [R20.0] 05/23/2024 Yes    Hypophosphatemia [E83.39] 05/23/2024 Yes    Agitation [R45.1] 05/23/2024 Yes    Pain [R52] 05/23/2024  Yes    Alcohol withdrawal [F10.939] 05/22/2024 Yes    Essential hypertension [I10] 05/17/2024 Yes      Problems Resolved During this Admission:    Diagnosis Date Noted Date Resolved POA    Encephalopathy [G93.40] 05/18/2024 05/20/2024 Yes       Discharged Condition: good    Disposition: Home or Self Care    Follow Up:   Follow-up Information       No, Primary Doctor. Schedule an appointment as soon as possible for a visit in 1 week(s).               Lino Macedo MD. Schedule an appointment as soon as possible for a visit in 1 week(s).    Specialty: Neurosurgery  Contact information:  [address]  [facility] [address]  [phone]                           Patient Instructions:      Ambulatory referral/consult to Neurosurgery   Standing Status: Future   Referral Priority: Routine Referral Type: Consultation   Referral Reason: Specialty Services Required   Referred to Provider: LINO MACEDO Requested Specialty: Neurosurgery   Number of Visits Requested: 1     Ambulatory referral/consult to Psychiatry   Standing Status: Future   Referral Priority: Routine Referral Type: Psychiatric   Referral Reason: Specialty Services Required   Requested Specialty: Psychiatry   Number of Visits Requested: 1            Pending Diagnostic Studies:       None           Medications:  Reconciled Home Medications:      Medication List        START taking these medications      folic acid 1 MG tablet  Commonly known as: FOLVITE  Take 1 tablet (1 mg total) by mouth once daily.     gabapentin 300 MG capsule  Commonly known as: NEURONTIN  Take 1 capsule (300 mg total) by mouth 3 (three) times daily.     losartan 50 MG tablet  Commonly known as: COZAAR  Take 1 tablet (50 mg total) by mouth once daily.     QUEtiapine 25 MG Tab  Commonly known as: SEROQUEL  Take 1 tablet (25 mg total) by mouth every evening.     THERA-TABS tablet  Generic drug: multivitamin  Take 1 tablet by mouth once daily.     thiamine 100 MG tablet  Take 1 tablet  (100 mg total) by mouth once daily.            STOP taking these medications      hydroCHLOROthiazide 12.5 MG Tab  Commonly known as: HYDRODIURIL              Indwelling Lines/Drains at time of discharge:   Lines/Drains/Airways       None                   Time spent on the discharge of patient: 30 minutes         Paul Anne MD  Department of Hospital Medicine  [facility] - Neurosurgery ([facility])

## 2024-05-30 ENCOUNTER — HOSPITAL ENCOUNTER (OUTPATIENT)
Facility: HOSPITAL | Age: 89
Discharge: HOME | End: 2024-05-31
Attending: EMERGENCY MEDICINE | Admitting: EMERGENCY MEDICINE
Payer: MEDICARE

## 2024-05-30 DIAGNOSIS — M79.606 LEG PAIN: ICD-10-CM

## 2024-05-30 DIAGNOSIS — M79.89 LEG SWELLING: ICD-10-CM

## 2024-05-30 DIAGNOSIS — R50.9 FEVER, UNSPECIFIED FEVER CAUSE: Primary | ICD-10-CM
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
## 2024-05-30 LAB
ALBUMIN SERPL BCP-MCNC: 2.9 G/DL (ref 3.5–5.2)
ALP SERPL-CCNC: 86 U/L (ref 55–135)
ALT SERPL W/O P-5'-P-CCNC: 17 U/L (ref 10–44)
ANION GAP SERPL CALC-SCNC: 6 MMOL/L (ref 8–16)
AST SERPL-CCNC: 26 U/L (ref 10–40)
BASOPHILS # BLD AUTO: 0.04 K/UL (ref 0–0.2)
BASOPHILS NFR BLD: 0.5 % (ref 0–1.9)
BILIRUB SERPL-MCNC: 0.3 MG/DL (ref 0.1–1)
BILIRUB UR QL STRIP: NEGATIVE
BILIRUB UR QL STRIP: NEGATIVE
BUN SERPL-MCNC: 18 MG/DL (ref 8–23)
CALCIUM SERPL-MCNC: 8.9 MG/DL (ref 8.7–10.5)
CHLORIDE SERPL-SCNC: 105 MMOL/L (ref 95–110)
CLARITY UR REFRACT.AUTO: CLEAR
CLARITY UR REFRACT.AUTO: CLEAR
CO2 SERPL-SCNC: 25 MMOL/L (ref 23–29)
COLOR UR AUTO: YELLOW
COLOR UR AUTO: YELLOW
CREAT SERPL-MCNC: 1 MG/DL (ref 0.5–1.4)
DIFFERENTIAL METHOD BLD: ABNORMAL
EOSINOPHIL # BLD AUTO: 0.2 K/UL (ref 0–0.5)
EOSINOPHIL NFR BLD: 2.2 % (ref 0–8)
ERYTHROCYTE [DISTWIDTH] IN BLOOD BY AUTOMATED COUNT: 13.6 % (ref 11.5–14.5)
EST. GFR  (NO RACE VARIABLE): >60 ML/MIN/1.73 M^2
GLUCOSE SERPL-MCNC: 83 MG/DL (ref 70–110)
GLUCOSE UR QL STRIP: NEGATIVE
GLUCOSE UR QL STRIP: NEGATIVE
HCT VFR BLD AUTO: 31 % (ref 40–54)
HGB BLD-MCNC: 9.7 G/DL (ref 14–18)
HGB UR QL STRIP: NEGATIVE
HGB UR QL STRIP: NEGATIVE
IMM GRANULOCYTES # BLD AUTO: 0.04 K/UL (ref 0–0.04)
IMM GRANULOCYTES NFR BLD AUTO: 0.5 % (ref 0–0.5)
KETONES UR QL STRIP: NEGATIVE
KETONES UR QL STRIP: NEGATIVE
LACTATE SERPL-SCNC: 0.8 MMOL/L (ref 0.5–2.2)
LEUKOCYTE ESTERASE UR QL STRIP: NEGATIVE
LEUKOCYTE ESTERASE UR QL STRIP: NEGATIVE
LYMPHOCYTES # BLD AUTO: 1.2 K/UL (ref 1–4.8)
LYMPHOCYTES NFR BLD: 14 % (ref 18–48)
MCH RBC QN AUTO: 31.9 PG (ref 27–31)
MCHC RBC AUTO-ENTMCNC: 31.3 G/DL (ref 32–36)
MCV RBC AUTO: 102 FL (ref 82–98)
MONOCYTES # BLD AUTO: 0.5 K/UL (ref 0.3–1)
MONOCYTES NFR BLD: 5.4 % (ref 4–15)
NEUTROPHILS # BLD AUTO: 6.6 K/UL (ref 1.8–7.7)
NEUTROPHILS NFR BLD: 77.4 % (ref 38–73)
NITRITE UR QL STRIP: NEGATIVE
NITRITE UR QL STRIP: NEGATIVE
NRBC BLD-RTO: 0 /100 WBC
OHS QRS DURATION: 82 MS
OHS QTC CALCULATION: 423 MS
PH UR STRIP: 7 [PH] (ref 5–8)
PH UR STRIP: 7 [PH] (ref 5–8)
PLATELET # BLD AUTO: 269 K/UL (ref 150–450)
PMV BLD AUTO: 10.1 FL (ref 9.2–12.9)
POTASSIUM SERPL-SCNC: 4.8 MMOL/L (ref 3.5–5.1)
PROT SERPL-MCNC: 6.5 G/DL (ref 6–8.4)
PROT UR QL STRIP: NEGATIVE
PROT UR QL STRIP: NEGATIVE
RBC # BLD AUTO: 3.04 M/UL (ref 4.6–6.2)
SARS-COV-2 RDRP RESP QL NAA+PROBE: NEGATIVE
SODIUM SERPL-SCNC: 136 MMOL/L (ref 136–145)
SP GR UR STRIP: 1.01 (ref 1–1.03)
SP GR UR STRIP: 1.01 (ref 1–1.03)
T4 FREE SERPL-MCNC: 0.84 NG/DL (ref 0.71–1.51)
TSH SERPL DL<=0.005 MIU/L-ACNC: 0.34 UIU/ML (ref 0.4–4)
URN SPEC COLLECT METH UR: NORMAL
URN SPEC COLLECT METH UR: NORMAL
WBC # BLD AUTO: 8.56 K/UL (ref 3.9–12.7)

## 2024-05-30 PROCEDURE — 84439 ASSAY OF FREE THYROXINE: CPT | Performed by: EMERGENCY MEDICINE

## 2024-05-30 PROCEDURE — 80053 COMPREHEN METABOLIC PANEL: CPT | Performed by: EMERGENCY MEDICINE

## 2024-05-30 PROCEDURE — G0378 HOSPITAL OBSERVATION PER HR: HCPCS

## 2024-05-30 PROCEDURE — 93010 ELECTROCARDIOGRAM REPORT: CPT | Mod: ,,, | Performed by: INTERNAL MEDICINE

## 2024-05-30 PROCEDURE — 81003 URINALYSIS AUTO W/O SCOPE: CPT | Performed by: EMERGENCY MEDICINE

## 2024-05-30 PROCEDURE — 81003 URINALYSIS AUTO W/O SCOPE: CPT | Mod: 91 | Performed by: PHYSICIAN ASSISTANT

## 2024-05-30 PROCEDURE — 84443 ASSAY THYROID STIM HORMONE: CPT | Performed by: EMERGENCY MEDICINE

## 2024-05-30 PROCEDURE — 99285 EMERGENCY DEPT VISIT HI MDM: CPT | Mod: 25

## 2024-05-30 PROCEDURE — 25000003 PHARM REV CODE 250: Performed by: PHYSICIAN ASSISTANT

## 2024-05-30 PROCEDURE — 83605 ASSAY OF LACTIC ACID: CPT | Performed by: EMERGENCY MEDICINE

## 2024-05-30 PROCEDURE — 25000003 PHARM REV CODE 250: Performed by: EMERGENCY MEDICINE

## 2024-05-30 PROCEDURE — 87040 BLOOD CULTURE FOR BACTERIA: CPT | Mod: 59 | Performed by: EMERGENCY MEDICINE

## 2024-05-30 PROCEDURE — 93005 ELECTROCARDIOGRAM TRACING: CPT

## 2024-05-30 PROCEDURE — U0002 COVID-19 LAB TEST NON-CDC: HCPCS | Performed by: EMERGENCY MEDICINE

## 2024-05-30 PROCEDURE — 85025 COMPLETE CBC W/AUTO DIFF WBC: CPT | Performed by: EMERGENCY MEDICINE

## 2024-05-30 RX ORDER — LOSARTAN POTASSIUM 50 MG/1
50 TABLET ORAL DAILY
Status: DISCONTINUED | OUTPATIENT
Start: 2024-05-31 | End: 2024-05-31 | Stop reason: HOSPADM

## 2024-05-30 RX ORDER — ACETAMINOPHEN 325 MG/1
650 TABLET ORAL EVERY 8 HOURS PRN
Status: DISCONTINUED | OUTPATIENT
Start: 2024-05-30 | End: 2024-05-31 | Stop reason: HOSPADM

## 2024-05-30 RX ORDER — SODIUM CHLORIDE 0.9 % (FLUSH) 0.9 %
10 SYRINGE (ML) INJECTION
Status: DISCONTINUED | OUTPATIENT
Start: 2024-05-30 | End: 2024-05-31 | Stop reason: HOSPADM

## 2024-05-30 RX ORDER — TALC
6 POWDER (GRAM) TOPICAL NIGHTLY PRN
Status: DISCONTINUED | OUTPATIENT
Start: 2024-05-30 | End: 2024-05-31 | Stop reason: HOSPADM

## 2024-05-30 RX ORDER — GABAPENTIN 300 MG/1
300 CAPSULE ORAL 3 TIMES DAILY
Status: DISCONTINUED | OUTPATIENT
Start: 2024-05-31 | End: 2024-05-31 | Stop reason: HOSPADM

## 2024-05-30 RX ORDER — ACETAMINOPHEN 500 MG
1000 TABLET ORAL
Status: COMPLETED | OUTPATIENT
Start: 2024-05-30 | End: 2024-05-30

## 2024-05-30 RX ORDER — ONDANSETRON HYDROCHLORIDE 2 MG/ML
4 INJECTION, SOLUTION INTRAVENOUS EVERY 8 HOURS PRN
Status: DISCONTINUED | OUTPATIENT
Start: 2024-05-30 | End: 2024-05-31 | Stop reason: HOSPADM

## 2024-05-30 RX ORDER — FOLIC ACID 1 MG/1
1 TABLET ORAL DAILY
Status: DISCONTINUED | OUTPATIENT
Start: 2024-05-31 | End: 2024-05-31 | Stop reason: HOSPADM

## 2024-05-30 RX ORDER — THIAMINE HCL 100 MG
100 TABLET ORAL DAILY
Status: DISCONTINUED | OUTPATIENT
Start: 2024-05-31 | End: 2024-05-31 | Stop reason: HOSPADM

## 2024-05-30 RX ORDER — QUETIAPINE FUMARATE 25 MG/1
25 TABLET, FILM COATED ORAL NIGHTLY
Status: DISCONTINUED | OUTPATIENT
Start: 2024-05-30 | End: 2024-05-31 | Stop reason: HOSPADM

## 2024-05-30 RX ADMIN — ACETAMINOPHEN 1000 MG: 500 TABLET ORAL at 07:05

## 2024-05-30 RX ADMIN — QUETIAPINE FUMARATE 25 MG: 25 TABLET ORAL at 10:05

## 2024-05-30 NOTE — ED PROVIDER NOTES
Encounter Date: 5/30/2024       History     Chief Complaint   Patient presents with    Fall     Via EMS. EMS reports family called 911 for difficult pt becoming violent. EMS reports recent neurosurgery this month. EMS reports pt is calm, cooperative, and AAOX4. Pt states he had a mechanical fall, denies injury, pain, weakness or blood thinner use. States he feels fine.      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 He notes he was making coffee in his kitchen this morning when he turned and fell onto his let side. Denies LOC. He states he did not hit his head. He denies any pain.    I spoke with his daughter who reported a mechanical fall, no LOC. Caretaker was present.     The history is provided by the patient, medical records and a relative. The history is limited by the condition of the patient.     Review of patient's allergies indicates:  No Known Allergies  Past Medical History:   Diagnosis Date    Hypertension      Past Surgical History:   Procedure Laterality Date    CEREBRAL ANGIOGRAM Bilateral 5/25/2024    Procedure: ANGIOGRAM-CEREBRAL;  Surgeon: Dayana Cuenca;  Location: [facility];  Service: Anesthesiology;  Laterality: Bilateral;    CRANIOTOMY FOR EVACUATION OF SUBDURAL HEMATOMA Right 5/17/2024    Procedure: CRANIOTOMY, FOR SUBDURAL HEMATOMA EVACUATION;  Surgeon: Herb Price MD;  Location: [facility] OR [address] [place] [address];  Service: Neurosurgery;  Laterality: Right;  right jacob hole craniotomy for SDH evacuation     No family history on file.  Social History     Tobacco Use    Smoking status: Every Day    Smokeless tobacco: Never         Physical Exam    Nursing note and vitals reviewed.  Constitutional: He appears well-developed and well-nourished. He is not diaphoretic. No distress.   HENT:   Head: Normocephalic and atraumatic.   Right Ear: External ear normal.   Left Ear: External ear normal.   Well healed linear surgical  incision ro R scalp about 5-6 cm in length     Eyes: Conjunctivae and EOM are normal.   Neck: Neck supple.   Cardiovascular:  Normal rate, regular rhythm and intact distal pulses.           Pulmonary/Chest: No respiratory distress. He exhibits no tenderness.   Abdominal: Abdomen is soft. He exhibits no distension. There is no abdominal tenderness.   Musculoskeletal:         General: Edema (1+ pitting to lower ext bilaterally) present. No tenderness.      Cervical back: Neck supple.      Comments: No TTP of hips, pelvis is stable  No pain with ranging hips, knees, ankles     Neurological: He is alert and oriented to person, place, and time. GCS score is 15. GCS eye subscore is 4. GCS verbal subscore is 5. GCS motor subscore is 6.   Skin: Skin is warm and dry.   1 x 2 cm abrasion to anterior L shin just inferior to L knee   Psychiatric: He has a normal mood and affect. His behavior is normal. Judgment and thought content normal.         ED Course   Procedures  Labs Reviewed   CBC W/ AUTO DIFFERENTIAL - Abnormal; Notable for the following components:       Result Value    RBC 3.04 (*)     Hemoglobin 9.7 (*)     Hematocrit 31.0 (*)      (*)     MCH 31.9 (*)     MCHC 31.3 (*)     Gran % 77.4 (*)     Lymph % 14.0 (*)     All other components within normal limits   COMPREHENSIVE METABOLIC PANEL - Abnormal; Notable for the following components:    Albumin 2.9 (*)     Anion Gap 6 (*)     All other components within normal limits   TSH - Abnormal; Notable for the following components:    TSH 0.338 (*)     All other components within normal limits   CULTURE, BLOOD   CULTURE, BLOOD   URINALYSIS, REFLEX TO URINE CULTURE    Narrative:     Specimen Source->Urine   T4, FREE   LACTIC ACID, PLASMA   SARS-COV-2 RNA AMPLIFICATION, QUAL   B-TYPE NATRIURETIC PEPTIDE   URINALYSIS, REFLEX TO URINE CULTURE     EKG Readings: (Independently Interpreted)   Initial Reading: No STEMI.   EKG done today at 2:16 p.m. normal sinus rhythm rate  of 70 normal axis Q-waves in septal leads appears changed compared to EKG from 05/19/2024     ECG Results              EKG 12-lead (Final result)        Collection Time Result Time QRS Duration OHS QTC Calculation    05/30/24 14:16:38 05/30/24 16:56:27 82 423                     Final result by Interface, Lab In [facility] (05/30/24 16:56:35)                   Narrative:    Test Reason : R45.1,    Vent. Rate : 070 BPM     Atrial Rate : 070 BPM     P-R Int : 122 ms          QRS Dur : 082 ms      QT Int : 392 ms       P-R-T Axes : 070 059 069 degrees     QTc Int : 423 ms    Normal sinus rhythm  Low septal forces ; Abnormal R wave progression in the precordial leads   Cannot exclude Septal infarct ,age undetermined vs due to voltage LVH  Abnormal ECG  When compared with ECG of 19-MAY-2024 12:29,  The axis Shifted left  Septal infarct is now possible  Criteria for Lateral infarct are no longer Present  T wave inversion no longer evident in Lateral leads  Confirmed by Charan CUEVA MD ([id number]) on 5/30/2024 4:56:26 PM    Referred By: [email or web address]   SELF           Confirmed By:Charan CUEVA MD                                  Imaging Results              X-Ray Chest AP Portable (Final result)  Result time 05/30/24 17:17:53      Final result by Soto Cruz DO (05/30/24 17:17:53)                   Impression:      No acute abnormality.      Electronically signed by: Soto Cruz  Date:    05/30/2024  Time:    17:17               Narrative:    EXAMINATION:  XR CHEST AP PORTABLE    CLINICAL HISTORY:  fall;    TECHNIQUE:  Single frontal view of the chest was performed.    COMPARISON:  05/17/2024.    FINDINGS:  The lungs are well expanded.  Coarse chronic interstitial prominence, stable.  No new focal consolidation.  The pleural spaces are clear. The cardiac silhouette is unremarkable.  There are calcifications of the aortic arch.  There are several remote left-sided rib fractures.  There is a remote compression fracture of the  midthoracic spine.  There are degenerative changes.                                       X-Ray Pelvis Routine AP (Final result)  Result time 05/30/24 17:12:58      Final result by Soto Cruz DO (05/30/24 17:12:58)                   Impression:      No acute fracture or dislocation.      Electronically signed by: Soto Cruz  Date:    05/30/2024  Time:    17:12               Narrative:    EXAMINATION:  XR PELVIS ROUTINE AP    CLINICAL HISTORY:  Unspecified fall, initial encounter    TECHNIQUE:  AP view of the pelvis was performed.    COMPARISON:  None.    FINDINGS:  There is no evidence of acute fracture or dislocation of the pelvis on this single, limited view.  The femoral heads are well seated in the acetabula.  There is mild joint space narrowing of the bilateral femoroacetabular joints.  There is degenerative change of the partially visualized lower lumbar spine.  There is a moderate volume of stool in the rectum.  There is diffuse osteopenia.                                       CT Head Without Contrast (Final result)  Result time 05/30/24 16:18:36      Final result by Williams Oconnor MD (05/30/24 16:18:36)                   Impression:      Evolving operative change of right frontal and parietal jacob hole for subdural hematoma evacuation with stable right cerebral convexity subdural collection and improved postoperative pneumocephalus.  Similar approximate 2 leftward midline shift.    Additional left cerebral convexity hyperdense subdural collection is similar to prior.    No new hemorrhage or major vascular distribution infarct.    Electronically signed by resident: Sri Potter  Date:    05/30/2024  Time:    15:51    Electronically signed by: Williams Oconnor  Date:    05/30/2024  Time:    16:18               Narrative:    EXAMINATION:  CT HEAD WITHOUT CONTRAST    CLINICAL HISTORY:  Subdural hemorrhage;    TECHNIQUE:  Low dose axial CT images obtained throughout the head without the use of intravenous  contrast.  Axial, sagittal and coronal reconstructions were performed.    COMPARISON:  CT head 05/26/2024    FINDINGS:  Evolving postoperative change including right frontal and parietal jacob hole for right cerebral convexity subdural hematoma evacuation.  Persistent hypodense collection along the right cerebral convexity without significant change from prior with residual pneumocephalus by the frontal lobe, through the track of the frontal jacob hole and about the occipital lobe.  Stable mass effect with leftward midline shift measure 2 mm.  No herniation.    Stable hyperdense subdural collection along the left convexity measure 4 mm.    Ventricles and sulci are stable in size.  No hydrocephalus.    No parenchymal mass, hemorrhage, edema or major vascular distribution infarct.    Skull/extracranial contents (limited evaluation):    No fracture. Mastoid air cells and paranasal sinuses are essentially clear.    Bilateral scleral banding.                                       Medications   acetaminophen tablet 1,000 mg (1,000 mg Oral Given 5/30/24 1955)     Medical Decision Making  DDX: ICH, skull fracture, debility  Patient with no acute findings on CT head, x-ray of his chest and pelvis lab work done today  Patient developed chills and was very warm to the touch and and oral temperature was 100° F  Patient was given Tylenol  Blood cultures, lactate, UA, COVID were added  Also found to have leg swelling, without SOB or hypoxia, BNP added and US BLE to evaluate for DVT  Given his recent craniotomy we will go ahead and admit for observation  Patient denies headache is not altered and has been taking well and there is no signs of infection to his surgical scar    Discussed with  Aysha and patient will be put in EDOU  Plan for:   -further evaluation of temperature/fever  -F/u blood culture  -Monitor vital signs  -NSG evaluation of surgical wound      Amount and/or Complexity of Data Reviewed  Labs:  ordered.  Radiology: ordered.    Risk  OTC drugs.               ED Course as of 05/30/24 2138   Thu May 30, 2024   1922 Pt now with oral temp of 100, getting UA to rule out UTI  Patient is mentating well, denying a headache. Added on blood cultures and lactate given his recent craniotomy 5/17//24 [GK]   2002 Patient has chills, will ad [GK]      ED Course User Index  [] Nery Rod MD                           Clinical Impression:  Final diagnoses:  [W19.XXXA] Fall  [M79.89] Leg swelling  [R50.9] Fever, unspecified fever cause (Primary)          ED Disposition Condition    Observation Stable                Nery Rod MD  05/30/24 2136       Nery Rod MD  05/30/24 2137       Nery Rod MD  05/30/24 2138

## 2024-05-30 NOTE — ED NOTES
Patient identifiers verified and correct for Moses Peng  LOC: The patient is awake, alert and aware of environment with an appropriate affect, the patient is oriented x 3 and speaking appropriately.   APPEARANCE: Patient appears comfortable and in no acute distress, patient is clean and well groomed.  SKIN: The skin is warm and dry, color consistent with ethnicity, patient has normal skin turgor and moist mucus membranes, skin intact, no breakdown or bruising noted.   MUSCULOSKELETAL: Patient moving all extremities spontaneously, no swelling noted.  No pain  RESPIRATORY: Airway is open and patent, respirations are spontaneous, patient has a normal effort and rate, no accessory muscle.  CARDIAC: Pt placed on cardiac monitor. Patient has a normal rate and regular rhythm, bilateral lower extremity edema noted , capillary refill < 3 seconds.   GASTRO: Soft and non tender to palpation, no distention noted.  : Pt denies any pain or frequency with urination.  NEURO: Pt opens eyes spontaneously, behavior appropriate to situation, follows commands, facial expression symmetrical, bilateral hand grasp equal and even, purposeful motor response noted, normal sensation in all extremities when touched with a finger.

## 2024-05-30 NOTE — ED TRIAGE NOTES
Pt states he fell in his kitchen this am.  States he turned to get his coffee and lost his balance.  Pt denies hitting head.  Pt denies any c/o.  Pt usually ambulates without difficulty.  Pt lives alone

## 2024-05-30 NOTE — ED NOTES
Pt incontinent of urine again.  States he knocked urinal or floor.  Perineal care provided.  Repositioned for comfort.

## 2024-05-30 NOTE — ED NOTES
Pt incontinent of urine while in CT.  Pt cleaned upon arrival back to room 19.  Pt given urinal, states he can use this without difficulty.

## 2024-05-31 VITALS
WEIGHT: 135 LBS | TEMPERATURE: 99 F | HEART RATE: 83 BPM | SYSTOLIC BLOOD PRESSURE: 161 MMHG | BODY MASS INDEX: 21.14 KG/M2 | OXYGEN SATURATION: 95 % | RESPIRATION RATE: 17 BRPM | DIASTOLIC BLOOD PRESSURE: 73 MMHG

## 2024-05-31 LAB
ALBUMIN SERPL BCP-MCNC: 2.5 G/DL (ref 3.5–5.2)
ALP SERPL-CCNC: 82 U/L (ref 55–135)
ALT SERPL W/O P-5'-P-CCNC: 14 U/L (ref 10–44)
ANION GAP SERPL CALC-SCNC: 6 MMOL/L (ref 8–16)
AST SERPL-CCNC: 20 U/L (ref 10–40)
BASOPHILS # BLD AUTO: 0.04 K/UL (ref 0–0.2)
BASOPHILS NFR BLD: 0.7 % (ref 0–1.9)
BILIRUB SERPL-MCNC: 0.4 MG/DL (ref 0.1–1)
BUN SERPL-MCNC: 17 MG/DL (ref 8–23)
CALCIUM SERPL-MCNC: 8.6 MG/DL (ref 8.7–10.5)
CHLORIDE SERPL-SCNC: 105 MMOL/L (ref 95–110)
CO2 SERPL-SCNC: 25 MMOL/L (ref 23–29)
CREAT SERPL-MCNC: 0.9 MG/DL (ref 0.5–1.4)
CRP SERPL-MCNC: 48.8 MG/L (ref 0–8.2)
DIFFERENTIAL METHOD BLD: ABNORMAL
EOSINOPHIL # BLD AUTO: 0.3 K/UL (ref 0–0.5)
EOSINOPHIL NFR BLD: 4.5 % (ref 0–8)
ERYTHROCYTE [DISTWIDTH] IN BLOOD BY AUTOMATED COUNT: 13.2 % (ref 11.5–14.5)
ERYTHROCYTE [SEDIMENTATION RATE] IN BLOOD BY PHOTOMETRIC METHOD: 54 MM/HR (ref 0–23)
EST. GFR  (NO RACE VARIABLE): >60 ML/MIN/1.73 M^2
GLUCOSE SERPL-MCNC: 81 MG/DL (ref 70–110)
HCT VFR BLD AUTO: 29.1 % (ref 40–54)
HGB BLD-MCNC: 9.3 G/DL (ref 14–18)
IMM GRANULOCYTES # BLD AUTO: 0.02 K/UL (ref 0–0.04)
IMM GRANULOCYTES NFR BLD AUTO: 0.3 % (ref 0–0.5)
LACTATE SERPL-SCNC: 0.7 MMOL/L (ref 0.5–2.2)
LYMPHOCYTES # BLD AUTO: 1.1 K/UL (ref 1–4.8)
LYMPHOCYTES NFR BLD: 19.3 % (ref 18–48)
MCH RBC QN AUTO: 31.3 PG (ref 27–31)
MCHC RBC AUTO-ENTMCNC: 32 G/DL (ref 32–36)
MCV RBC AUTO: 98 FL (ref 82–98)
MONOCYTES # BLD AUTO: 0.4 K/UL (ref 0.3–1)
MONOCYTES NFR BLD: 6.7 % (ref 4–15)
NEUTROPHILS # BLD AUTO: 4 K/UL (ref 1.8–7.7)
NEUTROPHILS NFR BLD: 68.5 % (ref 38–73)
NRBC BLD-RTO: 0 /100 WBC
PLATELET # BLD AUTO: 270 K/UL (ref 150–450)
PMV BLD AUTO: 10.2 FL (ref 9.2–12.9)
POTASSIUM SERPL-SCNC: 4.3 MMOL/L (ref 3.5–5.1)
PROCALCITONIN SERPL IA-MCNC: 0.11 NG/ML
PROT SERPL-MCNC: 5.9 G/DL (ref 6–8.4)
RBC # BLD AUTO: 2.97 M/UL (ref 4.6–6.2)
SODIUM SERPL-SCNC: 136 MMOL/L (ref 136–145)
WBC # BLD AUTO: 5.81 K/UL (ref 3.9–12.7)

## 2024-05-31 PROCEDURE — 84145 PROCALCITONIN (PCT): CPT | Performed by: STUDENT IN AN ORGANIZED HEALTH CARE EDUCATION/TRAINING PROGRAM

## 2024-05-31 PROCEDURE — 85025 COMPLETE CBC W/AUTO DIFF WBC: CPT | Performed by: PHYSICIAN ASSISTANT

## 2024-05-31 PROCEDURE — 85652 RBC SED RATE AUTOMATED: CPT | Performed by: STUDENT IN AN ORGANIZED HEALTH CARE EDUCATION/TRAINING PROGRAM

## 2024-05-31 PROCEDURE — 25000003 PHARM REV CODE 250: Performed by: PHYSICIAN ASSISTANT

## 2024-05-31 PROCEDURE — 86140 C-REACTIVE PROTEIN: CPT | Performed by: STUDENT IN AN ORGANIZED HEALTH CARE EDUCATION/TRAINING PROGRAM

## 2024-05-31 PROCEDURE — 80053 COMPREHEN METABOLIC PANEL: CPT | Performed by: PHYSICIAN ASSISTANT

## 2024-05-31 PROCEDURE — 83605 ASSAY OF LACTIC ACID: CPT | Performed by: PHYSICIAN ASSISTANT

## 2024-05-31 PROCEDURE — G0378 HOSPITAL OBSERVATION PER HR: HCPCS

## 2024-05-31 RX ORDER — LOSARTAN POTASSIUM 50 MG/1
100 TABLET ORAL DAILY
Qty: 60 TABLET | Refills: 0 | Status: SHIPPED | OUTPATIENT
Start: 2024-05-31 | End: 2024-06-30

## 2024-05-31 RX ADMIN — Medication 100 MG: at 09:05

## 2024-05-31 RX ADMIN — GABAPENTIN 300 MG: 300 CAPSULE ORAL at 09:05

## 2024-05-31 RX ADMIN — FOLIC ACID 1 MG: 1 TABLET ORAL at 09:05

## 2024-05-31 RX ADMIN — LOSARTAN POTASSIUM 50 MG: 50 TABLET, FILM COATED ORAL at 09:05

## 2024-05-31 NOTE — ED NOTES
Nurses Note -- 4 Eyes      5/30/2024   7:22 PM      Skin assessed during: Admit      [] No Altered Skin Integrity Present    []Prevention Measures Documented      [x] Yes- Altered Skin Integrity Present or Discovered   [x] LDA Added if Not in Epic (Describe Wound)   [x] New Altered Skin Integrity was Present on Admit and Documented in LDA   [x] Wound Image Taken    Wound Care Consulted? No    Attending Nurse:  Nitza Barahona RN/Staff Member:   Alisha Martell had recent craniotomy incisions healing.  New wound to left shin.

## 2024-05-31 NOTE — SUBJECTIVE & OBJECTIVE
Past Medical History:   Diagnosis Date    Hypertension        Past Surgical History:   Procedure Laterality Date    CEREBRAL ANGIOGRAM Bilateral 5/25/2024    Procedure: ANGIOGRAM-CEREBRAL;  Surgeon: Dayana Cuenca;  Location: [facility];  Service: Anesthesiology;  Laterality: Bilateral;    CRANIOTOMY FOR EVACUATION OF SUBDURAL HEMATOMA Right 5/17/2024    Procedure: CRANIOTOMY, FOR SUBDURAL HEMATOMA EVACUATION;  Surgeon: Herb Price MD;  Location: [facility] OR [address] [place] [address];  Service: Neurosurgery;  Laterality: Right;  right jacob hole craniotomy for SDH evacuation       Social History     Socioeconomic History    Marital status:    Tobacco Use    Smoking status: Every Day    Smokeless tobacco: Never     Social Determinants of Health     Financial Resource Strain: Low Risk  (5/19/2024)    Overall Financial Resource Strain (CARDIA)     Difficulty of Paying Living Expenses: Not very hard   Food Insecurity: No Food Insecurity (5/19/2024)    Hunger Vital Sign     Worried About Running Out of Food in the Last Year: Never true     Ran Out of Food in the Last Year: Never true   Transportation Needs: No Transportation Needs (5/19/2024)    TRANSPORTATION NEEDS     Transportation : No   Physical Activity: Inactive (5/18/2024)    Exercise Vital Sign     Days of Exercise per Week: 0 days     Minutes of Exercise per Session: 0 min   Stress: No Stress Concern Present (5/19/2024)    [employer] [place] [employer] - Occupational Stress Questionnaire     Feeling of Stress : Not at all   Housing Stability: Low Risk  (5/19/2024)    Housing Stability Vital Sign     Unable to Pay for Housing in the Last Year: No     Homeless in the Last Year: No       No family history on file.    Review of patient's allergies indicates:  No Known Allergies      Medications:  Continuous Infusions:  Scheduled Meds:   folic acid  1 mg Oral Daily    gabapentin  300 mg Oral TID    losartan  50 mg Oral Daily    QUEtiapine  25 mg Oral QHS    thiamine   "100 mg Oral Daily     PRN Meds:  Current Facility-Administered Medications:     acetaminophen, 650 mg, Oral, Q8H PRN    melatonin, 6 mg, Oral, Nightly PRN    ondansetron, 4 mg, Intravenous, Q8H PRN    sodium chloride 0.9%, 10 mL, Intravenous, PRN     Review of Systems  Objective:     Weight: 61.2 kg (135 lb)  Body mass index is 21.14 kg/m².  Vital Signs (Most Recent):  Temp: 97.9 °F (36.6 °C) (05/30/24 2100)  Pulse: 79 (05/30/24 2033)  Resp: 20 (05/30/24 2033)  BP: (!) 159/73 (05/30/24 2033)  SpO2: 96 % (05/30/24 2033) Vital Signs (24h Range):  Temp:  [97.9 °F (36.6 °C)-100 °F (37.8 °C)] 97.9 °F (36.6 °C)  Pulse:  [79-89] 79  Resp:  [18-20] 20  SpO2:  [94 %-99 %] 96 %  BP: (130-204)/(58-94) 159/73                         Male External Urinary Catheter 05/30/24 1734 Medium (Active)          Physical Exam         Neurosurgery Physical Exam    Physical Exam:    Constitutional: No distress.     HEENT: atraumatic/normocephalic    Cardiovascular: Regular rhythm.     Pulm: aerating well, saturating well    Abdominal: Soft.     Psych/Behavior: He is alert.     E4V5M6  AOx3  PERRL  EOMI  Face Symmetric  Tongue midline  BUE 5/5  BLE 5/5  No drift    Right cranial wounds appear well healing      Significant Labs:  Recent Labs   Lab 05/30/24  1432   GLU 83      K 4.8      CO2 25   BUN 18   CREATININE 1.0   CALCIUM 8.9     Recent Labs   Lab 05/30/24  1432   WBC 8.56   HGB 9.7*   HCT 31.0*        No results for input(s): "LABPT", "INR", "APTT" in the last 48 hours.  Microbiology Results (last 7 days)       Procedure Component Value Units Date/Time    Blood culture #1 **CANNOT BE ORDERED STAT** [[id number]] Collected: 05/30/24 1937    Order Status: Sent Specimen: Blood from Peripheral, Wrist, Left Updated: 05/30/24 1941    Blood culture #2 **CANNOT BE ORDERED STAT** [[id number]] Collected: 05/30/24 1937    Order Status: Sent Specimen: Blood from Peripheral, Wrist, Left Updated: 05/30/24 1941          All " pertinent labs from the last 24 hours have been reviewed.    Significant Diagnostics:  I have reviewed and interpreted all pertinent imaging results/findings within the past 24 hours.

## 2024-05-31 NOTE — ED TRIAGE NOTES
Moses Peng, miquel 88 y.o. male presents to the ED w/ complaint of fall    Adult Physical Assessment  LOC: Moses Peng, 88 y.o. male verified via two identifiers.  The patient is awake, alert, oriented and speaking appropriately at this time.  APPEARANCE: Patient resting comfortably and appears to be in no acute distress at this time. Patient is clean and well groomed, patient's clothing is properly fastened.  SKIN:The skin is warm and dry, color consistent with ethnicity, patient has normal skin turgor and moist mucus membranes, skin intact, no breakdown or brusing noted.  MUSCULOSKELETAL: Patient moving all extremities well, no obvious swelling or deformities noted.Reports falling earlier today  RESPIRATORY: Airway is open and patent, respirations are spontaneous, patient has a normal effort and rate, no accessory muscle use noted.  CARDIAC: Patient has a normal rate and rhythm, no periphreal edema noted in any extremity, capillary refill < 3 seconds in all extremities  ABDOMEN: Soft and non tender to palpation, no abdominal distention noted. Bowel sounds present in all four quadrants.  NEUROLOGIC: Eyes open spontaneously, behavior appropriate to situation, follows commands, facial expression symmetrical, bilateral hand grasp equal and even, purposeful motor response noted, normal sensation in all extremities when touched with a finger.      Triage note:  Chief Complaint   Patient presents with    Fall     Via EMS. EMS reports family called 911 for difficult pt becoming violent. EMS reports recent neurosurgery this month. EMS reports pt is calm, cooperative, and AAOX4. Pt states he had a mechanical fall, denies injury, pain, weakness or blood thinner use. States he feels fine.      Review of patient's allergies indicates:  No Known Allergies  Past Medical History:   Diagnosis Date    Hypertension

## 2024-05-31 NOTE — ED NOTES
Received report from Bonnie BUSTAMANTE. Assumed care of patient. Patient is alert and resting comfortably in bed in NAD. BP, cardiac, and O2 monitoring continued. Family member at bedside. Patient updated on plan of care, pt denies any needs or complaints at this time. Bed locked in lowest position, side rails up x2, call light within reach. VSS.

## 2024-05-31 NOTE — DISCHARGE SUMMARY
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 He notes he was making coffee in his kitchen this morning when he turned and fell onto his let side. Denies LOC. He states he did not hit his head. He denies any pain.     I spoke with his daughter who reported a mechanical fall, no LOC. Caretaker was present.      Patient reported to have chills and felt warm to touch in the ED.  He had a low-grade temperature of 100°.  Given recent craniotomy, and infectious workup was obtained.  There was no leukocytosis, but there was a left shift of patient's white count.  Lactate was normal.  COVID negative.  Chest x-ray without acute process.  Blood cultures were obtained and are pending.  Patient was placed in observation for monitoring.      The history is provided by the patient, medical records and a relative. The history is limited by the condition of the patient.      I reviewed the ED Provider Note dated 05/30/2024 prior to my evaluation of this patient.  I reviewed all labs and imaging performed in the Main ED, prior to patient being placed in Observation. Patient was placed in the ED Observation Unit for Fever.       Observation Course:    Placed in EDOU for elevated temp (100 F) with unclear cause.  HDS, no leukocytosis. Lactic and procal negative, CXR, XR pelvis, and UA negative. CRP and ESR are elevated, again without clear cause. LE doppler neg for DVT. Fever resolved without intervention. Patient without report of subjective fevers or chills. NSGY cleared patient from their perspective, post-craniotomy. Adjust home regimen for BP control. Close PCP f/u recommended.    Consultants:    TD    Final Diagnosis:  Fever, unspecified cause   Hypertension    Discharge Condition: Stable    Disposition: Home or Self Care     Time spent on the discharge of the patient  including review of hospital course with the patient. reviewing discharge medications and arranging follow-up care 36 minutes.  Patient was seen and examined on the date of discharge and determined to be suitable for discharge.    Follow Up:  Future Appointments   Date Time Provider Department Center   6/26/2024 12:00 PM [facility] CT1 LIMIT 675 LBS [facility] CT SCAN [place] MEJIAS   7/3/2024 11:00 AM Karla Cruz, NP [facility] NEUROS8 [facility]         Lizeth Mcguire PA-C, EDOU

## 2024-05-31 NOTE — PROGRESS NOTES
ED Observation Unit  Progress Note      HPI   Pt is an 89 yo F with PMH of SDH s/p craniotomy 5/17/24, HTN who presents by EMS after a fall that occurred earlier this morning. He notes he was making coffee in his kitchen this morning when he turned and fell onto his let side. Denies LOC. He states he did not hit his head. He denies any pain.     I spoke with his daughter who reported a mechanical fall, no LOC. Caretaker was present.      Patient reported to have chills and felt warm to touch in the ED.  He had a low-grade temperature of 100°.  Given recent craniotomy, and infectious workup was obtained.  There was no leukocytosis, but there was a left shift of patient's white count.  Lactate was normal.  COVID negative.  Chest x-ray without acute process.  Blood cultures were obtained and are pending.  Patient was placed in observation for monitoring.      The history is provided by the patient, medical records and a relative. The history is limited by the condition of the patient.      I reviewed the ED Provider Note dated 05/30/2024 prior to my evaluation of this patient.  I reviewed all labs and imaging performed in the Main ED, prior to patient being placed in Observation. Patient was placed in the ED Observation Unit for Fever.    Interval History   NAEON. Hypertensive with -180s. Patient c/o bilateral feet and hands numbness and pain, which is chronic - continuing gabapentin. Patient ready to go home. Denies CP, fevers or chills.     PMHx   Past Medical History:   Diagnosis Date    Hypertension       Past Surgical History:   Procedure Laterality Date    CEREBRAL ANGIOGRAM Bilateral 5/25/2024    Procedure: ANGIOGRAM-CEREBRAL;  Surgeon: Dayana Cuenca;  Location: [facility];  Service: Anesthesiology;  Laterality: Bilateral;    CRANIOTOMY FOR EVACUATION OF SUBDURAL HEMATOMA Right 5/17/2024    Procedure: CRANIOTOMY, FOR SUBDURAL HEMATOMA EVACUATION;  Surgeon: Herb Price MD;  Location: [facility] OR [address] [place] [address];   Service: Neurosurgery;  Laterality: Right;  right jacob hole craniotomy for SDH evacuation        Family Hx   No family history on file.     Social Hx   Social History     Socioeconomic History    Marital status:    Tobacco Use    Smoking status: Every Day    Smokeless tobacco: Never     Social Determinants of Health     Financial Resource Strain: Medium Risk (5/31/2024)    Overall Financial Resource Strain (CARDIA)     Difficulty of Paying Living Expenses: Somewhat hard   Food Insecurity: Food Insecurity Present (5/31/2024)    Hunger Vital Sign     Worried About Running Out of Food in the Last Year: Sometimes true     Ran Out of Food in the Last Year: Sometimes true   Transportation Needs: No Transportation Needs (5/31/2024)    TRANSPORTATION NEEDS     Transportation : No   Physical Activity: Inactive (5/31/2024)    Exercise Vital Sign     Days of Exercise per Week: 0 days     Minutes of Exercise per Session: 0 min   Stress: No Stress Concern Present (5/31/2024)    [employer] - Occupational Stress Questionnaire     Feeling of Stress : Not at all   Housing Stability: Low Risk  (5/31/2024)    Housing Stability Vital Sign     Unable to Pay for Housing in the Last Year: No     Homeless in the Last Year: No        Vital Signs   Vitals:    05/31/24 0359 05/31/24 0805 05/31/24 0900 05/31/24 1212   BP: (!) 166/75 (!) 175/78  (!) 161/73   Pulse: 78 79  83   Resp:  18  17   Temp: 98.4 °F (36.9 °C) 98.7 °F (37.1 °C)  98.6 °F (37 °C)   TempSrc: Oral Oral  Oral   SpO2: (!) 92% (!) 93% 95% 95%   Weight:            Review of Systems  Review of Systems   Constitutional:  Negative for chills and fever.   Musculoskeletal:  Positive for myalgias.   Neurological:  Positive for tingling and sensory change.       Brief Physical Exam/Reassessment   Physical Exam  Vitals and nursing note reviewed.   Constitutional:       General: He is not in acute distress.     Appearance: He is not ill-appearing.    HENT:      Head: Normocephalic and atraumatic.      Comments: Craniotomy site without erythema or drainage concerning for infection  Eyes:      Extraocular Movements: Extraocular movements intact.      Pupils: Pupils are equal, round, and reactive to light.   Cardiovascular:      Rate and Rhythm: Normal rate and regular rhythm.      Heart sounds: Normal heart sounds.   Pulmonary:      Effort: Pulmonary effort is normal. No respiratory distress.      Breath sounds: Normal breath sounds.   Chest:      Chest wall: No tenderness.   Abdominal:      General: Bowel sounds are normal.      Palpations: Abdomen is soft.      Tenderness: There is no abdominal tenderness.   Musculoskeletal:         General: Normal range of motion.      Cervical back: Normal range of motion.      Right lower leg: No edema.      Left lower leg: No edema.      Comments: +paresthesias reported to bilateral hands and feet   Lymphadenopathy:      Cervical: No cervical adenopathy.   Skin:     General: Skin is warm and dry.   Neurological:      General: No focal deficit present.      Mental Status: He is alert and oriented to person, place, and time. Mental status is at baseline.   Psychiatric:         Mood and Affect: Mood normal.         Behavior: Behavior normal.         Labs/Imaging   Labs Reviewed   CBC W/ AUTO DIFFERENTIAL - Abnormal; Notable for the following components:       Result Value    RBC 3.04 (*)     Hemoglobin 9.7 (*)     Hematocrit 31.0 (*)      (*)     MCH 31.9 (*)     MCHC 31.3 (*)     Gran % 77.4 (*)     Lymph % 14.0 (*)     All other components within normal limits   COMPREHENSIVE METABOLIC PANEL - Abnormal; Notable for the following components:    Albumin 2.9 (*)     Anion Gap 6 (*)     All other components within normal limits   TSH - Abnormal; Notable for the following components:    TSH 0.338 (*)     All other components within normal limits   SEDIMENTATION RATE - Abnormal; Notable for the following components:    Sed  Rate 54 (*)     All other components within normal limits   C-REACTIVE PROTEIN - Abnormal; Notable for the following components:    CRP 48.8 (*)     All other components within normal limits   CBC W/ AUTO DIFFERENTIAL - Abnormal; Notable for the following components:    RBC 2.97 (*)     Hemoglobin 9.3 (*)     Hematocrit 29.1 (*)     MCH 31.3 (*)     All other components within normal limits   COMPREHENSIVE METABOLIC PANEL - Abnormal; Notable for the following components:    Calcium 8.6 (*)     Total Protein 5.9 (*)     Albumin 2.5 (*)     Anion Gap 6 (*)     All other components within normal limits   CULTURE, BLOOD   CULTURE, BLOOD   URINALYSIS, REFLEX TO URINE CULTURE    Narrative:     Specimen Source->Urine   T4, FREE   URINALYSIS, REFLEX TO URINE CULTURE    Narrative:     Specimen Source->Urine   LACTIC ACID, PLASMA   SARS-COV-2 RNA AMPLIFICATION, QUAL   B-TYPE NATRIURETIC PEPTIDE   PROCALCITONIN   LACTIC ACID, PLASMA      Imaging Results              US Lower Extremity Veins Bilateral (Final result)  Result time 05/31/24 00:04:17      Final result by Frederick Baltazar MD (05/31/24 00:04:17)                   Impression:      No evidence of deep venous thrombosis in either lower extremity.    Right popliteal fossa cyst.      Electronically signed by: Frederick Baltazar MD  Date:    05/31/2024  Time:    00:04               Narrative:    EXAMINATION:  US LOWER EXTREMITY VEINS BILATERAL    CLINICAL HISTORY:  Other specified soft tissue disorders    TECHNIQUE:  Duplex and color flow Doppler and dynamic compression was performed of the bilateral lower extremity veins was performed.    COMPARISON:  None    FINDINGS:  Right thigh veins: The common femoral, femoral, popliteal, upper greater saphenous, and deep femoral veins are patent and free of thrombus. The veins are normally compressible and have normal phasic flow and augmentation response.    Right calf veins: The visualized calf veins are patent.    Left thigh  veins: The common femoral, femoral, popliteal, upper greater saphenous, and deep femoral veins are patent and free of thrombus. The veins are normally compressible and have normal phasic flow and augmentation response.    Left calf veins: The visualized calf veins are patent.    Miscellaneous: Right popliteal fossa nonvascular cyst measuring 1.9 x 1.3 x 4.0 cm.                                       X-Ray Chest AP Portable (Final result)  Result time 05/30/24 17:17:53      Final result by Soto Cruz DO (05/30/24 17:17:53)                   Impression:      No acute abnormality.      Electronically signed by: Soto Cruz  Date:    05/30/2024  Time:    17:17               Narrative:    EXAMINATION:  XR CHEST AP PORTABLE    CLINICAL HISTORY:  fall;    TECHNIQUE:  Single frontal view of the chest was performed.    COMPARISON:  05/17/2024.    FINDINGS:  The lungs are well expanded.  Coarse chronic interstitial prominence, stable.  No new focal consolidation.  The pleural spaces are clear. The cardiac silhouette is unremarkable.  There are calcifications of the aortic arch.  There are several remote left-sided rib fractures.  There is a remote compression fracture of the midthoracic spine.  There are degenerative changes.                                       X-Ray Pelvis Routine AP (Final result)  Result time 05/30/24 17:12:58      Final result by Soto Cruz DO (05/30/24 17:12:58)                   Impression:      No acute fracture or dislocation.      Electronically signed by: Soto Cruz  Date:    05/30/2024  Time:    17:12               Narrative:    EXAMINATION:  XR PELVIS ROUTINE AP    CLINICAL HISTORY:  Unspecified fall, initial encounter    TECHNIQUE:  AP view of the pelvis was performed.    COMPARISON:  None.    FINDINGS:  There is no evidence of acute fracture or dislocation of the pelvis on this single, limited view.  The femoral heads are well seated in the acetabula.  There is mild joint  space narrowing of the bilateral femoroacetabular joints.  There is degenerative change of the partially visualized lower lumbar spine.  There is a moderate volume of stool in the rectum.  There is diffuse osteopenia.                                       CT Head Without Contrast (Final result)  Result time 05/30/24 16:18:36      Final result by Williams Oconnor MD (05/30/24 16:18:36)                   Impression:      Evolving operative change of right frontal and parietal jacob hole for subdural hematoma evacuation with stable right cerebral convexity subdural collection and improved postoperative pneumocephalus.  Similar approximate 2 leftward midline shift.    Additional left cerebral convexity hyperdense subdural collection is similar to prior.    No new hemorrhage or major vascular distribution infarct.    Electronically signed by resident: Sri Potter  Date:    05/30/2024  Time:    15:51    Electronically signed by: Williams Oconnor  Date:    05/30/2024  Time:    16:18               Narrative:    EXAMINATION:  CT HEAD WITHOUT CONTRAST    CLINICAL HISTORY:  Subdural hemorrhage;    TECHNIQUE:  Low dose axial CT images obtained throughout the head without the use of intravenous contrast.  Axial, sagittal and coronal reconstructions were performed.    COMPARISON:  CT head 05/26/2024    FINDINGS:  Evolving postoperative change including right frontal and parietal jacob hole for right cerebral convexity subdural hematoma evacuation.  Persistent hypodense collection along the right cerebral convexity without significant change from prior with residual pneumocephalus by the frontal lobe, through the track of the frontal jacob hole and about the occipital lobe.  Stable mass effect with leftward midline shift measure 2 mm.  No herniation.    Stable hyperdense subdural collection along the left convexity measure 4 mm.    Ventricles and sulci are stable in size.  No hydrocephalus.    No parenchymal mass, hemorrhage, edema or  major vascular distribution infarct.    Skull/extracranial contents (limited evaluation):    No fracture. Mastoid air cells and paranasal sinuses are essentially clear.    Bilateral scleral banding.                                       I reviewed all labs, imaging, EKGs.     Plan   Fever, unclear source  - initially presented for a fall, found to have a low-grade temperature of 100° in the ED with chills.  - there is a relative leukocytosis compared to patient's baseline WBC count  - lactate within normal limits  - procal negative - low likelihood of infection  - UA negative for infection  - chest x-ray without evidence of pneumonia  - COVID negative  - blood cultures obtained.  Monitor for growth.   - hold antibiotics for now unless patient has major change in clinical status or vital signs indicating sepsis > if so start broad-spectrum abx, fluids; upgrade to .  - Repeat labs in AM  - Consult Neurosurgery in AM given recent craniotomy   --> NSGY without concern for acute intracranial process   - Fever resolved without intervention   - CRP 48 and ESR 54  - close PCP follow up - consider repeat labs     Hypertension   - uncontrolled   - NSGY goal <160 SBP   - increase home losartan 50 to 100 daily. Discussed update with patient.      I have discussed this case with Lilliam LEIGH.       Lizeth BARROS

## 2024-05-31 NOTE — CONSULTS
[facility] Dept  Neurosurgery  Consult Note    Subjective:     History of Present Illness: 87 yo F with PMH of SDH s/p burrhols for evacuation of R SDH 5/17/24, HTN, presents after fall. Pt was making coffee this morning, tripped while turning and fell on his left side, no LOC, no head trauma. He was able to ambulate from scene. He denies headache, confusion or focal weakness. Per chart review he reportedly had chills and fever workup was initiated, however patient denies chills at this time    Post-Op Info:  * No surgery found *         Past Medical History:   Diagnosis Date    Hypertension        Past Surgical History:   Procedure Laterality Date    CEREBRAL ANGIOGRAM Bilateral 5/25/2024    Procedure: ANGIOGRAM-CEREBRAL;  Surgeon: Dayana Cuenca;  Location: [facility];  Service: Anesthesiology;  Laterality: Bilateral;    CRANIOTOMY FOR EVACUATION OF SUBDURAL HEMATOMA Right 5/17/2024    Procedure: CRANIOTOMY, FOR SUBDURAL HEMATOMA EVACUATION;  Surgeon: Herb Price MD;  Location: [facility] OR [address] [place] [address];  Service: Neurosurgery;  Laterality: Right;  right jacob hole craniotomy for SDH evacuation       Social History     Socioeconomic History    Marital status:    Tobacco Use    Smoking status: Every Day    Smokeless tobacco: Never     Social Determinants of Health     Financial Resource Strain: Low Risk  (5/19/2024)    Overall Financial Resource Strain (CARDIA)     Difficulty of Paying Living Expenses: Not very hard   Food Insecurity: No Food Insecurity (5/19/2024)    Hunger Vital Sign     Worried About Running Out of Food in the Last Year: Never true     Ran Out of Food in the Last Year: Never true   Transportation Needs: No Transportation Needs (5/19/2024)    TRANSPORTATION NEEDS     Transportation : No   Physical Activity: Inactive (5/18/2024)    Exercise Vital Sign     Days of Exercise per Week: 0 days     Minutes of Exercise per Session: 0 min   Stress: No Stress Concern Present (5/19/2024)    [employer] - Occupational Stress Questionnaire     Feeling of Stress : Not at all   Housing Stability: Low Risk  (5/19/2024)    Housing Stability Vital Sign     Unable to Pay for Housing in the Last Year: No     Homeless in the Last Year: No       No family history on file.    Review of patient's allergies indicates:  No Known Allergies      Medications:  Continuous Infusions:  Scheduled Meds:   folic acid  1 mg Oral Daily    gabapentin  300 mg Oral TID    losartan  50 mg Oral Daily    QUEtiapine  25 mg Oral QHS    thiamine  100 mg Oral Daily     PRN Meds:  Current Facility-Administered Medications:     acetaminophen, 650 mg, Oral, Q8H PRN    melatonin, 6 mg, Oral, Nightly PRN    ondansetron, 4 mg, Intravenous, Q8H PRN    sodium chloride 0.9%, 10 mL, Intravenous, PRN     Review of Systems  Objective:     Weight: 61.2 kg (135 lb)  Body mass index is 21.14 kg/m².  Vital Signs (Most Recent):  Temp: 97.9 °F (36.6 °C) (05/30/24 2100)  Pulse: 79 (05/30/24 2033)  Resp: 20 (05/30/24 2033)  BP: (!) 159/73 (05/30/24 2033)  SpO2: 96 % (05/30/24 2033) Vital Signs (24h Range):  Temp:  [97.9 °F (36.6 °C)-100 °F (37.8 °C)] 97.9 °F (36.6 °C)  Pulse:  [79-89] 79  Resp:  [18-20] 20  SpO2:  [94 %-99 %] 96 %  BP: (130-204)/(58-94) 159/73                         Male External Urinary Catheter 05/30/24 1734 Medium (Active)          Physical Exam         Neurosurgery Physical Exam    Physical Exam:    Constitutional: No distress.     HEENT: atraumatic/normocephalic    Cardiovascular: Regular rhythm.     Pulm: aerating well, saturating well    Abdominal: Soft.     Psych/Behavior: He is alert.     Skin: dry    Neuro:     [id number]  AOx3  PERRL  EOMI  Face Symmetric  Tongue midline  BUE 5/5  BLE 5/5  No drift    Right cranial wounds appear well healing      Significant Labs:  Recent Labs   Lab 05/30/24  1432   GLU 83      K 4.8      CO2 25   BUN 18   CREATININE 1.0   CALCIUM 8.9     Recent Labs   Lab  "05/30/24  1432   WBC 8.56   HGB 9.7*   HCT 31.0*        No results for input(s): "LABPT", "INR", "APTT" in the last 48 hours.  Microbiology Results (last 7 days)       Procedure Component Value Units Date/Time    Blood culture #1 **CANNOT BE ORDERED STAT** [[id number]] Collected: 05/30/24 1937    Order Status: Sent Specimen: Blood from Peripheral, Wrist, Left Updated: 05/30/24 1941    Blood culture #2 **CANNOT BE ORDERED STAT** [[id number]] Collected: 05/30/24 1937    Order Status: Sent Specimen: Blood from Peripheral, Wrist, Left Updated: 05/30/24 1941          All pertinent labs from the last 24 hours have been reviewed.    Significant Diagnostics:  I have reviewed and interpreted all pertinent imaging results/findings within the past 24 hours.  Assessment/Plan:     Subdural hematoma  87 yo F with PMH of SDH s/p burrhols for evacuation of R SDH 5/17/24, HTN, presents after fall.     CTH 5/30: subacute on chronic SDH appear unchanged, less pneumocephalus, minimal mass effect     Fever workup initiated because of chills and fever of 100  BLE US 5/30: no DVT   WBC 5/30: 8.56   Lactate 5/30: 0.8 (negative)            UA 5/30: negative               covid negative   CXR negative      Wound appears well healing  ESR/CRP/procal pending  Bcx pending    - admitted to ED obs  - q4 hour neurochecks  - SBP < 160  - no further imaging needed  - no AC/AP reversal required  - No acute neurosurgical intervention required  - f/u inflammatory marker and rest of fever workup  - OK for diet  - OK for SQH          Chuy Mcgregor MD  Neurosurgery  [facility] Dept  "

## 2024-05-31 NOTE — ED NOTES
Assumed care at this time.   The patient is awake, alert and cooperative with a calm affect, patient is aware of environment. Airway is open and patent, respirations are spontaneous, normal respiratory effort and rate noted, skin warm and dry, moves all extremities well, appearance: no apparent distress noted.

## 2024-05-31 NOTE — HPI
89 yo F with PMH of SDH s/p burrhols for evacuation of R SDH 5/17/24, HTN, presents after fall. Pt was making coffee this morning, tripped while turning and fell on his left side, no LOC, no head trauma. He was able to ambulate from scene. He denies headache, confusion or focal weakness. Per chart review he reportedly had chills and fever workup was initiated, however patient denies chills at this time

## 2024-05-31 NOTE — ASSESSMENT & PLAN NOTE
87 yo F with PMH of SDH s/p burrhols for evacuation of R SDH 5/17/24, HTN, presents after fall.     CTH 5/30: subacute on chronic SDH appear unchanged, less pneumocephalus, minimal mass effect     Fever workup initiated because of chills and fever of 100  BLE US 5/30: no DVT   WBC 5/30: 8.56   Lactate 5/30: 0.8 (negative)            UA 5/30: negative               covid negative   CXR negative      Wound appears well healing  ESR/CRP/procal pending  Bcx pending    - admitted to ED obs  - q4 hour neurochecks  - SBP < 160  - no further imaging needed  - no AC/AP reversal required  - No acute neurosurgical intervention required  - f/u inflammatory marker and rest of fever workup  - OK for diet  - OK for SQH

## 2024-05-31 NOTE — PLAN OF CARE
"[facility] - Emergency Dept  Initial Discharge Assessment       Primary Care Provider: Salma, Primary Doctor    Admission Diagnosis: Fever, unspecified fever cause [R50.9]    Admission Date: 5/30/2024  Expected Discharge Date:     Transition of Care Barriers: (P) None    Payor: HUMANA MANAGED MEDICARE / Plan: [place] [facility] [place] Product Type: Medicare Advantage /     Extended Emergency Contact Information  Primary Emergency Contact: Shaan Rees  Mobile Phone: [phone]  Relation: Daughter  Preferred language: English   needed? No  Secondary Emergency Contact: Herb Rees  Mobile Phone: [phone]  Relation: Relative  Preferred language: English   needed? No    Discharge Plan A: (P) Home, Home with family  Discharge Plan B: (P) Home, Home with family      [id number] [place] - [address]  [address]  [place] [id number]  Phone: [phone] Fax: [phone]      Initial Assessment (most recent)       Adult Discharge Assessment - 05/31/24 0350          Discharge Assessment    Assessment Type Discharge Planning Assessment (P)      Confirmed/corrected address, phone number and insurance Yes (P)      Confirmed Demographics Correct on Facesheet (P)      Source of Information patient (P)      When was your last doctors appointment? -- (P)    No PCP    Does patient/caregiver understand observation status Yes (P)      Communicated MIKE with patient/caregiver Yes (P)      Reason For Admission "I fell" (P)      People in Home alone (P)      Facility Arrived From: Home (P)      Do you expect to return to your current living situation? Yes (P)      Do you have help at home or someone to help you manage your care at home? No (P)      Prior to hospitilization cognitive status: Alert/Oriented (P)      Current cognitive status: Alert/Oriented (P)      Walking or Climbing Stairs Difficulty yes (P)      Walking or Climbing Stairs ambulation difficulty, requires equipment (P)      Mobility " Management walker (P)      Dressing/Bathing Difficulty no (P)      Home Accessibility wheelchair accessible (P)      Home Layout Able to live on 1st floor (P)      Equipment Currently Used at Home walker, standard (P)      Readmission within 30 days? No (P)      Patient currently being followed by outpatient case management? No (P)      Do you currently have service(s) that help you manage your care at home? No (P)      Do you take prescription medications? Yes (P)      Do you have prescription coverage? Yes (P)      Coverage [employer] M[employer] medicare (P)      Do you have any problems affording any of your prescribed medications? No (P)      Is the patient taking medications as prescribed? yes (P)      Who is going to help you get home at discharge? daughter (P)      How do you get to doctors appointments? family or friend will provide (P)      Are you on dialysis? No (P)      Do you take coumadin? No (P)      Discharge Plan A Home;Home with family (P)      Discharge Plan B Home;Home with family (P)      DME Needed Upon Discharge  none (P)      Discharge Plan discussed with: Patient (P)      Transition of Care Barriers None (P)         Physical Activity    On average, how many days per week do you engage in moderate to strenuous exercise (like a brisk walk)? 0 days (P)      On average, how many minutes do you engage in exercise at this level? 0 min (P)         Financial Resource Strain    How hard is it for you to pay for the very basics like food, housing, medical care, and heating? Somewhat hard (P)         Housing Stability    In the last 12 months, was there a time when you were not able to pay the mortgage or rent on time? No (P)      At any time in the past 12 months, were you homeless or living in a shelter (including now)? No (P)         Transportation Needs    Has the lack of transportation kept you from medical appointments, meetings, work or from getting things needed for daily living? No (P)         Food  Insecurity    Within the past 12 months, you worried that your food would run out before you got the money to buy more. Sometimes true (P)      Within the past 12 months, the food you bought just didn't last and you didn't have money to get more. Sometimes true (P)         Stress    Do you feel stress - tense, restless, nervous, or anxious, or unable to sleep at night because your mind is troubled all the time - these days? Not at all (P)         Social Isolation    How often do you feel lonely or isolated from those around you?  Sometimes (P)         Alcohol Use    Q1: How often do you have a drink containing alcohol? 4 or more times a week (P)      Q2: How many drinks containing alcohol do you have on a typical day when you are drinking? 3 or 4 (P)      Q3: How often do you have six or more drinks on one occasion? Never (P)         Utilities    In the past 12 months has the electric, gas, oil, or water company threatened to shut off services in your home? No (P)         Health Literacy    How often do you need to have someone help you when you read instructions, pamphlets, or other written material from your doctor or pharmacy? Always (P)

## 2024-05-31 NOTE — H&P
ED Observation Unit  History and Physical      I assumed care of this patient from the Main ED at onset of observation time, 2023 on 05/30/2024.       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 He notes he was making coffee in his kitchen this morning when he turned and fell onto his let side. Denies LOC. He states he did not hit his head. He denies any pain.     I spoke with his daughter who reported a mechanical fall, no LOC. Caretaker was present.     Patient reported to have chills and felt warm to touch in the ED.  He had a low-grade temperature of 100°.  Given recent craniotomy, and infectious workup was obtained.  There was no leukocytosis, but there was a left shift of patient's white count.  Lactate was normal.  COVID negative.  Chest x-ray without acute process.  Blood cultures were obtained and are pending.  Patient was placed in observation for monitoring.      The history is provided by the patient, medical records and a relative. The history is limited by the condition of the patient.     I reviewed the ED Provider Note dated 05/30/2024 prior to my evaluation of this patient.  I reviewed all labs and imaging performed in the Main ED, prior to patient being placed in Observation. Patient was placed in the ED Observation Unit for Fever.    PMHx   Past Medical History:   Diagnosis Date    Hypertension       Past Surgical History:   Procedure Laterality Date    CEREBRAL ANGIOGRAM Bilateral 5/25/2024    Procedure: ANGIOGRAM-CEREBRAL;  Surgeon: Dayana Cuenca;  [facility];  Service: Anesthesiology;  Laterality: Bilateral;    CRANIOTOMY FOR EVACUATION OF SUBDURAL HEMATOMA Right 5/17/2024    Procedure: CRANIOTOMY, FOR SUBDURAL HEMATOMA EVACUATION;  Surgeon: Herb Price MD;  Location: [facility] OR [address] [place] [address];  Service: Neurosurgery;  Laterality: Right;  right jacob hole craniotomy for SDH  evacuation        Family Hx   No family history on file.     Social Hx   Social History     Socioeconomic History    Marital status:    Tobacco Use    Smoking status: Every Day    Smokeless tobacco: Never     Social Determinants of Health     Financial Resource Strain: Low Risk  (5/19/2024)    Overall Financial Resource Strain (CARDIA)     Difficulty of Paying Living Expenses: Not very hard   Food Insecurity: No Food Insecurity (5/19/2024)    Hunger Vital Sign     Worried About Running Out of Food in the Last Year: Never true     Ran Out of Food in the Last Year: Never true   Transportation Needs: No Transportation Needs (5/19/2024)    TRANSPORTATION NEEDS     Transportation : No   Physical Activity: Inactive (5/18/2024)    Exercise Vital Sign     Days of Exercise per Week: 0 days     Minutes of Exercise per Session: 0 min   Stress: No Stress Concern Present (5/19/2024)    [employer] - Occupational Stress Questionnaire     Feeling of Stress : Not at all   Housing Stability: Low Risk  (5/19/2024)    Housing Stability Vital Sign     Unable to Pay for Housing in the Last Year: No     Homeless in the Last Year: No        Vital Signs   Vitals:    05/30/24 1742 05/30/24 1913 05/30/24 2002 05/30/24 2033   BP:  (!) 204/80 (!) 183/86 (!) 159/73   Pulse:  89 85 79   Resp:   18 20   Temp:  100 °F (37.8 °C)  99.5 °F (37.5 °C)   TempSrc:  Oral  Oral   SpO2: 97% 96% (!) 94% 96%   Weight:            Review of Systems  Review of Systems   Constitutional:  Positive for fever.       Physical Exam  Physical Exam    Medications:   Scheduled Meds:   [START ON 5/31/2024] folic acid  1 mg Oral Daily    [START ON 5/31/2024] gabapentin  300 mg Oral TID    [START ON 5/31/2024] losartan  50 mg Oral Daily    QUEtiapine  25 mg Oral QHS    [START ON 5/31/2024] thiamine  100 mg Oral Daily     Continuous Infusions:  PRN Meds:.  Current Facility-Administered Medications:     acetaminophen, 650 mg, Oral, Q8H  PRN    melatonin, 6 mg, Oral, Nightly PRN    ondansetron, 4 mg, Intravenous, Q8H PRN    sodium chloride 0.9%, 10 mL, Intravenous, PRN      Assessment/Plan:  Fever, unclear source  - initially presented for a fall, found to have a low-grade temperature of 100° in the ED with chills.  - there is a relative leukocytosis compared to patient's baseline WBC count  - lactate within normal limits  - UA negative for infection  - chest x-ray without evidence of pneumonia  - COVID negative  - blood cultures obtained.  Monitor for growth.   - hold antibiotics for now unless patient has major change in clinical status or vital signs indicating sepsis > if so start broad-spectrum abx, fluids; upgrade to HM.  - Repeat labs in AM  - Consult Neurosurgery in AM given recent craniotomy    Case was discussed with the ED provider, Dr. Rod.

## 2024-06-04 LAB
BACTERIA BLD CULT: NORMAL
BACTERIA BLD CULT: NORMAL

## 2024-07-11 ENCOUNTER — TELEPHONE (OUTPATIENT)
Dept: NEUROSURGERY | Facility: CLINIC | Age: 89
End: 2024-07-11

## 2024-07-11 NOTE — TELEPHONE ENCOUNTER
Attempted to call pt was unable to reach pt. Called and spoke with daughter. Informed the rescheduled day and time. Daughter v/u

## 2024-07-18 ENCOUNTER — OFFICE VISIT (OUTPATIENT)
Dept: NEUROSURGERY | Facility: CLINIC | Age: 89
End: 2024-07-18
Payer: MEDICARE

## 2024-07-18 ENCOUNTER — HOSPITAL ENCOUNTER (OUTPATIENT)
Dept: RADIOLOGY | Facility: HOSPITAL | Age: 89
Discharge: HOME | End: 2024-07-18
Attending: PHYSICIAN ASSISTANT
Payer: MEDICARE

## 2024-07-18 VITALS
TEMPERATURE: 98 F | DIASTOLIC BLOOD PRESSURE: 74 MMHG | BODY MASS INDEX: 21.13 KG/M2 | HEART RATE: 76 BPM | SYSTOLIC BLOOD PRESSURE: 130 MMHG | WEIGHT: 134.94 LBS

## 2024-07-18 DIAGNOSIS — I62.00 NONTRAUMATIC SUBDURAL HEMORRHAGE, UNSPECIFIED: Primary | ICD-10-CM

## 2024-07-18 DIAGNOSIS — Z98.890 S/P CRANIOTOMY: ICD-10-CM

## 2024-07-18 DIAGNOSIS — M79.641 BILATERAL HAND PAIN: ICD-10-CM

## 2024-07-18 DIAGNOSIS — M79.642 BILATERAL HAND PAIN: ICD-10-CM

## 2024-07-18 DIAGNOSIS — G56.03 BILATERAL CARPAL TUNNEL SYNDROME: ICD-10-CM

## 2024-07-18 DIAGNOSIS — R45.851 SUICIDAL IDEATION: ICD-10-CM

## 2024-07-18 PROCEDURE — 1126F AMNT PAIN NOTED NONE PRSNT: CPT | Mod: CPTII,S$GLB,, | Performed by: PHYSICIAN ASSISTANT

## 2024-07-18 PROCEDURE — 1159F MED LIST DOCD IN RCRD: CPT | Mod: CPTII,S$GLB,, | Performed by: PHYSICIAN ASSISTANT

## 2024-07-18 PROCEDURE — 1101F PT FALLS ASSESS-DOCD LE1/YR: CPT | Mod: CPTII,S$GLB,, | Performed by: PHYSICIAN ASSISTANT

## 2024-07-18 PROCEDURE — 70450 CT HEAD/BRAIN W/O DYE: CPT | Mod: TC

## 2024-07-18 PROCEDURE — 99024 POSTOP FOLLOW-UP VISIT: CPT | Mod: S$GLB,,, | Performed by: PHYSICIAN ASSISTANT

## 2024-07-18 PROCEDURE — 70450 CT HEAD/BRAIN W/O DYE: CPT | Mod: 26,,, | Performed by: RADIOLOGY

## 2024-07-18 PROCEDURE — 3288F FALL RISK ASSESSMENT DOCD: CPT | Mod: CPTII,S$GLB,, | Performed by: PHYSICIAN ASSISTANT

## 2024-07-18 PROCEDURE — 99999 PR PBB SHADOW E&M-EST. PATIENT-LVL III: CPT | Mod: PBBFAC,,, | Performed by: PHYSICIAN ASSISTANT

## 2024-07-18 RX ORDER — DEXAMETHASONE 2 MG/1
2 TABLET ORAL EVERY 12 HOURS
Qty: 14 TABLET | Refills: 0 | Status: SHIPPED | OUTPATIENT
Start: 2024-07-18 | End: 2024-07-25

## 2024-07-23 NOTE — PROGRESS NOTES
[facility] - Neurosurgery 8th Fl  Neurosurgery    SUBJECTIVE:     History of Present Illness 5/17/24:  88 M unknown PMH presents to the ED by EMS stating he wants to kill himself. Came yesterday with c/o R hand paresthesias and was discharged. Currently under PEC per psych evaluation. He states he may have hit the left side of his head on the door earlier this week. Denies falls. Denies headache, seizures, weakness, b/b dysfunction. MRI with large right SDH with midline shift. Denies use of AC/APT. NSGY consulted for evaluation.     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 Discharged from the hospital in stable condition to home. He has been under the care of his daughter and doing well. Denies falls, seizure, headache, neck pain, gait imbalance, acute visual changes, n/v, b/b dysfunction. Today continues to complain of b/l hand numbness. He has not been evaluated by orthopedic surgery. Denies use antiplatelets/anticoagulants.       Review of patient's allergies indicates:  No Known Allergies    Past Medical History:   Diagnosis Date    Hypertension        Past Surgical History:   Procedure Laterality Date    CEREBRAL ANGIOGRAM Bilateral 5/25/2024    Procedure: ANGIOGRAM-CEREBRAL;  Surgeon: Dayana Cuenca;  Location: [facility];  Service: Anesthesiology;  Laterality: Bilateral;    CRANIOTOMY FOR EVACUATION OF SUBDURAL HEMATOMA Right 5/17/2024    Procedure: CRANIOTOMY, FOR SUBDURAL HEMATOMA EVACUATION;  Surgeon: Herb Price MD;  Location: [facility] OR [address] [place] [address];  Service: Neurosurgery;  Laterality: Right;  right jacob hole craniotomy for SDH evacuation        No family history on file.    Social History     Socioeconomic History    Marital status:    Tobacco Use    Smoking status: Every Day    Smokeless tobacco: Never     Social Determinants of Health     Financial Resource Strain: Medium Risk (5/31/2024)    Overall  Financial Resource Strain (CARDIA)     Difficulty of Paying Living Expenses: Somewhat hard   Food Insecurity: Food Insecurity Present (5/31/2024)    Hunger Vital Sign     Worried About Running Out of Food in the Last Year: Sometimes true     Ran Out of Food in the Last Year: Sometimes true   Transportation Needs: No Transportation Needs (5/31/2024)    TRANSPORTATION NEEDS     Transportation : No   Physical Activity: Inactive (5/31/2024)    Exercise Vital Sign     Days of Exercise per Week: 0 days     Minutes of Exercise per Session: 0 min   Stress: No Stress Concern Present (5/31/2024)    [employer] [employer] - Occupational Stress Questionnaire     Feeling of Stress : Not at all   Housing Stability: Low Risk  (5/31/2024)    Housing Stability Vital Sign     Unable to Pay for Housing in the Last Year: No     Homeless in the Last Year: No       Review of Systems:  Per HPI    OBJECTIVE:     Vital Signs (Most Recent):  Vitals:    07/18/24 1330   BP: 130/74   Pulse: 76   Temp: 98.1 °F (36.7 °C)   TempSrc: Temporal   Weight: 61.2 kg (134 lb 14.7 oz)       Physical Exam:  General: well developed, well nourished, no distress.   Head: normocephalic  Neurologic: Alert and oriented. Thought content appropriate.  GCS: Motor: 6/Verbal: 5/Eyes: 4 GCS Total: 15  Mental Status: Awake, Alert, Oriented x 4  Language: No aphasia  Speech: No dysarthria  Cranial nerves: face symmetric, tongue midline, CN II-XII grossly intact.   Eyes: pupils equal, round, reactive to light with accomodation, EOMI.  Pulmonary: normal respirations, no signs of respiratory distress  Abdomen: soft, non-distended, not tender to palpation  Sensory: intact to light touch throughout  Motor Strength: Moves all extremities spontaneously with good tone.  Full strength upper and lower extremities. No abnormal movements seen.   Pronator Drift: no drift noted  Finger-to-nose: Intact bilaterally  Cameron: absent  Skin: Skin is warm, dry and  intact.  Gait: normal    Subcentimeter scab mid incision, no drainage. Otherwise incision well healed.       Diagnostic Results:  I have personally reviewed all pertinent imaging.    CT head 7/18/24:  - decreased size bilateral SDH with increase in density noted within the collections, decreased leftward midline shift       ASSESSMENT/PLAN:     Moses Peng is a 89 y.o. male s/p right jacob hole craniotomy for SDH evacuation on 5/17 and bilateral MMA embolization 5/30 who presents for post-op follow-up. CTH reviewed with Dr. Swartz. Decreased size of bilateral subdural collections with increase in density concerning for recent hemorrhage. Patient and family denies trauma. He is asymptomatic. Will start on low dose steroids x 1 week and repeat CTH. If stable, dc steroids and follow-up with repeat CTH depending on repeat CT results. Referral sent to hand surgery for b/l carpal tunnel syndrome. We discussed concerning signs and symptoms that would prompt return to clinic or urgent medical attention, patient v/u. All questions answered. Encouraged to call the clinic with questions/concerns prior to the next visit.        Lurdes Pollard PA-C  Neurosurgery      Note dictated with voice recognition software, please excuse any grammatical errors.

## 2024-07-25 ENCOUNTER — TELEPHONE (OUTPATIENT)
Dept: ORTHOPEDICS | Facility: CLINIC | Age: 89
End: 2024-07-25
Payer: MEDICARE

## 2024-07-25 ENCOUNTER — OFFICE VISIT (OUTPATIENT)
Dept: NEUROSURGERY | Facility: CLINIC | Age: 89
End: 2024-07-25
Payer: MEDICARE

## 2024-07-25 ENCOUNTER — HOSPITAL ENCOUNTER (OUTPATIENT)
Dept: RADIOLOGY | Facility: HOSPITAL | Age: 89
Discharge: HOME | End: 2024-07-25
Attending: PHYSICIAN ASSISTANT
Payer: MEDICARE

## 2024-07-25 VITALS — DIASTOLIC BLOOD PRESSURE: 71 MMHG | SYSTOLIC BLOOD PRESSURE: 131 MMHG | HEART RATE: 66 BPM

## 2024-07-25 DIAGNOSIS — S06.5XAA SDH (SUBDURAL HEMATOMA): Primary | ICD-10-CM

## 2024-07-25 DIAGNOSIS — M79.642 BILATERAL HAND PAIN: Primary | ICD-10-CM

## 2024-07-25 DIAGNOSIS — I62.00 NONTRAUMATIC SUBDURAL HEMORRHAGE, UNSPECIFIED: ICD-10-CM

## 2024-07-25 DIAGNOSIS — M79.641 BILATERAL HAND PAIN: Primary | ICD-10-CM

## 2024-07-25 DIAGNOSIS — Z98.890 S/P CRANIOTOMY: ICD-10-CM

## 2024-07-25 PROCEDURE — 1125F AMNT PAIN NOTED PAIN PRSNT: CPT | Mod: CPTII,S$GLB,, | Performed by: PHYSICIAN ASSISTANT

## 2024-07-25 PROCEDURE — 1101F PT FALLS ASSESS-DOCD LE1/YR: CPT | Mod: CPTII,S$GLB,, | Performed by: PHYSICIAN ASSISTANT

## 2024-07-25 PROCEDURE — 70450 CT HEAD/BRAIN W/O DYE: CPT | Mod: 26,,, | Performed by: RADIOLOGY

## 2024-07-25 PROCEDURE — 1159F MED LIST DOCD IN RCRD: CPT | Mod: CPTII,S$GLB,, | Performed by: PHYSICIAN ASSISTANT

## 2024-07-25 PROCEDURE — 3288F FALL RISK ASSESSMENT DOCD: CPT | Mod: CPTII,S$GLB,, | Performed by: PHYSICIAN ASSISTANT

## 2024-07-25 PROCEDURE — 70450 CT HEAD/BRAIN W/O DYE: CPT | Mod: TC

## 2024-07-25 PROCEDURE — 99024 POSTOP FOLLOW-UP VISIT: CPT | Mod: S$GLB,,, | Performed by: PHYSICIAN ASSISTANT

## 2024-07-25 PROCEDURE — 99999 PR PBB SHADOW E&M-EST. PATIENT-LVL II: CPT | Mod: PBBFAC,,, | Performed by: PHYSICIAN ASSISTANT

## 2024-07-25 NOTE — TELEPHONE ENCOUNTER
Spoke c pts daughter. Requested a follow up call at 4:00 for scheduling assistance.   Patient verbalized understanding and was thankful.

## 2024-07-25 NOTE — PROGRESS NOTES
[facility] - Neurosurgery 8th Fl  Neurosurgery    SUBJECTIVE:     History of Present Illness 5/17/24:  88 M unknown PMH presents to the ED by EMS stating he wants to kill himself. Came yesterday with c/o R hand paresthesias and was discharged. Currently under PEC per psych evaluation. He states he may have hit the left side of his head on the door earlier this week. Denies falls. Denies headache, seizures, weakness, b/b dysfunction. MRI with large right SDH with midline shift. Denies use of AC/APT. NSGY consulted for evaluation.     Interval Hx 7/18/24:  Patient underwent right jacob hole craniotomy for SDH evacuation on 5/17/24, s/p bilateral MMA embolization 5/25/24. Discharged from the hospital in stable condition to home. He has been under the care of his daughter and doing well. Denies falls, seizure, headache, neck pain, gait imbalance, acute visual changes, n/v, b/b dysfunction. Today continues to complain of b/l hand numbness. He has not been evaluated by orthopedic surgery. Denies use antiplatelets/anticoagulants.       Interval Hx 7/25/24:  Pt returns for follow-up with new imaging. Daughter present for today's visit. Denies falls. Completed steroids. Denies headaches, acute visual changes, n/v, gait imbalance. Continues to endorse b/l hand pain and paresthesias.     Review of patient's allergies indicates:  No Known Allergies    Past Medical History:   Diagnosis Date    Hypertension        Past Surgical History:   Procedure Laterality Date    CEREBRAL ANGIOGRAM Bilateral 5/25/2024    Procedure: ANGIOGRAM-CEREBRAL;  Surgeon: Dayana Cuenca;  Location: [facility];  Service: Anesthesiology;  Laterality: Bilateral;    CRANIOTOMY FOR EVACUATION OF SUBDURAL HEMATOMA Right 5/17/2024    Procedure: CRANIOTOMY, FOR SUBDURAL HEMATOMA EVACUATION;  Surgeon: Herb Price MD;  Location: [facility] OR [address] [place] [address];  Service: Neurosurgery;  Laterality: Right;  right jacob hole craniotomy for SDH evacuation        No family history on  file.    Social History     Socioeconomic History    Marital status:    Tobacco Use    Smoking status: Every Day    Smokeless tobacco: Never     Social Determinants of Health     Financial Resource Strain: Medium Risk (5/31/2024)    Overall Financial Resource Strain (CARDIA)     Difficulty of Paying Living Expenses: Somewhat hard   Food Insecurity: Food Insecurity Present (5/31/2024)    Hunger Vital Sign     Worried About Running Out of Food in the Last Year: Sometimes true     Ran Out of Food in the Last Year: Sometimes true   Transportation Needs: No Transportation Needs (5/31/2024)    TRANSPORTATION NEEDS     Transportation : No   Physical Activity: Inactive (5/31/2024)    Exercise Vital Sign     Days of Exercise per Week: 0 days     Minutes of Exercise per Session: 0 min   Stress: No Stress Concern Present (5/31/2024)    [employer] - Occupational Stress Questionnaire     Feeling of Stress : Not at all   Housing Stability: Low Risk  (5/31/2024)    Housing Stability Vital Sign     Unable to Pay for Housing in the Last Year: No     Homeless in the Last Year: No       Review of Systems:  Per HPI    OBJECTIVE:     Vital Signs (Most Recent):  Vitals:    07/25/24 1310   BP: 131/71   Pulse: 66       Physical Exam:  General: well developed, well nourished, no distress.   Head: normocephalic  Neurologic: Alert and oriented. Thought content appropriate.  GCS: Motor: 6/Verbal: 5/Eyes: 4 GCS Total: 15  Mental Status: Awake, Alert, Oriented x 4  Language: No aphasia  Speech: No dysarthria  Cranial nerves: face symmetric, tongue midline, CN II-XII grossly intact.   Eyes: pupils equal, round, reactive to light with accomodation, EOMI.  Pulmonary: normal respirations, no signs of respiratory distress  Abdomen: soft, non-distended, not tender to palpation  Sensory: intact to light touch throughout  Motor Strength: Moves all extremities spontaneously with good tone.  Full strength upper and  lower extremities. No abnormal movements seen.   Pronator Drift: no drift noted  Finger-to-nose: Intact bilaterally  Cameron: absent  Skin: Skin is warm, dry and intact.  Gait: normal    Incision well healed.     Positive tinel's bilaterally at the wrist. Unable to place dorsal aspect of hands together to complete phalen test.      Diagnostic Results:  I have personally reviewed all pertinent imaging.    CT head 7/25/24:  - continued decrease in size and attenuation of right frontal SDH, stable small left SDH; no new hemorrhage      ASSESSMENT/PLAN:     Moses Peng is a 89 y.o. male s/p right jacob hole craniotomy for SDH evacuation on 5/17 and bilateral MMA embolization 5/30 who presents for post-op follow-up. CTH reviewed with Dr. Price, decreased size/attenuation SDH. Patient remains asymptomatic. F/u in 1 month with repeat CTH. Message sent to hand surgery for follow-up. We discussed concerning signs and symptoms that would prompt return to clinic or urgent medical attention, patient v/u. All questions answered. Encouraged to call the clinic with questions/concerns prior to the next visit.      Lurdes Pollard PA-C  Neurosurgery      Note dictated with voice recognition software, please excuse any grammatical errors.

## 2024-07-25 NOTE — TELEPHONE ENCOUNTER
Spoke c pt. Confirmed appt location & time c Dr. Reed 08/01/24 with XR prior. Pt expressed understanding & was thankful.      ----- Message from Yumiko Harrison sent at 7/25/2024  2:36 PM CDT -----    ----- Message -----  From: Lurdes Pollard PA-C  Sent: 7/25/2024   1:17 PM CDT  To: [facility] Staff    Hi,    I placed a referral for this patient to be evaluated for CTS, but it hasn't been scheduled yet. Can he get into clinic with the [facility] for an evaluation?    Thanks!

## 2024-07-29 ENCOUNTER — TELEPHONE (OUTPATIENT)
Dept: NEUROSURGERY | Facility: CLINIC | Age: 89
End: 2024-07-29
Payer: MEDICARE

## 2024-07-29 DIAGNOSIS — S06.5XAA SUBDURAL HEMATOMA: Primary | ICD-10-CM

## 2024-07-29 NOTE — TELEPHONE ENCOUNTER
Called patient's daughter to schedule an upcoming appointment with Kavin Baldwin PA-C and provided next appointment date and time.  Future Appointments   Date Time Provider Department Center   8/1/2024 10:15 AM [facility]   8/1/2024 11:00 AM Annette Morrison [facility]   8/27/2024  2:30 PM [facility] OIC-CT2 500 LB LIMIT [facility]   8/27/2024  3:30 PM Kavin Baldwin PA [facility] NEUROS8 [facility]        Patient's daughter voiced understanding and thanked me.

## 2024-08-01 ENCOUNTER — HOSPITAL ENCOUNTER (OUTPATIENT)
Dept: RADIOLOGY | Facility: OTHER | Age: 89
Discharge: HOME | End: 2024-08-01
Attending: ORTHOPAEDIC SURGERY
Payer: MEDICARE

## 2024-08-01 ENCOUNTER — OFFICE VISIT (OUTPATIENT)
Dept: ORTHOPEDICS | Facility: CLINIC | Age: 89
End: 2024-08-01
Payer: MEDICARE

## 2024-08-01 VITALS — BODY MASS INDEX: 21.18 KG/M2 | HEIGHT: 67 IN | WEIGHT: 134.94 LBS

## 2024-08-01 DIAGNOSIS — M79.642 BILATERAL HAND PAIN: ICD-10-CM

## 2024-08-01 DIAGNOSIS — M79.641 BILATERAL HAND PAIN: ICD-10-CM

## 2024-08-01 DIAGNOSIS — G56.03 BILATERAL CARPAL TUNNEL SYNDROME: Primary | ICD-10-CM

## 2024-08-01 PROCEDURE — 99999 PR PBB SHADOW E&M-EST. PATIENT-LVL II: CPT | Mod: PBBFAC,,, | Performed by: ORTHOPAEDIC SURGERY

## 2024-08-01 PROCEDURE — 1159F MED LIST DOCD IN RCRD: CPT | Mod: CPTII,S$GLB,, | Performed by: ORTHOPAEDIC SURGERY

## 2024-08-01 PROCEDURE — 73130 X-RAY EXAM OF HAND: CPT | Mod: 26,50,, | Performed by: RADIOLOGY

## 2024-08-01 PROCEDURE — 1125F AMNT PAIN NOTED PAIN PRSNT: CPT | Mod: CPTII,S$GLB,, | Performed by: ORTHOPAEDIC SURGERY

## 2024-08-01 PROCEDURE — 1101F PT FALLS ASSESS-DOCD LE1/YR: CPT | Mod: CPTII,S$GLB,, | Performed by: ORTHOPAEDIC SURGERY

## 2024-08-01 PROCEDURE — 99204 OFFICE O/P NEW MOD 45 MIN: CPT | Mod: S$GLB,,, | Performed by: ORTHOPAEDIC SURGERY

## 2024-08-01 PROCEDURE — 73130 X-RAY EXAM OF HAND: CPT | Mod: TC,50,FY

## 2024-08-01 PROCEDURE — 3288F FALL RISK ASSESSMENT DOCD: CPT | Mod: CPTII,S$GLB,, | Performed by: ORTHOPAEDIC SURGERY

## 2024-08-02 ENCOUNTER — TELEPHONE (OUTPATIENT)
Dept: NEUROLOGY | Facility: CLINIC | Age: 89
End: 2024-08-02
Payer: MEDICARE

## 2024-08-02 NOTE — TELEPHONE ENCOUNTER
Staff called and left a voice message for patient to contact the clinic so that we can schedule his EMG appt.

## 2024-08-16 ENCOUNTER — TELEPHONE (OUTPATIENT)
Dept: NEUROSURGERY | Facility: CLINIC | Age: 89
End: 2024-08-16
Payer: MEDICARE

## 2024-08-16 NOTE — TELEPHONE ENCOUNTER
Called patient's primary number and LVM with number to call due to insurance no longer being accepted

## 2024-09-06 ENCOUNTER — TELEPHONE (OUTPATIENT)
Dept: NEUROSURGERY | Facility: CLINIC | Age: 89
End: 2024-09-06
Payer: MEDICARE
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
## 2024-09-06 NOTE — TELEPHONE ENCOUNTER
----- Message from Karla Allred sent at 9/6/2024  3:48 PM CDT -----  Contact: [phone]  [email or web address]     Patient is calling for Medical Advice regarding:speak to the office     How long has patient had these symptoms:    Pharmacy name and phone#:    Patient wants a call back or thru [facility]: call ack     Comments:  Pts daughter is call;ing she sates she is concerned he has been falling and she wants to d\discuss with the dr   Please advise patient replies from provider may take up to 48 hours.

## 2024-09-07 ENCOUNTER — HOSPITAL ENCOUNTER (OUTPATIENT)
Facility: HOSPITAL | Age: 89
Discharge: PSYCHIATRIC OR SUBSTANCE USE FACILITY | End: 2024-09-08
Attending: EMERGENCY MEDICINE | Admitting: STUDENT IN AN ORGANIZED HEALTH CARE EDUCATION/TRAINING PROGRAM
Payer: MEDICARE

## 2024-09-07 DIAGNOSIS — R46.89 AGGRESSIVE BEHAVIOR: ICD-10-CM

## 2024-09-07 DIAGNOSIS — E16.2 HYPOGLYCEMIA: ICD-10-CM

## 2024-09-07 DIAGNOSIS — R45.851 SUICIDAL IDEATION: Primary | ICD-10-CM

## 2024-09-07 DIAGNOSIS — F10.929 ALCOHOLIC INTOXICATION WITH COMPLICATION: ICD-10-CM

## 2024-09-07 DIAGNOSIS — R45.6 VIOLENT BEHAVIOR: ICD-10-CM

## 2024-09-07 DIAGNOSIS — R07.9 CHEST PAIN: ICD-10-CM

## 2024-09-07 PROCEDURE — 96365 THER/PROPH/DIAG IV INF INIT: CPT

## 2024-09-07 PROCEDURE — 82077 ASSAY SPEC XCP UR&BREATH IA: CPT | Performed by: EMERGENCY MEDICINE

## 2024-09-07 PROCEDURE — 96366 THER/PROPH/DIAG IV INF ADDON: CPT

## 2024-09-07 PROCEDURE — 80143 DRUG ASSAY ACETAMINOPHEN: CPT | Performed by: EMERGENCY MEDICINE

## 2024-09-07 PROCEDURE — 84443 ASSAY THYROID STIM HORMONE: CPT | Performed by: EMERGENCY MEDICINE

## 2024-09-07 PROCEDURE — 80053 COMPREHEN METABOLIC PANEL: CPT | Performed by: EMERGENCY MEDICINE

## 2024-09-07 PROCEDURE — 85025 COMPLETE CBC W/AUTO DIFF WBC: CPT | Performed by: EMERGENCY MEDICINE

## 2024-09-07 PROCEDURE — 99285 EMERGENCY DEPT VISIT HI MDM: CPT | Mod: 25

## 2024-09-07 PROCEDURE — 96361 HYDRATE IV INFUSION ADD-ON: CPT

## 2024-09-07 PROCEDURE — 82962 GLUCOSE BLOOD TEST: CPT

## 2024-09-08 VITALS
RESPIRATION RATE: 16 BRPM | DIASTOLIC BLOOD PRESSURE: 85 MMHG | OXYGEN SATURATION: 98 % | SYSTOLIC BLOOD PRESSURE: 186 MMHG | HEIGHT: 67 IN | WEIGHT: 134.94 LBS | BODY MASS INDEX: 21.18 KG/M2 | HEART RATE: 58 BPM | TEMPERATURE: 99 F

## 2024-09-08 PROBLEM — E16.2 HYPOGLYCEMIA: Status: ACTIVE | Noted: 2024-09-08

## 2024-09-08 PROBLEM — F10.929 ALCOHOL INTOXICATION: Status: ACTIVE | Noted: 2024-09-08

## 2024-09-08 PROBLEM — R46.89 THREATENING BEHAVIOR: Status: ACTIVE | Noted: 2024-09-08

## 2024-09-08 LAB
ALBUMIN SERPL BCP-MCNC: 3.1 G/DL (ref 3.5–5.2)
ALP SERPL-CCNC: 135 U/L (ref 55–135)
ALT SERPL W/O P-5'-P-CCNC: 40 U/L (ref 10–44)
AMPHET+METHAMPHET UR QL: NEGATIVE
ANION GAP SERPL CALC-SCNC: 10 MMOL/L (ref 8–16)
ANION GAP SERPL CALC-SCNC: 8 MMOL/L (ref 8–16)
APAP SERPL-MCNC: <3 UG/ML (ref 10–20)
AST SERPL-CCNC: 44 U/L (ref 10–40)
BARBITURATES UR QL SCN>200 NG/ML: NEGATIVE
BASOPHILS # BLD AUTO: 0.04 K/UL (ref 0–0.2)
BASOPHILS # BLD AUTO: 0.04 K/UL (ref 0–0.2)
BASOPHILS NFR BLD: 0.8 % (ref 0–1.9)
BASOPHILS NFR BLD: 0.9 % (ref 0–1.9)
BENZODIAZ UR QL SCN>200 NG/ML: NEGATIVE
BILIRUB SERPL-MCNC: 0.2 MG/DL (ref 0.1–1)
BILIRUB UR QL STRIP: NEGATIVE
BUN SERPL-MCNC: 22 MG/DL (ref 8–23)
BUN SERPL-MCNC: 22 MG/DL (ref 8–23)
BZE UR QL SCN: NEGATIVE
CALCIUM SERPL-MCNC: 8.2 MG/DL (ref 8.7–10.5)
CALCIUM SERPL-MCNC: 8.8 MG/DL (ref 8.7–10.5)
CANNABINOIDS UR QL SCN: NEGATIVE
CHLORIDE SERPL-SCNC: 105 MMOL/L (ref 95–110)
CHLORIDE SERPL-SCNC: 105 MMOL/L (ref 95–110)
CLARITY UR REFRACT.AUTO: CLEAR
CO2 SERPL-SCNC: 22 MMOL/L (ref 23–29)
CO2 SERPL-SCNC: 24 MMOL/L (ref 23–29)
COLOR UR AUTO: YELLOW
CREAT SERPL-MCNC: 1.2 MG/DL (ref 0.5–1.4)
CREAT SERPL-MCNC: 1.3 MG/DL (ref 0.5–1.4)
CREAT UR-MCNC: 185 MG/DL (ref 23–375)
DIFFERENTIAL METHOD BLD: ABNORMAL
DIFFERENTIAL METHOD BLD: ABNORMAL
EOSINOPHIL # BLD AUTO: 0.2 K/UL (ref 0–0.5)
EOSINOPHIL # BLD AUTO: 0.2 K/UL (ref 0–0.5)
EOSINOPHIL NFR BLD: 3 % (ref 0–8)
EOSINOPHIL NFR BLD: 3.9 % (ref 0–8)
ERYTHROCYTE [DISTWIDTH] IN BLOOD BY AUTOMATED COUNT: 14.3 % (ref 11.5–14.5)
ERYTHROCYTE [DISTWIDTH] IN BLOOD BY AUTOMATED COUNT: 14.4 % (ref 11.5–14.5)
EST. GFR  (NO RACE VARIABLE): 52.5 ML/MIN/1.73 M^2
EST. GFR  (NO RACE VARIABLE): 57.8 ML/MIN/1.73 M^2
ETHANOL SERPL-MCNC: 173 MG/DL
ETHANOL SERPL-MCNC: <10 MG/DL
GLUCOSE SERPL-MCNC: 59 MG/DL (ref 70–110)
GLUCOSE SERPL-MCNC: 67 MG/DL (ref 70–110)
GLUCOSE UR QL STRIP: NEGATIVE
HCT VFR BLD AUTO: 34 % (ref 40–54)
HCT VFR BLD AUTO: 38.3 % (ref 40–54)
HGB BLD-MCNC: 11.8 G/DL (ref 14–18)
HGB BLD-MCNC: 12.3 G/DL (ref 14–18)
HGB UR QL STRIP: NEGATIVE
IMM GRANULOCYTES # BLD AUTO: 0.01 K/UL (ref 0–0.04)
IMM GRANULOCYTES # BLD AUTO: 0.06 K/UL (ref 0–0.04)
IMM GRANULOCYTES NFR BLD AUTO: 0.2 % (ref 0–0.5)
IMM GRANULOCYTES NFR BLD AUTO: 1.4 % (ref 0–0.5)
KETONES UR QL STRIP: NEGATIVE
LEUKOCYTE ESTERASE UR QL STRIP: NEGATIVE
LYMPHOCYTES # BLD AUTO: 1.5 K/UL (ref 1–4.8)
LYMPHOCYTES # BLD AUTO: 2.2 K/UL (ref 1–4.8)
LYMPHOCYTES NFR BLD: 34.1 % (ref 18–48)
LYMPHOCYTES NFR BLD: 41.9 % (ref 18–48)
MAGNESIUM SERPL-MCNC: 1.9 MG/DL (ref 1.6–2.6)
MCH RBC QN AUTO: 31 PG (ref 27–31)
MCH RBC QN AUTO: 31.7 PG (ref 27–31)
MCHC RBC AUTO-ENTMCNC: 32.1 G/DL (ref 32–36)
MCHC RBC AUTO-ENTMCNC: 34.7 G/DL (ref 32–36)
MCV RBC AUTO: 91 FL (ref 82–98)
MCV RBC AUTO: 97 FL (ref 82–98)
METHADONE UR QL SCN>300 NG/ML: NEGATIVE
MONOCYTES # BLD AUTO: 0.2 K/UL (ref 0.3–1)
MONOCYTES # BLD AUTO: 0.3 K/UL (ref 0.3–1)
MONOCYTES NFR BLD: 5.1 % (ref 4–15)
MONOCYTES NFR BLD: 5.3 % (ref 4–15)
NEUTROPHILS # BLD AUTO: 2.4 K/UL (ref 1.8–7.7)
NEUTROPHILS # BLD AUTO: 2.6 K/UL (ref 1.8–7.7)
NEUTROPHILS NFR BLD: 49 % (ref 38–73)
NEUTROPHILS NFR BLD: 54.4 % (ref 38–73)
NITRITE UR QL STRIP: NEGATIVE
NRBC BLD-RTO: 0 /100 WBC
NRBC BLD-RTO: 0 /100 WBC
OPIATES UR QL SCN: NEGATIVE
PCP UR QL SCN>25 NG/ML: NEGATIVE
PH UR STRIP: 6 [PH] (ref 5–8)
PHOSPHATE SERPL-MCNC: 2.3 MG/DL (ref 2.7–4.5)
PLATELET # BLD AUTO: 228 K/UL (ref 150–450)
PLATELET # BLD AUTO: 232 K/UL (ref 150–450)
PMV BLD AUTO: 10.1 FL (ref 9.2–12.9)
PMV BLD AUTO: 9.3 FL (ref 9.2–12.9)
POCT GLUCOSE: 111 MG/DL (ref 70–110)
POCT GLUCOSE: 59 MG/DL (ref 70–110)
POCT GLUCOSE: 65 MG/DL (ref 70–110)
POCT GLUCOSE: 67 MG/DL (ref 70–110)
POCT GLUCOSE: 70 MG/DL (ref 70–110)
POCT GLUCOSE: 73 MG/DL (ref 70–110)
POCT GLUCOSE: 84 MG/DL (ref 70–110)
POCT GLUCOSE: 87 MG/DL (ref 70–110)
POCT GLUCOSE: 91 MG/DL (ref 70–110)
POTASSIUM SERPL-SCNC: 4.4 MMOL/L (ref 3.5–5.1)
POTASSIUM SERPL-SCNC: 4.4 MMOL/L (ref 3.5–5.1)
PROT SERPL-MCNC: 6.7 G/DL (ref 6–8.4)
PROT UR QL STRIP: ABNORMAL
RBC # BLD AUTO: 3.72 M/UL (ref 4.6–6.2)
RBC # BLD AUTO: 3.97 M/UL (ref 4.6–6.2)
SODIUM SERPL-SCNC: 137 MMOL/L (ref 136–145)
SODIUM SERPL-SCNC: 137 MMOL/L (ref 136–145)
SP GR UR STRIP: 1.02 (ref 1–1.03)
TOXICOLOGY INFORMATION: NORMAL
TSH SERPL DL<=0.005 MIU/L-ACNC: 0.87 UIU/ML (ref 0.4–4)
URN SPEC COLLECT METH UR: ABNORMAL
WBC # BLD AUTO: 4.34 K/UL (ref 3.9–12.7)
WBC # BLD AUTO: 5.32 K/UL (ref 3.9–12.7)

## 2024-09-08 PROCEDURE — 82962 GLUCOSE BLOOD TEST: CPT

## 2024-09-08 PROCEDURE — 25000003 PHARM REV CODE 250

## 2024-09-08 PROCEDURE — 96365 THER/PROPH/DIAG IV INF INIT: CPT

## 2024-09-08 PROCEDURE — 82077 ASSAY SPEC XCP UR&BREATH IA: CPT | Performed by: STUDENT IN AN ORGANIZED HEALTH CARE EDUCATION/TRAINING PROGRAM

## 2024-09-08 PROCEDURE — G0378 HOSPITAL OBSERVATION PER HR: HCPCS

## 2024-09-08 PROCEDURE — 25000003 PHARM REV CODE 250: Performed by: STUDENT IN AN ORGANIZED HEALTH CARE EDUCATION/TRAINING PROGRAM

## 2024-09-08 PROCEDURE — 80307 DRUG TEST PRSMV CHEM ANLYZR: CPT | Performed by: EMERGENCY MEDICINE

## 2024-09-08 PROCEDURE — 80048 BASIC METABOLIC PNL TOTAL CA: CPT

## 2024-09-08 PROCEDURE — 63600175 PHARM REV CODE 636 W HCPCS

## 2024-09-08 PROCEDURE — 84100 ASSAY OF PHOSPHORUS: CPT

## 2024-09-08 PROCEDURE — 96366 THER/PROPH/DIAG IV INF ADDON: CPT

## 2024-09-08 PROCEDURE — 83735 ASSAY OF MAGNESIUM: CPT

## 2024-09-08 PROCEDURE — 96361 HYDRATE IV INFUSION ADD-ON: CPT

## 2024-09-08 PROCEDURE — 81003 URINALYSIS AUTO W/O SCOPE: CPT | Mod: 59 | Performed by: EMERGENCY MEDICINE

## 2024-09-08 PROCEDURE — 85025 COMPLETE CBC W/AUTO DIFF WBC: CPT

## 2024-09-08 RX ORDER — NALOXONE HCL 0.4 MG/ML
0.02 VIAL (ML) INJECTION
Status: DISCONTINUED | OUTPATIENT
Start: 2024-09-08 | End: 2024-09-08 | Stop reason: HOSPADM

## 2024-09-08 RX ORDER — GLUCAGON 1 MG
1 KIT INJECTION
Status: DISCONTINUED | OUTPATIENT
Start: 2024-09-08 | End: 2024-09-08 | Stop reason: HOSPADM

## 2024-09-08 RX ORDER — LORAZEPAM 1 MG/1
2 TABLET ORAL EVERY 8 HOURS PRN
Status: DISCONTINUED | OUTPATIENT
Start: 2024-09-08 | End: 2024-09-08 | Stop reason: HOSPADM

## 2024-09-08 RX ORDER — PROCHLORPERAZINE EDISYLATE 5 MG/ML
5 INJECTION INTRAMUSCULAR; INTRAVENOUS EVERY 6 HOURS PRN
Status: DISCONTINUED | OUTPATIENT
Start: 2024-09-08 | End: 2024-09-08 | Stop reason: HOSPADM

## 2024-09-08 RX ORDER — ACETAMINOPHEN 500 MG
1000 TABLET ORAL EVERY 8 HOURS PRN
Status: DISCONTINUED | OUTPATIENT
Start: 2024-09-08 | End: 2024-09-08 | Stop reason: HOSPADM

## 2024-09-08 RX ORDER — ONDANSETRON 8 MG/1
8 TABLET, ORALLY DISINTEGRATING ORAL EVERY 8 HOURS PRN
Status: DISCONTINUED | OUTPATIENT
Start: 2024-09-08 | End: 2024-09-08 | Stop reason: HOSPADM

## 2024-09-08 RX ORDER — IPRATROPIUM BROMIDE AND ALBUTEROL SULFATE 2.5; .5 MG/3ML; MG/3ML
3 SOLUTION RESPIRATORY (INHALATION) EVERY 4 HOURS PRN
Status: DISCONTINUED | OUTPATIENT
Start: 2024-09-08 | End: 2024-09-08 | Stop reason: HOSPADM

## 2024-09-08 RX ORDER — IBUPROFEN 200 MG
24 TABLET ORAL
Status: DISCONTINUED | OUTPATIENT
Start: 2024-09-08 | End: 2024-09-08 | Stop reason: HOSPADM

## 2024-09-08 RX ORDER — TALC
6 POWDER (GRAM) TOPICAL NIGHTLY PRN
Status: DISCONTINUED | OUTPATIENT
Start: 2024-09-08 | End: 2024-09-08 | Stop reason: HOSPADM

## 2024-09-08 RX ORDER — IBUPROFEN 200 MG
16 TABLET ORAL
Status: DISCONTINUED | OUTPATIENT
Start: 2024-09-08 | End: 2024-09-08 | Stop reason: HOSPADM

## 2024-09-08 RX ORDER — DEXTROSE MONOHYDRATE 50 MG/ML
INJECTION, SOLUTION INTRAVENOUS CONTINUOUS
Status: DISCONTINUED | OUTPATIENT
Start: 2024-09-08 | End: 2024-09-08

## 2024-09-08 RX ORDER — ACETAMINOPHEN 325 MG/1
650 TABLET ORAL EVERY 4 HOURS PRN
Status: DISCONTINUED | OUTPATIENT
Start: 2024-09-08 | End: 2024-09-08 | Stop reason: HOSPADM

## 2024-09-08 RX ORDER — FOLIC ACID 1 MG/1
1 TABLET ORAL DAILY
Status: DISCONTINUED | OUTPATIENT
Start: 2024-09-08 | End: 2024-09-08 | Stop reason: HOSPADM

## 2024-09-08 RX ORDER — SODIUM CHLORIDE 0.9 % (FLUSH) 0.9 %
5 SYRINGE (ML) INJECTION
Status: DISCONTINUED | OUTPATIENT
Start: 2024-09-08 | End: 2024-09-08 | Stop reason: HOSPADM

## 2024-09-08 RX ORDER — THIAMINE HCL 100 MG
100 TABLET ORAL DAILY
Status: DISCONTINUED | OUTPATIENT
Start: 2024-09-08 | End: 2024-09-08 | Stop reason: HOSPADM

## 2024-09-08 RX ORDER — LOSARTAN POTASSIUM 50 MG/1
50 TABLET ORAL DAILY
Status: DISCONTINUED | OUTPATIENT
Start: 2024-09-08 | End: 2024-09-08 | Stop reason: HOSPADM

## 2024-09-08 RX ADMIN — LOSARTAN POTASSIUM 50 MG: 50 TABLET, FILM COATED ORAL at 10:09

## 2024-09-08 RX ADMIN — DEXTROSE MONOHYDRATE 125 ML: 100 INJECTION, SOLUTION INTRAVENOUS at 05:09

## 2024-09-08 RX ADMIN — DEXTROSE MONOHYDRATE 100 ML/HR: 50 INJECTION, SOLUTION INTRAVENOUS at 07:09

## 2024-09-08 RX ADMIN — Medication 100 MG: at 10:09

## 2024-09-08 RX ADMIN — FOLIC ACID 1 MG: 1 TABLET ORAL at 10:09

## 2024-09-08 RX ADMIN — FOLIC ACID: 5 INJECTION, SOLUTION INTRAMUSCULAR; INTRAVENOUS; SUBCUTANEOUS at 04:09

## 2024-09-08 RX ADMIN — DEXTROSE MONOHYDRATE 125 ML: 100 INJECTION, SOLUTION INTRAVENOUS at 06:09

## 2024-09-08 RX ADMIN — THERA TABS 1 TABLET: TAB at 10:09

## 2024-09-08 NOTE — ASSESSMENT & PLAN NOTE
- hypoglycemia 67>84> 59 on admission  - admits to poor po intake & daily alcohol use  - denies use of insulin or antihyperglycemic medications  - infectious work up unremarkable  - improved eventually s/p D10 bolus in ED  - Accuchecks Q4h for now  - hypoglycemia protocol in place  - recommend continued counseling on alcohol cessation and improving nutrition
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
## 2024-09-08 NOTE — ASSESSMENT & PLAN NOTE
- presented with serum etoh 173  - admits to daily alcohol use (1/2 pint every 2 days)  - s/p banana bag in ED  - CIWA monitoring  - folic acid, thiamine, MVI  - prn lorazepam for withdrawal

## 2024-09-08 NOTE — HOSPITAL COURSE
Pt admitted to observation with IMG and remained stable overnight and into 9/8/2024. PEC remains in place. Patient seen and examined by me this morning. BG now WNL, eating, and grossly asymptomatic. Resuming home medications. Calm and AAOx4, though evasive in answering questions regarding SI/HI. He is medically cleared for transfer to a [facility] facility. [facility] request placed for transfer.

## 2024-09-08 NOTE — PLAN OF CARE
[facility] - Emergency Dept  Initial Discharge Assessment       Primary Care Provider: No, Primary Doctor    Admission Diagnosis: Aggressive behavior [R46.89]    Admission Date: 9/7/2024  Expected Discharge Date: 9/8/2024    Transition of Care Barriers: (P) Does not adhere to care plan    Payor: [employer] MANAGED MEDICARE / Plan: [employer] MEDICARE [facility] / Product Type: Medicare Advantage /     Extended Emergency Contact Information  Primary Emergency Contact: Shaan Rees  Mobile Phone: [phone]  Relation: Daughter  Preferred language: English   needed? No  Secondary Emergency Contact: Herb Rees  Mobile Phone: [phone]  Relation: Relative  Preferred language: English   needed? No    Discharge Plan A: (P) Psychiatric hospital  Discharge Plan B: (P) Psychiatric hospital      [id number] [place] - [address]  [address]  [place] [id number]  Phone: [phone] Fax: [phone]      Initial Assessment (most recent)       Adult Discharge Assessment - 09/08/24 1123          Discharge Assessment    Assessment Type Discharge Planning Assessment (P)      Source of Information health record;patient (P)      Does patient/caregiver understand observation status Yes (P)      Current cognitive status: Inappropriate Behavior (P)      Discharge Plan A Psychiatric hospital (P)      Discharge Plan B Psychiatric hospital (P)      DME Needed Upon Discharge  none (P)      Discharge Plan discussed with: Patient (P)      Transition of Care Barriers Does not adhere to care plan (P)         Financial Resource Strain    How hard is it for you to pay for the very basics like food, housing, medical care, and heating? Patient unable to answer (P)         Housing Stability    In the last 12 months, was there a time when you were not able to pay the mortgage or rent on time? Patient unable to answer (P)         Transportation Needs    Has the lack of transportation kept you from medical appointments,  meetings, work or from getting things needed for daily living? Patient unable to answer (P)         Food Insecurity    Within the past 12 months, you worried that your food would run out before you got the money to buy more. Patient unable to answer (P)         Stress    Do you feel stress - tense, restless, nervous, or anxious, or unable to sleep at night because your mind is troubled all the time - these days? Patient unable to answer (P)         Social Isolation    How often do you feel lonely or isolated from those around you?  Patient unable to answer (P)         Alcohol Use    Q1: How often do you have a drink containing alcohol? Patient unable to answer (P)      Q2: How many drinks containing alcohol do you have on a typical day when you are drinking? Patient unable to answer (P)      Q3: How often do you have six or more drinks on one occasion? Patient unable to answer (P)         Utilities    In the past 12 months has the electric, gas, oil, or water company threatened to shut off services in your home? Patient unable to answer (P)         Health Literacy    How often do you need to have someone help you when you read instructions, pamphlets, or other written material from your doctor or pharmacy? Patient unable to respond (P)

## 2024-09-08 NOTE — ASSESSMENT & PLAN NOTE
Chronic, controlled. Latest blood pressure and vitals reviewed-     Temp:  [98.1 °F (36.7 °C)]   Pulse:  [73]   Resp:  [15]   BP: (145)/(70)   SpO2:  [99 %] .   Home meds for hypertension were reviewed and noted below.   Hypertension Medications               losartan (COZAAR) 50 MG tablet Take 2 tablets (100 mg total) by mouth once daily.     While in the hospital, will manage blood pressure as follows; Adjust home antihypertensive regimen as follows- Not taking losartan at home. Hold in setting of mild RADHA.    Will utilize p.r.n. blood pressure medication only if patient's blood pressure greater than 180/110 and he develops symptoms such as worsening chest pain or shortness of breath.

## 2024-09-08 NOTE — PLAN OF CARE
Discharge Planning Assessment:  Patient admitted on: 9-7-24  Chart reviewed today  Care plan discussed with ER treatment team  attending Dr Sun    Current Dispo:: PEC'd  [facility]  Mr Peng stated to not tell any family of [facility] placement   Transportation: per PEC' policy  Case management to follow as needed
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
## 2024-09-08 NOTE — PLAN OF CARE
Patient seen and examined by me this morning. BG now WNL, eating, and grossly asymptomatic. Resuming home medications.    Calm and AAOx4, though evasive in answering questions regarding SI/HI. He is medically cleared for transfer to a [facility] facility.      Nathaniel Sun MD FACP  Attending Physician  Medical Director - [facility]  Department of Hospital Medicine  9/8/2024
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
## 2024-09-08 NOTE — PROVIDER PROGRESS NOTES - EMERGENCY DEPT.
Encounter Date: 9/7/2024    ED Physician Progress Notes           Patient signed out to me.  Patient admitted for further workup.

## 2024-09-08 NOTE — PLAN OF CARE
[facility] - Emergency Dept  Discharge Final Note    Primary Care Provider: No, Primary Doctor    Expected Discharge Date: 9/8/2024    Final Discharge Note (most recent)       Final Note - 09/08/24 1129          Final Note    Assessment Type Final Discharge Note (P)      [facility]ticipated Discharge [facility]position [facility] (P)         Post-Acute Status    Post-Acute Authorization Placement;Other (P)    [facility]    Post-Acute Placement Status Set-up Complete/Auth obtained (P)      Discharge Delays None known at this time (P)                      Important Message from Medicare             Contact Info       Referral sent for PCP        Next Steps: Follow up

## 2024-09-09 ENCOUNTER — TELEPHONE (OUTPATIENT)
Dept: NEUROSURGERY | Facility: CLINIC | Age: 89
End: 2024-09-09
Payer: MEDICARE

## 2024-09-09 NOTE — TELEPHONE ENCOUNTER
P/c to pt. Dtr. Left voicemail letting her know that we did see the CT that was done when she brought him to hospital over w/e, it was reviewed by REESE Chatman who stated pt will need another CT in 1 month, however due to pt. Insurance he cannot get it done here as he has managed [facility] thru [facility].  Recommended that they follow up with his PCP and have them order the CT scan to be done at [facility] so that his ins. Approves.  Asked her to call and let us know that she rec'd this message.

## 2024-09-09 NOTE — TELEPHONE ENCOUNTER
----- Message from Letty Zarate RN sent at 9/6/2024  3:56 PM CDT -----  Contact: [phone]    ----- Message -----  From: Karla Allred  Sent: 9/6/2024   3:49 PM CDT  To: Atul Chatman Staff    [email or web address]     Patient is calling for Medical Advice regarding:speak to the office     How long has patient had these symptoms:    Pharmacy name and phone#:    Patient wants a call back or thru [facility]: call ack     Comments:  Pts daughter is call;ing she sates she is concerned he has been falling and she wants to d\discuss with the dr   Please advise patient replies from provider may take up to 48 hours.

## 2024-10-06 ENCOUNTER — HOSPITAL ENCOUNTER (EMERGENCY)
Facility: HOSPITAL | Age: 89
Discharge: PSYCHIATRIC OR SUBSTANCE USE FACILITY | End: 2024-10-06
Attending: EMERGENCY MEDICINE
Payer: MEDICARE

## 2024-10-06 VITALS
RESPIRATION RATE: 20 BRPM | OXYGEN SATURATION: 99 % | WEIGHT: 134 LBS | DIASTOLIC BLOOD PRESSURE: 80 MMHG | TEMPERATURE: 98 F | BODY MASS INDEX: 20.99 KG/M2 | SYSTOLIC BLOOD PRESSURE: 160 MMHG | HEART RATE: 71 BPM

## 2024-10-06 DIAGNOSIS — F10.229 ALCOHOL DEPENDENCE WITH INTOXICATION WITH COMPLICATION: ICD-10-CM

## 2024-10-06 DIAGNOSIS — Z00.8 MEDICAL CLEARANCE FOR PSYCHIATRIC ADMISSION: ICD-10-CM

## 2024-10-06 DIAGNOSIS — I95.9 HYPOTENSION: ICD-10-CM

## 2024-10-06 DIAGNOSIS — R45.851 SUICIDAL IDEATION: Primary | ICD-10-CM

## 2024-10-06 DIAGNOSIS — N39.0 ACUTE UTI: ICD-10-CM

## 2024-10-06 DIAGNOSIS — R45.850 HOMICIDAL IDEATION: ICD-10-CM

## 2024-10-06 LAB
ALBUMIN SERPL BCP-MCNC: 3.1 G/DL (ref 3.5–5.2)
ALP SERPL-CCNC: 94 U/L (ref 55–135)
ALT SERPL W/O P-5'-P-CCNC: 32 U/L (ref 10–44)
AMPHET+METHAMPHET UR QL: NEGATIVE
ANION GAP SERPL CALC-SCNC: 8 MMOL/L (ref 8–16)
APAP SERPL-MCNC: <3 UG/ML (ref 10–20)
AST SERPL-CCNC: 36 U/L (ref 10–40)
BARBITURATES UR QL SCN>200 NG/ML: NEGATIVE
BASOPHILS # BLD AUTO: 0.03 K/UL (ref 0–0.2)
BASOPHILS NFR BLD: 0.6 % (ref 0–1.9)
BENZODIAZ UR QL SCN>200 NG/ML: NEGATIVE
BILIRUB SERPL-MCNC: 0.5 MG/DL (ref 0.1–1)
BILIRUB UR QL STRIP: NEGATIVE
BUN SERPL-MCNC: 24 MG/DL (ref 8–23)
BZE UR QL SCN: NEGATIVE
CALCIUM SERPL-MCNC: 8.6 MG/DL (ref 8.7–10.5)
CANNABINOIDS UR QL SCN: NEGATIVE
CHLORIDE SERPL-SCNC: 108 MMOL/L (ref 95–110)
CLARITY UR: CLEAR
CO2 SERPL-SCNC: 22 MMOL/L (ref 23–29)
COLOR UR: YELLOW
CREAT SERPL-MCNC: 1.3 MG/DL (ref 0.5–1.4)
CREAT UR-MCNC: 137.8 MG/DL (ref 23–375)
CTP QC/QA: YES
DIFFERENTIAL METHOD BLD: ABNORMAL
EOSINOPHIL # BLD AUTO: 0.2 K/UL (ref 0–0.5)
EOSINOPHIL NFR BLD: 3.8 % (ref 0–8)
ERYTHROCYTE [DISTWIDTH] IN BLOOD BY AUTOMATED COUNT: 14.6 % (ref 11.5–14.5)
EST. GFR  (NO RACE VARIABLE): 53 ML/MIN/1.73 M^2
ETHANOL SERPL-MCNC: 102 MG/DL
ETHANOL SERPL-MCNC: 15 MG/DL
GLUCOSE SERPL-MCNC: 78 MG/DL (ref 70–110)
GLUCOSE UR QL STRIP: NEGATIVE
HCT VFR BLD AUTO: 35.2 % (ref 40–54)
HGB BLD-MCNC: 11.7 G/DL (ref 14–18)
HGB UR QL STRIP: NEGATIVE
HYALINE CASTS #/AREA URNS LPF: 1 /LPF
IMM GRANULOCYTES # BLD AUTO: 0.02 K/UL (ref 0–0.04)
IMM GRANULOCYTES NFR BLD AUTO: 0.4 % (ref 0–0.5)
KETONES UR QL STRIP: NEGATIVE
LEUKOCYTE ESTERASE UR QL STRIP: ABNORMAL
LYMPHOCYTES # BLD AUTO: 2.1 K/UL (ref 1–4.8)
LYMPHOCYTES NFR BLD: 43.9 % (ref 18–48)
MCH RBC QN AUTO: 30.9 PG (ref 27–31)
MCHC RBC AUTO-ENTMCNC: 33.2 G/DL (ref 32–36)
MCV RBC AUTO: 93 FL (ref 82–98)
METHADONE UR QL SCN>300 NG/ML: NEGATIVE
MICROSCOPIC COMMENT: NORMAL
MONOCYTES # BLD AUTO: 0.3 K/UL (ref 0.3–1)
MONOCYTES NFR BLD: 7 % (ref 4–15)
NEUTROPHILS # BLD AUTO: 2.1 K/UL (ref 1.8–7.7)
NEUTROPHILS NFR BLD: 44.3 % (ref 38–73)
NITRITE UR QL STRIP: NEGATIVE
NRBC BLD-RTO: 0 /100 WBC
OPIATES UR QL SCN: NEGATIVE
PCP UR QL SCN>25 NG/ML: NEGATIVE
PH UR STRIP: 5 [PH] (ref 5–8)
PLATELET # BLD AUTO: 248 K/UL (ref 150–450)
PMV BLD AUTO: 9.5 FL (ref 9.2–12.9)
POTASSIUM SERPL-SCNC: 4.7 MMOL/L (ref 3.5–5.1)
PROT SERPL-MCNC: 6.6 G/DL (ref 6–8.4)
PROT UR QL STRIP: NEGATIVE
RBC # BLD AUTO: 3.79 M/UL (ref 4.6–6.2)
SARS-COV-2 RDRP RESP QL NAA+PROBE: NEGATIVE
SODIUM SERPL-SCNC: 138 MMOL/L (ref 136–145)
SP GR UR STRIP: 1.02 (ref 1–1.03)
TOXICOLOGY INFORMATION: NORMAL
TSH SERPL DL<=0.005 MIU/L-ACNC: 0.59 UIU/ML (ref 0.4–4)
URN SPEC COLLECT METH UR: ABNORMAL
UROBILINOGEN UR STRIP-ACNC: NEGATIVE EU/DL
WBC # BLD AUTO: 4.71 K/UL (ref 3.9–12.7)
WBC #/AREA URNS HPF: 2 /HPF (ref 0–5)

## 2024-10-06 PROCEDURE — 80307 DRUG TEST PRSMV CHEM ANLYZR: CPT | Performed by: EMERGENCY MEDICINE

## 2024-10-06 PROCEDURE — 25000003 PHARM REV CODE 250: Performed by: EMERGENCY MEDICINE

## 2024-10-06 PROCEDURE — 85025 COMPLETE CBC W/AUTO DIFF WBC: CPT | Performed by: EMERGENCY MEDICINE

## 2024-10-06 PROCEDURE — 96376 TX/PRO/DX INJ SAME DRUG ADON: CPT

## 2024-10-06 PROCEDURE — 99285 EMERGENCY DEPT VISIT HI MDM: CPT | Mod: 25

## 2024-10-06 PROCEDURE — 87635 SARS-COV-2 COVID-19 AMP PRB: CPT | Performed by: EMERGENCY MEDICINE

## 2024-10-06 PROCEDURE — 96368 THER/DIAG CONCURRENT INF: CPT

## 2024-10-06 PROCEDURE — 84443 ASSAY THYROID STIM HORMONE: CPT | Performed by: EMERGENCY MEDICINE

## 2024-10-06 PROCEDURE — 96365 THER/PROPH/DIAG IV INF INIT: CPT

## 2024-10-06 PROCEDURE — G0425 INPT/ED TELECONSULT30: HCPCS | Mod: 95,,, | Performed by: PSYCHIATRY & NEUROLOGY

## 2024-10-06 PROCEDURE — 93005 ELECTROCARDIOGRAM TRACING: CPT

## 2024-10-06 PROCEDURE — 81000 URINALYSIS NONAUTO W/SCOPE: CPT | Performed by: EMERGENCY MEDICINE

## 2024-10-06 PROCEDURE — 96375 TX/PRO/DX INJ NEW DRUG ADDON: CPT

## 2024-10-06 PROCEDURE — 80053 COMPREHEN METABOLIC PANEL: CPT | Performed by: EMERGENCY MEDICINE

## 2024-10-06 PROCEDURE — 82077 ASSAY SPEC XCP UR&BREATH IA: CPT | Performed by: EMERGENCY MEDICINE

## 2024-10-06 PROCEDURE — 80143 DRUG ASSAY ACETAMINOPHEN: CPT | Performed by: EMERGENCY MEDICINE

## 2024-10-06 PROCEDURE — 96366 THER/PROPH/DIAG IV INF ADDON: CPT

## 2024-10-06 PROCEDURE — 63600175 PHARM REV CODE 636 W HCPCS: Performed by: EMERGENCY MEDICINE

## 2024-10-06 PROCEDURE — 93010 ELECTROCARDIOGRAM REPORT: CPT | Mod: ,,, | Performed by: INTERNAL MEDICINE

## 2024-10-06 RX ORDER — LORAZEPAM 2 MG/ML
0.5 INJECTION INTRAMUSCULAR EVERY 4 HOURS
Status: DISCONTINUED | OUTPATIENT
Start: 2024-10-06 | End: 2024-10-07 | Stop reason: HOSPADM

## 2024-10-06 RX ORDER — LORAZEPAM 2 MG/ML
1 INJECTION INTRAMUSCULAR
Status: COMPLETED | OUTPATIENT
Start: 2024-10-06 | End: 2024-10-06

## 2024-10-06 RX ORDER — CEPHALEXIN 500 MG/1
500 CAPSULE ORAL 4 TIMES DAILY
Qty: 20 CAPSULE | Refills: 0 | Status: SHIPPED | OUTPATIENT
Start: 2024-10-06 | End: 2024-10-11

## 2024-10-06 RX ORDER — MIRTAZAPINE 15 MG/1
7.5 TABLET, FILM COATED ORAL NIGHTLY
COMMUNITY
Start: 2024-09-16

## 2024-10-06 RX ORDER — DIPHENOXYLATE HYDROCHLORIDE AND ATROPINE SULFATE 2.5; .025 MG/1; MG/1
1 TABLET ORAL
Status: DISCONTINUED | OUTPATIENT
Start: 2024-10-06 | End: 2024-10-07 | Stop reason: HOSPADM

## 2024-10-06 RX ORDER — SERTRALINE HYDROCHLORIDE 25 MG/1
25 TABLET, FILM COATED ORAL DAILY
COMMUNITY
Start: 2024-09-16

## 2024-10-06 RX ORDER — LANOLIN ALCOHOL/MO/W.PET/CERES
100 CREAM (GRAM) TOPICAL DAILY
COMMUNITY
Start: 2024-09-16

## 2024-10-06 RX ORDER — LORAZEPAM 2 MG/ML
1 INJECTION INTRAMUSCULAR EVERY 4 HOURS
Status: DISCONTINUED | OUTPATIENT
Start: 2024-10-06 | End: 2024-10-06

## 2024-10-06 RX ADMIN — LORAZEPAM 1 MG: 2 INJECTION INTRAMUSCULAR; INTRAVENOUS at 10:10

## 2024-10-06 RX ADMIN — LORAZEPAM 1 MG: 2 INJECTION INTRAMUSCULAR; INTRAVENOUS at 05:10

## 2024-10-06 RX ADMIN — LORAZEPAM 0.5 MG: 2 INJECTION INTRAMUSCULAR; INTRAVENOUS at 10:10

## 2024-10-06 RX ADMIN — LORAZEPAM 1 MG: 2 INJECTION INTRAMUSCULAR; INTRAVENOUS at 02:10

## 2024-10-06 RX ADMIN — FOLIC ACID: 5 INJECTION, SOLUTION INTRAMUSCULAR; INTRAVENOUS; SUBCUTANEOUS at 02:10

## 2024-10-06 RX ADMIN — CEFTRIAXONE 1 G: 1 INJECTION, POWDER, FOR SOLUTION INTRAMUSCULAR; INTRAVENOUS at 05:10
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
## 2024-10-06 NOTE — ED PROVIDER NOTES
"Encounter Date: 10/6/2024    SCRIBE #1 NOTE: I, vAiva Levine, am scribing for, and in the presence of,  Jen Gibbs DO. I have scribed the following portions of the note - Other sections scribed: HPI, ROS, PE.       History     Chief Complaint   Patient presents with    Psychiatric Evaluation     Pt to the ED [facility] with an OPC from daughter's ex wife and daughter stating pt is threatening to kill himself and his family, not eating, abusing alcohol and having sexual fantasies about his daughter. Pt presents in handcuffs continuously yelling "my hand hurts".     Moses Peng is a 89 y.o. male with Hx of HTN, dementia who presents to the ED for psychiatric evaluation secondary to an OPC from the 's office of Andrew becker. The patient has been brought in on a 's order request signed by ex-wife, daughter Rosaline Peng and Shaan Rees order for protective custody. It reports that the patient has been threatening to kill himself and his fmaily. He has been refusing to eat food, drinking a lot of alcohol, and having sexual fantasies about his daughter. He will not seek voluntary help, has dementia, is noncompliant with his medications, and believed to be dangerous to himself, dangerous to others, gravely disabled, abusing substance(s,) and unwilling to seek voluntary admission.   disabled, substance abuse.   Additional history is provided by independent historian: Police-- who they viewed a video that his family captured of him threatening his daughter and grandson saying that he was going to go home and get his gun and come back.           The history is provided by the police. The history is limited by the absence of a caregiver. No  was used.     Review of patient's allergies indicates:  No Known Allergies  Past Medical History:   Diagnosis Date    Hypertension      Past Surgical History:   Procedure Laterality Date    CEREBRAL ANGIOGRAM Bilateral 5/25/2024    Procedure: " ANGIOGRAM-CEREBRAL;  Surgeon: Dayana Cuenca;  Location: [facility];  Service: Anesthesiology;  Laterality: Bilateral;    CRANIOTOMY FOR EVACUATION OF SUBDURAL HEMATOMA Right 5/17/2024    Procedure: CRANIOTOMY, FOR SUBDURAL HEMATOMA EVACUATION;  Surgeon: Herb Price MD;  Location: [facility] OR [address] [place] [address];  Service: Neurosurgery;  Laterality: Right;  right jacob hole craniotomy for SDH evacuation     No family history on file.  Social History     Tobacco Use    Smoking status: Every Day    Smokeless tobacco: Never   Substance Use Topics    Alcohol use: Yes    Drug use: Never     Review of Systems   Unable to perform ROS: Dementia       Physical Exam     Initial Vitals [10/06/24 0953]   BP Pulse Resp Temp SpO2   (!) 83/54 76 18 98 °F (36.7 °C) 97 %      MAP       --           Patient gave consent to have physical exam performed.    Physical Exam    Nursing note and vitals reviewed.  Constitutional: He appears well-developed and well-nourished.   HENT:   Head: Normocephalic and atraumatic.   Right Ear: External ear normal.   Left Ear: External ear normal.   Nose: Nose normal. Mouth/Throat: Oropharynx is clear and moist.   Eyes: Conjunctivae and EOM are normal. Pupils are equal, round, and reactive to light.   Neck: Neck supple.   Normal range of motion.  Cardiovascular:  Normal rate, regular rhythm and normal heart sounds.     Exam reveals no gallop and no friction rub.       No murmur heard.  Pulmonary/Chest: Breath sounds normal. No respiratory distress. He has no wheezes. He has no rhonchi. He has no rales.   Abdominal: Abdomen is soft. Bowel sounds are normal. There is no abdominal tenderness. There is no rebound and no guarding.   Musculoskeletal:         General: No tenderness or edema. Normal range of motion.      Cervical back: Normal range of motion and neck supple.     Neurological: He is alert and oriented to person, place, and time. No cranial nerve deficit.   Speaking clearly in full sentences.    Skin: Skin is  warm and dry. Capillary refill takes less than 2 seconds. No rash noted.   Psychiatric: He has a normal mood and affect. His behavior is normal.   Agitated.         ED Course   Critical Care    Date/Time: 10/6/2024 12:03 PM    Performed by: Jen Gibbs DO  Authorized by: Jen Gibbs DO  Direct patient critical care time: 12 minutes  Additional history critical care time: 8 minutes  Ordering / reviewing critical care time: 8 minutes  Documentation critical care time: 8 minutes  Consulting other physicians critical care time: 8 minutes  Consult with family critical care time: 8 minutes  Total critical care time (exclusive of procedural time) : 52 minutes  Critical care was necessary to treat or prevent imminent or life-threatening deterioration of the following conditions: renal failure and dehydration (Suicidal ideation/homicidal ideation).  Critical care was time spent personally by me on the following activities: evaluation of patient's response to treatment, examination of patient, obtaining history from patient or surrogate, ordering and performing treatments and interventions, ordering and review of laboratory studies, ordering and review of radiographic studies, pulse oximetry, re-evaluation of patient's condition and review of old charts.        Labs Reviewed   CBC W/ AUTO DIFFERENTIAL - Abnormal       Result Value    WBC 4.71      RBC 3.79 (*)     Hemoglobin 11.7 (*)     Hematocrit 35.2 (*)     MCV 93      MCH 30.9      MCHC 33.2      RDW 14.6 (*)     Platelets 248      MPV 9.5      Immature Granulocytes 0.4      Gran # (ANC) 2.1      Immature Grans (Abs) 0.02      Lymph # 2.1      Mono # 0.3      Eos # 0.2      Baso # 0.03      nRBC 0      Gran % 44.3      Lymph % 43.9      Mono % 7.0      Eosinophil % 3.8      Basophil % 0.6      Differential Method Automated     COMPREHENSIVE METABOLIC PANEL - Abnormal    Sodium 138      Potassium 4.7      Chloride 108      CO2 22 (*)     Glucose 78      BUN 24 (*)      Creatinine 1.3      Calcium 8.6 (*)     Total Protein 6.6      Albumin 3.1 (*)     Total Bilirubin 0.5      Alkaline Phosphatase 94      AST 36      ALT 32      eGFR 53 (*)     Anion Gap 8     URINALYSIS, REFLEX TO URINE CULTURE - Abnormal    Specimen UA Urine, Clean Catch      Color, UA Yellow      Appearance, UA Clear      pH, UA 5.0      Specific Gravity, UA 1.020      Protein, UA Negative      Glucose, UA Negative      Ketones, UA Negative      Bilirubin (UA) Negative      Occult Blood UA Negative      Nitrite, UA Negative      Urobilinogen, UA Negative      Leukocytes, UA Trace (*)     Narrative:     Specimen Source->Urine   ALCOHOL,MEDICAL (ETHANOL) - Abnormal    Alcohol, Serum 102 (*)    ACETAMINOPHEN LEVEL - Abnormal    Acetaminophen (Tylenol), Serum <3.0 (*)    ALCOHOL,MEDICAL (ETHANOL) - Abnormal    Alcohol, Serum 15 (*)    TSH    TSH 0.587     DRUG SCREEN PANEL, URINE EMERGENCY    Benzodiazepines Negative      Methadone metabolites Negative      Cocaine (Metab.) Negative      Opiate Scrn, Ur Negative      Barbiturate Screen, Ur Negative      Amphetamine Screen, Ur Negative      THC Negative      Phencyclidine Negative      Creatinine, Urine 137.8      Toxicology Information SEE COMMENT      Narrative:     Specimen Source->Urine   URINALYSIS MICROSCOPIC    WBC, UA 2      Hyaline Casts, UA 1      Microscopic Comment SEE COMMENT      Narrative:     Specimen Source->Urine   SARS-COV-2 RDRP GENE    POC Rapid COVID Negative       Acceptable Yes       EKG Readings: (Independently Interpreted)   EKG independently interpreted by Jen Gibbs DO reads: No STEMI. Normal Sinus Rhythm.  Ventricular rate of 68. Normal Axis. Abnormal EKG. QTc rate of 444.  External documents reviewed for previous EKG comparison: Compared to EKG from 5/30/2024 rate decreased by 2 bpm today.         Imaging Results              CT Head Without Contrast (Final result)  Result time 10/06/24 12:04:00      Final result by  Kavin Olguin MD (10/06/24 12:04:00)                   Impression:      1. Relative to prior head CT performed 09/07/2024, 22:29 hours, there appears to be slight interval decrease in size of intermediate attenuation right anterior convexity subdural collection.  2. No new or enlarging areas of intracranial hemorrhage or worsening mass effect appreciated.      Electronically signed by: Kavin Olguin  Date:    10/06/2024  Time:    12:04               Narrative:    EXAMINATION:  CT HEAD WITHOUT CONTRAST    CLINICAL HISTORY:  Subarachnoid hemorrhage (SAH) suspected;    TECHNIQUE:  Low dose axial images were obtained through the head.  Coronal and sagittal reformations were also performed. Contrast was not administered.    COMPARISON:  Head CT performed 09/07/2024, 22:29 hours.    FINDINGS:  Comment: Motion compromised examination.    Blood: Relative to prior head CT performed 09/07/2024, 22:29 hours, there appears to be slight interval decrease in size of intermediate attenuation right anterior convexity subdural collection currently measuring on the order of 7-8 mm compared to 8-9 mm when measured in a similar location previously.  Relatively minimal mass effect on the subjacent sulci and gyri without significant midline shift.  No definite new or enlarging areas of intracranial hemorrhage appreciated.    Parenchyma: No definite loss of gray-white differentiation to suggest acute or subacute transcortical infarct. Generalized pattern of age-related parenchymal volume loss.  Nonspecific areas of white matter hypoattenuation may reflect sequela of chronic small vessel ischemic disease.    Ventricles/Extra-axial spaces: As above.  Similar size and configuration of the ventricles.  Basal cisterns remain patent.    Vessels: Mild atherosclerotic calcifications.    Orbits: Status post bilateral lens replacements and scleral banding.    Scalp: Unremarkable.    Skull: There are no depressed skull fractures or  destructive bone lesions.  Right-sided jacob hole craniotomy defects.    Sinuses and mastoids: Scattered relatively modest paranasal sinus mucosal thickening with small retention cysts.  Medial ization of the right lamina papyracea may relate to remote trauma or developmental etiology.    Other findings: Soft tissue attenuation within the external artery canals may relate to cerumen.                                       X-Ray Chest AP Portable (Final result)  Result time 10/06/24 10:40:30      Final result by Carole Su MD (10/06/24 10:40:30)                   Impression:      As above.      Electronically signed by: Carole Su MD  Date:    10/06/2024  Time:    10:40               Narrative:    EXAMINATION:  XR CHEST AP PORTABLE    CLINICAL HISTORY:  Hypotension, unspecified    TECHNIQUE:  Single frontal view of the chest was performed.    COMPARISON:  05/30/2024    FINDINGS:  Left basilar subsegmental atelectasis.  No consolidation or pleural effusions.  Heart size is normal.  Skeletal structures are intact.                                       Medications   LORazepam injection 0.5 mg (has no administration in time range)   diphenoxylate-atropine 2.5-0.025 mg per tablet 1 tablet (1 tablet Oral Not Given 10/6/24 1730)   LORazepam injection 1 mg (1 mg Intravenous Given 10/6/24 1017)   0.9% NaCl 1,000 mL with mvi, (ADULT) no.4 with vit K 3,300 unit- 150 mcg/10 mL 10 mL, thiamine 100 mg, folic acid 1 mg infusion ( Intravenous New Bag 10/6/24 1432)   LORazepam injection 1 mg (1 mg Intravenous Given 10/6/24 1423)   cefTRIAXone (Rocephin) 1 g in D5W 100 mL IVPB (MB+) (1 g Intravenous New Bag 10/6/24 1717)     Medical Decision Making  Amount and/or Complexity of Data Reviewed  Independent Historian:      Details: Additional history is provided by independent historian:     External Data Reviewed: notes.     Details: External documents reviewed: OPC 10/06.     Labs: ordered. Decision-making  "details documented in ED Course.  Radiology: ordered. Decision-making details documented in ED Course.  ECG/medicine tests: ordered and independent interpretation performed. Decision-making details documented in ED Course.     Details: See above.     Risk  Prescription drug management.            Scribe Attestation:   Scribe #1: I performed the above scribed service and the documentation accurately describes the services I performed. I attest to the accuracy of the note.        ED Course as of 10/06/24 1940   Sun Oct 06, 2024   1013 Per officer David godfrey has a video of the patient getting angry agitated saying he is going to get a gun and he is coming back.  Patient has been reported 3 times in the last 24 hours for concerning behavior by his daughter, grandson whom he says he loves, and his ex-wife. [RF]   1300 Psych consult completed with Dr. Burak Goldsmith.  His recommendations are as follows:  Rec:   1. Continue PEC for risk of harm to self/other. Inpatient psychiatric tx once medically cleared.  2. Ativan 1-2mg IV/IM q 4hours prn severe non redirectable agitation or alcohol withdrawal  3. Banana bag  4. 1:1 sitter  5. Will defer to inpatient psychiatric team to start/modify scheduled medications.   [RF]   1540 Will administer Ativan Q 4 to prevent alcohol withdrawal [RF]   1550 Patient is medically cleared for psychiatric placement. [RF]      ED Course User Index  [RF] Jen Gibbs, DO       Medically cleared for psychiatry placement: 10/6/2024  5:54 PM               Medical Decision Making:    This is an evaluation of a 89 y.o. male that presents to the Emergency Department for   Chief Complaint   Patient presents with    Psychiatric Evaluation     Pt to the ED [facility] with an OPC from daughter's ex wife and daughter stating pt is threatening to kill himself and his family, not eating, abusing alcohol and having sexual fantasies about his daughter. Pt presents in handcuffs continuously yelling "my hand " "hurts".       Independent historian:      The patient is a non-toxic and well appearing patient. On physical exam, patient appears well hydrated with moist mucus membranes. Breath sounds are clear and equal bilaterally with no adventitious breath sounds, tachypnea or respiratory distress. Regular rate and rhythm. No murmurs. Abdomen soft and non tender. Patient is tolerating PO without difficulty. Physical exam otherwise as above.     I have reviewed vital signs and nursing notes.   Vital Signs Are Reassuring.     Based on the patient's symptoms, I am considering and evaluating for the following differential diagnoses: psychosis, acute psychosis, suicidal ideation, homicidal ideation, alcohol addiction, alcohol abuse.     Consider hospitalization for:  Suicidal ideation/homicidal ideation    Patient is PEC'd as he is unwilling to obtain help at this time.  Patient is sent in to the ER on an OPC from the [facility] office.  ED Course:Treatment in the ED included Physical Exam and medications given in ED  Medications   LORazepam injection 0.5 mg (has no administration in time range)   diphenoxylate-atropine 2.5-0.025 mg per tablet 1 tablet (1 tablet Oral Not Given 10/6/24 1730)   LORazepam injection 1 mg (1 mg Intravenous Given 10/6/24 1017)   0.9% NaCl 1,000 mL with mvi, (ADULT) no.4 with vit K 3,300 unit- 150 mcg/10 mL 10 mL, thiamine 100 mg, folic acid 1 mg infusion ( Intravenous New Bag 10/6/24 1432)   LORazepam injection 1 mg (1 mg Intravenous Given 10/6/24 1423)   cefTRIAXone (Rocephin) 1 g in D5W 100 mL IVPB (MB+) (1 g Intravenous New Bag 10/6/24 1717)   .   Patient reports feeling better after treatment in the ER.       External Data/Documents Reviewed: Previous medical records and vital signs reviewed, see HPI and Physical exam.   Labs: ordered and reviewed.  Alcohol level elevated at 102.  Patient states he had his last drink last night.    Radiology: ordered as indicated and reviewed.  No " pneumothorax.  ECG/medicine tests: ordered and independent interpretation performed by Dr. Jen Gibbs DO. Decision-making details documented in ED Course.   Cardiac monitor placed for hypertension.  Monitor shows normal sinus rhythm with rate of 72 as interpreted by Dr. Gibbs.    Risk  Diagnosis or treatment significantly limited by the following social determinants of health: Body mass index is 20.99 kg/m².     In shared decision making with the patient, we discussed treatment, prescriptions, labs, and imaging results.    PEC signed at 10:25 AM.       Psych consult placed at 10:12am, awaiting psychaitric recommendations.     Psych consult completed at 1:00 p.m.    At time of transfer patient is awake alert speaking clearly in full sentences and moving all 4 extremities.     The following labs and imaging were reviewed:      Admission on 10/06/2024   Component Date Value Ref Range Status    WBC 10/06/2024 4.71  3.90 - 12.70 K/uL Final    RBC 10/06/2024 3.79 (L)  4.60 - 6.20 M/uL Final    Hemoglobin 10/06/2024 11.7 (L)  14.0 - 18.0 g/dL Final    Hematocrit 10/06/2024 35.2 (L)  40.0 - 54.0 % Final    MCV 10/06/2024 93  82 - 98 fL Final    MCH 10/06/2024 30.9  27.0 - 31.0 pg Final    MCHC 10/06/2024 33.2  32.0 - 36.0 g/dL Final    RDW 10/06/2024 14.6 (H)  11.5 - 14.5 % Final    Platelets 10/06/2024 248  150 - 450 K/uL Final    MPV 10/06/2024 9.5  9.2 - 12.9 fL Final    Immature Granulocytes 10/06/2024 0.4  0.0 - 0.5 % Final    Gran # (ANC) 10/06/2024 2.1  1.8 - 7.7 K/uL Final    Immature Grans (Abs) 10/06/2024 0.02  0.00 - 0.04 K/uL Final    Comment: Mild elevation in immature granulocytes is non specific and   can be seen in a variety of conditions including stress response,   acute inflammation, trauma and pregnancy. Correlation with other   laboratory and clinical findings is essential.      Lymph # 10/06/2024 2.1  1.0 - 4.8 K/uL Final    Mono # 10/06/2024 0.3  0.3 - 1.0 K/uL Final    Eos # 10/06/2024 0.2  0.0 -  0.5 K/uL Final    Baso # 10/06/2024 0.03  0.00 - 0.20 K/uL Final    nRBC 10/06/2024 0  0 /100 WBC Final    Gran % 10/06/2024 44.3  38.0 - 73.0 % Final    Lymph % 10/06/2024 43.9  18.0 - 48.0 % Final    Mono % 10/06/2024 7.0  4.0 - 15.0 % Final    Eosinophil % 10/06/2024 3.8  0.0 - 8.0 % Final    Basophil % 10/06/2024 0.6  0.0 - 1.9 % Final    Differential Method 10/06/2024 Automated   Final    Sodium 10/06/2024 138  136 - 145 mmol/L Final    Potassium 10/06/2024 4.7  3.5 - 5.1 mmol/L Final    Chloride 10/06/2024 108  95 - 110 mmol/L Final    CO2 10/06/2024 22 (L)  23 - 29 mmol/L Final    Glucose 10/06/2024 78  70 - 110 mg/dL Final    BUN 10/06/2024 24 (H)  8 - 23 mg/dL Final    Creatinine 10/06/2024 1.3  0.5 - 1.4 mg/dL Final    Calcium 10/06/2024 8.6 (L)  8.7 - 10.5 mg/dL Final    Total Protein 10/06/2024 6.6  6.0 - 8.4 g/dL Final    Albumin 10/06/2024 3.1 (L)  3.5 - 5.2 g/dL Final    Total Bilirubin 10/06/2024 0.5  0.1 - 1.0 mg/dL Final    Comment: For infants and newborns, interpretation of results should be based  on gestational age, weight and in agreement with clinical  observations.    Premature Infant recommended reference ranges:  Up to 24 hours.............<8.0 mg/dL  Up to 48 hours............<12.0 mg/dL  3-5 days..................<15.0 mg/dL  6-29 days.................<15.0 mg/dL      Alkaline Phosphatase 10/06/2024 94  55 - 135 U/L Final    AST 10/06/2024 36  10 - 40 U/L Final    ALT 10/06/2024 32  10 - 44 U/L Final    eGFR 10/06/2024 53 (A)  >60 mL/min/1.73 m^2 Final    Anion Gap 10/06/2024 8  8 - 16 mmol/L Final    TSH 10/06/2024 0.587  0.400 - 4.000 uIU/mL Final    Specimen UA 10/06/2024 Urine, Clean Catch   Final    Color, UA 10/06/2024 Yellow  Yellow, Straw, Zita Final    Appearance, UA 10/06/2024 Clear  Clear Final    pH, UA 10/06/2024 5.0  5.0 - 8.0 Final    Specific Gravity, UA 10/06/2024 1.020  1.005 - 1.030 Final    Protein, UA 10/06/2024 Negative  Negative Final    Comment: Recommend a 24  hour urine protein or a urine   protein/creatinine ratio if globulin induced proteinuria is  clinically suspected.      Glucose, UA 10/06/2024 Negative  Negative Final    Ketones, UA 10/06/2024 Negative  Negative Final    Bilirubin (UA) 10/06/2024 Negative  Negative Final    Occult Blood UA 10/06/2024 Negative  Negative Final    Nitrite, UA 10/06/2024 Negative  Negative Final    Urobilinogen, UA 10/06/2024 Negative  <2.0 EU/dL Final    Leukocytes, UA 10/06/2024 Trace (A)  Negative Final    Benzodiazepines 10/06/2024 Negative  Negative Final    Methadone metabolites 10/06/2024 Negative  Negative Final    Cocaine (Metab.) 10/06/2024 Negative  Negative Final    Opiate Scrn, Ur 10/06/2024 Negative  Negative Final    Barbiturate Screen, Ur 10/06/2024 Negative  Negative Final    Amphetamine Screen, Ur 10/06/2024 Negative  Negative Final    THC 10/06/2024 Negative  Negative Final    Phencyclidine 10/06/2024 Negative  Negative Final    Creatinine, Urine 10/06/2024 137.8  23.0 - 375.0 mg/dL Final    Toxicology Information 10/06/2024 SEE COMMENT   Final    Comment: This screen includes the following classes of drugs at the listed   cut-off:    Benzodiazepines 200 ng/ml  Methadone 300 ng/ml  Cocaine metabolite 300 ng/ml  Opiates 300 ng/ml  Barbiturates 200 ng/ml  Amphetamines 1000 ng/ml  Marijuana metabs (THC) 50 ng/ml  Phencyclidine (PCP) 25 ng/ml    This is a screening test. If results do not correlate with clinical   presentation, then a confirmatory send out test is advised.     This report is intended for use in clinical monitoring and management   of   patients. It is not intended for use in employment related drug   testing.      Alcohol, Serum 10/06/2024 102 (H)  <10 mg/dL Final    Acetaminophen (Tylenol), Serum 10/06/2024 <3.0 (L)  10.0 - 20.0 ug/mL Final    Comment: Toxic Levels:  Adults (4 hr post-ingestion).........>150 ug/mL  Adults (12 hr post-ingestion)........>40 ug/mL  Peds (2 hr post-ingestion, liquid)...>225  ug/mL      POC Rapid COVID 10/06/2024 Negative  Negative Final     Acceptable 10/06/2024 Yes   Final    WBC, UA 10/06/2024 2  0 - 5 /hpf Final    Hyaline Casts, UA 10/06/2024 1  0-1/lpf /lpf Final    Microscopic Comment 10/06/2024 SEE COMMENT   Final    Comment: Other formed elements not mentioned in the report are not   present in the microscopic examination.       Alcohol, Serum 10/06/2024 15 (H)  <10 mg/dL Final        Imaging Results              CT Head Without Contrast (Final result)  Result time 10/06/24 12:04:00      Final result by Kavin Olguin MD (10/06/24 12:04:00)                   Impression:      1. Relative to prior head CT performed 09/07/2024, 22:29 hours, there appears to be slight interval decrease in size of intermediate attenuation right anterior convexity subdural collection.  2. No new or enlarging areas of intracranial hemorrhage or worsening mass effect appreciated.      Electronically signed by: Kavin Olguin  Date:    10/06/2024  Time:    12:04               Narrative:    EXAMINATION:  CT HEAD WITHOUT CONTRAST    CLINICAL HISTORY:  Subarachnoid hemorrhage (SAH) suspected;    TECHNIQUE:  Low dose axial images were obtained through the head.  Coronal and sagittal reformations were also performed. Contrast was not administered.    COMPARISON:  Head CT performed 09/07/2024, 22:29 hours.    FINDINGS:  Comment: Motion compromised examination.    Blood: Relative to prior head CT performed 09/07/2024, 22:29 hours, there appears to be slight interval decrease in size of intermediate attenuation right anterior convexity subdural collection currently measuring on the order of 7-8 mm compared to 8-9 mm when measured in a similar location previously.  Relatively minimal mass effect on the subjacent sulci and gyri without significant midline shift.  No definite new or enlarging areas of intracranial hemorrhage appreciated.    Parenchyma: No definite loss of gray-white  differentiation to suggest acute or subacute transcortical infarct. Generalized pattern of age-related parenchymal volume loss.  Nonspecific areas of white matter hypoattenuation may reflect sequela of chronic small vessel ischemic disease.    Ventricles/Extra-axial spaces: As above.  Similar size and configuration of the ventricles.  Basal cisterns remain patent.    Vessels: Mild atherosclerotic calcifications.    Orbits: Status post bilateral lens replacements and scleral banding.    Scalp: Unremarkable.    Skull: There are no depressed skull fractures or destructive bone lesions.  Right-sided jacob hole craniotomy defects.    Sinuses and mastoids: Scattered relatively modest paranasal sinus mucosal thickening with small retention cysts.  Medial ization of the right lamina papyracea may relate to remote trauma or developmental etiology.    Other findings: Soft tissue attenuation within the external artery canals may relate to cerumen.                                       X-Ray Chest AP Portable (Final result)  Result time 10/06/24 10:40:30      Final result by Carole Su MD (10/06/24 10:40:30)                   Impression:      As above.      Electronically signed by: Carole Su MD  Date:    10/06/2024  Time:    10:40               Narrative:    EXAMINATION:  XR CHEST AP PORTABLE    CLINICAL HISTORY:  Hypotension, unspecified    TECHNIQUE:  Single frontal view of the chest was performed.    COMPARISON:  05/30/2024    FINDINGS:  Left basilar subsegmental atelectasis.  No consolidation or pleural effusions.  Heart size is normal.  Skeletal structures are intact.                                        Clinical Impression:  Final diagnoses:  [Z00.8] Medical clearance for psychiatric admission  [I95.9] Hypotension  [R45.851] Suicidal ideation (Primary)  [F10.229] Alcohol dependence with intoxication with complication  [R45.850] Homicidal ideation  [N39.0] Acute UTI          ED Disposition Condition     Transfer to [facility] Facility Stable          ED Prescriptions       Medication Sig Dispense Start Date End Date Auth. Provider    cephALEXin (KEFLEX) 500 MG capsule Take 1 capsule (500 mg total) by mouth 4 (four) times daily. for 5 days 20 capsule 10/6/2024 10/11/2024 Jen Gibbs,           Follow-up Information    None        I, Dr. Jen Gibbs, personally performed the services described in this documentation. This document was produced by a scribe under my direction and in my presence. All medical record entries made by the scribe were at my direction and in my presence.  I have reviewed the chart and agree that the record reflects my personal performance and is accurate and complete. Jen Gibbs DO.     10/06/2024 5:40 PM       Jen Gibbs DO  10/06/24 1902       Jen Gibbs DO  10/06/24 1940
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
## 2024-10-07 LAB
OHS QRS DURATION: 76 MS
OHS QTC CALCULATION: 444 MS

## 2024-10-07 NOTE — ED NOTES
Report, chart and belongings given to EMS . Hospital security at bedside. Pt assisted to EMS stretcher. NAD.

## 2024-10-09 ENCOUNTER — TELEPHONE (OUTPATIENT)
Dept: NEUROSURGERY | Facility: CLINIC | Age: 89
End: 2024-10-09
Payer: MEDICARE
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
## 2024-10-21 ENCOUNTER — TELEPHONE (OUTPATIENT)
Dept: NEUROSURGERY | Facility: CLINIC | Age: 89
End: 2024-10-21
Payer: MEDICARE

## 2024-10-21 NOTE — TELEPHONE ENCOUNTER
----- Message from Lurdes Pollard PA-C sent at 10/18/2024 11:37 AM CDT -----  Stable and improving. Can continue to follow-up with his PCP.  ----- Message -----  From: Letty Zarate RN  Sent: 10/17/2024  12:54 PM CDT  To: Lurdes Pollard PA-C; Letty Zarate RN    Looks like pt had a ct head done on 10/6.  Let me know if we need to do anything else.  Not sure if he is back home or still in behavioral hosp.after pec.  See notes..

## 2024-10-21 NOTE — TELEPHONE ENCOUNTER
P/c to pt. Dtr, gave her below information that pt. Can follow up with his primary care.  She verbalized understanding.

## 2024-11-04 ENCOUNTER — HOSPITAL ENCOUNTER (EMERGENCY)
Facility: HOSPITAL | Age: 89
Discharge: PSYCHIATRIC OR SUBSTANCE USE FACILITY | End: 2024-11-04
Attending: EMERGENCY MEDICINE
Payer: MEDICARE

## 2024-11-04 VITALS
TEMPERATURE: 98 F | HEIGHT: 67 IN | BODY MASS INDEX: 22.76 KG/M2 | RESPIRATION RATE: 18 BRPM | OXYGEN SATURATION: 99 % | DIASTOLIC BLOOD PRESSURE: 78 MMHG | WEIGHT: 145 LBS | SYSTOLIC BLOOD PRESSURE: 155 MMHG | HEART RATE: 80 BPM

## 2024-11-04 DIAGNOSIS — R45.851 SUICIDAL IDEATION: Primary | ICD-10-CM

## 2024-11-04 DIAGNOSIS — R45.1 AGITATION: ICD-10-CM

## 2024-11-04 LAB
ALBUMIN SERPL BCP-MCNC: 3.5 G/DL (ref 3.5–5.2)
ALP SERPL-CCNC: 111 U/L (ref 40–150)
ALT SERPL W/O P-5'-P-CCNC: 42 U/L (ref 10–44)
AMPHET+METHAMPHET UR QL: NEGATIVE
ANION GAP SERPL CALC-SCNC: 10 MMOL/L (ref 8–16)
AST SERPL-CCNC: 76 U/L (ref 10–40)
BARBITURATES UR QL SCN>200 NG/ML: NEGATIVE
BASOPHILS # BLD AUTO: 0.04 K/UL (ref 0–0.2)
BASOPHILS NFR BLD: 0.9 % (ref 0–1.9)
BENZODIAZ UR QL SCN>200 NG/ML: NEGATIVE
BILIRUB SERPL-MCNC: 0.9 MG/DL (ref 0.1–1)
BILIRUB UR QL STRIP: NEGATIVE
BUN SERPL-MCNC: 19 MG/DL (ref 8–23)
BZE UR QL SCN: NEGATIVE
CALCIUM SERPL-MCNC: 8.5 MG/DL (ref 8.7–10.5)
CANNABINOIDS UR QL SCN: NEGATIVE
CHLORIDE SERPL-SCNC: 108 MMOL/L (ref 95–110)
CLARITY UR REFRACT.AUTO: CLEAR
CO2 SERPL-SCNC: 26 MMOL/L (ref 23–29)
COLOR UR AUTO: YELLOW
CREAT SERPL-MCNC: 1.5 MG/DL (ref 0.5–1.4)
CREAT UR-MCNC: 220 MG/DL (ref 23–375)
DIFFERENTIAL METHOD BLD: ABNORMAL
EOSINOPHIL # BLD AUTO: 0.1 K/UL (ref 0–0.5)
EOSINOPHIL NFR BLD: 1.1 % (ref 0–8)
ERYTHROCYTE [DISTWIDTH] IN BLOOD BY AUTOMATED COUNT: 14.2 % (ref 11.5–14.5)
EST. GFR  (NO RACE VARIABLE): 44.2 ML/MIN/1.73 M^2
ETHANOL SERPL-MCNC: 114 MG/DL
GLUCOSE SERPL-MCNC: 74 MG/DL (ref 70–110)
GLUCOSE UR QL STRIP: NEGATIVE
HCT VFR BLD AUTO: 35.3 % (ref 40–54)
HGB BLD-MCNC: 11.5 G/DL (ref 14–18)
HGB UR QL STRIP: NEGATIVE
IMM GRANULOCYTES # BLD AUTO: 0.04 K/UL (ref 0–0.04)
IMM GRANULOCYTES NFR BLD AUTO: 0.9 % (ref 0–0.5)
KETONES UR QL STRIP: NEGATIVE
LEUKOCYTE ESTERASE UR QL STRIP: NEGATIVE
LYMPHOCYTES # BLD AUTO: 1.7 K/UL (ref 1–4.8)
LYMPHOCYTES NFR BLD: 38 % (ref 18–48)
MCH RBC QN AUTO: 30.8 PG (ref 27–31)
MCHC RBC AUTO-ENTMCNC: 32.6 G/DL (ref 32–36)
MCV RBC AUTO: 95 FL (ref 82–98)
METHADONE UR QL SCN>300 NG/ML: NEGATIVE
MONOCYTES # BLD AUTO: 0.3 K/UL (ref 0.3–1)
MONOCYTES NFR BLD: 6.5 % (ref 4–15)
NEUTROPHILS # BLD AUTO: 2.3 K/UL (ref 1.8–7.7)
NEUTROPHILS NFR BLD: 52.6 % (ref 38–73)
NITRITE UR QL STRIP: NEGATIVE
NRBC BLD-RTO: 0 /100 WBC
OPIATES UR QL SCN: NEGATIVE
PCP UR QL SCN>25 NG/ML: NEGATIVE
PH UR STRIP: 6 [PH] (ref 5–8)
PLATELET # BLD AUTO: 293 K/UL (ref 150–450)
PMV BLD AUTO: 9.8 FL (ref 9.2–12.9)
POTASSIUM SERPL-SCNC: 4 MMOL/L (ref 3.5–5.1)
PROT SERPL-MCNC: 7 G/DL (ref 6–8.4)
PROT UR QL STRIP: ABNORMAL
RBC # BLD AUTO: 3.73 M/UL (ref 4.6–6.2)
SODIUM SERPL-SCNC: 144 MMOL/L (ref 136–145)
SP GR UR STRIP: 1.02 (ref 1–1.03)
TOXICOLOGY INFORMATION: NORMAL
TSH SERPL DL<=0.005 MIU/L-ACNC: 1.29 UIU/ML (ref 0.4–4)
URN SPEC COLLECT METH UR: ABNORMAL
WBC # BLD AUTO: 4.45 K/UL (ref 3.9–12.7)

## 2024-11-04 PROCEDURE — 99285 EMERGENCY DEPT VISIT HI MDM: CPT | Mod: 25

## 2024-11-04 PROCEDURE — 80307 DRUG TEST PRSMV CHEM ANLYZR: CPT | Performed by: EMERGENCY MEDICINE

## 2024-11-04 PROCEDURE — 84443 ASSAY THYROID STIM HORMONE: CPT | Performed by: EMERGENCY MEDICINE

## 2024-11-04 PROCEDURE — 81003 URINALYSIS AUTO W/O SCOPE: CPT | Performed by: EMERGENCY MEDICINE

## 2024-11-04 PROCEDURE — 80053 COMPREHEN METABOLIC PANEL: CPT | Performed by: EMERGENCY MEDICINE

## 2024-11-04 PROCEDURE — 82077 ASSAY SPEC XCP UR&BREATH IA: CPT | Performed by: EMERGENCY MEDICINE

## 2024-11-04 PROCEDURE — 85025 COMPLETE CBC W/AUTO DIFF WBC: CPT | Performed by: EMERGENCY MEDICINE

## 2024-11-04 RX ORDER — HALOPERIDOL 5 MG/ML
5 INJECTION INTRAMUSCULAR EVERY 4 HOURS PRN
Status: DISCONTINUED | OUTPATIENT
Start: 2024-11-04 | End: 2024-11-04 | Stop reason: HOSPADM

## 2024-11-04 RX ORDER — HALOPERIDOL 5 MG/1
5 TABLET ORAL EVERY 4 HOURS PRN
Status: DISCONTINUED | OUTPATIENT
Start: 2024-11-04 | End: 2024-11-04 | Stop reason: HOSPADM

## 2024-11-04 RX ORDER — LORAZEPAM 1 MG/1
2 TABLET ORAL EVERY 4 HOURS PRN
Status: DISCONTINUED | OUTPATIENT
Start: 2024-11-04 | End: 2024-11-04 | Stop reason: HOSPADM
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
## 2024-11-04 NOTE — ED NOTES
Patient resting in stretcher and is in NAD at this time. Pt is awake alert and oriented x4, respirations even and unlabored. Pt denies pain at this time. Pt calm. Environment safe. Risk sitter at bedside for one to one observation and q15min safety checks. Pt aware of POC. Bed low and locked with side rails up x2.

## 2024-11-05 NOTE — ED NOTES
APPEARANCE: awake and alert in NAD.   SKIN: warm, dry and intact. No breakdown or bruising.  MUSCULOSKELETAL: Patient moving all extremities spontaneously, no obvious swelling or deformities noted. Ambulates independently.  RESPIRATORY: Denies shortness of breath.Respirations unlabored.   CARDIAC: Denies CP, 2+ distal pulses; no peripheral edema  ABDOMEN: S/ND/NT, Denies nausea   : voids spontaneously, denies difficulty  Neurologic: AAO x 4; follows commands equal strength in all extremities; denies numbness/tingling. Denies dizziness, DENNISE, pupils 3mm  Psychosocial: agitated and anxious

## 2024-11-05 NOTE — ED NOTES
Assumed care of patient. Pt. Roomed in 26. Pt placed in paper scrubs and resting in stretcher comfortably. No signs of distress noted. Sitter remains at bedside in direct visual contact, charting per protocol every 15 minutes. No equipment or belongings are in the patients room. Pt aware of plan of care. Will continue to monitor.  Bed placed in low locked position, side rails up x2, call bell is within reach. Warm blanket and pillow provided to patient. Will continue to monitor.

## 2024-12-17 ENCOUNTER — HOSPITAL ENCOUNTER (EMERGENCY)
Facility: HOSPITAL | Age: 89
Discharge: HOME | End: 2024-12-17
Attending: EMERGENCY MEDICINE
Payer: MEDICARE

## 2024-12-17 VITALS
RESPIRATION RATE: 16 BRPM | BODY MASS INDEX: 23.54 KG/M2 | SYSTOLIC BLOOD PRESSURE: 197 MMHG | WEIGHT: 150 LBS | TEMPERATURE: 98 F | HEIGHT: 67 IN | HEART RATE: 101 BPM | DIASTOLIC BLOOD PRESSURE: 94 MMHG | OXYGEN SATURATION: 97 %

## 2024-12-17 DIAGNOSIS — G56.03 BILATERAL CARPAL TUNNEL SYNDROME: ICD-10-CM

## 2024-12-17 DIAGNOSIS — G62.9 NEUROPATHY: Primary | ICD-10-CM

## 2024-12-17 PROCEDURE — 99283 EMERGENCY DEPT VISIT LOW MDM: CPT

## 2024-12-17 RX ORDER — GABAPENTIN 300 MG/1
300 CAPSULE ORAL 3 TIMES DAILY
Qty: 90 CAPSULE | Refills: 0 | Status: SHIPPED | OUTPATIENT
Start: 2024-12-17 | End: 2025-01-16

## 2024-12-17 NOTE — ED TRIAGE NOTES
Patient identifiers for Moses Peng 89 y.o. male checked and correct. Pt arrives to ED via POV requesting eyeglasses. Pt states he lost his glasses about 2 weeks ago; has not made appointment with oncology.     Chief Complaint   Patient presents with    Eye Problem     Wanting eye exam for glasses    Hand Problem     Both hands numb for 4-5 yrs     Past Medical History:   Diagnosis Date    Hypertension      Allergies reported: Review of patient's allergies indicates:  No Known Allergies      LOC: Patient is awake, alert, and aware of environment with an appropriate affect. Patient is oriented x 4 and speaking appropriately.  APPEARANCE: Patient resting comfortably and in no acute distress. Patient is clean and well groomed, patient's clothing is properly fastened.  HEENT: +blurred vision (baseline) wears eyeglasses   SKIN: The skin is warm and dry. Patient has normal skin turgor and moist mucus membranes.   MUSCULOSKELETAL: Patient is moving all extremities well, no obvious deformities noted. Pulses intact.   RESPIRATORY: Airway is open and patent. Respirations are spontaneous and non-labored with normal effort and rate.  CARDIAC: Patient has a normal rate and rhythm.  No peripheral edema noted. Denies chest pain and SOB at at time of assessment.   ABDOMEN: No distention noted. Soft and non-tender upon palpation.  NEUROLOGICAL: pupils 2 mm, PERRL. Facial expression is symmetrical. Hand grasps are equal bilaterally. +bilateral hand and foot numbness/tingling

## 2024-12-17 NOTE — ED PROVIDER NOTES
Encounter Date: 12/17/2024       History     Chief Complaint   Patient presents with    Eye Problem     Wanting eye exam for glasses    Hand Problem     Both hands numb for 4-5 yrs     89-year-old male with past medical history of hypertension, subdural hematoma, carpal tunnel syndrome presents the ED because he lost his eyeglasses and he is also having numbness to both hands.  Reports pain and numbness to both hands for 4-5 years.  As any recent changes.  States he is not taking any medications for this right now.  States he needs a new pair of glasses because he lost his eyeglasses.  Denies any acute vision changes.        Review of patient's allergies indicates:  No Known Allergies  Past Medical History:   Diagnosis Date    Hypertension      Past Surgical History:   Procedure Laterality Date    CEREBRAL ANGIOGRAM Bilateral 5/25/2024    Procedure: ANGIOGRAM-CEREBRAL;  Surgeon: Dayana Cuenca;  Location: [facility];  Service: Anesthesiology;  Laterality: Bilateral;    CRANIOTOMY FOR EVACUATION OF SUBDURAL HEMATOMA Right 5/17/2024    Procedure: CRANIOTOMY, FOR SUBDURAL HEMATOMA EVACUATION;  Surgeon: Herb Price MD;  Location: [facility] OR [address] [place] [address];  Service: Neurosurgery;  Laterality: Right;  right jacob hole craniotomy for SDH evacuation     No family history on file.  Social History     Tobacco Use    Smoking status: Every Day    Smokeless tobacco: Never   Substance Use Topics    Alcohol use: Yes    Drug use: Never     Review of Systems    Physical Exam     Initial Vitals [12/17/24 1015]   BP Pulse Resp Temp SpO2   (!) 197/94 101 16 98.4 °F (36.9 °C) 97 %      MAP       --         Physical Exam    Nursing note and vitals reviewed.  Constitutional: He appears well-developed and well-nourished.   HENT:   Head: Normocephalic and atraumatic.   Eyes: Conjunctivae are normal.   Neck: Neck supple.   Normal range of motion.  Cardiovascular:  Normal rate.           Pulmonary/Chest: Breath sounds normal.   Abdominal: Abdomen is  soft.   Musculoskeletal:         General: Normal range of motion.      Cervical back: Normal range of motion and neck supple.     Neurological: He is alert and oriented to person, place, and time. He has normal strength. No cranial nerve deficit. GCS score is 15. GCS eye subscore is 4. GCS verbal subscore is 5. GCS motor subscore is 6.   Skin: Skin is warm. Capillary refill takes less than 2 seconds.         ED Course   Procedures  Labs Reviewed - No data to display       Imaging Results    None          Medications - No data to display  Medical Decision Making  89-year-old male presents ED because he lost his eyeglasses and also has chronic numbness and pain to both hands.  We discussed following up with his eye doctor to get a new set of glasses.  No acute vision changes or signs of acute CVA.  No focal neurological deficits.  He is also followed by [facility] hand clinic for carpal tunnel syndrome.  Symptoms are chronic he was previously prescribed gabapentin but is not taking it currently.  Will be prescribed gabapentin and recommend close follow-up in the hand clinic.  Spoke with daughter over the phone who primarily manages his medical condition states that she was waiting on a phone call from the hand clinic but never received 1.  Will place a repeat referral and I provided the clinic phone number and recommend they call the clinic.  Counseled on return ED precautions.    Risk  Prescription drug management.                                      Clinical Impression:  Final diagnoses:  [G62.9] Neuropathy (Primary)  [G56.03] Bilateral carpal tunnel syndrome          ED Disposition Condition    Discharge Stable          ED Prescriptions       Medication Sig Dispense Start Date End Date Auth. Provider    gabapentin (NEURONTIN) 300 MG capsule Take 1 capsule (300 mg total) by mouth 3 (three) times daily. 90 capsule 12/17/2024 1/16/2025 Alma Rosa Faria PA-C          Follow-up Information       Follow up With Specialties  Details Why Contact Info Additional Information    [facility] Schedule an appointment as soon as possible for a visit   [address], [address]  [facility] [address] [phone] Turn at Entrance 1 on [facility] in [facility] and take elevators to Floor 2. Follow signs to [facility]. Take [facility] [address] to Floor 9 for [address].             Alma Rosa Faria PA-C  12/17/24 1119

## 2024-12-17 NOTE — DISCHARGE INSTRUCTIONS
Please follow-up with your eye doctor for replacement eyeglasses.    Please continue the gabapentin for your nerve pain.  You will need to follow-up with hand surgery for further management.

## 2025-01-17 ENCOUNTER — TELEPHONE (OUTPATIENT)
Dept: ORTHOPEDICS | Facility: CLINIC | Age: OVER 89
End: 2025-01-17
Payer: MEDICARE

## 2025-01-17 NOTE — TELEPHONE ENCOUNTER
Attempted to call patient to let him know he would need additional testing prior to surgery and he would be out of work 2-6 weeks

## 2025-07-05 ENCOUNTER — HOSPITAL ENCOUNTER (EMERGENCY)
Facility: HOSPITAL | Age: OVER 89
Discharge: HOME | End: 2025-07-05
Attending: EMERGENCY MEDICINE
Payer: MEDICARE

## 2025-07-05 VITALS
RESPIRATION RATE: 12 BRPM | SYSTOLIC BLOOD PRESSURE: 177 MMHG | TEMPERATURE: 98 F | WEIGHT: 180 LBS | HEART RATE: 61 BPM | DIASTOLIC BLOOD PRESSURE: 95 MMHG | OXYGEN SATURATION: 100 % | BODY MASS INDEX: 28.25 KG/M2 | HEIGHT: 67 IN

## 2025-07-05 DIAGNOSIS — G62.9 NEUROPATHY: Primary | ICD-10-CM

## 2025-07-05 DIAGNOSIS — R35.89 POLYURIA: ICD-10-CM

## 2025-07-05 LAB
BACTERIA #/AREA URNS AUTO: ABNORMAL /HPF
BILIRUB UR QL STRIP.AUTO: NEGATIVE
BUN SERPL-MCNC: 18 MG/DL (ref 6–30)
CHLORIDE SERPL-SCNC: 105 MMOL/L (ref 95–110)
CLARITY UR: ABNORMAL
COLOR UR AUTO: YELLOW
CREAT SERPL-MCNC: 1.1 MG/DL (ref 0.5–1.4)
GLUCOSE SERPL-MCNC: 81 MG/DL (ref 70–110)
GLUCOSE UR QL STRIP: NEGATIVE
HCT VFR BLD CALC: 35 %PCV (ref 36–54)
HGB UR QL STRIP: NEGATIVE
KETONES UR QL STRIP: NEGATIVE
LEUKOCYTE ESTERASE UR QL STRIP: ABNORMAL
MICROSCOPIC COMMENT: ABNORMAL
NITRITE UR QL STRIP: NEGATIVE
PH UR STRIP: 6 [PH]
POC IONIZED CALCIUM: 1.23 MMOL/L (ref 1.06–1.42)
POC TCO2 (MEASURED): 25 MMOL/L (ref 23–29)
POTASSIUM BLD-SCNC: 3.8 MMOL/L (ref 3.5–5.1)
PROT UR QL STRIP: ABNORMAL
RBC #/AREA URNS AUTO: 7 /HPF (ref 0–4)
SAMPLE: ABNORMAL
SODIUM BLD-SCNC: 141 MMOL/L (ref 136–145)
SP GR UR STRIP: 1.03
SQUAMOUS #/AREA URNS AUTO: 12 /HPF
UROBILINOGEN UR STRIP-ACNC: NEGATIVE EU/DL
WBC #/AREA URNS AUTO: 1 /HPF (ref 0–5)

## 2025-07-05 PROCEDURE — 81001 URINALYSIS AUTO W/SCOPE: CPT | Performed by: EMERGENCY MEDICINE

## 2025-07-05 PROCEDURE — 80047 BASIC METABLC PNL IONIZED CA: CPT

## 2025-07-05 PROCEDURE — 99284 EMERGENCY DEPT VISIT MOD MDM: CPT

## 2025-07-05 RX ORDER — GABAPENTIN 300 MG/1
300 CAPSULE ORAL 3 TIMES DAILY
Qty: 90 CAPSULE | Refills: 0 | Status: SHIPPED | OUTPATIENT
Start: 2025-07-05 | End: 2025-07-05

## 2025-07-05 RX ORDER — GABAPENTIN 300 MG/1
300 CAPSULE ORAL 3 TIMES DAILY
Qty: 90 CAPSULE | Refills: 0 | Status: SHIPPED | OUTPATIENT
Start: 2025-07-05 | End: 2025-08-04

## 2025-07-05 NOTE — DISCHARGE INSTRUCTIONS
I refilled your neuropathy medication.  Your urinalysis is normal.    The neurology clinic will contact you to arrange follow up     Return to the emergency department if any new or concerning symptoms occur

## 2025-07-05 NOTE — ED TRIAGE NOTES
89 y/o M presents to ER with c/c RUE, LUE, and RLE numbness tingling and pain x 1 year. Pain rated 10/10.

## 2025-07-05 NOTE — ED PROVIDER NOTES
Encounter Date: 7/5/2025       History     Chief Complaint   Patient presents with    Numbness     Both hands and r foot numb for 6-7 yrs     HPI  89-year-old male with a history of neuropathy presents with complaint of progressive numbness to his bilateral hands and feet.  He has pain to the right hand and right foot that has worse with increased use.  He denies increased quantities anus.  He is not having falls due to footdrop.  Chart review shows that he has previously prescribed gabapentin for these symptoms but he reports he is out of it.  He reports increased urination as well that has improved with the use of an over-the-counter pill he obtained at [facility]  Review of patient's allergies indicates:  No Known Allergies  Past Medical History:   Diagnosis Date    Hypertension      Past Surgical History:   Procedure Laterality Date    CEREBRAL ANGIOGRAM Bilateral 5/25/2024    Procedure: ANGIOGRAM-CEREBRAL;  Surgeon: Dayana Cuenca;  Location: [facility];  Service: Anesthesiology;  Laterality: Bilateral;    CRANIOTOMY FOR EVACUATION OF SUBDURAL HEMATOMA Right 5/17/2024    Procedure: CRANIOTOMY, FOR SUBDURAL HEMATOMA EVACUATION;  Surgeon: Herb Price MD;  Location: [facility] OR [address] [place] [address];  Service: Neurosurgery;  Laterality: Right;  right jacob hole craniotomy for SDH evacuation     No family history on file.  Social History[1]  Review of Systems    Physical Exam     Initial Vitals [07/05/25 1254]   BP Pulse Resp Temp SpO2   (!) 195/92 83 20 97.6 °F (36.4 °C) 100 %      MAP       --         Physical Exam    Nursing note and vitals reviewed.  Constitutional: He appears well-developed and well-nourished.   HENT:   Head: Normocephalic and atraumatic.   Eyes: Conjunctivae and EOM are normal. Pupils are equal, round, and reactive to light.   Neck: Neck supple.   Normal range of motion.  Cardiovascular:  Normal rate, regular rhythm and intact distal pulses.           Patient has excellent pulses to his right DP and PT via  Doppler   Pulmonary/Chest: Breath sounds normal. No respiratory distress. He exhibits no tenderness.   Abdominal: Abdomen is soft. He exhibits no distension. There is no rebound.   Musculoskeletal:         General: No tenderness or edema. Normal range of motion.      Cervical back: Normal range of motion and neck supple.     Neurological: He is alert and oriented to person, place, and time. He has normal strength. No cranial nerve deficit or sensory deficit. GCS score is 15. GCS eye subscore is 4. GCS verbal subscore is 5. GCS motor subscore is 6.   Patient has intact sensation to his bilateral hands and feet to light touch and two-point discrimination      Skin: Skin is warm and dry. Capillary refill takes less than 2 seconds.   Psychiatric: He has a normal mood and affect.         ED Course   Procedures  Labs Reviewed   URINALYSIS, REFLEX TO URINE CULTURE - Abnormal       Result Value    Color, UA Yellow      Appearance, UA Hazy (*)     pH, UA 6.0      Spec Grav UA 1.030      Protein, UA Trace (*)     Glucose, UA Negative      Ketones, UA Negative      Bilirubin, UA Negative      Blood, UA Negative      Nitrites, UA Negative      Urobilinogen, UA Negative      Leukocyte Esterase, UA 3+ (*)    URINALYSIS MICROSCOPIC - Abnormal    RBC, UA 7 (*)     WBC, UA 1      Bacteria, UA Rare      Squamous Epithelial Cells, UA 12      Microscopic Comment       ISTAT PROCEDURE - Abnormal    POC Glucose 81      POC BUN 18      POC Creatinine 1.1      POC Sodium 141      POC Potassium 3.8      POC Chloride 105      POC TCO2 (MEASURED) 25      POC Ionized Calcium 1.23      POC Hematocrit 35 (*)     Sample DEBO     GREY TOP URINE HOLD   ISTAT CHEM8          Imaging Results    None          Medications - No data to display  Medical Decision Making  Presentation is consistent with a chronic progression of the patient's neuropathy.  I have referred him to Neurology to be evaluated for need for possible EMG and determine if he is still  safe to drive.  He is not having any motor symptoms yet.  I have refilled his gabapentin has not helped his symptoms previously.  Given his polyuria, I checked a UA in his kidney function and it was normal.    Amount and/or Complexity of Data Reviewed  Labs:  Decision-making details documented in ED Course.    Risk  Prescription drug management.               ED Course as of 07/05/25 1441   Sat Jul 05, 2025   1437 ISTAT PROCEDURE(!)  BUN and creatinine reassuring [DS]      ED Course User Index  [DS] SessionsLuis MD                           Clinical Impression:  Final diagnoses:  [G62.9] Neuropathy (Primary)  [R35.89] Polyuria                       [1]   Social History  Tobacco Use    Smoking status: Every Day    Smokeless tobacco: Never   Substance Use Topics    Alcohol use: Yes    Drug use: Never        Luis Hickey MD  07/05/25 0084

## 2025-07-05 NOTE — ED NOTES
Bed: [facility]  Expected date:   Expected time:   Means of arrival:   Comments:

## 2025-07-06 LAB — HOLD SPECIMEN: NORMAL

## 2025-07-11 ENCOUNTER — TELEPHONE (OUTPATIENT)
Dept: NEUROLOGY | Facility: CLINIC | Age: OVER 89
End: 2025-07-11
Payer: MEDICARE

## 2025-07-11 ENCOUNTER — OFFICE VISIT (OUTPATIENT)
Dept: URGENT CARE | Facility: CLINIC | Age: OVER 89
End: 2025-07-11
Payer: MEDICARE

## 2025-07-11 ENCOUNTER — TELEPHONE (OUTPATIENT)
Dept: ORTHOPEDICS | Facility: CLINIC | Age: OVER 89
End: 2025-07-11
Payer: MEDICARE

## 2025-07-11 VITALS
DIASTOLIC BLOOD PRESSURE: 82 MMHG | TEMPERATURE: 99 F | HEIGHT: 67 IN | SYSTOLIC BLOOD PRESSURE: 154 MMHG | RESPIRATION RATE: 20 BRPM | HEART RATE: 82 BPM | WEIGHT: 180 LBS | BODY MASS INDEX: 28.25 KG/M2 | OXYGEN SATURATION: 97 %

## 2025-07-11 DIAGNOSIS — G62.9 NEUROPATHY: Primary | ICD-10-CM

## 2025-07-11 DIAGNOSIS — G56.03 BILATERAL CARPAL TUNNEL SYNDROME: ICD-10-CM

## 2025-07-11 DIAGNOSIS — R35.0 URINARY FREQUENCY: ICD-10-CM

## 2025-07-11 LAB
BILIRUBIN, UA POC OHS: NEGATIVE
BLOOD, UA POC OHS: NEGATIVE
CLARITY, UA POC OHS: ABNORMAL
COLOR, UA POC OHS: YELLOW
GLUCOSE, UA POC OHS: NEGATIVE
KETONES, UA POC OHS: ABNORMAL
LEUKOCYTES, UA POC OHS: NEGATIVE
NITRITE, UA POC OHS: NEGATIVE
OHS QRS DURATION: 80 MS
OHS QTC CALCULATION: 452 MS
PH, UA POC OHS: 5.5
PROTEIN, UA POC OHS: 30
SPECIFIC GRAVITY, UA POC OHS: >=1.03
UROBILINOGEN, UA POC OHS: 0.2

## 2025-07-11 NOTE — TELEPHONE ENCOUNTER
Spoke c pt. Confimred that he is still having hand tingling and pain. Advised pt that we would like to get his EMG Nerve Study performed and a follow up appointment with Dr. Derek Berger. Patient verbalized understanding and was thankful.   Message sent to EB for EMG scheduling.     Patient verbalized understanding and was thankful.

## 2025-07-11 NOTE — TELEPHONE ENCOUNTER
I spoke with patient's daughter to get patient scheduled with provider, patient has an referral in our system appointment set on 7/15

## 2025-07-11 NOTE — TELEPHONE ENCOUNTER
----- Message from June Calderon sent at 7/11/2025  2:10 PM CDT -----  Regarding: EMG scheduling  Could either of you assist me in getting mr. Peng scheduled for an EMG and f/u with Dr. Derek Berger?   Please and thank you very much. He's a sweet 89 y/o that needs to get his nerve study. :)    TGIF!  Yumiko

## 2025-07-11 NOTE — TELEPHONE ENCOUNTER
Staff spoke to patient. Patient does not want to to the [facility] location. Staff did sent a message to two pool providers [place] the [facility]

## 2025-07-11 NOTE — PATIENT INSTRUCTIONS
Referrals for neurology and hand specialist were placed. They will call you in the next couple of days to schedule you an appointment.    Please drink more water.    Please go to the nearest emergency room for any worsening of symptoms.    Referral ordered for Neurology and Hand Surgery.  Call [phone] to schedule appt

## 2025-07-11 NOTE — PROGRESS NOTES
"Subjective:      Patient ID: Moses Peng is a 89 y.o. male.    Vitals:  height is 5' 7" (1.702 m) and weight is 81.6 kg (180 lb). His oral temperature is 98.6 °F (37 °C). His blood pressure is 154/82 (abnormal) and his pulse is 82. His respiration is 20 and oxygen saturation is 97%.     Chief Complaint: Numbness    This is a 89 y.o. male who presents today with a chief complaint of R hand and foot numbness x5 years that is getting worse. PT reports that his right foot became so numb that his foot was on the accelerator of his vehicle. Pt also reports he is going to bathroom more frequently. He was seen in ED on 7/5 for similar symptoms. UA and kidney function normal at the time. He was referred to Neurology but he has not called to set up appt. He has strained relationship with daughter who used to be the one managing his doctor appts. He does not have a PCP and does not want one.    Other  This is a new problem. The current episode started today. The problem occurs constantly. The problem has been gradually worsening. Associated symptoms include numbness. Pertinent negatives include no abdominal pain, anorexia, arthralgias, change in bowel habit, chest pain, chills, congestion, coughing, diaphoresis, fatigue, fever, headaches, joint swelling, myalgias, nausea, neck pain, rash, sore throat, swollen glands, urinary symptoms, vertigo, visual change, vomiting or weakness. He has tried nothing for the symptoms. The treatment provided no relief.       Constitution: Negative for chills, sweating, fatigue and fever.   HENT:  Negative for congestion and sore throat.    Neck: Negative for neck pain.   Cardiovascular:  Negative for chest pain.   Respiratory:  Negative for cough.    Gastrointestinal:  Negative for abdominal pain, nausea and vomiting.   Musculoskeletal:  Negative for joint pain, joint swelling and muscle ache.   Skin:  Negative for rash and erythema.   Neurological:  Positive for numbness. Negative for history of " vertigo and headaches.      Objective:     Physical Exam   Constitutional: He is oriented to person, place, and time. He appears well-developed.   HENT:   Head: Normocephalic and atraumatic. Head is without abrasion, without contusion and without laceration.   Ears:   Right Ear: External ear normal.   Left Ear: External ear normal.   Nose: Nose normal.   Mouth/Throat: Oropharynx is clear and moist and mucous membranes are normal.   Eyes: Conjunctivae, EOM and lids are normal. Pupils are equal, round, and reactive to light.   Neck: Trachea normal and phonation normal. Neck supple.   Cardiovascular: Normal rate, regular rhythm and normal heart sounds.   Pulmonary/Chest: Effort normal and breath sounds normal. No stridor. No respiratory distress. He has no wheezes. He has no rhonchi. He has no rales. He exhibits no tenderness.   Musculoskeletal: Normal range of motion.         General: Normal range of motion.   Neurological: He is alert and oriented to person, place, and time.      Comments: Numbness to bilateral hands and feet   Skin: Skin is warm, dry, intact and no rash. Capillary refill takes less than 2 seconds. No abrasion, No burn, No bruising, No erythema and No ecchymosis   Psychiatric: His speech is normal and behavior is normal. Judgment and thought content normal.   Nursing note and vitals reviewed.      Assessment:     1. Neuropathy    2. Urinary frequency    3. Bilateral carpal tunnel syndrome        Plan:   Reviewed previous ED records.    Attempted to schedule neurology and hand surgery appts for pt but nothing available until October. Clinic  states she will send messages to both offices and they will reach out to patient to get him scheduled sooner.  Provided patient with referral number to call as well. Advised to continue taking gabapentin as ordered by ED.  Instructed to go to nearest emergency department for any worsening of symptoms.    Neuropathy  -     EKG 12-lead; Future  -      Ambulatory referral/consult to Neurology    Urinary frequency  -     POCT Urinalysis(Instrument)    Bilateral carpal tunnel syndrome  -     Ambulatory referral/consult to Hand Surgery    EKG:NSR; unchanged from previous tracings.    Patient Instructions   Referrals for neurology and hand specialist were placed. They will call you in the next couple of days to schedule you an appointment.    Please drink more water.    Please go to the nearest emergency room for any worsening of symptoms.    Referral ordered for Neurology and Hand Surgery.  Call [phone] to schedule appt

## 2025-07-15 ENCOUNTER — TELEPHONE (OUTPATIENT)
Dept: ORTHOPEDICS | Facility: CLINIC | Age: OVER 89
End: 2025-07-15
Payer: MEDICARE

## 2025-07-15 NOTE — TELEPHONE ENCOUNTER
LVM c pt. Attempting to confirm appt location & time c Dr. Reed 07/17/25 to review EMG results (7/15/25) . Requested a call back to the [facility] at [phone] with any questions, concerns or need for a different appointment time.

## (undated) DEVICE — CHLORAPREP W TINT 26ML APPL

## (undated) DEVICE — TRAY NEURO OMC

## (undated) DEVICE — KIT SURGIFLO HEMOSTATIC MATRIX

## (undated) DEVICE — DRAPE TOP 53X102IN

## (undated) DEVICE — TUBE FRAZIER 5MM 2FT SOFT TIP

## (undated) DEVICE — DRAPE ORTH SPLIT 77X108IN

## (undated) DEVICE — DRESSING SURGICAL 3/4X3/4

## (undated) DEVICE — DRAPE CORETEMP FLD WRM 56X62IN

## (undated) DEVICE — CARTRIDGE OIL

## (undated) DEVICE — ROUTER TAPERED 2.3MM

## (undated) DEVICE — HEMOSTAT SURGICEL 4X8IN

## (undated) DEVICE — DRESSING SURGICAL 1/2X1/2

## (undated) DEVICE — DRAPE SURGICAL STERI IRRG PCH

## (undated) DEVICE — MARKER SKIN RULER STERILE

## (undated) DEVICE — CORD BIPOLAR 12 FOOT

## (undated) DEVICE — SUT 4/0 18IN NUROLON BLK B

## (undated) DEVICE — SUT VICRYL 2 0 CT 2

## (undated) DEVICE — PENCIL GOLF STERILE

## (undated) DEVICE — ELECTRODE REM PLYHSV RETURN 9

## (undated) DEVICE — BAG DRAINAGE(CRAINIAL) EDS

## (undated) DEVICE — BIT PERFORATOR LARGE

## (undated) DEVICE — BLADE SURG CARBON STEEL SZ11

## (undated) DEVICE — DIFFUSER

## (undated) DEVICE — BUR BONE CUT MICRO TPS 3X3.8MM

## (undated) DEVICE — BURR ROUTER TAPERED

## (undated) DEVICE — KIT EVACUATOR FULL PERF 100CC

## (undated) DEVICE — BURR PRECISION ROUND 6.0MM

## (undated) DEVICE — CATH BACTISEAL EVD CLEAR 1.9MM